# Patient Record
Sex: MALE | Race: OTHER | NOT HISPANIC OR LATINO | URBAN - METROPOLITAN AREA
[De-identification: names, ages, dates, MRNs, and addresses within clinical notes are randomized per-mention and may not be internally consistent; named-entity substitution may affect disease eponyms.]

---

## 2017-01-23 ENCOUNTER — INPATIENT (INPATIENT)
Facility: HOSPITAL | Age: 82
LOS: 14 days | Discharge: HOME | DRG: 234 | End: 2017-02-07
Attending: THORACIC SURGERY (CARDIOTHORACIC VASCULAR SURGERY) | Admitting: INTERNAL MEDICINE
Payer: MEDICARE

## 2017-01-23 VITALS
RESPIRATION RATE: 16 BRPM | HEART RATE: 55 BPM | OXYGEN SATURATION: 95 % | WEIGHT: 130.07 LBS | TEMPERATURE: 98 F | DIASTOLIC BLOOD PRESSURE: 79 MMHG | SYSTOLIC BLOOD PRESSURE: 179 MMHG | HEIGHT: 63 IN

## 2017-01-23 DIAGNOSIS — I20.9 ANGINA PECTORIS, UNSPECIFIED: ICD-10-CM

## 2017-01-23 DIAGNOSIS — I20.0 UNSTABLE ANGINA: ICD-10-CM

## 2017-01-23 DIAGNOSIS — Z96.652 PRESENCE OF LEFT ARTIFICIAL KNEE JOINT: Chronic | ICD-10-CM

## 2017-01-23 LAB
ALBUMIN SERPL ELPH-MCNC: 3.9 G/DL — SIGNIFICANT CHANGE UP (ref 3.3–5)
ALP SERPL-CCNC: 53 U/L — SIGNIFICANT CHANGE UP (ref 40–120)
ALT FLD-CCNC: 11 U/L — SIGNIFICANT CHANGE UP (ref 10–45)
ANION GAP SERPL CALC-SCNC: 12 MMOL/L — SIGNIFICANT CHANGE UP (ref 5–17)
APTT BLD: 28.7 SEC — SIGNIFICANT CHANGE UP (ref 27.5–37.4)
AST SERPL-CCNC: 14 U/L — SIGNIFICANT CHANGE UP (ref 10–40)
BILIRUB DIRECT SERPL-MCNC: 0.2 MG/DL — SIGNIFICANT CHANGE UP (ref 0–0.2)
BILIRUB INDIRECT FLD-MCNC: 0.3 MG/DL — SIGNIFICANT CHANGE UP (ref 0.2–1)
BILIRUB SERPL-MCNC: 0.5 MG/DL — SIGNIFICANT CHANGE UP (ref 0.2–1.2)
BLD GP AB SCN SERPL QL: NEGATIVE — SIGNIFICANT CHANGE UP
BUN SERPL-MCNC: 22 MG/DL — SIGNIFICANT CHANGE UP (ref 7–23)
CALCIUM SERPL-MCNC: 9.9 MG/DL — SIGNIFICANT CHANGE UP (ref 8.4–10.5)
CHLORIDE SERPL-SCNC: 104 MMOL/L — SIGNIFICANT CHANGE UP (ref 96–108)
CHOLEST SERPL-MCNC: 89 MG/DL — SIGNIFICANT CHANGE UP (ref 10–199)
CO2 SERPL-SCNC: 23 MMOL/L — SIGNIFICANT CHANGE UP (ref 22–31)
CREAT SERPL-MCNC: 1.02 MG/DL — SIGNIFICANT CHANGE UP (ref 0.5–1.3)
GLUCOSE SERPL-MCNC: 103 MG/DL — HIGH (ref 70–99)
HBA1C BLD-MCNC: 6.7 % — HIGH (ref 4–5.6)
HCT VFR BLD CALC: 36.6 % — LOW (ref 39–50)
HDLC SERPL-MCNC: 52 MG/DL — SIGNIFICANT CHANGE UP (ref 40–125)
HGB BLD-MCNC: 12.2 G/DL — LOW (ref 13–17)
INR BLD: 1.05 RATIO — SIGNIFICANT CHANGE UP (ref 0.88–1.16)
LIPID PNL WITH DIRECT LDL SERPL: 26 MG/DL — SIGNIFICANT CHANGE UP
MCHC RBC-ENTMCNC: 32 PG — SIGNIFICANT CHANGE UP (ref 27–34)
MCHC RBC-ENTMCNC: 33.5 GM/DL — SIGNIFICANT CHANGE UP (ref 32–36)
MCV RBC AUTO: 95.5 FL — SIGNIFICANT CHANGE UP (ref 80–100)
PLATELET # BLD AUTO: 241 K/UL — SIGNIFICANT CHANGE UP (ref 150–400)
POTASSIUM SERPL-MCNC: 4.9 MMOL/L — SIGNIFICANT CHANGE UP (ref 3.5–5.3)
POTASSIUM SERPL-SCNC: 4.9 MMOL/L — SIGNIFICANT CHANGE UP (ref 3.5–5.3)
PROT SERPL-MCNC: 6.5 G/DL — SIGNIFICANT CHANGE UP (ref 6–8.3)
PROTHROM AB SERPL-ACNC: 11.3 SEC — SIGNIFICANT CHANGE UP (ref 10–13.1)
RBC # BLD: 3.83 M/UL — LOW (ref 4.2–5.8)
RBC # FLD: 13.2 % — SIGNIFICANT CHANGE UP (ref 10.3–14.5)
RH IG SCN BLD-IMP: POSITIVE — SIGNIFICANT CHANGE UP
SODIUM SERPL-SCNC: 139 MMOL/L — SIGNIFICANT CHANGE UP (ref 135–145)
T3 SERPL-MCNC: 87 NG/DL — SIGNIFICANT CHANGE UP (ref 80–200)
T4 AB SER-ACNC: 6.1 UG/DL — SIGNIFICANT CHANGE UP (ref 4.6–12)
TOTAL CHOLESTEROL/HDL RATIO MEASUREMENT: 1.7 RATIO — LOW (ref 3.4–9.6)
TRIGL SERPL-MCNC: 56 MG/DL — SIGNIFICANT CHANGE UP (ref 10–149)
TSH SERPL-MCNC: 1.12 UIU/ML — SIGNIFICANT CHANGE UP (ref 0.27–4.2)
WBC # BLD: 9.1 K/UL — SIGNIFICANT CHANGE UP (ref 3.8–10.5)
WBC # FLD AUTO: 9.1 K/UL — SIGNIFICANT CHANGE UP (ref 3.8–10.5)

## 2017-01-23 PROCEDURE — 93010 ELECTROCARDIOGRAM REPORT: CPT

## 2017-01-23 PROCEDURE — 71010: CPT | Mod: 26

## 2017-01-23 PROCEDURE — 93880 EXTRACRANIAL BILAT STUDY: CPT | Mod: 26

## 2017-01-23 RX ORDER — SODIUM CHLORIDE 9 MG/ML
3 INJECTION INTRAMUSCULAR; INTRAVENOUS; SUBCUTANEOUS EVERY 8 HOURS
Qty: 0 | Refills: 0 | Status: DISCONTINUED | OUTPATIENT
Start: 2017-01-23 | End: 2017-01-24

## 2017-01-23 RX ORDER — ASPIRIN/CALCIUM CARB/MAGNESIUM 324 MG
81 TABLET ORAL DAILY
Qty: 0 | Refills: 0 | Status: DISCONTINUED | OUTPATIENT
Start: 2017-01-23 | End: 2017-01-24

## 2017-01-23 RX ORDER — CEFUROXIME AXETIL 250 MG
1500 TABLET ORAL ONCE
Qty: 0 | Refills: 0 | Status: DISCONTINUED | OUTPATIENT
Start: 2017-01-23 | End: 2017-01-24

## 2017-01-23 RX ORDER — NITROGLYCERIN 6.5 MG
0.3 CAPSULE, EXTENDED RELEASE ORAL
Qty: 0 | Refills: 0 | Status: DISCONTINUED | OUTPATIENT
Start: 2017-01-23 | End: 2017-01-23

## 2017-01-23 RX ORDER — POLYETHYLENE GLYCOL 3350 17 G/17G
17 POWDER, FOR SOLUTION ORAL DAILY
Qty: 0 | Refills: 0 | Status: DISCONTINUED | OUTPATIENT
Start: 2017-01-23 | End: 2017-01-24

## 2017-01-23 RX ORDER — CHLORHEXIDINE GLUCONATE 213 G/1000ML
1 SOLUTION TOPICAL ONCE
Qty: 0 | Refills: 0 | Status: COMPLETED | OUTPATIENT
Start: 2017-01-23 | End: 2017-01-23

## 2017-01-23 RX ORDER — SODIUM CHLORIDE 9 MG/ML
1000 INJECTION, SOLUTION INTRAVENOUS
Qty: 0 | Refills: 0 | Status: DISCONTINUED | OUTPATIENT
Start: 2017-01-23 | End: 2017-01-24

## 2017-01-23 RX ORDER — FINASTERIDE 5 MG/1
5 TABLET, FILM COATED ORAL DAILY
Qty: 0 | Refills: 0 | Status: DISCONTINUED | OUTPATIENT
Start: 2017-01-23 | End: 2017-01-24

## 2017-01-23 RX ORDER — NITROGLYCERIN 6.5 MG
0.4 CAPSULE, EXTENDED RELEASE ORAL
Qty: 0 | Refills: 0 | Status: DISCONTINUED | OUTPATIENT
Start: 2017-01-23 | End: 2017-01-24

## 2017-01-23 RX ORDER — SERTRALINE 25 MG/1
25 TABLET, FILM COATED ORAL DAILY
Qty: 0 | Refills: 0 | Status: DISCONTINUED | OUTPATIENT
Start: 2017-01-23 | End: 2017-01-24

## 2017-01-23 RX ORDER — GLUCAGON INJECTION, SOLUTION 0.5 MG/.1ML
1 INJECTION, SOLUTION SUBCUTANEOUS ONCE
Qty: 0 | Refills: 0 | Status: DISCONTINUED | OUTPATIENT
Start: 2017-01-23 | End: 2017-01-24

## 2017-01-23 RX ORDER — CHLORHEXIDINE GLUCONATE 213 G/1000ML
30 SOLUTION TOPICAL ONCE
Qty: 0 | Refills: 0 | Status: DISCONTINUED | OUTPATIENT
Start: 2017-01-23 | End: 2017-01-24

## 2017-01-23 RX ORDER — PANTOPRAZOLE SODIUM 20 MG/1
40 TABLET, DELAYED RELEASE ORAL
Qty: 0 | Refills: 0 | Status: DISCONTINUED | OUTPATIENT
Start: 2017-01-23 | End: 2017-01-24

## 2017-01-23 RX ORDER — METOPROLOL TARTRATE 50 MG
25 TABLET ORAL
Qty: 0 | Refills: 0 | Status: DISCONTINUED | OUTPATIENT
Start: 2017-01-23 | End: 2017-01-24

## 2017-01-23 RX ORDER — MECLIZINE HCL 12.5 MG
1 TABLET ORAL
Qty: 0 | Refills: 0 | COMMUNITY

## 2017-01-23 RX ORDER — FLUTICASONE PROPIONATE AND SALMETEROL 50; 250 UG/1; UG/1
1 POWDER ORAL; RESPIRATORY (INHALATION)
Qty: 0 | Refills: 0 | Status: DISCONTINUED | OUTPATIENT
Start: 2017-01-23 | End: 2017-01-24

## 2017-01-23 RX ORDER — MECLIZINE HCL 12.5 MG
25 TABLET ORAL THREE TIMES A DAY
Qty: 0 | Refills: 0 | Status: DISCONTINUED | OUTPATIENT
Start: 2017-01-23 | End: 2017-01-24

## 2017-01-23 RX ORDER — DEXTROSE 50 % IN WATER 50 %
1 SYRINGE (ML) INTRAVENOUS ONCE
Qty: 0 | Refills: 0 | Status: DISCONTINUED | OUTPATIENT
Start: 2017-01-23 | End: 2017-01-24

## 2017-01-23 RX ORDER — DEXTROSE 50 % IN WATER 50 %
25 SYRINGE (ML) INTRAVENOUS ONCE
Qty: 0 | Refills: 0 | Status: DISCONTINUED | OUTPATIENT
Start: 2017-01-23 | End: 2017-01-24

## 2017-01-23 RX ORDER — CARBIDOPA AND LEVODOPA 25; 100 MG/1; MG/1
1 TABLET ORAL THREE TIMES A DAY
Qty: 0 | Refills: 0 | Status: DISCONTINUED | OUTPATIENT
Start: 2017-01-23 | End: 2017-01-24

## 2017-01-23 RX ORDER — ATORVASTATIN CALCIUM 80 MG/1
80 TABLET, FILM COATED ORAL AT BEDTIME
Qty: 0 | Refills: 0 | Status: DISCONTINUED | OUTPATIENT
Start: 2017-01-23 | End: 2017-01-24

## 2017-01-23 RX ORDER — INSULIN LISPRO 100/ML
VIAL (ML) SUBCUTANEOUS AT BEDTIME
Qty: 0 | Refills: 0 | Status: DISCONTINUED | OUTPATIENT
Start: 2017-01-23 | End: 2017-01-24

## 2017-01-23 RX ORDER — DEXTROSE 50 % IN WATER 50 %
12.5 SYRINGE (ML) INTRAVENOUS ONCE
Qty: 0 | Refills: 0 | Status: DISCONTINUED | OUTPATIENT
Start: 2017-01-23 | End: 2017-01-24

## 2017-01-23 RX ORDER — INSULIN LISPRO 100/ML
VIAL (ML) SUBCUTANEOUS
Qty: 0 | Refills: 0 | Status: DISCONTINUED | OUTPATIENT
Start: 2017-01-23 | End: 2017-01-24

## 2017-01-23 RX ORDER — TAMSULOSIN HYDROCHLORIDE 0.4 MG/1
1 CAPSULE ORAL
Qty: 0 | Refills: 0 | COMMUNITY

## 2017-01-23 RX ORDER — ISOSORBIDE MONONITRATE 60 MG/1
30 TABLET, EXTENDED RELEASE ORAL DAILY
Qty: 0 | Refills: 0 | Status: DISCONTINUED | OUTPATIENT
Start: 2017-01-23 | End: 2017-01-24

## 2017-01-23 RX ADMIN — FINASTERIDE 5 MILLIGRAM(S): 5 TABLET, FILM COATED ORAL at 18:49

## 2017-01-23 RX ADMIN — ATORVASTATIN CALCIUM 80 MILLIGRAM(S): 80 TABLET, FILM COATED ORAL at 21:49

## 2017-01-23 RX ADMIN — CARBIDOPA AND LEVODOPA 1 TABLET(S): 25; 100 TABLET ORAL at 21:49

## 2017-01-23 RX ADMIN — ISOSORBIDE MONONITRATE 30 MILLIGRAM(S): 60 TABLET, EXTENDED RELEASE ORAL at 18:46

## 2017-01-23 RX ADMIN — SODIUM CHLORIDE 3 MILLILITER(S): 9 INJECTION INTRAMUSCULAR; INTRAVENOUS; SUBCUTANEOUS at 21:47

## 2017-01-23 RX ADMIN — CHLORHEXIDINE GLUCONATE 1 APPLICATION(S): 213 SOLUTION TOPICAL at 21:46

## 2017-01-23 RX ADMIN — FLUTICASONE PROPIONATE AND SALMETEROL 1 DOSE(S): 50; 250 POWDER ORAL; RESPIRATORY (INHALATION) at 21:48

## 2017-01-23 RX ADMIN — Medication 25 MILLIGRAM(S): at 19:48

## 2017-01-23 RX ADMIN — Medication 81 MILLIGRAM(S): at 18:49

## 2017-01-23 RX ADMIN — SERTRALINE 25 MILLIGRAM(S): 25 TABLET, FILM COATED ORAL at 18:47

## 2017-01-23 RX ADMIN — Medication 25 MILLIGRAM(S): at 21:50

## 2017-01-23 NOTE — H&P CARDIOLOGY - PMH
Benign prostatic hyperplasia    CAD (coronary artery disease)    Diabetes mellitus    Dyslipidemia    Hypertension    Osteoporosis    Syncope    Urinary frequency

## 2017-01-23 NOTE — H&P CARDIOLOGY - FAMILY HISTORY
Father  Still living? Unknown  Family history of coronary artery disease, Age at diagnosis: Age Unknown     Mother  Still living? Unknown  Family history of coronary artery disease, Age at diagnosis: Age Unknown

## 2017-01-23 NOTE — H&P CARDIOLOGY - HISTORY OF PRESENT ILLNESS
76 y/o male poor historian with PMHx of HTN, DMT2 (A1c unknown), CAD with PCI to LAD (2003) presents for cardiac cath. Patient reports exertional and nonexertional, dull left ACW non-radiating chest pain, relieved with nitro SL. Patient seen and evaluated by Dr. Campos (cards). Referred fro cardiac cath for further cardiac evaluation. Patient currently denies sob, seen & eval By MD & now presents for cardiac cath.

## 2017-01-24 ENCOUNTER — APPOINTMENT (OUTPATIENT)
Dept: CARDIOTHORACIC SURGERY | Facility: HOSPITAL | Age: 82
End: 2017-01-24

## 2017-01-24 LAB
ALBUMIN SERPL ELPH-MCNC: 2.8 G/DL — LOW (ref 3.3–5)
ALP SERPL-CCNC: 33 U/L — LOW (ref 40–120)
ALT FLD-CCNC: 5 U/L RC — LOW (ref 10–45)
ANION GAP SERPL CALC-SCNC: 14 MMOL/L — SIGNIFICANT CHANGE UP (ref 5–17)
APPEARANCE UR: CLEAR — SIGNIFICANT CHANGE UP
APTT BLD: 32.4 SEC — SIGNIFICANT CHANGE UP (ref 27.5–37.4)
AST SERPL-CCNC: 19 U/L — SIGNIFICANT CHANGE UP (ref 10–40)
BASOPHILS # BLD AUTO: 0 K/UL — SIGNIFICANT CHANGE UP (ref 0–0.2)
BASOPHILS NFR BLD AUTO: 0.2 % — SIGNIFICANT CHANGE UP (ref 0–2)
BILIRUB SERPL-MCNC: 1 MG/DL — SIGNIFICANT CHANGE UP (ref 0.2–1.2)
BILIRUB UR-MCNC: NEGATIVE — SIGNIFICANT CHANGE UP
BLD GP AB SCN SERPL QL: NEGATIVE — SIGNIFICANT CHANGE UP
BUN SERPL-MCNC: 18 MG/DL — SIGNIFICANT CHANGE UP (ref 7–23)
CALCIUM SERPL-MCNC: 7.9 MG/DL — LOW (ref 8.4–10.5)
CHLORIDE SERPL-SCNC: 108 MMOL/L — SIGNIFICANT CHANGE UP (ref 96–108)
CK MB BLD-MCNC: 11.2 % — HIGH (ref 0–3.5)
CK MB CFR SERPL CALC: 18.2 NG/ML — HIGH (ref 0–6.7)
CK SERPL-CCNC: 162 U/L — SIGNIFICANT CHANGE UP (ref 30–200)
CO2 SERPL-SCNC: 24 MMOL/L — SIGNIFICANT CHANGE UP (ref 22–31)
COLOR SPEC: SIGNIFICANT CHANGE UP
CREAT SERPL-MCNC: 0.7 MG/DL — SIGNIFICANT CHANGE UP (ref 0.5–1.3)
DIFF PNL FLD: ABNORMAL
EOSINOPHIL # BLD AUTO: 0.1 K/UL — SIGNIFICANT CHANGE UP (ref 0–0.5)
EOSINOPHIL NFR BLD AUTO: 0.9 % — SIGNIFICANT CHANGE UP (ref 0–6)
FIBRINOGEN PPP-MCNC: 276 MG/DL — SIGNIFICANT CHANGE UP (ref 255–510)
GAS PNL BLDA: SIGNIFICANT CHANGE UP
GLUCOSE SERPL-MCNC: 118 MG/DL — HIGH (ref 70–99)
GLUCOSE UR QL: 100
HCT VFR BLD CALC: 26.9 % — LOW (ref 39–50)
HGB BLD-MCNC: 9.1 G/DL — LOW (ref 13–17)
INR BLD: 1.44 RATIO — HIGH (ref 0.88–1.16)
KETONES UR-MCNC: NEGATIVE — SIGNIFICANT CHANGE UP
LEUKOCYTE ESTERASE UR-ACNC: NEGATIVE — SIGNIFICANT CHANGE UP
LYMPHOCYTES # BLD AUTO: 1 K/UL — SIGNIFICANT CHANGE UP (ref 1–3.3)
LYMPHOCYTES # BLD AUTO: 6.5 % — LOW (ref 13–44)
MCHC RBC-ENTMCNC: 32.2 PG — SIGNIFICANT CHANGE UP (ref 27–34)
MCHC RBC-ENTMCNC: 33.8 GM/DL — SIGNIFICANT CHANGE UP (ref 32–36)
MCV RBC AUTO: 95.2 FL — SIGNIFICANT CHANGE UP (ref 80–100)
MONOCYTES # BLD AUTO: 1 K/UL — HIGH (ref 0–0.9)
MONOCYTES NFR BLD AUTO: 6.1 % — SIGNIFICANT CHANGE UP (ref 2–14)
MRSA PCR RESULT.: SIGNIFICANT CHANGE UP
NEUTROPHILS # BLD AUTO: 13.9 K/UL — HIGH (ref 1.8–7.4)
NEUTROPHILS NFR BLD AUTO: 86.3 % — HIGH (ref 43–77)
NITRITE UR-MCNC: NEGATIVE — SIGNIFICANT CHANGE UP
PH UR: 5 — SIGNIFICANT CHANGE UP (ref 4.8–8)
PLATELET # BLD AUTO: 138 K/UL — LOW (ref 150–400)
POTASSIUM SERPL-MCNC: 4.8 MMOL/L — SIGNIFICANT CHANGE UP (ref 3.5–5.3)
POTASSIUM SERPL-SCNC: 4.8 MMOL/L — SIGNIFICANT CHANGE UP (ref 3.5–5.3)
PROT SERPL-MCNC: 4.2 G/DL — LOW (ref 6–8.3)
PROT UR-MCNC: NEGATIVE — SIGNIFICANT CHANGE UP
PROTHROM AB SERPL-ACNC: 15.7 SEC — HIGH (ref 10–13.1)
RBC # BLD: 2.82 M/UL — LOW (ref 4.2–5.8)
RBC # FLD: 13.2 % — SIGNIFICANT CHANGE UP (ref 10.3–14.5)
RH IG SCN BLD-IMP: POSITIVE — SIGNIFICANT CHANGE UP
S AUREUS DNA NOSE QL NAA+PROBE: SIGNIFICANT CHANGE UP
SODIUM SERPL-SCNC: 146 MMOL/L — HIGH (ref 135–145)
SP GR SPEC: >1.03 — HIGH (ref 1.01–1.02)
TROPONIN T SERPL-MCNC: 0.44 NG/ML — HIGH (ref 0–0.06)
UROBILINOGEN FLD QL: NEGATIVE — SIGNIFICANT CHANGE UP
WBC # BLD: 16.1 K/UL — HIGH (ref 3.8–10.5)
WBC # FLD AUTO: 16.1 K/UL — HIGH (ref 3.8–10.5)

## 2017-01-24 PROCEDURE — 94010 BREATHING CAPACITY TEST: CPT | Mod: 26

## 2017-01-24 PROCEDURE — 71010: CPT | Mod: 26

## 2017-01-24 PROCEDURE — 93010 ELECTROCARDIOGRAM REPORT: CPT

## 2017-01-24 PROCEDURE — 33518 CABG ARTERY-VEIN TWO: CPT

## 2017-01-24 PROCEDURE — 71010: CPT | Mod: 26,77

## 2017-01-24 PROCEDURE — 33533 CABG ARTERIAL SINGLE: CPT

## 2017-01-24 PROCEDURE — 33508 ENDOSCOPIC VEIN HARVEST: CPT

## 2017-01-24 RX ORDER — POTASSIUM CHLORIDE 20 MEQ
10 PACKET (EA) ORAL ONCE
Qty: 0 | Refills: 0 | Status: DISCONTINUED | OUTPATIENT
Start: 2017-01-24 | End: 2017-01-27

## 2017-01-24 RX ORDER — NICARDIPINE HYDROCHLORIDE 30 MG/1
5 CAPSULE, EXTENDED RELEASE ORAL
Qty: 50 | Refills: 0 | Status: DISCONTINUED | OUTPATIENT
Start: 2017-01-24 | End: 2017-01-25

## 2017-01-24 RX ORDER — SODIUM CHLORIDE 9 MG/ML
1000 INJECTION INTRAMUSCULAR; INTRAVENOUS; SUBCUTANEOUS
Qty: 0 | Refills: 0 | Status: DISCONTINUED | OUTPATIENT
Start: 2017-01-24 | End: 2017-01-26

## 2017-01-24 RX ORDER — FAMOTIDINE 10 MG/ML
20 INJECTION INTRAVENOUS EVERY 12 HOURS
Qty: 0 | Refills: 0 | Status: DISCONTINUED | OUTPATIENT
Start: 2017-01-24 | End: 2017-01-25

## 2017-01-24 RX ORDER — POTASSIUM CHLORIDE 20 MEQ
10 PACKET (EA) ORAL
Qty: 0 | Refills: 0 | Status: COMPLETED | OUTPATIENT
Start: 2017-01-24 | End: 2017-01-24

## 2017-01-24 RX ORDER — PROPOFOL 10 MG/ML
14.12 INJECTION, EMULSION INTRAVENOUS
Qty: 500 | Refills: 0 | Status: DISCONTINUED | OUTPATIENT
Start: 2017-01-24 | End: 2017-01-25

## 2017-01-24 RX ORDER — NOREPINEPHRINE BITARTRATE/D5W 8 MG/250ML
0.09 PLASTIC BAG, INJECTION (ML) INTRAVENOUS
Qty: 8 | Refills: 0 | Status: DISCONTINUED | OUTPATIENT
Start: 2017-01-24 | End: 2017-01-25

## 2017-01-24 RX ORDER — ASPIRIN/CALCIUM CARB/MAGNESIUM 324 MG
325 TABLET ORAL DAILY
Qty: 0 | Refills: 0 | Status: DISCONTINUED | OUTPATIENT
Start: 2017-01-24 | End: 2017-02-06

## 2017-01-24 RX ORDER — DOCUSATE SODIUM 100 MG
100 CAPSULE ORAL THREE TIMES A DAY
Qty: 0 | Refills: 0 | Status: DISCONTINUED | OUTPATIENT
Start: 2017-01-24 | End: 2017-02-07

## 2017-01-24 RX ORDER — POTASSIUM CHLORIDE 20 MEQ
10 PACKET (EA) ORAL
Qty: 0 | Refills: 0 | Status: DISCONTINUED | OUTPATIENT
Start: 2017-01-24 | End: 2017-01-27

## 2017-01-24 RX ORDER — OXYCODONE HYDROCHLORIDE 5 MG/1
5 TABLET ORAL EVERY 4 HOURS
Qty: 0 | Refills: 0 | Status: DISCONTINUED | OUTPATIENT
Start: 2017-01-24 | End: 2017-01-27

## 2017-01-24 RX ORDER — ASPIRIN/CALCIUM CARB/MAGNESIUM 324 MG
300 TABLET ORAL ONCE
Qty: 0 | Refills: 0 | Status: COMPLETED | OUTPATIENT
Start: 2017-01-24 | End: 2017-01-24

## 2017-01-24 RX ORDER — ALBUMIN HUMAN 25 %
250 VIAL (ML) INTRAVENOUS
Qty: 0 | Refills: 0 | Status: COMPLETED | OUTPATIENT
Start: 2017-01-24 | End: 2017-01-24

## 2017-01-24 RX ORDER — SODIUM CHLORIDE 9 MG/ML
1000 INJECTION, SOLUTION INTRAVENOUS
Qty: 0 | Refills: 0 | Status: DISCONTINUED | OUTPATIENT
Start: 2017-01-24 | End: 2017-01-26

## 2017-01-24 RX ORDER — ALBUMIN HUMAN 25 %
250 VIAL (ML) INTRAVENOUS ONCE
Qty: 0 | Refills: 0 | Status: COMPLETED | OUTPATIENT
Start: 2017-01-24 | End: 2017-01-24

## 2017-01-24 RX ORDER — CEFUROXIME AXETIL 250 MG
1500 TABLET ORAL EVERY 8 HOURS
Qty: 0 | Refills: 0 | Status: COMPLETED | OUTPATIENT
Start: 2017-01-24 | End: 2017-01-25

## 2017-01-24 RX ORDER — ACETAMINOPHEN 500 MG
650 TABLET ORAL EVERY 6 HOURS
Qty: 0 | Refills: 0 | Status: DISCONTINUED | OUTPATIENT
Start: 2017-01-24 | End: 2017-02-07

## 2017-01-24 RX ORDER — MEPERIDINE HYDROCHLORIDE 50 MG/ML
25 INJECTION INTRAMUSCULAR; INTRAVENOUS; SUBCUTANEOUS ONCE
Qty: 0 | Refills: 0 | Status: DISCONTINUED | OUTPATIENT
Start: 2017-01-24 | End: 2017-01-24

## 2017-01-24 RX ORDER — SODIUM CHLORIDE 9 MG/ML
700 INJECTION, SOLUTION INTRAVENOUS ONCE
Qty: 0 | Refills: 0 | Status: COMPLETED | OUTPATIENT
Start: 2017-01-24 | End: 2017-01-24

## 2017-01-24 RX ORDER — HYDROMORPHONE HYDROCHLORIDE 2 MG/ML
0.5 INJECTION INTRAMUSCULAR; INTRAVENOUS; SUBCUTANEOUS ONCE
Qty: 0 | Refills: 0 | Status: DISCONTINUED | OUTPATIENT
Start: 2017-01-24 | End: 2017-01-24

## 2017-01-24 RX ORDER — INSULIN HUMAN 100 [IU]/ML
1 INJECTION, SOLUTION SUBCUTANEOUS
Qty: 100 | Refills: 0 | Status: DISCONTINUED | OUTPATIENT
Start: 2017-01-24 | End: 2017-01-25

## 2017-01-24 RX ORDER — DOBUTAMINE HCL 250MG/20ML
1.25 VIAL (ML) INTRAVENOUS
Qty: 500 | Refills: 0 | Status: DISCONTINUED | OUTPATIENT
Start: 2017-01-24 | End: 2017-01-25

## 2017-01-24 RX ORDER — CHLORHEXIDINE GLUCONATE 213 G/1000ML
5 SOLUTION TOPICAL EVERY 4 HOURS
Qty: 0 | Refills: 0 | Status: DISCONTINUED | OUTPATIENT
Start: 2017-01-24 | End: 2017-01-25

## 2017-01-24 RX ORDER — DOBUTAMINE HCL 250MG/20ML
2 VIAL (ML) INTRAVENOUS
Qty: 500 | Refills: 0 | Status: DISCONTINUED | OUTPATIENT
Start: 2017-01-24 | End: 2017-01-24

## 2017-01-24 RX ORDER — AMINOCAPROIC ACID 500 MG/1
5 TABLET ORAL
Qty: 5 | Refills: 0 | Status: DISCONTINUED | OUTPATIENT
Start: 2017-01-24 | End: 2017-01-24

## 2017-01-24 RX ADMIN — Medication 300 MILLIGRAM(S): at 23:02

## 2017-01-24 RX ADMIN — Medication 100 MILLIGRAM(S): at 23:03

## 2017-01-24 RX ADMIN — CHLORHEXIDINE GLUCONATE 5 MILLILITER(S): 213 SOLUTION TOPICAL at 18:11

## 2017-01-24 RX ADMIN — Medication 100 MILLIEQUIVALENT(S): at 17:33

## 2017-01-24 RX ADMIN — Medication 125 MILLILITER(S): at 17:59

## 2017-01-24 RX ADMIN — HYDROMORPHONE HYDROCHLORIDE 0.5 MILLIGRAM(S): 2 INJECTION INTRAMUSCULAR; INTRAVENOUS; SUBCUTANEOUS at 18:45

## 2017-01-24 RX ADMIN — Medication 25 MILLIGRAM(S): at 06:55

## 2017-01-24 RX ADMIN — Medication 100 MILLIEQUIVALENT(S): at 18:01

## 2017-01-24 RX ADMIN — Medication 4.42 MICROGRAM(S)/KG/MIN: at 21:43

## 2017-01-24 RX ADMIN — Medication 500 MILLILITER(S): at 21:39

## 2017-01-24 RX ADMIN — SODIUM CHLORIDE 4200 MILLILITER(S): 9 INJECTION, SOLUTION INTRAVENOUS at 20:45

## 2017-01-24 RX ADMIN — PROPOFOL 5 MICROGRAM(S)/KG/MIN: 10 INJECTION, EMULSION INTRAVENOUS at 21:43

## 2017-01-24 RX ADMIN — Medication 100 MILLIEQUIVALENT(S): at 23:45

## 2017-01-24 RX ADMIN — CHLORHEXIDINE GLUCONATE 5 MILLILITER(S): 213 SOLUTION TOPICAL at 15:16

## 2017-01-24 RX ADMIN — Medication 10 MICROGRAM(S)/KG/MIN: at 21:42

## 2017-01-24 RX ADMIN — CARBIDOPA AND LEVODOPA 1 TABLET(S): 25; 100 TABLET ORAL at 06:55

## 2017-01-24 RX ADMIN — Medication 100 MILLIGRAM(S): at 16:32

## 2017-01-24 RX ADMIN — HYDROMORPHONE HYDROCHLORIDE 0.5 MILLIGRAM(S): 2 INJECTION INTRAMUSCULAR; INTRAVENOUS; SUBCUTANEOUS at 19:00

## 2017-01-24 RX ADMIN — CHLORHEXIDINE GLUCONATE 5 MILLILITER(S): 213 SOLUTION TOPICAL at 21:10

## 2017-01-24 RX ADMIN — FLUTICASONE PROPIONATE AND SALMETEROL 1 DOSE(S): 50; 250 POWDER ORAL; RESPIRATORY (INHALATION) at 06:55

## 2017-01-24 RX ADMIN — PANTOPRAZOLE SODIUM 40 MILLIGRAM(S): 20 TABLET, DELAYED RELEASE ORAL at 06:55

## 2017-01-24 RX ADMIN — FAMOTIDINE 20 MILLIGRAM(S): 10 INJECTION INTRAVENOUS at 18:11

## 2017-01-24 RX ADMIN — SODIUM CHLORIDE 3 MILLILITER(S): 9 INJECTION INTRAMUSCULAR; INTRAVENOUS; SUBCUTANEOUS at 06:49

## 2017-01-24 NOTE — PROVIDER CONTACT NOTE (OTHER) - SITUATION
pt is a c3 from today on levo, insulin, pt not hemodynamically atable, blood pressure dropping then bp increasing, pt bucking vent

## 2017-01-24 NOTE — PROVIDER CONTACT NOTE (OTHER) - ACTION/TREATMENT ORDERED:
give 1 unit blood, give LR, albumin, start cardene when pressure high, start  start propofol drip, will continue to monitor

## 2017-01-25 LAB
ALBUMIN SERPL ELPH-MCNC: 3.6 G/DL — SIGNIFICANT CHANGE UP (ref 3.3–5)
ALP SERPL-CCNC: 29 U/L — LOW (ref 40–120)
ALT FLD-CCNC: 8 U/L RC — LOW (ref 10–45)
ANION GAP SERPL CALC-SCNC: 13 MMOL/L — SIGNIFICANT CHANGE UP (ref 5–17)
APTT BLD: 52.7 SEC — HIGH (ref 27.5–37.4)
AST SERPL-CCNC: 19 U/L — SIGNIFICANT CHANGE UP (ref 10–40)
BASOPHILS # BLD AUTO: 0 K/UL — SIGNIFICANT CHANGE UP (ref 0–0.2)
BASOPHILS NFR BLD AUTO: 0 % — SIGNIFICANT CHANGE UP (ref 0–2)
BILIRUB SERPL-MCNC: 1.5 MG/DL — HIGH (ref 0.2–1.2)
BUN SERPL-MCNC: 18 MG/DL — SIGNIFICANT CHANGE UP (ref 7–23)
CALCIUM SERPL-MCNC: 8 MG/DL — LOW (ref 8.4–10.5)
CHLORIDE SERPL-SCNC: 108 MMOL/L — SIGNIFICANT CHANGE UP (ref 96–108)
CO2 SERPL-SCNC: 23 MMOL/L — SIGNIFICANT CHANGE UP (ref 22–31)
CREAT SERPL-MCNC: 0.9 MG/DL — SIGNIFICANT CHANGE UP (ref 0.5–1.3)
EOSINOPHIL # BLD AUTO: 0 K/UL — SIGNIFICANT CHANGE UP (ref 0–0.5)
EOSINOPHIL NFR BLD AUTO: 0.1 % — SIGNIFICANT CHANGE UP (ref 0–6)
GAS PNL BLDA: SIGNIFICANT CHANGE UP
GLUCOSE SERPL-MCNC: 136 MG/DL — HIGH (ref 70–99)
HCT VFR BLD CALC: 27.8 % — LOW (ref 39–50)
HGB BLD-MCNC: 9.4 G/DL — LOW (ref 13–17)
INR BLD: 1.33 RATIO — HIGH (ref 0.88–1.16)
LYMPHOCYTES # BLD AUTO: 0.4 K/UL — LOW (ref 1–3.3)
LYMPHOCYTES # BLD AUTO: 2.7 % — LOW (ref 13–44)
MCHC RBC-ENTMCNC: 32.1 PG — SIGNIFICANT CHANGE UP (ref 27–34)
MCHC RBC-ENTMCNC: 33.9 GM/DL — SIGNIFICANT CHANGE UP (ref 32–36)
MCV RBC AUTO: 94.8 FL — SIGNIFICANT CHANGE UP (ref 80–100)
MONOCYTES # BLD AUTO: 0.3 K/UL — SIGNIFICANT CHANGE UP (ref 0–0.9)
MONOCYTES NFR BLD AUTO: 2.3 % — SIGNIFICANT CHANGE UP (ref 2–14)
NEUTROPHILS # BLD AUTO: 13.7 K/UL — HIGH (ref 1.8–7.4)
NEUTROPHILS NFR BLD AUTO: 94.9 % — HIGH (ref 43–77)
PLATELET # BLD AUTO: 112 K/UL — LOW (ref 150–400)
POTASSIUM SERPL-MCNC: 4.5 MMOL/L — SIGNIFICANT CHANGE UP (ref 3.5–5.3)
POTASSIUM SERPL-SCNC: 4.5 MMOL/L — SIGNIFICANT CHANGE UP (ref 3.5–5.3)
PROT SERPL-MCNC: 4.9 G/DL — LOW (ref 6–8.3)
PROTHROM AB SERPL-ACNC: 14.5 SEC — HIGH (ref 10–13.1)
RBC # BLD: 2.93 M/UL — LOW (ref 4.2–5.8)
RBC # FLD: 13.4 % — SIGNIFICANT CHANGE UP (ref 10.3–14.5)
SODIUM SERPL-SCNC: 144 MMOL/L — SIGNIFICANT CHANGE UP (ref 135–145)
WBC # BLD: 14.4 K/UL — HIGH (ref 3.8–10.5)
WBC # FLD AUTO: 14.4 K/UL — HIGH (ref 3.8–10.5)

## 2017-01-25 PROCEDURE — 71010: CPT | Mod: 26

## 2017-01-25 PROCEDURE — 93010 ELECTROCARDIOGRAM REPORT: CPT

## 2017-01-25 RX ORDER — FINASTERIDE 5 MG/1
5 TABLET, FILM COATED ORAL DAILY
Qty: 0 | Refills: 0 | Status: DISCONTINUED | OUTPATIENT
Start: 2017-01-25 | End: 2017-02-07

## 2017-01-25 RX ORDER — HEPARIN SODIUM 5000 [USP'U]/ML
5000 INJECTION INTRAVENOUS; SUBCUTANEOUS EVERY 8 HOURS
Qty: 0 | Refills: 0 | Status: DISCONTINUED | OUTPATIENT
Start: 2017-01-25 | End: 2017-01-25

## 2017-01-25 RX ORDER — DOBUTAMINE HCL 250MG/20ML
1 VIAL (ML) INTRAVENOUS
Qty: 500 | Refills: 0 | Status: DISCONTINUED | OUTPATIENT
Start: 2017-01-25 | End: 2017-01-25

## 2017-01-25 RX ORDER — POLYETHYLENE GLYCOL 3350 17 G/17G
17 POWDER, FOR SOLUTION ORAL DAILY
Qty: 0 | Refills: 0 | Status: DISCONTINUED | OUTPATIENT
Start: 2017-01-25 | End: 2017-02-07

## 2017-01-25 RX ORDER — ENOXAPARIN SODIUM 100 MG/ML
40 INJECTION SUBCUTANEOUS DAILY
Qty: 0 | Refills: 0 | Status: DISCONTINUED | OUTPATIENT
Start: 2017-01-25 | End: 2017-01-28

## 2017-01-25 RX ORDER — FENTANYL CITRATE 50 UG/ML
25 INJECTION INTRAVENOUS ONCE
Qty: 0 | Refills: 0 | Status: DISCONTINUED | OUTPATIENT
Start: 2017-01-25 | End: 2017-01-25

## 2017-01-25 RX ORDER — METOPROLOL TARTRATE 50 MG
12.5 TABLET ORAL EVERY 12 HOURS
Qty: 0 | Refills: 0 | Status: DISCONTINUED | OUTPATIENT
Start: 2017-01-25 | End: 2017-01-26

## 2017-01-25 RX ORDER — SERTRALINE 25 MG/1
25 TABLET, FILM COATED ORAL DAILY
Qty: 0 | Refills: 0 | Status: DISCONTINUED | OUTPATIENT
Start: 2017-01-25 | End: 2017-02-07

## 2017-01-25 RX ORDER — METFORMIN HYDROCHLORIDE 850 MG/1
500 TABLET ORAL
Qty: 0 | Refills: 0 | Status: DISCONTINUED | OUTPATIENT
Start: 2017-01-25 | End: 2017-01-26

## 2017-01-25 RX ORDER — FAMOTIDINE 10 MG/ML
20 INJECTION INTRAVENOUS
Qty: 0 | Refills: 0 | Status: DISCONTINUED | OUTPATIENT
Start: 2017-01-25 | End: 2017-01-25

## 2017-01-25 RX ORDER — ALBUMIN HUMAN 25 %
250 VIAL (ML) INTRAVENOUS ONCE
Qty: 0 | Refills: 0 | Status: COMPLETED | OUTPATIENT
Start: 2017-01-25 | End: 2017-01-25

## 2017-01-25 RX ORDER — PANTOPRAZOLE SODIUM 20 MG/1
40 TABLET, DELAYED RELEASE ORAL
Qty: 0 | Refills: 0 | Status: DISCONTINUED | OUTPATIENT
Start: 2017-01-25 | End: 2017-02-07

## 2017-01-25 RX ORDER — INSULIN LISPRO 100/ML
VIAL (ML) SUBCUTANEOUS AT BEDTIME
Qty: 0 | Refills: 0 | Status: DISCONTINUED | OUTPATIENT
Start: 2017-01-25 | End: 2017-02-07

## 2017-01-25 RX ORDER — ATORVASTATIN CALCIUM 80 MG/1
40 TABLET, FILM COATED ORAL AT BEDTIME
Qty: 0 | Refills: 0 | Status: DISCONTINUED | OUTPATIENT
Start: 2017-01-25 | End: 2017-02-07

## 2017-01-25 RX ORDER — DEXMEDETOMIDINE HYDROCHLORIDE IN 0.9% SODIUM CHLORIDE 4 UG/ML
0.1 INJECTION INTRAVENOUS
Qty: 200 | Refills: 0 | Status: DISCONTINUED | OUTPATIENT
Start: 2017-01-25 | End: 2017-01-25

## 2017-01-25 RX ORDER — INSULIN LISPRO 100/ML
VIAL (ML) SUBCUTANEOUS
Qty: 0 | Refills: 0 | Status: DISCONTINUED | OUTPATIENT
Start: 2017-01-25 | End: 2017-02-07

## 2017-01-25 RX ORDER — CARBIDOPA AND LEVODOPA 25; 100 MG/1; MG/1
1 TABLET ORAL THREE TIMES A DAY
Qty: 0 | Refills: 0 | Status: DISCONTINUED | OUTPATIENT
Start: 2017-01-25 | End: 2017-02-07

## 2017-01-25 RX ORDER — OXYBUTYNIN CHLORIDE 5 MG
5 TABLET ORAL
Qty: 0 | Refills: 0 | Status: DISCONTINUED | OUTPATIENT
Start: 2017-01-25 | End: 2017-02-07

## 2017-01-25 RX ADMIN — FINASTERIDE 5 MILLIGRAM(S): 5 TABLET, FILM COATED ORAL at 12:48

## 2017-01-25 RX ADMIN — Medication 100 MILLIGRAM(S): at 07:37

## 2017-01-25 RX ADMIN — Medication 12.5 MILLIGRAM(S): at 18:36

## 2017-01-25 RX ADMIN — CARBIDOPA AND LEVODOPA 1 TABLET(S): 25; 100 TABLET ORAL at 12:49

## 2017-01-25 RX ADMIN — Medication 100 MILLIGRAM(S): at 22:33

## 2017-01-25 RX ADMIN — Medication 325 MILLIGRAM(S): at 12:48

## 2017-01-25 RX ADMIN — CARBIDOPA AND LEVODOPA 1 TABLET(S): 25; 100 TABLET ORAL at 22:34

## 2017-01-25 RX ADMIN — OXYCODONE HYDROCHLORIDE 5 MILLIGRAM(S): 5 TABLET ORAL at 20:35

## 2017-01-25 RX ADMIN — Medication 5 MILLIGRAM(S): at 18:36

## 2017-01-25 RX ADMIN — FAMOTIDINE 20 MILLIGRAM(S): 10 INJECTION INTRAVENOUS at 05:08

## 2017-01-25 RX ADMIN — CHLORHEXIDINE GLUCONATE 5 MILLILITER(S): 213 SOLUTION TOPICAL at 05:08

## 2017-01-25 RX ADMIN — Medication 100 MILLIGRAM(S): at 23:37

## 2017-01-25 RX ADMIN — CHLORHEXIDINE GLUCONATE 5 MILLILITER(S): 213 SOLUTION TOPICAL at 01:32

## 2017-01-25 RX ADMIN — OXYCODONE HYDROCHLORIDE 5 MILLIGRAM(S): 5 TABLET ORAL at 20:08

## 2017-01-25 RX ADMIN — Medication 100 MILLIGRAM(S): at 17:18

## 2017-01-25 RX ADMIN — OXYCODONE HYDROCHLORIDE 5 MILLIGRAM(S): 5 TABLET ORAL at 14:20

## 2017-01-25 RX ADMIN — Medication 100 MILLIGRAM(S): at 12:49

## 2017-01-25 RX ADMIN — METFORMIN HYDROCHLORIDE 500 MILLIGRAM(S): 850 TABLET ORAL at 17:20

## 2017-01-25 RX ADMIN — Medication 650 MILLIGRAM(S): at 12:50

## 2017-01-25 RX ADMIN — OXYCODONE HYDROCHLORIDE 5 MILLIGRAM(S): 5 TABLET ORAL at 13:50

## 2017-01-25 RX ADMIN — POLYETHYLENE GLYCOL 3350 17 GRAM(S): 17 POWDER, FOR SOLUTION ORAL at 12:48

## 2017-01-25 RX ADMIN — SERTRALINE 25 MILLIGRAM(S): 25 TABLET, FILM COATED ORAL at 12:48

## 2017-01-25 RX ADMIN — ATORVASTATIN CALCIUM 40 MILLIGRAM(S): 80 TABLET, FILM COATED ORAL at 22:33

## 2017-01-25 RX ADMIN — Medication 125 MILLILITER(S): at 10:15

## 2017-01-25 RX ADMIN — FENTANYL CITRATE 25 MICROGRAM(S): 50 INJECTION INTRAVENOUS at 05:45

## 2017-01-25 RX ADMIN — CARBIDOPA AND LEVODOPA 1 TABLET(S): 25; 100 TABLET ORAL at 02:33

## 2017-01-25 RX ADMIN — ENOXAPARIN SODIUM 40 MILLIGRAM(S): 100 INJECTION SUBCUTANEOUS at 12:48

## 2017-01-25 RX ADMIN — Medication 6: at 17:17

## 2017-01-25 RX ADMIN — Medication 650 MILLIGRAM(S): at 13:20

## 2017-01-25 RX ADMIN — METFORMIN HYDROCHLORIDE 500 MILLIGRAM(S): 850 TABLET ORAL at 12:53

## 2017-01-25 RX ADMIN — FENTANYL CITRATE 25 MICROGRAM(S): 50 INJECTION INTRAVENOUS at 05:30

## 2017-01-25 NOTE — AIRWAY REMOVAL NOTE  ADULT & PEDS - ARTIFICAL AIRWAY REMOVAL COMMENTS
Written order for extubation verified. The patient was identified by full name and birth date compared to the identification band. Present during the procedure was Shi HIGHTOWER

## 2017-01-26 LAB
ANION GAP SERPL CALC-SCNC: 14 MMOL/L — SIGNIFICANT CHANGE UP (ref 5–17)
APPEARANCE UR: CLEAR — SIGNIFICANT CHANGE UP
BACTERIA # UR AUTO: ABNORMAL /HPF
BASOPHILS # BLD AUTO: 0 K/UL — SIGNIFICANT CHANGE UP (ref 0–0.2)
BASOPHILS NFR BLD AUTO: 0 % — SIGNIFICANT CHANGE UP (ref 0–2)
BILIRUB UR-MCNC: NEGATIVE — SIGNIFICANT CHANGE UP
BUN SERPL-MCNC: 31 MG/DL — HIGH (ref 7–23)
CALCIUM SERPL-MCNC: 8.3 MG/DL — LOW (ref 8.4–10.5)
CHLORIDE SERPL-SCNC: 105 MMOL/L — SIGNIFICANT CHANGE UP (ref 96–108)
CO2 SERPL-SCNC: 22 MMOL/L — SIGNIFICANT CHANGE UP (ref 22–31)
COLOR SPEC: YELLOW — SIGNIFICANT CHANGE UP
CREAT SERPL-MCNC: 0.97 MG/DL — SIGNIFICANT CHANGE UP (ref 0.5–1.3)
DIFF PNL FLD: ABNORMAL
EOSINOPHIL # BLD AUTO: 0 K/UL — SIGNIFICANT CHANGE UP (ref 0–0.5)
EOSINOPHIL NFR BLD AUTO: 0.1 % — SIGNIFICANT CHANGE UP (ref 0–6)
GLUCOSE SERPL-MCNC: 206 MG/DL — HIGH (ref 70–99)
GLUCOSE UR QL: NEGATIVE — SIGNIFICANT CHANGE UP
HCT VFR BLD CALC: 26.4 % — LOW (ref 39–50)
HGB BLD-MCNC: 8.7 G/DL — LOW (ref 13–17)
KETONES UR-MCNC: NEGATIVE — SIGNIFICANT CHANGE UP
LEUKOCYTE ESTERASE UR-ACNC: NEGATIVE — SIGNIFICANT CHANGE UP
LYMPHOCYTES # BLD AUTO: 0.5 K/UL — LOW (ref 1–3.3)
LYMPHOCYTES # BLD AUTO: 3.1 % — LOW (ref 13–44)
MCHC RBC-ENTMCNC: 31.7 PG — SIGNIFICANT CHANGE UP (ref 27–34)
MCHC RBC-ENTMCNC: 32.8 GM/DL — SIGNIFICANT CHANGE UP (ref 32–36)
MCV RBC AUTO: 96.5 FL — SIGNIFICANT CHANGE UP (ref 80–100)
MONOCYTES # BLD AUTO: 1.3 K/UL — HIGH (ref 0–0.9)
MONOCYTES NFR BLD AUTO: 7.6 % — SIGNIFICANT CHANGE UP (ref 2–14)
NEUTROPHILS # BLD AUTO: 15 K/UL — HIGH (ref 1.8–7.4)
NEUTROPHILS NFR BLD AUTO: 89.2 % — HIGH (ref 43–77)
NITRITE UR-MCNC: NEGATIVE — SIGNIFICANT CHANGE UP
PH UR: 6 — SIGNIFICANT CHANGE UP (ref 4.8–8)
PLATELET # BLD AUTO: 122 K/UL — LOW (ref 150–400)
POTASSIUM SERPL-MCNC: 5 MMOL/L — SIGNIFICANT CHANGE UP (ref 3.5–5.3)
POTASSIUM SERPL-SCNC: 5 MMOL/L — SIGNIFICANT CHANGE UP (ref 3.5–5.3)
PROT UR-MCNC: SIGNIFICANT CHANGE UP
RBC # BLD: 2.74 M/UL — LOW (ref 4.2–5.8)
RBC # FLD: 13.7 % — SIGNIFICANT CHANGE UP (ref 10.3–14.5)
RBC CASTS # UR COMP ASSIST: ABNORMAL /HPF (ref 0–2)
SODIUM SERPL-SCNC: 141 MMOL/L — SIGNIFICANT CHANGE UP (ref 135–145)
SP GR SPEC: 1.03 — HIGH (ref 1.01–1.02)
UROBILINOGEN FLD QL: NEGATIVE — SIGNIFICANT CHANGE UP
WBC # BLD: 16.8 K/UL — HIGH (ref 3.8–10.5)
WBC # FLD AUTO: 16.8 K/UL — HIGH (ref 3.8–10.5)
WBC UR QL: SIGNIFICANT CHANGE UP /HPF (ref 0–5)

## 2017-01-26 PROCEDURE — 71010: CPT | Mod: 26

## 2017-01-26 RX ORDER — MAGNESIUM SULFATE 500 MG/ML
2 VIAL (ML) INJECTION ONCE
Qty: 0 | Refills: 0 | Status: COMPLETED | OUTPATIENT
Start: 2017-01-26 | End: 2017-01-26

## 2017-01-26 RX ORDER — METOPROLOL TARTRATE 50 MG
5 TABLET ORAL ONCE
Qty: 0 | Refills: 0 | Status: DISCONTINUED | OUTPATIENT
Start: 2017-01-26 | End: 2017-01-27

## 2017-01-26 RX ORDER — SODIUM CHLORIDE 9 MG/ML
1000 INJECTION, SOLUTION INTRAVENOUS
Qty: 0 | Refills: 0 | Status: DISCONTINUED | OUTPATIENT
Start: 2017-01-26 | End: 2017-02-06

## 2017-01-26 RX ORDER — INSULIN LISPRO 100/ML
4 VIAL (ML) SUBCUTANEOUS
Qty: 0 | Refills: 0 | Status: DISCONTINUED | OUTPATIENT
Start: 2017-01-26 | End: 2017-01-28

## 2017-01-26 RX ORDER — METOPROLOL TARTRATE 50 MG
25 TABLET ORAL
Qty: 0 | Refills: 0 | Status: DISCONTINUED | OUTPATIENT
Start: 2017-01-26 | End: 2017-01-26

## 2017-01-26 RX ORDER — INSULIN GLARGINE 100 [IU]/ML
8 INJECTION, SOLUTION SUBCUTANEOUS AT BEDTIME
Qty: 0 | Refills: 0 | Status: DISCONTINUED | OUTPATIENT
Start: 2017-01-26 | End: 2017-01-30

## 2017-01-26 RX ORDER — DEXTROSE 50 % IN WATER 50 %
12.5 SYRINGE (ML) INTRAVENOUS ONCE
Qty: 0 | Refills: 0 | Status: DISCONTINUED | OUTPATIENT
Start: 2017-01-26 | End: 2017-02-07

## 2017-01-26 RX ORDER — DEXTROSE 50 % IN WATER 50 %
1 SYRINGE (ML) INTRAVENOUS ONCE
Qty: 0 | Refills: 0 | Status: DISCONTINUED | OUTPATIENT
Start: 2017-01-26 | End: 2017-02-07

## 2017-01-26 RX ORDER — DEXTROSE 50 % IN WATER 50 %
25 SYRINGE (ML) INTRAVENOUS ONCE
Qty: 0 | Refills: 0 | Status: DISCONTINUED | OUTPATIENT
Start: 2017-01-26 | End: 2017-02-07

## 2017-01-26 RX ORDER — GLUCAGON INJECTION, SOLUTION 0.5 MG/.1ML
1 INJECTION, SOLUTION SUBCUTANEOUS ONCE
Qty: 0 | Refills: 0 | Status: DISCONTINUED | OUTPATIENT
Start: 2017-01-26 | End: 2017-02-07

## 2017-01-26 RX ORDER — METOPROLOL TARTRATE 50 MG
25 TABLET ORAL THREE TIMES A DAY
Qty: 0 | Refills: 0 | Status: DISCONTINUED | OUTPATIENT
Start: 2017-01-26 | End: 2017-01-28

## 2017-01-26 RX ADMIN — Medication 3: at 12:06

## 2017-01-26 RX ADMIN — OXYCODONE HYDROCHLORIDE 5 MILLIGRAM(S): 5 TABLET ORAL at 09:50

## 2017-01-26 RX ADMIN — Medication 5 MILLIGRAM(S): at 17:04

## 2017-01-26 RX ADMIN — CARBIDOPA AND LEVODOPA 1 TABLET(S): 25; 100 TABLET ORAL at 06:55

## 2017-01-26 RX ADMIN — OXYCODONE HYDROCHLORIDE 5 MILLIGRAM(S): 5 TABLET ORAL at 00:32

## 2017-01-26 RX ADMIN — Medication 25 MILLIGRAM(S): at 05:45

## 2017-01-26 RX ADMIN — OXYCODONE HYDROCHLORIDE 5 MILLIGRAM(S): 5 TABLET ORAL at 10:20

## 2017-01-26 RX ADMIN — METFORMIN HYDROCHLORIDE 500 MILLIGRAM(S): 850 TABLET ORAL at 17:04

## 2017-01-26 RX ADMIN — Medication 650 MILLIGRAM(S): at 17:04

## 2017-01-26 RX ADMIN — Medication 650 MILLIGRAM(S): at 18:04

## 2017-01-26 RX ADMIN — Medication 5 MILLIGRAM(S): at 05:45

## 2017-01-26 RX ADMIN — OXYCODONE HYDROCHLORIDE 5 MILLIGRAM(S): 5 TABLET ORAL at 23:02

## 2017-01-26 RX ADMIN — Medication 325 MILLIGRAM(S): at 12:06

## 2017-01-26 RX ADMIN — Medication 100 MILLIGRAM(S): at 21:25

## 2017-01-26 RX ADMIN — METFORMIN HYDROCHLORIDE 500 MILLIGRAM(S): 850 TABLET ORAL at 07:53

## 2017-01-26 RX ADMIN — Medication 100 MILLIGRAM(S): at 14:39

## 2017-01-26 RX ADMIN — SERTRALINE 25 MILLIGRAM(S): 25 TABLET, FILM COATED ORAL at 12:05

## 2017-01-26 RX ADMIN — Medication 25 MILLIGRAM(S): at 21:25

## 2017-01-26 RX ADMIN — POLYETHYLENE GLYCOL 3350 17 GRAM(S): 17 POWDER, FOR SOLUTION ORAL at 12:05

## 2017-01-26 RX ADMIN — CARBIDOPA AND LEVODOPA 1 TABLET(S): 25; 100 TABLET ORAL at 21:25

## 2017-01-26 RX ADMIN — CARBIDOPA AND LEVODOPA 1 TABLET(S): 25; 100 TABLET ORAL at 14:39

## 2017-01-26 RX ADMIN — PANTOPRAZOLE SODIUM 40 MILLIGRAM(S): 20 TABLET, DELAYED RELEASE ORAL at 05:46

## 2017-01-26 RX ADMIN — ENOXAPARIN SODIUM 40 MILLIGRAM(S): 100 INJECTION SUBCUTANEOUS at 12:06

## 2017-01-26 RX ADMIN — Medication 1: at 07:50

## 2017-01-26 RX ADMIN — Medication 50 GRAM(S): at 09:50

## 2017-01-26 RX ADMIN — ATORVASTATIN CALCIUM 40 MILLIGRAM(S): 80 TABLET, FILM COATED ORAL at 21:25

## 2017-01-26 RX ADMIN — Medication 100 MILLIGRAM(S): at 05:46

## 2017-01-26 RX ADMIN — Medication 25 MILLIGRAM(S): at 14:39

## 2017-01-26 RX ADMIN — OXYCODONE HYDROCHLORIDE 5 MILLIGRAM(S): 5 TABLET ORAL at 01:10

## 2017-01-26 RX ADMIN — FINASTERIDE 5 MILLIGRAM(S): 5 TABLET, FILM COATED ORAL at 12:05

## 2017-01-26 NOTE — PHYSICAL THERAPY INITIAL EVALUATION ADULT - PERTINENT HX OF CURRENT PROBLEM, REHAB EVAL
76 y/o Una speaking M presents for cardiac cath. Diagnostic cath via RFA 1/23 reveals LM 60%, pCx 50%, mRCA 50%. S/p CABG x 3 01/24/2017. 78 y/o M w/ Parkinson's Disease presents for cardiac cath. Diagnostic cath via RFA 1/23 reveals LM 60%, pCx 50%, mRCA 50%. S/p CABG x 3 01/24/2017.

## 2017-01-26 NOTE — PHYSICAL THERAPY INITIAL EVALUATION ADULT - GAIT DEVIATIONS NOTED, PT EVAL
decreased step length/increased time in double stance/forward flexed posture, difficulty initiating gait/decreased velocity of limb motion/decreased gisella

## 2017-01-27 LAB
ANION GAP SERPL CALC-SCNC: 12 MMOL/L — SIGNIFICANT CHANGE UP (ref 5–17)
BASOPHILS # BLD AUTO: 0 K/UL — SIGNIFICANT CHANGE UP (ref 0–0.2)
BASOPHILS NFR BLD AUTO: 0 % — SIGNIFICANT CHANGE UP (ref 0–2)
BLD GP AB SCN SERPL QL: NEGATIVE — SIGNIFICANT CHANGE UP
BUN SERPL-MCNC: 35 MG/DL — HIGH (ref 7–23)
CALCIUM SERPL-MCNC: 8.4 MG/DL — SIGNIFICANT CHANGE UP (ref 8.4–10.5)
CHLORIDE SERPL-SCNC: 103 MMOL/L — SIGNIFICANT CHANGE UP (ref 96–108)
CO2 SERPL-SCNC: 24 MMOL/L — SIGNIFICANT CHANGE UP (ref 22–31)
CREAT SERPL-MCNC: 0.87 MG/DL — SIGNIFICANT CHANGE UP (ref 0.5–1.3)
CULTURE RESULTS: NO GROWTH — SIGNIFICANT CHANGE UP
EOSINOPHIL # BLD AUTO: 0 K/UL — SIGNIFICANT CHANGE UP (ref 0–0.5)
EOSINOPHIL NFR BLD AUTO: 0.1 % — SIGNIFICANT CHANGE UP (ref 0–6)
GLUCOSE SERPL-MCNC: 171 MG/DL — HIGH (ref 70–99)
HCT VFR BLD CALC: 23.3 % — LOW (ref 39–50)
HCT VFR BLD CALC: 29.3 % — LOW (ref 39–50)
HGB BLD-MCNC: 10.1 G/DL — LOW (ref 13–17)
HGB BLD-MCNC: 7.4 G/DL — LOW (ref 13–17)
LYMPHOCYTES # BLD AUTO: 0.7 K/UL — LOW (ref 1–3.3)
LYMPHOCYTES # BLD AUTO: 4.6 % — LOW (ref 13–44)
MCHC RBC-ENTMCNC: 30.9 PG — SIGNIFICANT CHANGE UP (ref 27–34)
MCHC RBC-ENTMCNC: 31.7 PG — SIGNIFICANT CHANGE UP (ref 27–34)
MCHC RBC-ENTMCNC: 31.9 GM/DL — LOW (ref 32–36)
MCHC RBC-ENTMCNC: 34.3 GM/DL — SIGNIFICANT CHANGE UP (ref 32–36)
MCV RBC AUTO: 92.6 FL — SIGNIFICANT CHANGE UP (ref 80–100)
MCV RBC AUTO: 96.8 FL — SIGNIFICANT CHANGE UP (ref 80–100)
MONOCYTES # BLD AUTO: 1.5 K/UL — HIGH (ref 0–0.9)
MONOCYTES NFR BLD AUTO: 10.4 % — SIGNIFICANT CHANGE UP (ref 2–14)
NEUTROPHILS # BLD AUTO: 12.4 K/UL — HIGH (ref 1.8–7.4)
NEUTROPHILS NFR BLD AUTO: 84.9 % — HIGH (ref 43–77)
PLATELET # BLD AUTO: 112 K/UL — LOW (ref 150–400)
PLATELET # BLD AUTO: 121 K/UL — LOW (ref 150–400)
POTASSIUM SERPL-MCNC: 5.2 MMOL/L — SIGNIFICANT CHANGE UP (ref 3.5–5.3)
POTASSIUM SERPL-SCNC: 5.2 MMOL/L — SIGNIFICANT CHANGE UP (ref 3.5–5.3)
RBC # BLD: 2.41 M/UL — LOW (ref 4.2–5.8)
RBC # BLD: 3.17 M/UL — LOW (ref 4.2–5.8)
RBC # FLD: 13.4 % — SIGNIFICANT CHANGE UP (ref 10.3–14.5)
RBC # FLD: 14.3 % — SIGNIFICANT CHANGE UP (ref 10.3–14.5)
RH IG SCN BLD-IMP: POSITIVE — SIGNIFICANT CHANGE UP
SODIUM SERPL-SCNC: 139 MMOL/L — SIGNIFICANT CHANGE UP (ref 135–145)
SPECIMEN SOURCE: SIGNIFICANT CHANGE UP
WBC # BLD: 14.6 K/UL — HIGH (ref 3.8–10.5)
WBC # BLD: 16.2 K/UL — HIGH (ref 3.8–10.5)
WBC # FLD AUTO: 14.6 K/UL — HIGH (ref 3.8–10.5)
WBC # FLD AUTO: 16.2 K/UL — HIGH (ref 3.8–10.5)

## 2017-01-27 PROCEDURE — 93010 ELECTROCARDIOGRAM REPORT: CPT

## 2017-01-27 PROCEDURE — 71010: CPT | Mod: 26

## 2017-01-27 PROCEDURE — 71250 CT THORAX DX C-: CPT | Mod: 26

## 2017-01-27 PROCEDURE — 93306 TTE W/DOPPLER COMPLETE: CPT | Mod: 26

## 2017-01-27 RX ORDER — ALBUTEROL 90 UG/1
1 AEROSOL, METERED ORAL EVERY 4 HOURS
Qty: 0 | Refills: 0 | Status: DISCONTINUED | OUTPATIENT
Start: 2017-01-27 | End: 2017-02-07

## 2017-01-27 RX ORDER — AMIODARONE HYDROCHLORIDE 400 MG/1
400 TABLET ORAL THREE TIMES A DAY
Qty: 0 | Refills: 0 | Status: COMPLETED | OUTPATIENT
Start: 2017-01-27 | End: 2017-01-31

## 2017-01-27 RX ORDER — METOPROLOL TARTRATE 50 MG
5 TABLET ORAL ONCE
Qty: 0 | Refills: 0 | Status: COMPLETED | OUTPATIENT
Start: 2017-01-27 | End: 2017-01-27

## 2017-01-27 RX ORDER — TIOTROPIUM BROMIDE 18 UG/1
1 CAPSULE ORAL; RESPIRATORY (INHALATION) DAILY
Qty: 0 | Refills: 0 | Status: DISCONTINUED | OUTPATIENT
Start: 2017-01-27 | End: 2017-02-07

## 2017-01-27 RX ORDER — AMIODARONE HYDROCHLORIDE 400 MG/1
200 TABLET ORAL DAILY
Qty: 0 | Refills: 0 | Status: DISCONTINUED | OUTPATIENT
Start: 2017-02-01 | End: 2017-02-04

## 2017-01-27 RX ORDER — FUROSEMIDE 40 MG
40 TABLET ORAL DAILY
Qty: 0 | Refills: 0 | Status: DISCONTINUED | OUTPATIENT
Start: 2017-01-28 | End: 2017-01-28

## 2017-01-27 RX ORDER — FUROSEMIDE 40 MG
40 TABLET ORAL ONCE
Qty: 0 | Refills: 0 | Status: COMPLETED | OUTPATIENT
Start: 2017-01-27 | End: 2017-01-27

## 2017-01-27 RX ORDER — IPRATROPIUM/ALBUTEROL SULFATE 18-103MCG
3 AEROSOL WITH ADAPTER (GRAM) INHALATION EVERY 6 HOURS
Qty: 0 | Refills: 0 | Status: DISCONTINUED | OUTPATIENT
Start: 2017-01-27 | End: 2017-02-07

## 2017-01-27 RX ADMIN — FINASTERIDE 5 MILLIGRAM(S): 5 TABLET, FILM COATED ORAL at 13:08

## 2017-01-27 RX ADMIN — Medication 325 MILLIGRAM(S): at 13:09

## 2017-01-27 RX ADMIN — Medication 5 MILLIGRAM(S): at 17:06

## 2017-01-27 RX ADMIN — OXYCODONE HYDROCHLORIDE 5 MILLIGRAM(S): 5 TABLET ORAL at 00:05

## 2017-01-27 RX ADMIN — Medication 100 MILLIGRAM(S): at 22:31

## 2017-01-27 RX ADMIN — Medication 5 MILLIGRAM(S): at 05:57

## 2017-01-27 RX ADMIN — Medication 25 MILLIGRAM(S): at 22:34

## 2017-01-27 RX ADMIN — CARBIDOPA AND LEVODOPA 1 TABLET(S): 25; 100 TABLET ORAL at 22:30

## 2017-01-27 RX ADMIN — Medication 40 MILLIGRAM(S): at 09:13

## 2017-01-27 RX ADMIN — Medication 5 MILLIGRAM(S): at 15:10

## 2017-01-27 RX ADMIN — Medication 5 MILLIGRAM(S): at 13:15

## 2017-01-27 RX ADMIN — Medication 100 MILLIGRAM(S): at 05:57

## 2017-01-27 RX ADMIN — ATORVASTATIN CALCIUM 40 MILLIGRAM(S): 80 TABLET, FILM COATED ORAL at 22:30

## 2017-01-27 RX ADMIN — Medication 4 UNIT(S): at 13:06

## 2017-01-27 RX ADMIN — Medication 3 MILLILITER(S): at 17:10

## 2017-01-27 RX ADMIN — INSULIN GLARGINE 8 UNIT(S): 100 INJECTION, SOLUTION SUBCUTANEOUS at 22:34

## 2017-01-27 RX ADMIN — OXYCODONE HYDROCHLORIDE 5 MILLIGRAM(S): 5 TABLET ORAL at 07:53

## 2017-01-27 RX ADMIN — Medication 2: at 17:07

## 2017-01-27 RX ADMIN — OXYCODONE HYDROCHLORIDE 5 MILLIGRAM(S): 5 TABLET ORAL at 22:34

## 2017-01-27 RX ADMIN — CARBIDOPA AND LEVODOPA 1 TABLET(S): 25; 100 TABLET ORAL at 05:57

## 2017-01-27 RX ADMIN — Medication 3 MILLILITER(S): at 09:13

## 2017-01-27 RX ADMIN — ENOXAPARIN SODIUM 40 MILLIGRAM(S): 100 INJECTION SUBCUTANEOUS at 13:07

## 2017-01-27 RX ADMIN — Medication 25 MILLIGRAM(S): at 05:57

## 2017-01-27 RX ADMIN — CARBIDOPA AND LEVODOPA 1 TABLET(S): 25; 100 TABLET ORAL at 13:08

## 2017-01-27 RX ADMIN — Medication 25 MILLIGRAM(S): at 13:07

## 2017-01-27 RX ADMIN — Medication 1: at 07:53

## 2017-01-27 RX ADMIN — OXYCODONE HYDROCHLORIDE 5 MILLIGRAM(S): 5 TABLET ORAL at 08:22

## 2017-01-27 RX ADMIN — AMIODARONE HYDROCHLORIDE 400 MILLIGRAM(S): 400 TABLET ORAL at 13:33

## 2017-01-27 RX ADMIN — AMIODARONE HYDROCHLORIDE 400 MILLIGRAM(S): 400 TABLET ORAL at 22:31

## 2017-01-27 RX ADMIN — OXYCODONE HYDROCHLORIDE 5 MILLIGRAM(S): 5 TABLET ORAL at 23:00

## 2017-01-27 RX ADMIN — Medication 4 UNIT(S): at 07:53

## 2017-01-27 RX ADMIN — POLYETHYLENE GLYCOL 3350 17 GRAM(S): 17 POWDER, FOR SOLUTION ORAL at 13:09

## 2017-01-27 RX ADMIN — PANTOPRAZOLE SODIUM 40 MILLIGRAM(S): 20 TABLET, DELAYED RELEASE ORAL at 05:57

## 2017-01-27 RX ADMIN — SERTRALINE 25 MILLIGRAM(S): 25 TABLET, FILM COATED ORAL at 13:09

## 2017-01-27 RX ADMIN — Medication 5 MILLIGRAM(S): at 13:45

## 2017-01-27 RX ADMIN — Medication 4 UNIT(S): at 17:07

## 2017-01-27 RX ADMIN — Medication 2: at 13:06

## 2017-01-27 RX ADMIN — Medication 100 MILLIGRAM(S): at 13:07

## 2017-01-28 LAB
ANION GAP SERPL CALC-SCNC: 11 MMOL/L — SIGNIFICANT CHANGE UP (ref 5–17)
BUN SERPL-MCNC: 34 MG/DL — HIGH (ref 7–23)
CALCIUM SERPL-MCNC: 9 MG/DL — SIGNIFICANT CHANGE UP (ref 8.4–10.5)
CHLORIDE SERPL-SCNC: 103 MMOL/L — SIGNIFICANT CHANGE UP (ref 96–108)
CO2 SERPL-SCNC: 28 MMOL/L — SIGNIFICANT CHANGE UP (ref 22–31)
CREAT SERPL-MCNC: 0.83 MG/DL — SIGNIFICANT CHANGE UP (ref 0.5–1.3)
GLUCOSE SERPL-MCNC: 86 MG/DL — SIGNIFICANT CHANGE UP (ref 70–99)
HCT VFR BLD CALC: 26.3 % — LOW (ref 39–50)
HGB BLD-MCNC: 8.9 G/DL — LOW (ref 13–17)
INR BLD: 1.16 RATIO — SIGNIFICANT CHANGE UP (ref 0.88–1.16)
MCHC RBC-ENTMCNC: 31.4 PG — SIGNIFICANT CHANGE UP (ref 27–34)
MCHC RBC-ENTMCNC: 33.8 GM/DL — SIGNIFICANT CHANGE UP (ref 32–36)
MCV RBC AUTO: 92.9 FL — SIGNIFICANT CHANGE UP (ref 80–100)
PLATELET # BLD AUTO: 112 K/UL — LOW (ref 150–400)
POTASSIUM SERPL-MCNC: 4.2 MMOL/L — SIGNIFICANT CHANGE UP (ref 3.5–5.3)
POTASSIUM SERPL-SCNC: 4.2 MMOL/L — SIGNIFICANT CHANGE UP (ref 3.5–5.3)
PROTHROM AB SERPL-ACNC: 12.7 SEC — SIGNIFICANT CHANGE UP (ref 10–13.1)
RBC # BLD: 2.83 M/UL — LOW (ref 4.2–5.8)
RBC # FLD: 14.1 % — SIGNIFICANT CHANGE UP (ref 10.3–14.5)
SODIUM SERPL-SCNC: 142 MMOL/L — SIGNIFICANT CHANGE UP (ref 135–145)
WBC # BLD: 11.8 K/UL — HIGH (ref 3.8–10.5)
WBC # FLD AUTO: 11.8 K/UL — HIGH (ref 3.8–10.5)

## 2017-01-28 RX ORDER — TIOTROPIUM BROMIDE 18 UG/1
1 CAPSULE ORAL; RESPIRATORY (INHALATION) DAILY
Qty: 0 | Refills: 0 | Status: DISCONTINUED | OUTPATIENT
Start: 2017-01-28 | End: 2017-02-07

## 2017-01-28 RX ORDER — METOPROLOL TARTRATE 50 MG
5 TABLET ORAL ONCE
Qty: 0 | Refills: 0 | Status: COMPLETED | OUTPATIENT
Start: 2017-01-28 | End: 2017-01-28

## 2017-01-28 RX ORDER — FUROSEMIDE 40 MG
40 TABLET ORAL ONCE
Qty: 0 | Refills: 0 | Status: COMPLETED | OUTPATIENT
Start: 2017-01-28 | End: 2017-01-28

## 2017-01-28 RX ORDER — METOPROLOL TARTRATE 50 MG
50 TABLET ORAL
Qty: 0 | Refills: 0 | Status: DISCONTINUED | OUTPATIENT
Start: 2017-01-28 | End: 2017-02-04

## 2017-01-28 RX ORDER — ENOXAPARIN SODIUM 100 MG/ML
60 INJECTION SUBCUTANEOUS EVERY 12 HOURS
Qty: 0 | Refills: 0 | Status: DISCONTINUED | OUTPATIENT
Start: 2017-01-28 | End: 2017-01-29

## 2017-01-28 RX ORDER — INSULIN LISPRO 100/ML
7 VIAL (ML) SUBCUTANEOUS
Qty: 0 | Refills: 0 | Status: DISCONTINUED | OUTPATIENT
Start: 2017-01-28 | End: 2017-01-30

## 2017-01-28 RX ORDER — METOPROLOL TARTRATE 50 MG
50 TABLET ORAL
Qty: 0 | Refills: 0 | Status: DISCONTINUED | OUTPATIENT
Start: 2017-01-28 | End: 2017-01-28

## 2017-01-28 RX ORDER — WARFARIN SODIUM 2.5 MG/1
5 TABLET ORAL ONCE
Qty: 0 | Refills: 0 | Status: COMPLETED | OUTPATIENT
Start: 2017-01-28 | End: 2017-01-28

## 2017-01-28 RX ORDER — FUROSEMIDE 40 MG
40 TABLET ORAL DAILY
Qty: 0 | Refills: 0 | Status: DISCONTINUED | OUTPATIENT
Start: 2017-01-29 | End: 2017-02-07

## 2017-01-28 RX ADMIN — ATORVASTATIN CALCIUM 40 MILLIGRAM(S): 80 TABLET, FILM COATED ORAL at 22:36

## 2017-01-28 RX ADMIN — Medication 325 MILLIGRAM(S): at 13:09

## 2017-01-28 RX ADMIN — POLYETHYLENE GLYCOL 3350 17 GRAM(S): 17 POWDER, FOR SOLUTION ORAL at 13:10

## 2017-01-28 RX ADMIN — AMIODARONE HYDROCHLORIDE 400 MILLIGRAM(S): 400 TABLET ORAL at 22:35

## 2017-01-28 RX ADMIN — FINASTERIDE 5 MILLIGRAM(S): 5 TABLET, FILM COATED ORAL at 13:10

## 2017-01-28 RX ADMIN — SERTRALINE 25 MILLIGRAM(S): 25 TABLET, FILM COATED ORAL at 13:10

## 2017-01-28 RX ADMIN — Medication 25 MILLIGRAM(S): at 14:57

## 2017-01-28 RX ADMIN — CARBIDOPA AND LEVODOPA 1 TABLET(S): 25; 100 TABLET ORAL at 22:35

## 2017-01-28 RX ADMIN — Medication 50 MILLIGRAM(S): at 18:01

## 2017-01-28 RX ADMIN — INSULIN GLARGINE 8 UNIT(S): 100 INJECTION, SOLUTION SUBCUTANEOUS at 23:44

## 2017-01-28 RX ADMIN — AMIODARONE HYDROCHLORIDE 400 MILLIGRAM(S): 400 TABLET ORAL at 14:56

## 2017-01-28 RX ADMIN — Medication 5 MILLIGRAM(S): at 09:41

## 2017-01-28 RX ADMIN — ENOXAPARIN SODIUM 60 MILLIGRAM(S): 100 INJECTION SUBCUTANEOUS at 18:00

## 2017-01-28 RX ADMIN — WARFARIN SODIUM 5 MILLIGRAM(S): 2.5 TABLET ORAL at 22:38

## 2017-01-28 RX ADMIN — Medication 3 MILLILITER(S): at 06:02

## 2017-01-28 RX ADMIN — Medication 25 MILLIGRAM(S): at 06:04

## 2017-01-28 RX ADMIN — TIOTROPIUM BROMIDE 1 CAPSULE(S): 18 CAPSULE ORAL; RESPIRATORY (INHALATION) at 07:27

## 2017-01-28 RX ADMIN — Medication 5 MILLIGRAM(S): at 18:01

## 2017-01-28 RX ADMIN — Medication 3 MILLILITER(S): at 00:04

## 2017-01-28 RX ADMIN — Medication 5 MILLIGRAM(S): at 06:04

## 2017-01-28 RX ADMIN — Medication 100 MILLIGRAM(S): at 14:57

## 2017-01-28 RX ADMIN — CARBIDOPA AND LEVODOPA 1 TABLET(S): 25; 100 TABLET ORAL at 06:04

## 2017-01-28 RX ADMIN — Medication 7 UNIT(S): at 12:10

## 2017-01-28 RX ADMIN — PANTOPRAZOLE SODIUM 40 MILLIGRAM(S): 20 TABLET, DELAYED RELEASE ORAL at 06:04

## 2017-01-28 RX ADMIN — Medication 4 UNIT(S): at 07:47

## 2017-01-28 RX ADMIN — Medication 5 MILLIGRAM(S): at 07:43

## 2017-01-28 RX ADMIN — Medication 3 MILLILITER(S): at 12:10

## 2017-01-28 RX ADMIN — AMIODARONE HYDROCHLORIDE 400 MILLIGRAM(S): 400 TABLET ORAL at 06:04

## 2017-01-28 RX ADMIN — Medication 100 MILLIGRAM(S): at 22:35

## 2017-01-28 RX ADMIN — Medication 7 UNIT(S): at 18:01

## 2017-01-28 RX ADMIN — Medication 3 MILLILITER(S): at 18:01

## 2017-01-28 RX ADMIN — Medication 40 MILLIGRAM(S): at 06:20

## 2017-01-28 RX ADMIN — CARBIDOPA AND LEVODOPA 1 TABLET(S): 25; 100 TABLET ORAL at 14:57

## 2017-01-28 RX ADMIN — Medication 100 MILLIGRAM(S): at 06:04

## 2017-01-28 NOTE — PROVIDER CONTACT NOTE (CHANGE IN STATUS NOTIFICATION) - ACTION/TREATMENT ORDERED:
Pt given Lopressor 5mg IVP as per orders, received 6AM Lopressor 25mg and Amio 400mg, will continue to monitor

## 2017-01-28 NOTE — PROVIDER CONTACT NOTE (OTHER) - REASON
pt pressure very liable, pt woke up only squeezed hands did not move feet
Pt. Using accessory muscle to breath, Audible expiratory wheezing
Patient converted to Rapid Afib 130-140

## 2017-01-28 NOTE — PROVIDER CONTACT NOTE (OTHER) - BACKGROUND
Pt. S/P cath: TVD on 1/23; CABG X3 on 1/24. Pt. sent to Rapid Afib on 1/26; and 1/27. Pt. now on NSR

## 2017-01-29 LAB
ANION GAP SERPL CALC-SCNC: 12 MMOL/L — SIGNIFICANT CHANGE UP (ref 5–17)
BUN SERPL-MCNC: 38 MG/DL — HIGH (ref 7–23)
CALCIUM SERPL-MCNC: 8.9 MG/DL — SIGNIFICANT CHANGE UP (ref 8.4–10.5)
CHLORIDE SERPL-SCNC: 101 MMOL/L — SIGNIFICANT CHANGE UP (ref 96–108)
CO2 SERPL-SCNC: 28 MMOL/L — SIGNIFICANT CHANGE UP (ref 22–31)
CREAT SERPL-MCNC: 0.99 MG/DL — SIGNIFICANT CHANGE UP (ref 0.5–1.3)
GLUCOSE SERPL-MCNC: 108 MG/DL — HIGH (ref 70–99)
HCT VFR BLD CALC: 26.6 % — LOW (ref 39–50)
HGB BLD-MCNC: 9 G/DL — LOW (ref 13–17)
INR BLD: 1.14 RATIO — SIGNIFICANT CHANGE UP (ref 0.88–1.16)
MCHC RBC-ENTMCNC: 31.8 PG — SIGNIFICANT CHANGE UP (ref 27–34)
MCHC RBC-ENTMCNC: 34 GM/DL — SIGNIFICANT CHANGE UP (ref 32–36)
MCV RBC AUTO: 93.5 FL — SIGNIFICANT CHANGE UP (ref 80–100)
PLATELET # BLD AUTO: 149 K/UL — LOW (ref 150–400)
POTASSIUM SERPL-MCNC: 3.9 MMOL/L — SIGNIFICANT CHANGE UP (ref 3.5–5.3)
POTASSIUM SERPL-SCNC: 3.9 MMOL/L — SIGNIFICANT CHANGE UP (ref 3.5–5.3)
PROTHROM AB SERPL-ACNC: 12.4 SEC — SIGNIFICANT CHANGE UP (ref 10–13.1)
RBC # BLD: 2.84 M/UL — LOW (ref 4.2–5.8)
RBC # FLD: 14.1 % — SIGNIFICANT CHANGE UP (ref 10.3–14.5)
SODIUM SERPL-SCNC: 141 MMOL/L — SIGNIFICANT CHANGE UP (ref 135–145)
WBC # BLD: 10.7 K/UL — HIGH (ref 3.8–10.5)
WBC # FLD AUTO: 10.7 K/UL — HIGH (ref 3.8–10.5)

## 2017-01-29 RX ORDER — WARFARIN SODIUM 2.5 MG/1
5 TABLET ORAL ONCE
Qty: 0 | Refills: 0 | Status: COMPLETED | OUTPATIENT
Start: 2017-01-29 | End: 2017-01-29

## 2017-01-29 RX ORDER — NYSTATIN 500MM UNIT
500000 POWDER (EA) MISCELLANEOUS EVERY 6 HOURS
Qty: 0 | Refills: 0 | Status: DISCONTINUED | OUTPATIENT
Start: 2017-01-29 | End: 2017-02-07

## 2017-01-29 RX ADMIN — INSULIN GLARGINE 8 UNIT(S): 100 INJECTION, SOLUTION SUBCUTANEOUS at 22:23

## 2017-01-29 RX ADMIN — Medication 5 MILLIGRAM(S): at 07:02

## 2017-01-29 RX ADMIN — Medication 7 UNIT(S): at 17:19

## 2017-01-29 RX ADMIN — Medication 325 MILLIGRAM(S): at 12:03

## 2017-01-29 RX ADMIN — FINASTERIDE 5 MILLIGRAM(S): 5 TABLET, FILM COATED ORAL at 12:03

## 2017-01-29 RX ADMIN — Medication 500000 UNIT(S): at 14:54

## 2017-01-29 RX ADMIN — Medication 100 MILLIGRAM(S): at 07:02

## 2017-01-29 RX ADMIN — PANTOPRAZOLE SODIUM 40 MILLIGRAM(S): 20 TABLET, DELAYED RELEASE ORAL at 07:02

## 2017-01-29 RX ADMIN — Medication 1: at 22:01

## 2017-01-29 RX ADMIN — Medication 1: at 17:19

## 2017-01-29 RX ADMIN — SERTRALINE 25 MILLIGRAM(S): 25 TABLET, FILM COATED ORAL at 12:03

## 2017-01-29 RX ADMIN — CARBIDOPA AND LEVODOPA 1 TABLET(S): 25; 100 TABLET ORAL at 07:02

## 2017-01-29 RX ADMIN — ENOXAPARIN SODIUM 60 MILLIGRAM(S): 100 INJECTION SUBCUTANEOUS at 07:07

## 2017-01-29 RX ADMIN — Medication 40 MILLIGRAM(S): at 07:02

## 2017-01-29 RX ADMIN — Medication 100 MILLIGRAM(S): at 21:25

## 2017-01-29 RX ADMIN — Medication 3 MILLILITER(S): at 21:36

## 2017-01-29 RX ADMIN — AMIODARONE HYDROCHLORIDE 400 MILLIGRAM(S): 400 TABLET ORAL at 07:02

## 2017-01-29 RX ADMIN — CARBIDOPA AND LEVODOPA 1 TABLET(S): 25; 100 TABLET ORAL at 14:54

## 2017-01-29 RX ADMIN — Medication 5 MILLIGRAM(S): at 17:20

## 2017-01-29 RX ADMIN — Medication 50 MILLIGRAM(S): at 07:07

## 2017-01-29 RX ADMIN — POLYETHYLENE GLYCOL 3350 17 GRAM(S): 17 POWDER, FOR SOLUTION ORAL at 12:02

## 2017-01-29 RX ADMIN — Medication 50 MILLIGRAM(S): at 17:20

## 2017-01-29 RX ADMIN — Medication 7 UNIT(S): at 08:20

## 2017-01-29 RX ADMIN — Medication 3 MILLILITER(S): at 12:02

## 2017-01-29 RX ADMIN — WARFARIN SODIUM 5 MILLIGRAM(S): 2.5 TABLET ORAL at 21:25

## 2017-01-29 RX ADMIN — Medication 3 MILLILITER(S): at 07:03

## 2017-01-29 RX ADMIN — Medication 3 MILLILITER(S): at 17:19

## 2017-01-29 RX ADMIN — Medication 7 UNIT(S): at 12:02

## 2017-01-29 RX ADMIN — Medication 100 MILLIGRAM(S): at 14:56

## 2017-01-29 RX ADMIN — ATORVASTATIN CALCIUM 40 MILLIGRAM(S): 80 TABLET, FILM COATED ORAL at 21:25

## 2017-01-29 RX ADMIN — CARBIDOPA AND LEVODOPA 1 TABLET(S): 25; 100 TABLET ORAL at 21:25

## 2017-01-29 RX ADMIN — Medication 500000 UNIT(S): at 21:25

## 2017-01-29 RX ADMIN — TIOTROPIUM BROMIDE 1 CAPSULE(S): 18 CAPSULE ORAL; RESPIRATORY (INHALATION) at 12:03

## 2017-01-29 RX ADMIN — Medication 3 MILLILITER(S): at 00:00

## 2017-01-29 RX ADMIN — AMIODARONE HYDROCHLORIDE 400 MILLIGRAM(S): 400 TABLET ORAL at 21:25

## 2017-01-29 RX ADMIN — AMIODARONE HYDROCHLORIDE 400 MILLIGRAM(S): 400 TABLET ORAL at 14:56

## 2017-01-30 LAB
ANION GAP SERPL CALC-SCNC: 14 MMOL/L — SIGNIFICANT CHANGE UP (ref 5–17)
APTT BLD: 29.6 SEC — SIGNIFICANT CHANGE UP (ref 27.5–37.4)
BUN SERPL-MCNC: 37 MG/DL — HIGH (ref 7–23)
CALCIUM SERPL-MCNC: 8.6 MG/DL — SIGNIFICANT CHANGE UP (ref 8.4–10.5)
CHLORIDE SERPL-SCNC: 100 MMOL/L — SIGNIFICANT CHANGE UP (ref 96–108)
CO2 SERPL-SCNC: 26 MMOL/L — SIGNIFICANT CHANGE UP (ref 22–31)
CREAT SERPL-MCNC: 1.03 MG/DL — SIGNIFICANT CHANGE UP (ref 0.5–1.3)
GLUCOSE SERPL-MCNC: 138 MG/DL — HIGH (ref 70–99)
HCT VFR BLD CALC: 25.7 % — LOW (ref 39–50)
HGB BLD-MCNC: 8.9 G/DL — LOW (ref 13–17)
INR BLD: 1.27 RATIO — HIGH (ref 0.88–1.16)
MCHC RBC-ENTMCNC: 32.3 PG — SIGNIFICANT CHANGE UP (ref 27–34)
MCHC RBC-ENTMCNC: 34.5 GM/DL — SIGNIFICANT CHANGE UP (ref 32–36)
MCV RBC AUTO: 93.6 FL — SIGNIFICANT CHANGE UP (ref 80–100)
PLATELET # BLD AUTO: 186 K/UL — SIGNIFICANT CHANGE UP (ref 150–400)
POTASSIUM SERPL-MCNC: 3.9 MMOL/L — SIGNIFICANT CHANGE UP (ref 3.5–5.3)
POTASSIUM SERPL-SCNC: 3.9 MMOL/L — SIGNIFICANT CHANGE UP (ref 3.5–5.3)
PROTHROM AB SERPL-ACNC: 13.7 SEC — HIGH (ref 10–13.1)
RBC # BLD: 2.74 M/UL — LOW (ref 4.2–5.8)
RBC # FLD: 14.1 % — SIGNIFICANT CHANGE UP (ref 10.3–14.5)
SODIUM SERPL-SCNC: 140 MMOL/L — SIGNIFICANT CHANGE UP (ref 135–145)
WBC # BLD: 9.1 K/UL — SIGNIFICANT CHANGE UP (ref 3.8–10.5)
WBC # FLD AUTO: 9.1 K/UL — SIGNIFICANT CHANGE UP (ref 3.8–10.5)

## 2017-01-30 RX ORDER — WARFARIN SODIUM 2.5 MG/1
7.5 TABLET ORAL ONCE
Qty: 0 | Refills: 0 | Status: COMPLETED | OUTPATIENT
Start: 2017-01-30 | End: 2017-01-30

## 2017-01-30 RX ORDER — INSULIN LISPRO 100/ML
9 VIAL (ML) SUBCUTANEOUS
Qty: 0 | Refills: 0 | Status: DISCONTINUED | OUTPATIENT
Start: 2017-01-30 | End: 2017-02-01

## 2017-01-30 RX ORDER — INSULIN GLARGINE 100 [IU]/ML
10 INJECTION, SOLUTION SUBCUTANEOUS AT BEDTIME
Qty: 0 | Refills: 0 | Status: DISCONTINUED | OUTPATIENT
Start: 2017-01-30 | End: 2017-02-07

## 2017-01-30 RX ADMIN — Medication 7 UNIT(S): at 08:15

## 2017-01-30 RX ADMIN — Medication 2: at 12:09

## 2017-01-30 RX ADMIN — CARBIDOPA AND LEVODOPA 1 TABLET(S): 25; 100 TABLET ORAL at 21:44

## 2017-01-30 RX ADMIN — Medication 325 MILLIGRAM(S): at 12:01

## 2017-01-30 RX ADMIN — AMIODARONE HYDROCHLORIDE 400 MILLIGRAM(S): 400 TABLET ORAL at 06:32

## 2017-01-30 RX ADMIN — Medication 500000 UNIT(S): at 06:32

## 2017-01-30 RX ADMIN — TIOTROPIUM BROMIDE 1 CAPSULE(S): 18 CAPSULE ORAL; RESPIRATORY (INHALATION) at 12:10

## 2017-01-30 RX ADMIN — Medication 5 MILLIGRAM(S): at 06:32

## 2017-01-30 RX ADMIN — CARBIDOPA AND LEVODOPA 1 TABLET(S): 25; 100 TABLET ORAL at 14:38

## 2017-01-30 RX ADMIN — SERTRALINE 25 MILLIGRAM(S): 25 TABLET, FILM COATED ORAL at 12:01

## 2017-01-30 RX ADMIN — Medication 500000 UNIT(S): at 17:46

## 2017-01-30 RX ADMIN — Medication 3 MILLILITER(S): at 21:44

## 2017-01-30 RX ADMIN — ATORVASTATIN CALCIUM 40 MILLIGRAM(S): 80 TABLET, FILM COATED ORAL at 21:44

## 2017-01-30 RX ADMIN — Medication 100 MILLIGRAM(S): at 14:38

## 2017-01-30 RX ADMIN — Medication 1: at 17:43

## 2017-01-30 RX ADMIN — Medication 5 MILLIGRAM(S): at 17:46

## 2017-01-30 RX ADMIN — INSULIN GLARGINE 10 UNIT(S): 100 INJECTION, SOLUTION SUBCUTANEOUS at 21:56

## 2017-01-30 RX ADMIN — Medication 3 MILLILITER(S): at 06:33

## 2017-01-30 RX ADMIN — Medication 50 MILLIGRAM(S): at 17:46

## 2017-01-30 RX ADMIN — Medication 50 MILLIGRAM(S): at 06:32

## 2017-01-30 RX ADMIN — Medication 500000 UNIT(S): at 21:44

## 2017-01-30 RX ADMIN — POLYETHYLENE GLYCOL 3350 17 GRAM(S): 17 POWDER, FOR SOLUTION ORAL at 12:00

## 2017-01-30 RX ADMIN — Medication 1: at 08:15

## 2017-01-30 RX ADMIN — Medication 9 UNIT(S): at 17:43

## 2017-01-30 RX ADMIN — Medication 40 MILLIGRAM(S): at 06:32

## 2017-01-30 RX ADMIN — Medication 100 MILLIGRAM(S): at 08:16

## 2017-01-30 RX ADMIN — AMIODARONE HYDROCHLORIDE 400 MILLIGRAM(S): 400 TABLET ORAL at 14:38

## 2017-01-30 RX ADMIN — Medication 3 MILLILITER(S): at 12:10

## 2017-01-30 RX ADMIN — CARBIDOPA AND LEVODOPA 1 TABLET(S): 25; 100 TABLET ORAL at 06:32

## 2017-01-30 RX ADMIN — WARFARIN SODIUM 7.5 MILLIGRAM(S): 2.5 TABLET ORAL at 21:44

## 2017-01-30 RX ADMIN — Medication 500000 UNIT(S): at 12:05

## 2017-01-30 RX ADMIN — AMIODARONE HYDROCHLORIDE 400 MILLIGRAM(S): 400 TABLET ORAL at 21:44

## 2017-01-30 RX ADMIN — FINASTERIDE 5 MILLIGRAM(S): 5 TABLET, FILM COATED ORAL at 12:01

## 2017-01-30 RX ADMIN — Medication 7 UNIT(S): at 12:10

## 2017-01-30 RX ADMIN — PANTOPRAZOLE SODIUM 40 MILLIGRAM(S): 20 TABLET, DELAYED RELEASE ORAL at 06:32

## 2017-01-31 LAB
ANION GAP SERPL CALC-SCNC: 14 MMOL/L — SIGNIFICANT CHANGE UP (ref 5–17)
APPEARANCE UR: CLEAR — SIGNIFICANT CHANGE UP
BACTERIA # UR AUTO: ABNORMAL /HPF
BILIRUB UR-MCNC: NEGATIVE — SIGNIFICANT CHANGE UP
BUN SERPL-MCNC: 35 MG/DL — HIGH (ref 7–23)
CALCIUM SERPL-MCNC: 8.8 MG/DL — SIGNIFICANT CHANGE UP (ref 8.4–10.5)
CHLORIDE SERPL-SCNC: 101 MMOL/L — SIGNIFICANT CHANGE UP (ref 96–108)
CO2 SERPL-SCNC: 27 MMOL/L — SIGNIFICANT CHANGE UP (ref 22–31)
COLOR SPEC: YELLOW — SIGNIFICANT CHANGE UP
CREAT SERPL-MCNC: 1.05 MG/DL — SIGNIFICANT CHANGE UP (ref 0.5–1.3)
DIFF PNL FLD: NEGATIVE — SIGNIFICANT CHANGE UP
GLUCOSE SERPL-MCNC: 95 MG/DL — SIGNIFICANT CHANGE UP (ref 70–99)
GLUCOSE UR QL: NEGATIVE — SIGNIFICANT CHANGE UP
HCT VFR BLD CALC: 26.6 % — LOW (ref 39–50)
HGB BLD-MCNC: 8.8 G/DL — LOW (ref 13–17)
INR BLD: 1.66 RATIO — HIGH (ref 0.88–1.16)
KETONES UR-MCNC: NEGATIVE — SIGNIFICANT CHANGE UP
LEUKOCYTE ESTERASE UR-ACNC: NEGATIVE — SIGNIFICANT CHANGE UP
MCHC RBC-ENTMCNC: 31.3 PG — SIGNIFICANT CHANGE UP (ref 27–34)
MCHC RBC-ENTMCNC: 33 GM/DL — SIGNIFICANT CHANGE UP (ref 32–36)
MCV RBC AUTO: 94.8 FL — SIGNIFICANT CHANGE UP (ref 80–100)
NITRITE UR-MCNC: NEGATIVE — SIGNIFICANT CHANGE UP
PH UR: 5.5 — SIGNIFICANT CHANGE UP (ref 4.8–8)
PLATELET # BLD AUTO: 243 K/UL — SIGNIFICANT CHANGE UP (ref 150–400)
POTASSIUM SERPL-MCNC: 3.8 MMOL/L — SIGNIFICANT CHANGE UP (ref 3.5–5.3)
POTASSIUM SERPL-SCNC: 3.8 MMOL/L — SIGNIFICANT CHANGE UP (ref 3.5–5.3)
PROT UR-MCNC: SIGNIFICANT CHANGE UP
PROTHROM AB SERPL-ACNC: 18.2 SEC — HIGH (ref 10–13.1)
RBC # BLD: 2.81 M/UL — LOW (ref 4.2–5.8)
RBC # FLD: 13.6 % — SIGNIFICANT CHANGE UP (ref 10.3–14.5)
RBC CASTS # UR COMP ASSIST: SIGNIFICANT CHANGE UP /HPF (ref 0–2)
SODIUM SERPL-SCNC: 142 MMOL/L — SIGNIFICANT CHANGE UP (ref 135–145)
SP GR SPEC: 1.02 — SIGNIFICANT CHANGE UP (ref 1.01–1.02)
UROBILINOGEN FLD QL: NEGATIVE — SIGNIFICANT CHANGE UP
WBC # BLD: 10.9 K/UL — HIGH (ref 3.8–10.5)
WBC # FLD AUTO: 10.9 K/UL — HIGH (ref 3.8–10.5)
WBC UR QL: SIGNIFICANT CHANGE UP /HPF (ref 0–5)

## 2017-01-31 RX ORDER — WARFARIN SODIUM 2.5 MG/1
5 TABLET ORAL ONCE
Qty: 0 | Refills: 0 | Status: COMPLETED | OUTPATIENT
Start: 2017-01-31 | End: 2017-01-31

## 2017-01-31 RX ADMIN — ATORVASTATIN CALCIUM 40 MILLIGRAM(S): 80 TABLET, FILM COATED ORAL at 21:47

## 2017-01-31 RX ADMIN — Medication 50 MILLIGRAM(S): at 06:34

## 2017-01-31 RX ADMIN — TIOTROPIUM BROMIDE 1 CAPSULE(S): 18 CAPSULE ORAL; RESPIRATORY (INHALATION) at 11:54

## 2017-01-31 RX ADMIN — Medication 9 UNIT(S): at 07:49

## 2017-01-31 RX ADMIN — WARFARIN SODIUM 5 MILLIGRAM(S): 2.5 TABLET ORAL at 21:47

## 2017-01-31 RX ADMIN — Medication 3 MILLILITER(S): at 17:16

## 2017-01-31 RX ADMIN — AMIODARONE HYDROCHLORIDE 400 MILLIGRAM(S): 400 TABLET ORAL at 06:34

## 2017-01-31 RX ADMIN — Medication 9 UNIT(S): at 17:13

## 2017-01-31 RX ADMIN — CARBIDOPA AND LEVODOPA 1 TABLET(S): 25; 100 TABLET ORAL at 14:23

## 2017-01-31 RX ADMIN — Medication 500000 UNIT(S): at 17:16

## 2017-01-31 RX ADMIN — INSULIN GLARGINE 10 UNIT(S): 100 INJECTION, SOLUTION SUBCUTANEOUS at 21:56

## 2017-01-31 RX ADMIN — PANTOPRAZOLE SODIUM 40 MILLIGRAM(S): 20 TABLET, DELAYED RELEASE ORAL at 06:33

## 2017-01-31 RX ADMIN — Medication 3 MILLILITER(S): at 06:35

## 2017-01-31 RX ADMIN — Medication 3 MILLILITER(S): at 21:47

## 2017-01-31 RX ADMIN — Medication 3 MILLILITER(S): at 12:03

## 2017-01-31 RX ADMIN — Medication 100 MILLIGRAM(S): at 14:23

## 2017-01-31 RX ADMIN — Medication 325 MILLIGRAM(S): at 11:54

## 2017-01-31 RX ADMIN — CARBIDOPA AND LEVODOPA 1 TABLET(S): 25; 100 TABLET ORAL at 06:34

## 2017-01-31 RX ADMIN — Medication 500000 UNIT(S): at 06:35

## 2017-01-31 RX ADMIN — Medication 5 MILLIGRAM(S): at 17:16

## 2017-01-31 RX ADMIN — CARBIDOPA AND LEVODOPA 1 TABLET(S): 25; 100 TABLET ORAL at 21:47

## 2017-01-31 RX ADMIN — Medication 40 MILLIGRAM(S): at 06:34

## 2017-01-31 RX ADMIN — Medication 500000 UNIT(S): at 12:03

## 2017-01-31 RX ADMIN — Medication 5 MILLIGRAM(S): at 06:34

## 2017-01-31 RX ADMIN — Medication 500000 UNIT(S): at 21:47

## 2017-01-31 RX ADMIN — Medication 50 MILLIGRAM(S): at 17:16

## 2017-01-31 RX ADMIN — Medication 100 MILLIGRAM(S): at 21:47

## 2017-01-31 RX ADMIN — SERTRALINE 25 MILLIGRAM(S): 25 TABLET, FILM COATED ORAL at 11:54

## 2017-01-31 RX ADMIN — FINASTERIDE 5 MILLIGRAM(S): 5 TABLET, FILM COATED ORAL at 11:54

## 2017-01-31 RX ADMIN — Medication 9 UNIT(S): at 12:10

## 2017-02-01 LAB
ANION GAP SERPL CALC-SCNC: 15 MMOL/L — SIGNIFICANT CHANGE UP (ref 5–17)
BUN SERPL-MCNC: 46 MG/DL — HIGH (ref 7–23)
CALCIUM SERPL-MCNC: 9.4 MG/DL — SIGNIFICANT CHANGE UP (ref 8.4–10.5)
CHLORIDE SERPL-SCNC: 96 MMOL/L — SIGNIFICANT CHANGE UP (ref 96–108)
CO2 SERPL-SCNC: 28 MMOL/L — SIGNIFICANT CHANGE UP (ref 22–31)
CREAT SERPL-MCNC: 1.11 MG/DL — SIGNIFICANT CHANGE UP (ref 0.5–1.3)
GLUCOSE SERPL-MCNC: 180 MG/DL — HIGH (ref 70–99)
HCT VFR BLD CALC: 29.6 % — LOW (ref 39–50)
HGB BLD-MCNC: 9.7 G/DL — LOW (ref 13–17)
INR BLD: 2.63 RATIO — HIGH (ref 0.88–1.16)
MCHC RBC-ENTMCNC: 30.8 PG — SIGNIFICANT CHANGE UP (ref 27–34)
MCHC RBC-ENTMCNC: 32.8 GM/DL — SIGNIFICANT CHANGE UP (ref 32–36)
MCV RBC AUTO: 93.8 FL — SIGNIFICANT CHANGE UP (ref 80–100)
PLATELET # BLD AUTO: 333 K/UL — SIGNIFICANT CHANGE UP (ref 150–400)
POTASSIUM SERPL-MCNC: 3.9 MMOL/L — SIGNIFICANT CHANGE UP (ref 3.5–5.3)
POTASSIUM SERPL-SCNC: 3.9 MMOL/L — SIGNIFICANT CHANGE UP (ref 3.5–5.3)
PROTHROM AB SERPL-ACNC: 28.7 SEC — HIGH (ref 10–13.1)
RBC # BLD: 3.15 M/UL — LOW (ref 4.2–5.8)
RBC # FLD: 14.3 % — SIGNIFICANT CHANGE UP (ref 10.3–14.5)
SODIUM SERPL-SCNC: 139 MMOL/L — SIGNIFICANT CHANGE UP (ref 135–145)
WBC # BLD: 11.9 K/UL — HIGH (ref 3.8–10.5)
WBC # FLD AUTO: 11.9 K/UL — HIGH (ref 3.8–10.5)

## 2017-02-01 RX ORDER — DEXTROSE 50 % IN WATER 50 %
1 SYRINGE (ML) INTRAVENOUS ONCE
Qty: 0 | Refills: 0 | Status: COMPLETED | OUTPATIENT
Start: 2017-02-01 | End: 2017-02-01

## 2017-02-01 RX ADMIN — Medication 5 MILLIGRAM(S): at 05:11

## 2017-02-01 RX ADMIN — Medication 3 MILLILITER(S): at 20:00

## 2017-02-01 RX ADMIN — INSULIN GLARGINE 10 UNIT(S): 100 INJECTION, SOLUTION SUBCUTANEOUS at 22:54

## 2017-02-01 RX ADMIN — Medication 325 MILLIGRAM(S): at 13:11

## 2017-02-01 RX ADMIN — AMIODARONE HYDROCHLORIDE 200 MILLIGRAM(S): 400 TABLET ORAL at 05:12

## 2017-02-01 RX ADMIN — Medication 500000 UNIT(S): at 13:10

## 2017-02-01 RX ADMIN — Medication 50 MILLIGRAM(S): at 22:00

## 2017-02-01 RX ADMIN — Medication 40 MILLIGRAM(S): at 05:11

## 2017-02-01 RX ADMIN — Medication 650 MILLIGRAM(S): at 22:45

## 2017-02-01 RX ADMIN — Medication 650 MILLIGRAM(S): at 22:00

## 2017-02-01 RX ADMIN — PANTOPRAZOLE SODIUM 40 MILLIGRAM(S): 20 TABLET, DELAYED RELEASE ORAL at 05:13

## 2017-02-01 RX ADMIN — Medication 9 UNIT(S): at 08:19

## 2017-02-01 RX ADMIN — CARBIDOPA AND LEVODOPA 1 TABLET(S): 25; 100 TABLET ORAL at 22:00

## 2017-02-01 RX ADMIN — Medication 3 MILLILITER(S): at 05:12

## 2017-02-01 RX ADMIN — FINASTERIDE 5 MILLIGRAM(S): 5 TABLET, FILM COATED ORAL at 13:11

## 2017-02-01 RX ADMIN — CARBIDOPA AND LEVODOPA 1 TABLET(S): 25; 100 TABLET ORAL at 13:11

## 2017-02-01 RX ADMIN — TIOTROPIUM BROMIDE 1 CAPSULE(S): 18 CAPSULE ORAL; RESPIRATORY (INHALATION) at 13:10

## 2017-02-01 RX ADMIN — Medication 50 MILLIGRAM(S): at 05:11

## 2017-02-01 RX ADMIN — CARBIDOPA AND LEVODOPA 1 TABLET(S): 25; 100 TABLET ORAL at 05:12

## 2017-02-01 RX ADMIN — Medication 100 MILLIGRAM(S): at 22:00

## 2017-02-01 RX ADMIN — Medication 1 DOSE(S): at 11:58

## 2017-02-01 RX ADMIN — ATORVASTATIN CALCIUM 40 MILLIGRAM(S): 80 TABLET, FILM COATED ORAL at 22:01

## 2017-02-01 RX ADMIN — Medication 100 MILLIGRAM(S): at 13:11

## 2017-02-01 RX ADMIN — Medication 5 MILLIGRAM(S): at 22:00

## 2017-02-01 RX ADMIN — Medication 1: at 08:19

## 2017-02-01 RX ADMIN — Medication 0: at 22:55

## 2017-02-01 RX ADMIN — Medication 3 MILLILITER(S): at 13:10

## 2017-02-01 RX ADMIN — SERTRALINE 25 MILLIGRAM(S): 25 TABLET, FILM COATED ORAL at 13:10

## 2017-02-01 NOTE — DIETITIAN INITIAL EVALUATION ADULT. - ADHERENCE
Pt states his daughter or wife make food for him. States that he checks his fingersticks in the morning and his numbers are typically 100-135 mg/dL. Pt states he has whole wheat bread for breakfas,t chicken and bean sauce with rice for lunch and dinner. Snacks on fruit. Attempted to provide diet education, however pt refused. Pt took written materials in English, states his family can read English. Made pt aware RD available for diet education upon pt/family request.

## 2017-02-01 NOTE — DIETITIAN INITIAL EVALUATION ADULT. - NS AS NUTRI INTERV ED CONTENT
Attempted to provide verbal diet education however pt refused. Noted pt also confused. Pt took written materials in English stating that his family read English. Made pt aware RD available for diet education upon pt/family request. Left pt Taking Warfarin Safely Packet, Type 2 Diabetes Medical Nutrition Therapy handout and Heart Healthy Eating Medical Nutrition Therapy handout.

## 2017-02-01 NOTE — DIETITIAN INITIAL EVALUATION ADULT. - NS AS NUTRI INTERV FEED ASSISTANCE
1) Reviewed alternate menu options and menu ordering procedure. 2) provide food preferences within diet restrictions when requested. 3) Continue to provide assistance and encouragement at meal times.

## 2017-02-01 NOTE — DIETITIAN INITIAL EVALUATION ADULT. - NS AS NUTRI INTERV MEDICAL AND FOOD SUPPLEMENTS
Recommend add glucerna shakes twice daily for additional protein calories. Encouraged pt to consume in between meals for small/frequent meals.

## 2017-02-01 NOTE — DIETITIAN INITIAL EVALUATION ADULT. - PT NOT SOURCE
Noted pt confused, poor historian no family at bedside. Primary language Farsi however pt refused  phone, able to answer questions in English.

## 2017-02-01 NOTE — DIETITIAN INITIAL EVALUATION ADULT. - DIET TYPE
dysphagia 2 with thin liquids, halal/consistent carbohydrate (no snacks)/DASH/TLC (sodium and cholesterol restricted diet)

## 2017-02-01 NOTE — DIETITIAN INITIAL EVALUATION ADULT. - ENERGY NEEDS
weight: 5'3", weight: 130 pounds ,BMI: 23, ideal body weight: 124 pounds (+/-10%), %IBW: 105%  Pertinent information: Per chart pt s/p CABG x 3, post-op rapid Afib, chronic diastolic CHF. Noted +1 gretel leg, foot edema. No pressure ulcers noted. Defer fluids to team.

## 2017-02-01 NOTE — DIETITIAN INITIAL EVALUATION ADULT. - PERTINENT MEDS FT
nystatin suspension, amiodarone, lantus, lasix, humalog, humalog insulin sliding scale, miralax, colace, protonix, coumadin

## 2017-02-02 LAB
ANION GAP SERPL CALC-SCNC: 11 MMOL/L — SIGNIFICANT CHANGE UP (ref 5–17)
BUN SERPL-MCNC: 39 MG/DL — HIGH (ref 7–23)
CALCIUM SERPL-MCNC: 8.8 MG/DL — SIGNIFICANT CHANGE UP (ref 8.4–10.5)
CHLORIDE SERPL-SCNC: 100 MMOL/L — SIGNIFICANT CHANGE UP (ref 96–108)
CO2 SERPL-SCNC: 29 MMOL/L — SIGNIFICANT CHANGE UP (ref 22–31)
CREAT SERPL-MCNC: 1.13 MG/DL — SIGNIFICANT CHANGE UP (ref 0.5–1.3)
GLUCOSE SERPL-MCNC: 110 MG/DL — HIGH (ref 70–99)
HCT VFR BLD CALC: 25.3 % — LOW (ref 39–50)
HGB BLD-MCNC: 8.4 G/DL — LOW (ref 13–17)
INR BLD: 3.94 RATIO — HIGH (ref 0.88–1.16)
MCHC RBC-ENTMCNC: 31.6 PG — SIGNIFICANT CHANGE UP (ref 27–34)
MCHC RBC-ENTMCNC: 33.3 GM/DL — SIGNIFICANT CHANGE UP (ref 32–36)
MCV RBC AUTO: 94.9 FL — SIGNIFICANT CHANGE UP (ref 80–100)
PLATELET # BLD AUTO: 303 K/UL — SIGNIFICANT CHANGE UP (ref 150–400)
POTASSIUM SERPL-MCNC: 3.5 MMOL/L — SIGNIFICANT CHANGE UP (ref 3.5–5.3)
POTASSIUM SERPL-SCNC: 3.5 MMOL/L — SIGNIFICANT CHANGE UP (ref 3.5–5.3)
PROTHROM AB SERPL-ACNC: 43.1 SEC — HIGH (ref 10–13.1)
RBC # BLD: 2.67 M/UL — LOW (ref 4.2–5.8)
RBC # FLD: 14 % — SIGNIFICANT CHANGE UP (ref 10.3–14.5)
SODIUM SERPL-SCNC: 140 MMOL/L — SIGNIFICANT CHANGE UP (ref 135–145)
WBC # BLD: 8.9 K/UL — SIGNIFICANT CHANGE UP (ref 3.8–10.5)
WBC # FLD AUTO: 8.9 K/UL — SIGNIFICANT CHANGE UP (ref 3.8–10.5)

## 2017-02-02 RX ORDER — SORBITOL SOLUTION 70 %
20 SOLUTION, ORAL MISCELLANEOUS ONCE
Qty: 0 | Refills: 0 | Status: COMPLETED | OUTPATIENT
Start: 2017-02-02 | End: 2017-02-02

## 2017-02-02 RX ORDER — POTASSIUM CHLORIDE 20 MEQ
20 PACKET (EA) ORAL DAILY
Qty: 0 | Refills: 0 | Status: DISCONTINUED | OUTPATIENT
Start: 2017-02-02 | End: 2017-02-03

## 2017-02-02 RX ADMIN — Medication 3 MILLILITER(S): at 14:26

## 2017-02-02 RX ADMIN — Medication 650 MILLIGRAM(S): at 09:44

## 2017-02-02 RX ADMIN — Medication 3 MILLILITER(S): at 09:29

## 2017-02-02 RX ADMIN — Medication 40 MILLIGRAM(S): at 06:24

## 2017-02-02 RX ADMIN — Medication 20 MILLILITER(S): at 16:42

## 2017-02-02 RX ADMIN — CARBIDOPA AND LEVODOPA 1 TABLET(S): 25; 100 TABLET ORAL at 22:27

## 2017-02-02 RX ADMIN — Medication 5 MILLIGRAM(S): at 18:50

## 2017-02-02 RX ADMIN — FINASTERIDE 5 MILLIGRAM(S): 5 TABLET, FILM COATED ORAL at 14:27

## 2017-02-02 RX ADMIN — AMIODARONE HYDROCHLORIDE 200 MILLIGRAM(S): 400 TABLET ORAL at 09:29

## 2017-02-02 RX ADMIN — PANTOPRAZOLE SODIUM 40 MILLIGRAM(S): 20 TABLET, DELAYED RELEASE ORAL at 06:25

## 2017-02-02 RX ADMIN — Medication 1: at 17:53

## 2017-02-02 RX ADMIN — Medication 100 MILLIGRAM(S): at 22:27

## 2017-02-02 RX ADMIN — Medication 5 MILLIGRAM(S): at 06:24

## 2017-02-02 RX ADMIN — Medication 650 MILLIGRAM(S): at 10:15

## 2017-02-02 RX ADMIN — Medication 50 MILLIGRAM(S): at 09:43

## 2017-02-02 RX ADMIN — INSULIN GLARGINE 10 UNIT(S): 100 INJECTION, SOLUTION SUBCUTANEOUS at 22:26

## 2017-02-02 RX ADMIN — Medication 500000 UNIT(S): at 06:25

## 2017-02-02 RX ADMIN — Medication 325 MILLIGRAM(S): at 14:26

## 2017-02-02 RX ADMIN — CARBIDOPA AND LEVODOPA 1 TABLET(S): 25; 100 TABLET ORAL at 14:27

## 2017-02-02 RX ADMIN — Medication 500000 UNIT(S): at 18:51

## 2017-02-02 RX ADMIN — ATORVASTATIN CALCIUM 40 MILLIGRAM(S): 80 TABLET, FILM COATED ORAL at 22:27

## 2017-02-02 RX ADMIN — CARBIDOPA AND LEVODOPA 1 TABLET(S): 25; 100 TABLET ORAL at 06:25

## 2017-02-02 RX ADMIN — Medication 50 MILLIGRAM(S): at 18:50

## 2017-02-02 RX ADMIN — POLYETHYLENE GLYCOL 3350 17 GRAM(S): 17 POWDER, FOR SOLUTION ORAL at 14:26

## 2017-02-02 RX ADMIN — TIOTROPIUM BROMIDE 1 CAPSULE(S): 18 CAPSULE ORAL; RESPIRATORY (INHALATION) at 14:27

## 2017-02-02 RX ADMIN — SERTRALINE 25 MILLIGRAM(S): 25 TABLET, FILM COATED ORAL at 14:27

## 2017-02-02 RX ADMIN — Medication 500000 UNIT(S): at 14:27

## 2017-02-02 RX ADMIN — Medication 3 MILLILITER(S): at 02:00

## 2017-02-02 RX ADMIN — Medication 100 MILLIGRAM(S): at 06:24

## 2017-02-02 RX ADMIN — Medication 1: at 12:26

## 2017-02-02 RX ADMIN — Medication 100 MILLIGRAM(S): at 14:27

## 2017-02-02 RX ADMIN — Medication 20 MILLIEQUIVALENT(S): at 18:52

## 2017-02-03 ENCOUNTER — TRANSCRIPTION ENCOUNTER (OUTPATIENT)
Age: 82
End: 2017-02-03

## 2017-02-03 LAB
ANION GAP SERPL CALC-SCNC: 7 MMOL/L — SIGNIFICANT CHANGE UP (ref 5–17)
BUN SERPL-MCNC: 40 MG/DL — HIGH (ref 7–23)
CALCIUM SERPL-MCNC: 8.8 MG/DL — SIGNIFICANT CHANGE UP (ref 8.4–10.5)
CHLORIDE SERPL-SCNC: 103 MMOL/L — SIGNIFICANT CHANGE UP (ref 96–108)
CO2 SERPL-SCNC: 32 MMOL/L — HIGH (ref 22–31)
CREAT SERPL-MCNC: 1.08 MG/DL — SIGNIFICANT CHANGE UP (ref 0.5–1.3)
GLUCOSE SERPL-MCNC: 111 MG/DL — HIGH (ref 70–99)
HCT VFR BLD CALC: 26.3 % — LOW (ref 39–50)
HGB BLD-MCNC: 8.6 G/DL — LOW (ref 13–17)
INR BLD: 3.34 RATIO — HIGH (ref 0.88–1.16)
MCHC RBC-ENTMCNC: 31.2 PG — SIGNIFICANT CHANGE UP (ref 27–34)
MCHC RBC-ENTMCNC: 32.6 GM/DL — SIGNIFICANT CHANGE UP (ref 32–36)
MCV RBC AUTO: 95.5 FL — SIGNIFICANT CHANGE UP (ref 80–100)
PLATELET # BLD AUTO: 354 K/UL — SIGNIFICANT CHANGE UP (ref 150–400)
POTASSIUM SERPL-MCNC: 3.6 MMOL/L — SIGNIFICANT CHANGE UP (ref 3.5–5.3)
POTASSIUM SERPL-SCNC: 3.6 MMOL/L — SIGNIFICANT CHANGE UP (ref 3.5–5.3)
PROTHROM AB SERPL-ACNC: 36.9 SEC — HIGH (ref 10–13.1)
RBC # BLD: 2.75 M/UL — LOW (ref 4.2–5.8)
RBC # FLD: 14.4 % — SIGNIFICANT CHANGE UP (ref 10.3–14.5)
SODIUM SERPL-SCNC: 142 MMOL/L — SIGNIFICANT CHANGE UP (ref 135–145)
WBC # BLD: 9 K/UL — SIGNIFICANT CHANGE UP (ref 3.8–10.5)
WBC # FLD AUTO: 9 K/UL — SIGNIFICANT CHANGE UP (ref 3.8–10.5)

## 2017-02-03 PROCEDURE — 93010 ELECTROCARDIOGRAM REPORT: CPT

## 2017-02-03 PROCEDURE — 93308 TTE F-UP OR LMTD: CPT | Mod: 26

## 2017-02-03 PROCEDURE — 93321 DOPPLER ECHO F-UP/LMTD STD: CPT | Mod: 26

## 2017-02-03 PROCEDURE — 71010: CPT | Mod: 26

## 2017-02-03 RX ORDER — LOSARTAN POTASSIUM 100 MG/1
1 TABLET, FILM COATED ORAL
Qty: 0 | Refills: 0 | COMMUNITY

## 2017-02-03 RX ORDER — POLYETHYLENE GLYCOL 3350 17 G/17G
17 POWDER, FOR SOLUTION ORAL
Qty: 0 | Refills: 0 | DISCHARGE
Start: 2017-02-03

## 2017-02-03 RX ORDER — METOPROLOL TARTRATE 50 MG
1 TABLET ORAL
Qty: 0 | Refills: 0 | COMMUNITY
Start: 2017-02-03

## 2017-02-03 RX ORDER — INSULIN GLARGINE 100 [IU]/ML
10 INJECTION, SOLUTION SUBCUTANEOUS
Qty: 0 | Refills: 0 | DISCHARGE
Start: 2017-02-03

## 2017-02-03 RX ORDER — ASPIRIN/CALCIUM CARB/MAGNESIUM 324 MG
1 TABLET ORAL
Qty: 0 | Refills: 0 | COMMUNITY
Start: 2017-02-03

## 2017-02-03 RX ORDER — INSULIN LISPRO 100/ML
0 VIAL (ML) SUBCUTANEOUS
Qty: 0 | Refills: 0 | DISCHARGE
Start: 2017-02-03

## 2017-02-03 RX ORDER — SERTRALINE 25 MG/1
1 TABLET, FILM COATED ORAL
Qty: 0 | Refills: 0 | DISCHARGE
Start: 2017-02-03

## 2017-02-03 RX ORDER — WARFARIN SODIUM 2.5 MG/1
1 TABLET ORAL
Qty: 0 | Refills: 0 | COMMUNITY
Start: 2017-02-03

## 2017-02-03 RX ORDER — SITAGLIPTIN 50 MG/1
1 TABLET, FILM COATED ORAL
Qty: 0 | Refills: 0 | COMMUNITY

## 2017-02-03 RX ORDER — COLCHICINE 0.6 MG
0.6 TABLET ORAL
Qty: 0 | Refills: 0 | Status: DISCONTINUED | OUTPATIENT
Start: 2017-02-03 | End: 2017-02-07

## 2017-02-03 RX ORDER — POLYETHYLENE GLYCOL 3350 17 G/17G
0 POWDER, FOR SOLUTION ORAL
Qty: 0 | Refills: 0 | COMMUNITY

## 2017-02-03 RX ORDER — POTASSIUM CHLORIDE 20 MEQ
40 PACKET (EA) ORAL DAILY
Qty: 0 | Refills: 0 | Status: DISCONTINUED | OUTPATIENT
Start: 2017-02-03 | End: 2017-02-04

## 2017-02-03 RX ORDER — ROSUVASTATIN CALCIUM 5 MG/1
1 TABLET ORAL
Qty: 0 | Refills: 0 | COMMUNITY

## 2017-02-03 RX ORDER — CARBIDOPA AND LEVODOPA 25; 100 MG/1; MG/1
1 TABLET ORAL
Qty: 0 | Refills: 0 | DISCHARGE
Start: 2017-02-03

## 2017-02-03 RX ORDER — NYSTATIN 500MM UNIT
5 POWDER (EA) MISCELLANEOUS
Qty: 0 | Refills: 0 | DISCHARGE
Start: 2017-02-03

## 2017-02-03 RX ORDER — METFORMIN HYDROCHLORIDE 850 MG/1
1 TABLET ORAL
Qty: 0 | Refills: 0 | COMMUNITY

## 2017-02-03 RX ORDER — ACETAMINOPHEN 500 MG
2 TABLET ORAL
Qty: 0 | Refills: 0 | DISCHARGE
Start: 2017-02-03

## 2017-02-03 RX ORDER — MELOXICAM 15 MG/1
1 TABLET ORAL
Qty: 0 | Refills: 0 | COMMUNITY

## 2017-02-03 RX ORDER — MIRABEGRON 50 MG/1
1 TABLET, EXTENDED RELEASE ORAL
Qty: 0 | Refills: 0 | COMMUNITY

## 2017-02-03 RX ORDER — BUDESONIDE, MICRONIZED 100 %
0.25 POWDER (GRAM) MISCELLANEOUS
Qty: 0 | Refills: 0 | Status: DISCONTINUED | OUTPATIENT
Start: 2017-02-03 | End: 2017-02-07

## 2017-02-03 RX ORDER — OXYBUTYNIN CHLORIDE 5 MG
1 TABLET ORAL
Qty: 0 | Refills: 0 | DISCHARGE
Start: 2017-02-03

## 2017-02-03 RX ORDER — ASPIRIN/CALCIUM CARB/MAGNESIUM 324 MG
1 TABLET ORAL
Qty: 0 | Refills: 0 | COMMUNITY

## 2017-02-03 RX ORDER — AMIODARONE HYDROCHLORIDE 400 MG/1
1 TABLET ORAL
Qty: 0 | Refills: 0 | DISCHARGE
Start: 2017-02-03

## 2017-02-03 RX ORDER — WARFARIN SODIUM 2.5 MG/1
2.5 TABLET ORAL ONCE
Qty: 0 | Refills: 0 | Status: DISCONTINUED | OUTPATIENT
Start: 2017-02-03 | End: 2017-02-03

## 2017-02-03 RX ORDER — SERTRALINE 25 MG/1
1 TABLET, FILM COATED ORAL
Qty: 0 | Refills: 0 | COMMUNITY

## 2017-02-03 RX ORDER — FUROSEMIDE 40 MG
1 TABLET ORAL
Qty: 0 | Refills: 0 | DISCHARGE
Start: 2017-02-03

## 2017-02-03 RX ORDER — POTASSIUM CHLORIDE 20 MEQ
40 PACKET (EA) ORAL DAILY
Qty: 0 | Refills: 0 | Status: DISCONTINUED | OUTPATIENT
Start: 2017-02-03 | End: 2017-02-03

## 2017-02-03 RX ORDER — MECLIZINE HCL 12.5 MG
1 TABLET ORAL
Qty: 0 | Refills: 0 | COMMUNITY

## 2017-02-03 RX ORDER — CARBIDOPA AND LEVODOPA 25; 100 MG/1; MG/1
1 TABLET ORAL
Qty: 0 | Refills: 0 | COMMUNITY

## 2017-02-03 RX ORDER — NITROGLYCERIN 6.5 MG
1 CAPSULE, EXTENDED RELEASE ORAL
Qty: 0 | Refills: 0 | COMMUNITY

## 2017-02-03 RX ORDER — POTASSIUM CHLORIDE 20 MEQ
2 PACKET (EA) ORAL
Qty: 0 | Refills: 0 | COMMUNITY
Start: 2017-02-03

## 2017-02-03 RX ORDER — DOCUSATE SODIUM 100 MG
1 CAPSULE ORAL
Qty: 0 | Refills: 0 | DISCHARGE
Start: 2017-02-03

## 2017-02-03 RX ORDER — ISOSORBIDE MONONITRATE 60 MG/1
1 TABLET, EXTENDED RELEASE ORAL
Qty: 0 | Refills: 0 | COMMUNITY

## 2017-02-03 RX ORDER — ATORVASTATIN CALCIUM 80 MG/1
1 TABLET, FILM COATED ORAL
Qty: 0 | Refills: 0 | DISCHARGE
Start: 2017-02-03

## 2017-02-03 RX ADMIN — INSULIN GLARGINE 10 UNIT(S): 100 INJECTION, SOLUTION SUBCUTANEOUS at 21:59

## 2017-02-03 RX ADMIN — Medication 100 MILLIGRAM(S): at 21:17

## 2017-02-03 RX ADMIN — CARBIDOPA AND LEVODOPA 1 TABLET(S): 25; 100 TABLET ORAL at 06:02

## 2017-02-03 RX ADMIN — CARBIDOPA AND LEVODOPA 1 TABLET(S): 25; 100 TABLET ORAL at 21:17

## 2017-02-03 RX ADMIN — AMIODARONE HYDROCHLORIDE 200 MILLIGRAM(S): 400 TABLET ORAL at 06:02

## 2017-02-03 RX ADMIN — Medication 2: at 14:26

## 2017-02-03 RX ADMIN — Medication 0.6 MILLIGRAM(S): at 21:17

## 2017-02-03 RX ADMIN — Medication 1: at 17:38

## 2017-02-03 RX ADMIN — Medication 50 MILLIGRAM(S): at 17:41

## 2017-02-03 RX ADMIN — TIOTROPIUM BROMIDE 1 CAPSULE(S): 18 CAPSULE ORAL; RESPIRATORY (INHALATION) at 14:30

## 2017-02-03 RX ADMIN — Medication 0.25 MILLIGRAM(S): at 18:01

## 2017-02-03 RX ADMIN — Medication 40 MILLIEQUIVALENT(S): at 14:27

## 2017-02-03 RX ADMIN — FINASTERIDE 5 MILLIGRAM(S): 5 TABLET, FILM COATED ORAL at 14:28

## 2017-02-03 RX ADMIN — Medication 50 MILLIGRAM(S): at 06:11

## 2017-02-03 RX ADMIN — ATORVASTATIN CALCIUM 40 MILLIGRAM(S): 80 TABLET, FILM COATED ORAL at 21:17

## 2017-02-03 RX ADMIN — Medication 3 MILLILITER(S): at 17:41

## 2017-02-03 RX ADMIN — CARBIDOPA AND LEVODOPA 1 TABLET(S): 25; 100 TABLET ORAL at 14:30

## 2017-02-03 RX ADMIN — Medication 40 MILLIGRAM(S): at 06:02

## 2017-02-03 RX ADMIN — Medication 325 MILLIGRAM(S): at 14:28

## 2017-02-03 RX ADMIN — Medication 5 MILLIGRAM(S): at 06:02

## 2017-02-03 RX ADMIN — PANTOPRAZOLE SODIUM 40 MILLIGRAM(S): 20 TABLET, DELAYED RELEASE ORAL at 06:02

## 2017-02-03 RX ADMIN — Medication 100 MILLIGRAM(S): at 06:02

## 2017-02-03 RX ADMIN — Medication 5 MILLIGRAM(S): at 17:41

## 2017-02-03 RX ADMIN — SERTRALINE 25 MILLIGRAM(S): 25 TABLET, FILM COATED ORAL at 14:28

## 2017-02-03 NOTE — DISCHARGE NOTE ADULT - NS AS ACTIVITY OBS
Walking-Indoors allowed/Showering allowed/Do not make important decisions/Do not drive or operate machinery/Return to Work/School allowed/Stairs allowed/Walking-Outdoors allowed/Sex allowed/No Heavy lifting/straining

## 2017-02-03 NOTE — DISCHARGE NOTE ADULT - CARE PROVIDER_API CALL
Albaro Booth), Surgery; Thoracic and Cardiac Surgery  [address] [place] [address]  Phone: [phone]  Fax: [phone] Albaro Booth (MD), Surgery; Thoracic and Cardiac Surgery  [address] [place] [address]  Phone: [phone]  Fax: [phone]    Dagoberto Wilburn; MBBS), Cardiovascular Disease; Internal Medicine  [address] [place] [id number]  Phone: [phone]  Fax: [phone]

## 2017-02-03 NOTE — DISCHARGE NOTE ADULT - CARE PLAN
Principal Discharge DX:	S/P CABG x 3  Goal:	Complete Recovery  Instructions for follow-up, activity and diet:	1. Daily Shower  2. Weight yourself daily and notify any weight gain greater than 2-3 pounds in 24 hours.  3. Diabetic diet - no concentrated sweets, low fat, low cholesterol, no added salt.  4. Cleanse Midsternal incision and leg incision daily while showering with warm water and mild soap, pat dry and maintain open to air.   5. Follow Cardiac Surgery Do's and Don'ts discharge instructions.  6. Check blood sugar before meals and at bedtime. Record levels.  7. Check PT/INR Saturday 2/4 and then every Monday and Thursday weekly. Dose Coumadin per INR level. Goal INR 2-3. Principal Discharge DX:	S/P CABG x 3  Goal:	Complete Recovery  Instructions for follow-up, activity and diet:	1. Daily Shower  2. Weight yourself daily and notify any weight gain greater than 2-3 pounds in 24 hours.  3. Diabetic diet - no concentrated sweets, low fat, low cholesterol, no added salt.  4. Cleanse Midsternal incision and leg incision daily while showering with warm water and mild soap, pat dry and maintain open to air.   5. Follow Cardiac Surgery Do's and Don'ts discharge instructions.  6. Check blood sugar before meals and at bedtime. Record levels.  7. Check PT/INR Saturday 2/4 and then every Monday and Thursday weekly. Dose Coumadin per INR level. Goal INR 2-3.  8. Check BMP and CBC Monday and Thursday and prn. Supplement potassium prn. Principal Discharge DX:	S/P CABG x 3  Goal:	Complete Recovery  Instructions for follow-up, activity and diet:	1. Daily Shower  2. Weight yourself daily and notify any weight gain greater than 2-3 pounds in 24 hours.  3. Diabetic diet - no concentrated sweets, low fat, low cholesterol, no added salt.  4. Cleanse Midsternal incision and leg incision daily while showering with warm water and mild soap, pat dry and maintain open to air.   5. Follow Cardiac Surgery Do's and Don'ts discharge instructions.  6. Check blood sugar before meals and at bedtime. Record levels.  7. Check PT/INR Saturday 2/4 and then every Monday and Thursday weekly. Dose Coumadin per INR level. Goal INR 2-3.  8. Measure Intake and Output daily.  8. Check BMP and CBC Monday and Thursday and prn. Supplement potassium prn.

## 2017-02-03 NOTE — DISCHARGE NOTE ADULT - PLAN OF CARE
Complete Recovery 1. Daily Shower  2. Weight yourself daily and notify any weight gain greater than 2-3 pounds in 24 hours.  3. Diabetic diet - no concentrated sweets, low fat, low cholesterol, no added salt.  4. Cleanse Midsternal incision and leg incision daily while showering with warm water and mild soap, pat dry and maintain open to air.   5. Follow Cardiac Surgery Do's and Don'ts discharge instructions.  6. Check blood sugar before meals and at bedtime. Record levels.  7. Check PT/INR Saturday 2/4 and then every Monday and Thursday weekly. Dose Coumadin per INR level. Goal INR 2-3. 1. Daily Shower  2. Weight yourself daily and notify any weight gain greater than 2-3 pounds in 24 hours.  3. Diabetic diet - no concentrated sweets, low fat, low cholesterol, no added salt.  4. Cleanse Midsternal incision and leg incision daily while showering with warm water and mild soap, pat dry and maintain open to air.   5. Follow Cardiac Surgery Do's and Don'ts discharge instructions.  6. Check blood sugar before meals and at bedtime. Record levels.  7. Check PT/INR Saturday 2/4 and then every Monday and Thursday weekly. Dose Coumadin per INR level. Goal INR 2-3.  8. Check BMP and CBC Monday and Thursday and prn. Supplement potassium prn. 1. Daily Shower  2. Weight yourself daily and notify any weight gain greater than 2-3 pounds in 24 hours.  3. Diabetic diet - no concentrated sweets, low fat, low cholesterol, no added salt.  4. Cleanse Midsternal incision and leg incision daily while showering with warm water and mild soap, pat dry and maintain open to air.   5. Follow Cardiac Surgery Do's and Don'ts discharge instructions.  6. Check blood sugar before meals and at bedtime. Record levels.  7. Check PT/INR Saturday 2/4 and then every Monday and Thursday weekly. Dose Coumadin per INR level. Goal INR 2-3.  8. Measure Intake and Output daily.  8. Check BMP and CBC Monday and Thursday and prn. Supplement potassium prn.

## 2017-02-03 NOTE — DISCHARGE NOTE ADULT - MEDICATION SUMMARY - MEDICATIONS TO STOP TAKING
I will STOP taking the medications listed below when I get home from the hospital:    Crestor 20 mg oral tablet  -- 1 tab(s) by mouth once a day (at bedtime)    losartan 100 mg oral tablet  -- 1 tab(s) by mouth once a day    metFORMIN 500 mg oral tablet, extended release  -- 1 tab(s) by mouth 2 times a day    isosorbide mononitrate 30 mg oral tablet, extended release  -- 1 tab(s) by mouth once a day (in the morning)    meclizine 25 mg oral tablet  -- 1 tab(s) by mouth 3 times a day    meloxicam 15 mg oral tablet  -- 1 tab(s) by mouth once a day    Myrbetriq 25 mg oral tablet, extended release  -- 1 tab(s) by mouth once a day    Nitrostat 0.3 mg sublingual tablet  -- 1 tab(s) under tongue every 5 minutes, As Needed    Januvia 50 mg oral tablet  -- 1 tab(s) by mouth once a day

## 2017-02-03 NOTE — DISCHARGE NOTE ADULT - NS MD DC FALL RISK RISK
For information on Fall & Injury Prevention, visit [email or web address]

## 2017-02-03 NOTE — DISCHARGE NOTE ADULT - INSTRUCTIONS
Diabetic diet - no concentrated sweets, low fat, low cholesterol, no added salt. Diabetic diet - no concentrated sweets, low fat, low cholesterol, no added salt. Soft diet 2/2 sore throat-advance diet as tolerated.

## 2017-02-03 NOTE — DISCHARGE NOTE ADULT - MEDICATION SUMMARY - MEDICATIONS TO TAKE
I will START or STAY ON the medications listed below when I get home from the hospital:    finasteride 5 mg oral tablet  -- 1 tab(s) by mouth once a day  -- Indication: For prostate    acetaminophen 325 mg oral tablet  -- 2 tab(s) by mouth every 6 hours, As needed, Mild Pain (1 - 3)  -- Indication: For pain    aspirin 325 mg oral delayed release tablet  -- 1 tab(s) by mouth once a day  -- Indication: For anti-platelet    amiodarone 200 mg oral tablet  -- 1 tab(s) by mouth once a day  -- Indication: For antiarrythmic    warfarin 2.5 mg oral tablet  -- 1 tab(s) by mouth once a day  -- Indication: For Blood thinner    sertraline 25 mg oral tablet  -- 1 tab(s) by mouth once a day  -- Indication: For depression    HumaLOG 100 units/mL subcutaneous solution  --  subcutaneous 3 times a day (before meals); 1 Unit(s) if Glucose 151 - 200  2 Unit(s) if Glucose 201 - 250  3 Unit(s) if Glucose 251 - 300  4 Unit(s) if Glucose 301 - 350  5 Unit(s) if Glucose 351 - 400  6 Unit(s) if Glucose Greater Than 400  -- Indication: For diabetes    HumaLOG 100 units/mL subcutaneous solution  --  subcutaneous once a day (at bedtime); 2 Unit(s) if Glucose 251 - 300  4 Unit(s) if Glucose 301 - 350  6 Unit(s) if Glucose 351 - 400  8 Unit(s) if Glucose Greater Than 400  -- Indication: For diabetes    insulin glargine  -- 10 unit(s) subcutaneous once a day (at bedtime)  -- Indication: For diabetes    nystatin 100,000 units/mL oral suspension  -- 5 milliliter(s) by mouth every 6 hours  -- Indication: For thrush    atorvastatin 40 mg oral tablet  -- 1 tab(s) by mouth once a day (at bedtime)  -- Indication: For cholesterol    carbidopa-levodopa 25 mg-100 mg oral tablet  -- 1 tab(s) by mouth 3 times a day  -- Indication: For parkinsons    metoprolol tartrate 50 mg oral tablet  -- 1 tab(s) by mouth 2 times a day  -- Indication: For Blood pressure/heart rate    Symbicort 160 mcg-4.5 mcg/inh inhalation aerosol  -- 2 puff(s) inhaled 2 times a day  -- Indication: For asthma    furosemide 40 mg oral tablet  -- 1 tab(s) by mouth once a day  -- Indication: For diuretic    docusate sodium 100 mg oral capsule  -- 1 cap(s) by mouth 3 times a day  -- Indication: For Stool softener    polyethylene glycol 3350 oral powder for reconstitution  -- 17 gram(s) by mouth once a day  -- Indication: For laxative    potassium chloride 20 mEq oral tablet, extended release  -- 2 tab(s) by mouth once a day  -- Indication: For potassium supplement    omeprazole 40 mg oral delayed release capsule  -- 1 cap(s) by mouth once a day  -- Indication: For gerd    oxybutynin 5 mg oral tablet  -- 1 tab(s) by mouth 2 times a day  -- Indication: For Urinary frequency I will START or STAY ON the medications listed below when I get home from the hospital:    finasteride 5 mg oral tablet  -- 1 tab(s) by mouth once a day  -- Indication: For prostate    budesonide 0.25 mg/2 mL inhalation suspension  --  inhaled   -- Indication: For inhaler    aspirin 81 mg oral delayed release tablet  -- 1 tab(s) by mouth once a day  -- Indication: For antiplatelet    acetaminophen 325 mg oral tablet  -- 2 tab(s) by mouth every 6 hours, As needed, Mild Pain (1 - 3)  -- Indication: For pain    amiodarone 200 mg oral tablet  -- 1 tab(s) by mouth once a day  -- Indication: For antiarrythmic    sertraline 25 mg oral tablet  -- 1 tab(s) by mouth once a day  -- Indication: For depression    HumaLOG 100 units/mL subcutaneous solution  --  subcutaneous 3 times a day (before meals); 1 Unit(s) if Glucose 151 - 200  2 Unit(s) if Glucose 201 - 250  3 Unit(s) if Glucose 251 - 300  4 Unit(s) if Glucose 301 - 350  5 Unit(s) if Glucose 351 - 400  6 Unit(s) if Glucose Greater Than 400  -- Indication: For diabetes    HumaLOG 100 units/mL subcutaneous solution  --  subcutaneous once a day (at bedtime); 2 Unit(s) if Glucose 251 - 300  4 Unit(s) if Glucose 301 - 350  6 Unit(s) if Glucose 351 - 400  8 Unit(s) if Glucose Greater Than 400  -- Indication: For diabetes    insulin glargine  -- 10 unit(s) subcutaneous once a day (at bedtime)  -- Indication: For diabetes    nystatin 100,000 units/mL oral suspension  -- 5 milliliter(s) by mouth every 6 hours  -- Indication: For thrush    colchicine 0.6 mg oral tablet  -- 1 tab(s) by mouth 2 times a day  -- Indication: For antigout    atorvastatin 40 mg oral tablet  -- 1 tab(s) by mouth once a day (at bedtime)  -- Indication: For cholesterol    carbidopa-levodopa 25 mg-100 mg oral tablet  -- 1 tab(s) by mouth 3 times a day  -- Indication: For parkinsons    metoprolol tartrate 25 mg oral tablet  -- 1 tab(s) by mouth 2 times a day  -- Indication: For cardiovascular agent    Symbicort 160 mcg-4.5 mcg/inh inhalation aerosol  -- 2 puff(s) inhaled 2 times a day  -- Indication: For asthma    furosemide 40 mg oral tablet  -- 1 tab(s) by mouth once a day  -- Indication: For diuretic    docusate sodium 100 mg oral capsule  -- 1 cap(s) by mouth 3 times a day  -- Indication: For Stool softener    polyethylene glycol 3350 oral powder for reconstitution  -- 17 gram(s) by mouth once a day  -- Indication: For laxative    omeprazole 40 mg oral delayed release capsule  -- 1 cap(s) by mouth once a day  -- Indication: For gerd    oxybutynin 5 mg oral tablet  -- 1 tab(s) by mouth 2 times a day  -- Indication: For Urinary frequency

## 2017-02-03 NOTE — DISCHARGE NOTE ADULT - ADDITIONAL INSTRUCTIONS
Please call [phone] after discharge from Rehab to make follow up appointment with Dr. Booth at [facility].  Check PT/INR Saturday 2/4 and then every Monday and Thursday weekly. Dose Coumadin per INR level. Goal INR 2-3. Please call [phone] after discharge from Rehab to make follow up appointment with Dr. Booth at [facility].  Please call [phone] after discharge from Rehab to make follow up appointment with cardiologist Dr. Campos to manage Coumadin dosing and to check PT/INR levels. Goal INR 2-3.  Please make follow up appointment with PCP after discharge from Rehab.

## 2017-02-03 NOTE — DISCHARGE NOTE ADULT - MEDICATION SUMMARY - MEDICATIONS TO CHANGE
I will SWITCH the dose or number of times a day I take the medications listed below when I get home from the hospital:    aspirin 81 mg oral tablet  -- 1 tab(s) by mouth once a day    aspirin 81 mg oral tablet  -- 1 tab(s) by mouth once a day

## 2017-02-03 NOTE — DISCHARGE NOTE ADULT - HOSPITAL COURSE
78 y/o male with PMHx of HTN, DMT2, Parkinson's disease, BPH, CAD with PCI to LAD (2003) s/p 1/24/17 CABGx3 LIMA.   Post-op Course:  Afib with RVR  TTE  Chest CT  Acute delirium/confusion requiring 1:1 observation for safety, now off 1:1 A&Ox2-3 but forgetful, needs frequent orientation.  Repeat TTE 2/3  Discharged to Rehab on 2/3/17 per Dr. Booth. 76 y/o male with PMHx of HTN, DMT2, Parkinson's disease, BPH, CAD with PCI to LAD (2003) s/p 1/24/17 CABGx3 LIMA.   Post-op Course:  Afib with RVR, started on amiodarone, pt continues to have go back and forth between SR and Afib.  Anticoagulation on Coumadin, goal INR 2-3.  1/27 TTE- normal LV function, mild-mod AR, MR, TR. Negative pericardial effusion.  1/27 Chest CT - Small B/L pleural effusions and adjacent atelectasis. Trace pericardial effusion. Started on PO lasix 40mg daily.  Acute delirium/confusion requiring 1:1 observation for safety, now off 1:1 A&Ox2-3 but forgetful, needs frequent orientation.  Repeat TTE 2/3  Discharged to Rehab on 2/3/17 per Dr. Booth. 76 y/o male with PMHx of HTN, DMT2, Parkinson's disease, BPH, CAD with PCI to LAD (2003) s/p 1/24/17 CABGx3 LIMA.   Post-op Course:  Afib with RVR, started on amiodarone, pt continues to have go back and forth between SR and Afib.  Anticoagulation on Coumadin, goal INR 2-3.  1/27 TTE- normal LV function, mild-mod AR, MR, TR. Negative pericardial effusion.  1/27 Chest CT - Small B/L pleural effusions and adjacent atelectasis. Trace pericardial effusion. Started on PO lasix 40mg daily.  Acute delirium/confusion requiring 1:1 observation for safety, now off 1:1 A&Ox2-3 but forgetful, needs frequent orientation.  Repeat TTE 2/3: EF 63% negative pericardial effusion.  Discharged to Rehab on 2/3/17 per Dr. Booth. 78 y/o male with PMHx of HTN, DMT2, Parkinson's disease, BPH, CAD with PCI to LAD (2003) s/p 1/24/17 CABGx3 LIMA.   Post-op Course:  Afib with RVR, started on amiodarone, pt continues to have go back and forth between SR and Afib.  Anticoagulation on Coumadin, goal INR 2-3.  1/27 TTE- normal LV function, mild-mod AR, MR, TR. Negative pericardial effusion.  1/27 Chest CT - Small B/L pleural effusions and adjacent atelectasis. Trace pericardial effusion. Started on PO lasix 40mg daily.  Acute delirium/confusion requiring 1:1 observation for safety, now off 1:1 A&Ox2-3 but forgetful, needs frequent orientation.  Repeat TTE 2/3: EF 63% negative pericardial effusion.  Discharged to Rehab on 2/3/17 per Dr. Booth. 76 y/o male with PMHx of HTN, DMT2, Parkinson's disease, BPH, CAD with PCI to LAD (2003) s/p 1/24/17 CABGx3 LIMA.   Post-op Course:  Afib with RVR, started on amiodarone, pt continues to have go back and forth between SR and Afib.  Anticoagulation on Coumadin, goal INR 2-3.  1/27 TTE- normal LV function, mild-mod AR, MR, TR. Negative pericardial effusion.  1/27 Chest CT - Small B/L pleural effusions and adjacent atelectasis. Trace pericardial effusion. Started on PO lasix 40mg daily.  Acute delirium/confusion requiring 1:1 observation for safety, now off 1:1 A&Ox2-3 but forgetful, needs frequent orientation.  Repeat TTE 2/3: EF 63% negative pericardial effusion.  Discharged to Rehab on 2/7/17 per Dr. Booth.

## 2017-02-03 NOTE — DISCHARGE NOTE ADULT - CARE PROVIDERS DIRECT ADDRESSES
,[email or web address],DirectAddress_Unknown ,[email or web address],DirectAddress_Unknown

## 2017-02-03 NOTE — DISCHARGE NOTE ADULT - PATIENT PORTAL LINK FT
“You can access the Follow[employer] Patient Portal, offered by [facility], by registering with the following website: [email or web address]”

## 2017-02-04 LAB
ANION GAP SERPL CALC-SCNC: 14 MMOL/L — SIGNIFICANT CHANGE UP (ref 5–17)
BUN SERPL-MCNC: 39 MG/DL — HIGH (ref 7–23)
CALCIUM SERPL-MCNC: 9.1 MG/DL — SIGNIFICANT CHANGE UP (ref 8.4–10.5)
CHLORIDE SERPL-SCNC: 102 MMOL/L — SIGNIFICANT CHANGE UP (ref 96–108)
CO2 SERPL-SCNC: 26 MMOL/L — SIGNIFICANT CHANGE UP (ref 22–31)
CREAT SERPL-MCNC: 1.06 MG/DL — SIGNIFICANT CHANGE UP (ref 0.5–1.3)
GLUCOSE SERPL-MCNC: 138 MG/DL — HIGH (ref 70–99)
HCT VFR BLD CALC: 26.6 % — LOW (ref 39–50)
HGB BLD-MCNC: 8.8 G/DL — LOW (ref 13–17)
INR BLD: 2.06 RATIO — HIGH (ref 0.88–1.16)
MCHC RBC-ENTMCNC: 31.6 PG — SIGNIFICANT CHANGE UP (ref 27–34)
MCHC RBC-ENTMCNC: 33.1 GM/DL — SIGNIFICANT CHANGE UP (ref 32–36)
MCV RBC AUTO: 95.5 FL — SIGNIFICANT CHANGE UP (ref 80–100)
PLATELET # BLD AUTO: 357 K/UL — SIGNIFICANT CHANGE UP (ref 150–400)
POTASSIUM SERPL-MCNC: 4.5 MMOL/L — SIGNIFICANT CHANGE UP (ref 3.5–5.3)
POTASSIUM SERPL-SCNC: 4.5 MMOL/L — SIGNIFICANT CHANGE UP (ref 3.5–5.3)
PROTHROM AB SERPL-ACNC: 22.4 SEC — HIGH (ref 10–13.1)
RBC # BLD: 2.78 M/UL — LOW (ref 4.2–5.8)
RBC # FLD: 14.4 % — SIGNIFICANT CHANGE UP (ref 10.3–14.5)
SODIUM SERPL-SCNC: 142 MMOL/L — SIGNIFICANT CHANGE UP (ref 135–145)
WBC # BLD: 12.2 K/UL — HIGH (ref 3.8–10.5)
WBC # FLD AUTO: 12.2 K/UL — HIGH (ref 3.8–10.5)

## 2017-02-04 RX ORDER — METOPROLOL TARTRATE 50 MG
25 TABLET ORAL
Qty: 0 | Refills: 0 | Status: DISCONTINUED | OUTPATIENT
Start: 2017-02-04 | End: 2017-02-07

## 2017-02-04 RX ORDER — WARFARIN SODIUM 2.5 MG/1
2 TABLET ORAL ONCE
Qty: 0 | Refills: 0 | Status: COMPLETED | OUTPATIENT
Start: 2017-02-04 | End: 2017-02-04

## 2017-02-04 RX ORDER — POTASSIUM CHLORIDE 20 MEQ
20 PACKET (EA) ORAL DAILY
Qty: 0 | Refills: 0 | Status: DISCONTINUED | OUTPATIENT
Start: 2017-02-05 | End: 2017-02-07

## 2017-02-04 RX ADMIN — Medication 0.6 MILLIGRAM(S): at 05:56

## 2017-02-04 RX ADMIN — POLYETHYLENE GLYCOL 3350 17 GRAM(S): 17 POWDER, FOR SOLUTION ORAL at 12:05

## 2017-02-04 RX ADMIN — TIOTROPIUM BROMIDE 1 CAPSULE(S): 18 CAPSULE ORAL; RESPIRATORY (INHALATION) at 12:04

## 2017-02-04 RX ADMIN — Medication 500000 UNIT(S): at 15:06

## 2017-02-04 RX ADMIN — Medication 5 MILLIGRAM(S): at 17:13

## 2017-02-04 RX ADMIN — CARBIDOPA AND LEVODOPA 1 TABLET(S): 25; 100 TABLET ORAL at 05:57

## 2017-02-04 RX ADMIN — AMIODARONE HYDROCHLORIDE 200 MILLIGRAM(S): 400 TABLET ORAL at 05:57

## 2017-02-04 RX ADMIN — Medication 100 MILLIGRAM(S): at 22:21

## 2017-02-04 RX ADMIN — Medication 1: at 17:14

## 2017-02-04 RX ADMIN — Medication 2: at 12:02

## 2017-02-04 RX ADMIN — PANTOPRAZOLE SODIUM 40 MILLIGRAM(S): 20 TABLET, DELAYED RELEASE ORAL at 05:58

## 2017-02-04 RX ADMIN — Medication 0.25 MILLIGRAM(S): at 08:40

## 2017-02-04 RX ADMIN — SERTRALINE 25 MILLIGRAM(S): 25 TABLET, FILM COATED ORAL at 12:04

## 2017-02-04 RX ADMIN — Medication 40 MILLIGRAM(S): at 05:58

## 2017-02-04 RX ADMIN — Medication 5 MILLIGRAM(S): at 05:57

## 2017-02-04 RX ADMIN — Medication 50 MILLIGRAM(S): at 05:56

## 2017-02-04 RX ADMIN — Medication 325 MILLIGRAM(S): at 12:04

## 2017-02-04 RX ADMIN — INSULIN GLARGINE 10 UNIT(S): 100 INJECTION, SOLUTION SUBCUTANEOUS at 22:22

## 2017-02-04 RX ADMIN — ATORVASTATIN CALCIUM 40 MILLIGRAM(S): 80 TABLET, FILM COATED ORAL at 22:21

## 2017-02-04 RX ADMIN — CARBIDOPA AND LEVODOPA 1 TABLET(S): 25; 100 TABLET ORAL at 22:21

## 2017-02-04 RX ADMIN — Medication 0.6 MILLIGRAM(S): at 17:13

## 2017-02-04 RX ADMIN — WARFARIN SODIUM 2 MILLIGRAM(S): 2.5 TABLET ORAL at 22:21

## 2017-02-04 RX ADMIN — Medication 3 MILLILITER(S): at 08:40

## 2017-02-04 RX ADMIN — Medication 25 MILLIGRAM(S): at 17:13

## 2017-02-04 RX ADMIN — Medication 0.25 MILLIGRAM(S): at 18:13

## 2017-02-04 RX ADMIN — Medication 3 MILLILITER(S): at 18:00

## 2017-02-04 RX ADMIN — Medication 3 MILLILITER(S): at 12:02

## 2017-02-04 RX ADMIN — Medication 100 MILLIGRAM(S): at 05:57

## 2017-02-04 RX ADMIN — Medication 100 MILLIGRAM(S): at 15:06

## 2017-02-04 RX ADMIN — FINASTERIDE 5 MILLIGRAM(S): 5 TABLET, FILM COATED ORAL at 12:04

## 2017-02-04 RX ADMIN — CARBIDOPA AND LEVODOPA 1 TABLET(S): 25; 100 TABLET ORAL at 15:06

## 2017-02-05 LAB
ANION GAP SERPL CALC-SCNC: 11 MMOL/L — SIGNIFICANT CHANGE UP (ref 5–17)
BUN SERPL-MCNC: 29 MG/DL — HIGH (ref 7–23)
CALCIUM SERPL-MCNC: 9.4 MG/DL — SIGNIFICANT CHANGE UP (ref 8.4–10.5)
CHLORIDE SERPL-SCNC: 102 MMOL/L — SIGNIFICANT CHANGE UP (ref 96–108)
CO2 SERPL-SCNC: 28 MMOL/L — SIGNIFICANT CHANGE UP (ref 22–31)
CREAT SERPL-MCNC: 1.02 MG/DL — SIGNIFICANT CHANGE UP (ref 0.5–1.3)
GLUCOSE SERPL-MCNC: 109 MG/DL — HIGH (ref 70–99)
HCT VFR BLD CALC: 28 % — LOW (ref 39–50)
HGB BLD-MCNC: 9.1 G/DL — LOW (ref 13–17)
INR BLD: 1.91 RATIO — HIGH (ref 0.88–1.16)
MCHC RBC-ENTMCNC: 31.4 PG — SIGNIFICANT CHANGE UP (ref 27–34)
MCHC RBC-ENTMCNC: 32.4 GM/DL — SIGNIFICANT CHANGE UP (ref 32–36)
MCV RBC AUTO: 96.9 FL — SIGNIFICANT CHANGE UP (ref 80–100)
PLATELET # BLD AUTO: 423 K/UL — HIGH (ref 150–400)
POTASSIUM SERPL-MCNC: 4.3 MMOL/L — SIGNIFICANT CHANGE UP (ref 3.5–5.3)
POTASSIUM SERPL-SCNC: 4.3 MMOL/L — SIGNIFICANT CHANGE UP (ref 3.5–5.3)
PROTHROM AB SERPL-ACNC: 21 SEC — HIGH (ref 10–13.1)
RBC # BLD: 2.89 M/UL — LOW (ref 4.2–5.8)
RBC # FLD: 14.9 % — HIGH (ref 10.3–14.5)
SODIUM SERPL-SCNC: 141 MMOL/L — SIGNIFICANT CHANGE UP (ref 135–145)
WBC # BLD: 14 K/UL — HIGH (ref 3.8–10.5)
WBC # FLD AUTO: 14 K/UL — HIGH (ref 3.8–10.5)

## 2017-02-05 PROCEDURE — 99232 SBSQ HOSP IP/OBS MODERATE 35: CPT | Mod: GC

## 2017-02-05 PROCEDURE — 71020: CPT | Mod: 26

## 2017-02-05 RX ORDER — WARFARIN SODIUM 2.5 MG/1
3 TABLET ORAL ONCE
Qty: 0 | Refills: 0 | Status: COMPLETED | OUTPATIENT
Start: 2017-02-05 | End: 2017-02-05

## 2017-02-05 RX ADMIN — Medication 3 MILLILITER(S): at 17:44

## 2017-02-05 RX ADMIN — Medication 3 MILLILITER(S): at 08:05

## 2017-02-05 RX ADMIN — Medication 40 MILLIGRAM(S): at 06:22

## 2017-02-05 RX ADMIN — INSULIN GLARGINE 10 UNIT(S): 100 INJECTION, SOLUTION SUBCUTANEOUS at 22:24

## 2017-02-05 RX ADMIN — Medication 5 MILLIGRAM(S): at 17:44

## 2017-02-05 RX ADMIN — ATORVASTATIN CALCIUM 40 MILLIGRAM(S): 80 TABLET, FILM COATED ORAL at 22:23

## 2017-02-05 RX ADMIN — CARBIDOPA AND LEVODOPA 1 TABLET(S): 25; 100 TABLET ORAL at 22:23

## 2017-02-05 RX ADMIN — Medication 5 MILLIGRAM(S): at 06:22

## 2017-02-05 RX ADMIN — Medication 0.25 MILLIGRAM(S): at 17:44

## 2017-02-05 RX ADMIN — Medication 325 MILLIGRAM(S): at 12:53

## 2017-02-05 RX ADMIN — Medication 0.6 MILLIGRAM(S): at 17:50

## 2017-02-05 RX ADMIN — FINASTERIDE 5 MILLIGRAM(S): 5 TABLET, FILM COATED ORAL at 12:54

## 2017-02-05 RX ADMIN — Medication 25 MILLIGRAM(S): at 06:25

## 2017-02-05 RX ADMIN — Medication 500000 UNIT(S): at 17:44

## 2017-02-05 RX ADMIN — SERTRALINE 25 MILLIGRAM(S): 25 TABLET, FILM COATED ORAL at 12:53

## 2017-02-05 RX ADMIN — WARFARIN SODIUM 3 MILLIGRAM(S): 2.5 TABLET ORAL at 22:23

## 2017-02-05 RX ADMIN — PANTOPRAZOLE SODIUM 40 MILLIGRAM(S): 20 TABLET, DELAYED RELEASE ORAL at 06:22

## 2017-02-05 RX ADMIN — Medication 500000 UNIT(S): at 12:53

## 2017-02-05 RX ADMIN — TIOTROPIUM BROMIDE 1 CAPSULE(S): 18 CAPSULE ORAL; RESPIRATORY (INHALATION) at 12:52

## 2017-02-05 RX ADMIN — Medication 100 MILLIGRAM(S): at 13:01

## 2017-02-05 RX ADMIN — Medication 0.6 MILLIGRAM(S): at 06:22

## 2017-02-05 RX ADMIN — CARBIDOPA AND LEVODOPA 1 TABLET(S): 25; 100 TABLET ORAL at 06:22

## 2017-02-05 RX ADMIN — POLYETHYLENE GLYCOL 3350 17 GRAM(S): 17 POWDER, FOR SOLUTION ORAL at 12:54

## 2017-02-05 RX ADMIN — Medication 3 MILLILITER(S): at 12:52

## 2017-02-05 RX ADMIN — CARBIDOPA AND LEVODOPA 1 TABLET(S): 25; 100 TABLET ORAL at 13:02

## 2017-02-05 RX ADMIN — Medication 0.25 MILLIGRAM(S): at 08:05

## 2017-02-05 RX ADMIN — Medication 25 MILLIGRAM(S): at 17:44

## 2017-02-05 RX ADMIN — Medication 100 MILLIGRAM(S): at 06:22

## 2017-02-05 RX ADMIN — Medication 20 MILLIEQUIVALENT(S): at 12:53

## 2017-02-06 LAB
ANION GAP SERPL CALC-SCNC: 10 MMOL/L — SIGNIFICANT CHANGE UP (ref 5–17)
BUN SERPL-MCNC: 29 MG/DL — HIGH (ref 7–23)
CALCIUM SERPL-MCNC: 8.6 MG/DL — SIGNIFICANT CHANGE UP (ref 8.4–10.5)
CHLORIDE SERPL-SCNC: 103 MMOL/L — SIGNIFICANT CHANGE UP (ref 96–108)
CO2 SERPL-SCNC: 27 MMOL/L — SIGNIFICANT CHANGE UP (ref 22–31)
CREAT SERPL-MCNC: 0.99 MG/DL — SIGNIFICANT CHANGE UP (ref 0.5–1.3)
GLUCOSE SERPL-MCNC: 86 MG/DL — SIGNIFICANT CHANGE UP (ref 70–99)
HCT VFR BLD CALC: 28.1 % — LOW (ref 39–50)
HGB BLD-MCNC: 9 G/DL — LOW (ref 13–17)
INR BLD: 1.94 RATIO — HIGH (ref 0.88–1.16)
MCHC RBC-ENTMCNC: 31.1 PG — SIGNIFICANT CHANGE UP (ref 27–34)
MCHC RBC-ENTMCNC: 31.9 GM/DL — LOW (ref 32–36)
MCV RBC AUTO: 97.5 FL — SIGNIFICANT CHANGE UP (ref 80–100)
PLATELET # BLD AUTO: 414 K/UL — HIGH (ref 150–400)
POTASSIUM SERPL-MCNC: 4.5 MMOL/L — SIGNIFICANT CHANGE UP (ref 3.5–5.3)
POTASSIUM SERPL-SCNC: 4.5 MMOL/L — SIGNIFICANT CHANGE UP (ref 3.5–5.3)
PROTHROM AB SERPL-ACNC: 21.1 SEC — HIGH (ref 10–13.1)
RBC # BLD: 2.89 M/UL — LOW (ref 4.2–5.8)
RBC # FLD: 15.1 % — HIGH (ref 10.3–14.5)
SODIUM SERPL-SCNC: 140 MMOL/L — SIGNIFICANT CHANGE UP (ref 135–145)
WBC # BLD: 11.1 K/UL — HIGH (ref 3.8–10.5)
WBC # FLD AUTO: 11.1 K/UL — HIGH (ref 3.8–10.5)

## 2017-02-06 PROCEDURE — 99232 SBSQ HOSP IP/OBS MODERATE 35: CPT | Mod: GC

## 2017-02-06 RX ORDER — ASPIRIN/CALCIUM CARB/MAGNESIUM 324 MG
81 TABLET ORAL DAILY
Qty: 0 | Refills: 0 | Status: DISCONTINUED | OUTPATIENT
Start: 2017-02-06 | End: 2017-02-07

## 2017-02-06 RX ORDER — WARFARIN SODIUM 2.5 MG/1
3 TABLET ORAL ONCE
Qty: 0 | Refills: 0 | Status: COMPLETED | OUTPATIENT
Start: 2017-02-06 | End: 2017-02-06

## 2017-02-06 RX ADMIN — Medication 0.25 MILLIGRAM(S): at 23:15

## 2017-02-06 RX ADMIN — Medication 100 MILLIGRAM(S): at 13:50

## 2017-02-06 RX ADMIN — SERTRALINE 25 MILLIGRAM(S): 25 TABLET, FILM COATED ORAL at 11:14

## 2017-02-06 RX ADMIN — CARBIDOPA AND LEVODOPA 1 TABLET(S): 25; 100 TABLET ORAL at 23:14

## 2017-02-06 RX ADMIN — CARBIDOPA AND LEVODOPA 1 TABLET(S): 25; 100 TABLET ORAL at 13:50

## 2017-02-06 RX ADMIN — Medication 500000 UNIT(S): at 17:19

## 2017-02-06 RX ADMIN — CARBIDOPA AND LEVODOPA 1 TABLET(S): 25; 100 TABLET ORAL at 06:35

## 2017-02-06 RX ADMIN — FINASTERIDE 5 MILLIGRAM(S): 5 TABLET, FILM COATED ORAL at 11:14

## 2017-02-06 RX ADMIN — Medication 25 MILLIGRAM(S): at 06:35

## 2017-02-06 RX ADMIN — Medication 325 MILLIGRAM(S): at 11:15

## 2017-02-06 RX ADMIN — ATORVASTATIN CALCIUM 40 MILLIGRAM(S): 80 TABLET, FILM COATED ORAL at 23:14

## 2017-02-06 RX ADMIN — Medication 5 MILLIGRAM(S): at 06:34

## 2017-02-06 RX ADMIN — Medication 0.6 MILLIGRAM(S): at 17:19

## 2017-02-06 RX ADMIN — Medication 20 MILLIEQUIVALENT(S): at 11:14

## 2017-02-06 RX ADMIN — WARFARIN SODIUM 3 MILLIGRAM(S): 2.5 TABLET ORAL at 23:14

## 2017-02-06 RX ADMIN — Medication 0.6 MILLIGRAM(S): at 06:35

## 2017-02-06 RX ADMIN — Medication 0.25 MILLIGRAM(S): at 08:50

## 2017-02-06 RX ADMIN — Medication 3 MILLILITER(S): at 23:15

## 2017-02-06 RX ADMIN — TIOTROPIUM BROMIDE 1 CAPSULE(S): 18 CAPSULE ORAL; RESPIRATORY (INHALATION) at 11:15

## 2017-02-06 RX ADMIN — Medication 3 MILLILITER(S): at 13:50

## 2017-02-06 RX ADMIN — Medication 5 MILLIGRAM(S): at 17:19

## 2017-02-06 RX ADMIN — Medication 40 MILLIGRAM(S): at 06:35

## 2017-02-06 RX ADMIN — Medication 500000 UNIT(S): at 08:50

## 2017-02-06 RX ADMIN — Medication 3 MILLILITER(S): at 08:50

## 2017-02-06 RX ADMIN — PANTOPRAZOLE SODIUM 40 MILLIGRAM(S): 20 TABLET, DELAYED RELEASE ORAL at 06:34

## 2017-02-06 RX ADMIN — Medication 25 MILLIGRAM(S): at 17:19

## 2017-02-06 RX ADMIN — Medication 100 MILLIGRAM(S): at 23:12

## 2017-02-06 RX ADMIN — POLYETHYLENE GLYCOL 3350 17 GRAM(S): 17 POWDER, FOR SOLUTION ORAL at 11:15

## 2017-02-06 RX ADMIN — INSULIN GLARGINE 10 UNIT(S): 100 INJECTION, SOLUTION SUBCUTANEOUS at 23:18

## 2017-02-07 VITALS — WEIGHT: 129.19 LBS

## 2017-02-07 LAB
ANION GAP SERPL CALC-SCNC: 10 MMOL/L — SIGNIFICANT CHANGE UP (ref 5–17)
BUN SERPL-MCNC: 24 MG/DL — HIGH (ref 7–23)
CALCIUM SERPL-MCNC: 8.7 MG/DL — SIGNIFICANT CHANGE UP (ref 8.4–10.5)
CHLORIDE SERPL-SCNC: 101 MMOL/L — SIGNIFICANT CHANGE UP (ref 96–108)
CO2 SERPL-SCNC: 26 MMOL/L — SIGNIFICANT CHANGE UP (ref 22–31)
CREAT SERPL-MCNC: 0.93 MG/DL — SIGNIFICANT CHANGE UP (ref 0.5–1.3)
GLUCOSE SERPL-MCNC: 70 MG/DL — SIGNIFICANT CHANGE UP (ref 70–99)
HCT VFR BLD CALC: 28.6 % — LOW (ref 39–50)
HGB BLD-MCNC: 9.5 G/DL — LOW (ref 13–17)
INR BLD: 1.82 RATIO — HIGH (ref 0.88–1.16)
MCHC RBC-ENTMCNC: 32.1 PG — SIGNIFICANT CHANGE UP (ref 27–34)
MCHC RBC-ENTMCNC: 33 GM/DL — SIGNIFICANT CHANGE UP (ref 32–36)
MCV RBC AUTO: 97.3 FL — SIGNIFICANT CHANGE UP (ref 80–100)
PLATELET # BLD AUTO: 428 K/UL — HIGH (ref 150–400)
POTASSIUM SERPL-MCNC: 4.4 MMOL/L — SIGNIFICANT CHANGE UP (ref 3.5–5.3)
POTASSIUM SERPL-SCNC: 4.4 MMOL/L — SIGNIFICANT CHANGE UP (ref 3.5–5.3)
PROTHROM AB SERPL-ACNC: 20 SEC — HIGH (ref 10–13.1)
RBC # BLD: 2.94 M/UL — LOW (ref 4.2–5.8)
RBC # FLD: 14.9 % — HIGH (ref 10.3–14.5)
SODIUM SERPL-SCNC: 137 MMOL/L — SIGNIFICANT CHANGE UP (ref 135–145)
WBC # BLD: 9.8 K/UL — SIGNIFICANT CHANGE UP (ref 3.8–10.5)
WBC # FLD AUTO: 9.8 K/UL — SIGNIFICANT CHANGE UP (ref 3.8–10.5)

## 2017-02-07 PROCEDURE — 36430 TRANSFUSION BLD/BLD COMPNT: CPT

## 2017-02-07 PROCEDURE — 94002 VENT MGMT INPAT INIT DAY: CPT

## 2017-02-07 PROCEDURE — 94640 AIRWAY INHALATION TREATMENT: CPT

## 2017-02-07 PROCEDURE — 84132 ASSAY OF SERUM POTASSIUM: CPT

## 2017-02-07 PROCEDURE — 82803 BLOOD GASES ANY COMBINATION: CPT

## 2017-02-07 PROCEDURE — 83036 HEMOGLOBIN GLYCOSYLATED A1C: CPT

## 2017-02-07 PROCEDURE — 86891 AUTOLOGOUS BLOOD OP SALVAGE: CPT

## 2017-02-07 PROCEDURE — 80076 HEPATIC FUNCTION PANEL: CPT

## 2017-02-07 PROCEDURE — 71250 CT THORAX DX C-: CPT

## 2017-02-07 PROCEDURE — 85610 PROTHROMBIN TIME: CPT

## 2017-02-07 PROCEDURE — 82947 ASSAY GLUCOSE BLOOD QUANT: CPT

## 2017-02-07 PROCEDURE — C1769: CPT

## 2017-02-07 PROCEDURE — 84436 ASSAY OF TOTAL THYROXINE: CPT

## 2017-02-07 PROCEDURE — 86900 BLOOD TYPING SEROLOGIC ABO: CPT

## 2017-02-07 PROCEDURE — 93880 EXTRACRANIAL BILAT STUDY: CPT

## 2017-02-07 PROCEDURE — C1894: CPT

## 2017-02-07 PROCEDURE — 93308 TTE F-UP OR LMTD: CPT

## 2017-02-07 PROCEDURE — 83605 ASSAY OF LACTIC ACID: CPT

## 2017-02-07 PROCEDURE — 80048 BASIC METABOLIC PNL TOTAL CA: CPT

## 2017-02-07 PROCEDURE — 97162 PT EVAL MOD COMPLEX 30 MIN: CPT

## 2017-02-07 PROCEDURE — C1887: CPT

## 2017-02-07 PROCEDURE — C1751: CPT

## 2017-02-07 PROCEDURE — C1889: CPT

## 2017-02-07 PROCEDURE — 86901 BLOOD TYPING SEROLOGIC RH(D): CPT

## 2017-02-07 PROCEDURE — 84480 ASSAY TRIIODOTHYRONINE (T3): CPT

## 2017-02-07 PROCEDURE — 93306 TTE W/DOPPLER COMPLETE: CPT

## 2017-02-07 PROCEDURE — 93321 DOPPLER ECHO F-UP/LMTD STD: CPT

## 2017-02-07 PROCEDURE — 82435 ASSAY OF BLOOD CHLORIDE: CPT

## 2017-02-07 PROCEDURE — 71046 X-RAY EXAM CHEST 2 VIEWS: CPT

## 2017-02-07 PROCEDURE — 84443 ASSAY THYROID STIM HORMONE: CPT

## 2017-02-07 PROCEDURE — 93005 ELECTROCARDIOGRAM TRACING: CPT

## 2017-02-07 PROCEDURE — 84484 ASSAY OF TROPONIN QUANT: CPT

## 2017-02-07 PROCEDURE — 82553 CREATINE MB FRACTION: CPT

## 2017-02-07 PROCEDURE — 82550 ASSAY OF CK (CPK): CPT

## 2017-02-07 PROCEDURE — 85014 HEMATOCRIT: CPT

## 2017-02-07 PROCEDURE — 82330 ASSAY OF CALCIUM: CPT

## 2017-02-07 PROCEDURE — P9016: CPT

## 2017-02-07 PROCEDURE — 81001 URINALYSIS AUTO W/SCOPE: CPT

## 2017-02-07 PROCEDURE — C1729: CPT

## 2017-02-07 PROCEDURE — 94010 BREATHING CAPACITY TEST: CPT

## 2017-02-07 PROCEDURE — 80053 COMPREHEN METABOLIC PANEL: CPT

## 2017-02-07 PROCEDURE — 87640 STAPH A DNA AMP PROBE: CPT

## 2017-02-07 PROCEDURE — P9045: CPT

## 2017-02-07 PROCEDURE — 85730 THROMBOPLASTIN TIME PARTIAL: CPT

## 2017-02-07 PROCEDURE — 82565 ASSAY OF CREATININE: CPT

## 2017-02-07 PROCEDURE — 84295 ASSAY OF SERUM SODIUM: CPT

## 2017-02-07 PROCEDURE — 85384 FIBRINOGEN ACTIVITY: CPT

## 2017-02-07 PROCEDURE — 87086 URINE CULTURE/COLONY COUNT: CPT

## 2017-02-07 PROCEDURE — 71045 X-RAY EXAM CHEST 1 VIEW: CPT

## 2017-02-07 PROCEDURE — 80061 LIPID PANEL: CPT

## 2017-02-07 PROCEDURE — 93454 CORONARY ARTERY ANGIO S&I: CPT

## 2017-02-07 PROCEDURE — 86923 COMPATIBILITY TEST ELECTRIC: CPT

## 2017-02-07 PROCEDURE — 97116 GAIT TRAINING THERAPY: CPT

## 2017-02-07 PROCEDURE — 87641 MR-STAPH DNA AMP PROBE: CPT

## 2017-02-07 PROCEDURE — 85027 COMPLETE CBC AUTOMATED: CPT

## 2017-02-07 PROCEDURE — P9047: CPT

## 2017-02-07 PROCEDURE — 86850 RBC ANTIBODY SCREEN: CPT

## 2017-02-07 RX ORDER — POTASSIUM CHLORIDE 20 MEQ
1 PACKET (EA) ORAL
Qty: 30 | Refills: 0
Start: 2017-02-07 | End: 2017-03-09

## 2017-02-07 RX ORDER — METOPROLOL TARTRATE 50 MG
1 TABLET ORAL
Qty: 0 | Refills: 0 | DISCHARGE
Start: 2017-02-07

## 2017-02-07 RX ORDER — WARFARIN SODIUM 2.5 MG/1
3 TABLET ORAL
Qty: 90 | Refills: 0
Start: 2017-02-07 | End: 2017-03-09

## 2017-02-07 RX ORDER — BUDESONIDE, MICRONIZED 100 %
0 POWDER (GRAM) MISCELLANEOUS
Qty: 0 | Refills: 0 | DISCHARGE
Start: 2017-02-07

## 2017-02-07 RX ORDER — WARFARIN SODIUM 2.5 MG/1
4 TABLET ORAL ONCE
Qty: 0 | Refills: 0 | Status: DISCONTINUED | OUTPATIENT
Start: 2017-02-07 | End: 2017-02-07

## 2017-02-07 RX ORDER — ASPIRIN/CALCIUM CARB/MAGNESIUM 324 MG
1 TABLET ORAL
Qty: 0 | Refills: 0 | DISCHARGE
Start: 2017-02-07

## 2017-02-07 RX ORDER — COLCHICINE 0.6 MG
1 TABLET ORAL
Qty: 0 | Refills: 0 | DISCHARGE
Start: 2017-02-07

## 2017-02-07 RX ADMIN — Medication 5 MILLIGRAM(S): at 06:16

## 2017-02-07 RX ADMIN — Medication 40 MILLIGRAM(S): at 06:15

## 2017-02-07 RX ADMIN — Medication 25 MILLIGRAM(S): at 06:26

## 2017-02-07 RX ADMIN — TIOTROPIUM BROMIDE 1 CAPSULE(S): 18 CAPSULE ORAL; RESPIRATORY (INHALATION) at 11:43

## 2017-02-07 RX ADMIN — Medication 20 MILLIEQUIVALENT(S): at 11:43

## 2017-02-07 RX ADMIN — CARBIDOPA AND LEVODOPA 1 TABLET(S): 25; 100 TABLET ORAL at 06:15

## 2017-02-07 RX ADMIN — Medication 3 MILLILITER(S): at 09:06

## 2017-02-07 RX ADMIN — PANTOPRAZOLE SODIUM 40 MILLIGRAM(S): 20 TABLET, DELAYED RELEASE ORAL at 06:15

## 2017-02-07 RX ADMIN — SERTRALINE 25 MILLIGRAM(S): 25 TABLET, FILM COATED ORAL at 11:43

## 2017-02-07 RX ADMIN — Medication 81 MILLIGRAM(S): at 11:43

## 2017-02-07 RX ADMIN — FINASTERIDE 5 MILLIGRAM(S): 5 TABLET, FILM COATED ORAL at 11:43

## 2017-02-07 RX ADMIN — Medication 500000 UNIT(S): at 06:16

## 2017-02-07 RX ADMIN — Medication 0.25 MILLIGRAM(S): at 06:15

## 2017-02-07 RX ADMIN — Medication 0.6 MILLIGRAM(S): at 06:15

## 2017-02-07 RX ADMIN — Medication 100 MILLIGRAM(S): at 06:15

## 2017-02-22 ENCOUNTER — APPOINTMENT (OUTPATIENT)
Dept: CARDIOLOGY | Facility: CLINIC | Age: 82
End: 2017-02-22

## 2017-02-22 VITALS
TEMPERATURE: 97.5 F | RESPIRATION RATE: 17 BRPM | SYSTOLIC BLOOD PRESSURE: 105 MMHG | DIASTOLIC BLOOD PRESSURE: 69 MMHG | OXYGEN SATURATION: 96 % | HEART RATE: 84 BPM

## 2017-02-22 PROBLEM — M81.0 AGE-RELATED OSTEOPOROSIS WITHOUT CURRENT PATHOLOGICAL FRACTURE: Chronic | Status: ACTIVE | Noted: 2017-01-23

## 2017-02-22 PROBLEM — R35.0 FREQUENCY OF MICTURITION: Chronic | Status: ACTIVE | Noted: 2017-01-23

## 2017-02-22 PROBLEM — R55 SYNCOPE AND COLLAPSE: Chronic | Status: ACTIVE | Noted: 2017-01-23

## 2017-02-22 PROBLEM — N40.0 BENIGN PROSTATIC HYPERPLASIA WITHOUT LOWER URINARY TRACT SYMPTOMS: Chronic | Status: ACTIVE | Noted: 2017-01-23

## 2017-03-23 ENCOUNTER — APPOINTMENT (OUTPATIENT)
Dept: CARDIOLOGY | Facility: CLINIC | Age: 82
End: 2017-03-23

## 2017-03-23 ENCOUNTER — NON-APPOINTMENT (OUTPATIENT)
Age: 82
End: 2017-03-23

## 2017-03-23 VITALS
OXYGEN SATURATION: 97 % | TEMPERATURE: 97.9 F | DIASTOLIC BLOOD PRESSURE: 74 MMHG | SYSTOLIC BLOOD PRESSURE: 145 MMHG | BODY MASS INDEX: 20.37 KG/M2 | RESPIRATION RATE: 17 BRPM | HEART RATE: 64 BPM | WEIGHT: 115 LBS

## 2017-03-23 LAB
INR PPP: 1.1 RATIO
POCT-PROTHROMBIN TIME: 12.8 SECS

## 2017-03-23 RX ORDER — AMIODARONE HYDROCHLORIDE 200 MG/1
200 TABLET ORAL DAILY
Qty: 60 | Refills: 1 | Status: DISCONTINUED | COMMUNITY
End: 2017-03-23

## 2017-05-01 ENCOUNTER — NON-APPOINTMENT (OUTPATIENT)
Age: 82
End: 2017-05-01

## 2017-05-01 ENCOUNTER — APPOINTMENT (OUTPATIENT)
Dept: CARDIOLOGY | Facility: CLINIC | Age: 82
End: 2017-05-01

## 2017-05-01 VITALS — SYSTOLIC BLOOD PRESSURE: 158 MMHG | DIASTOLIC BLOOD PRESSURE: 63 MMHG

## 2017-05-01 VITALS
RESPIRATION RATE: 16 BRPM | HEART RATE: 69 BPM | DIASTOLIC BLOOD PRESSURE: 72 MMHG | SYSTOLIC BLOOD PRESSURE: 179 MMHG | WEIGHT: 124 LBS | TEMPERATURE: 98.2 F | OXYGEN SATURATION: 97 % | BODY MASS INDEX: 21.97 KG/M2

## 2017-05-01 DIAGNOSIS — G20 PARKINSON'S DISEASE: ICD-10-CM

## 2017-05-01 DIAGNOSIS — I25.10 ATHEROSCLEROTIC HEART DISEASE OF NATIVE CORONARY ARTERY W/OUT ANGINA PECTORIS: ICD-10-CM

## 2017-05-01 RX ORDER — AMOXICILLIN AND CLAVULANATE POTASSIUM 875; 125 MG/1; MG/1
875-125 TABLET, COATED ORAL
Qty: 14 | Refills: 0 | Status: DISCONTINUED | COMMUNITY
Start: 2017-01-06 | End: 2017-05-01

## 2017-05-01 RX ORDER — WARFARIN 3 MG/1
3 TABLET ORAL DAILY
Qty: 30 | Refills: 0 | Status: DISCONTINUED | COMMUNITY
Start: 2017-02-22 | End: 2017-05-01

## 2017-07-17 ENCOUNTER — APPOINTMENT (OUTPATIENT)
Dept: CARDIOLOGY | Facility: CLINIC | Age: 82
End: 2017-07-17

## 2017-07-17 ENCOUNTER — NON-APPOINTMENT (OUTPATIENT)
Age: 82
End: 2017-07-17

## 2017-07-17 VITALS
DIASTOLIC BLOOD PRESSURE: 55 MMHG | OXYGEN SATURATION: 97 % | RESPIRATION RATE: 17 BRPM | SYSTOLIC BLOOD PRESSURE: 102 MMHG | WEIGHT: 124 LBS | BODY MASS INDEX: 21.97 KG/M2 | HEART RATE: 74 BPM | TEMPERATURE: 98.6 F

## 2017-07-17 VITALS — SYSTOLIC BLOOD PRESSURE: 91 MMHG | DIASTOLIC BLOOD PRESSURE: 50 MMHG

## 2017-07-17 RX ORDER — ISOSORBIDE MONONITRATE 30 MG
30 TABLET, EXTENDED RELEASE 24 HR ORAL
Refills: 0 | Status: DISCONTINUED | COMMUNITY
End: 2017-07-17

## 2017-08-08 ENCOUNTER — MEDICATION RENEWAL (OUTPATIENT)
Age: 82
End: 2017-08-08

## 2018-02-24 ENCOUNTER — APPOINTMENT (OUTPATIENT)
Dept: CARDIOLOGY | Facility: CLINIC | Age: 83
End: 2018-02-24
Payer: MEDICARE

## 2018-02-24 ENCOUNTER — NON-APPOINTMENT (OUTPATIENT)
Age: 83
End: 2018-02-24

## 2018-02-24 VITALS
SYSTOLIC BLOOD PRESSURE: 137 MMHG | WEIGHT: 125 LBS | OXYGEN SATURATION: 96 % | DIASTOLIC BLOOD PRESSURE: 92 MMHG | BODY MASS INDEX: 22.14 KG/M2 | HEART RATE: 68 BPM | TEMPERATURE: 97.4 F | RESPIRATION RATE: 18 BRPM

## 2018-02-24 DIAGNOSIS — R07.9 CHEST PAIN, UNSPECIFIED: ICD-10-CM

## 2018-02-24 PROCEDURE — 99215 OFFICE O/P EST HI 40 MIN: CPT

## 2018-02-24 PROCEDURE — 93306 TTE W/DOPPLER COMPLETE: CPT

## 2018-02-24 PROCEDURE — 93000 ELECTROCARDIOGRAM COMPLETE: CPT | Mod: 59

## 2018-02-24 RX ORDER — LOSARTAN POTASSIUM 50 MG/1
50 TABLET, FILM COATED ORAL
Qty: 90 | Refills: 1 | Status: DISCONTINUED | COMMUNITY
End: 2018-02-24

## 2018-02-24 RX ORDER — AZITHROMYCIN 250 MG/1
250 TABLET, FILM COATED ORAL
Qty: 6 | Refills: 0 | Status: DISCONTINUED | COMMUNITY
Start: 2017-09-23 | End: 2018-02-24

## 2018-02-24 RX ORDER — OMEPRAZOLE 40 MG/1
40 CAPSULE, DELAYED RELEASE ORAL
Qty: 30 | Refills: 0 | Status: DISCONTINUED | COMMUNITY
Start: 2017-01-16 | End: 2018-02-24

## 2018-02-24 RX ORDER — SERTRALINE 25 MG/1
25 TABLET, FILM COATED ORAL
Qty: 30 | Refills: 0 | Status: DISCONTINUED | COMMUNITY
Start: 2016-12-27 | End: 2018-02-24

## 2018-02-24 RX ORDER — CEFDINIR 300 MG/1
300 CAPSULE ORAL
Qty: 14 | Refills: 0 | Status: DISCONTINUED | COMMUNITY
Start: 2017-10-02 | End: 2018-02-24

## 2018-02-24 RX ORDER — ACETAMINOPHEN AND CODEINE 300; 30 MG/1; MG/1
300-30 TABLET ORAL
Qty: 15 | Refills: 0 | Status: DISCONTINUED | COMMUNITY
Start: 2017-09-23 | End: 2018-02-24

## 2018-02-24 RX ORDER — NEPAFENAC 1 MG/ML
0.1 SUSPENSION/ DROPS OPHTHALMIC
Qty: 6 | Refills: 0 | Status: ACTIVE | COMMUNITY
Start: 2017-12-04

## 2018-02-24 RX ORDER — FUROSEMIDE 40 MG/1
40 TABLET ORAL
Qty: 90 | Refills: 0 | Status: DISCONTINUED | COMMUNITY
Start: 2017-05-01 | End: 2018-02-24

## 2018-02-24 RX ORDER — CEFTRIAXONE 1 G/1
1 INJECTION, POWDER, FOR SOLUTION INTRAMUSCULAR; INTRAVENOUS
Qty: 3 | Refills: 0 | Status: DISCONTINUED | COMMUNITY
Start: 2017-09-30 | End: 2018-02-24

## 2018-02-24 RX ORDER — LOPERAMIDE HYDROCHLORIDE 2 MG/1
2 CAPSULE ORAL
Qty: 15 | Refills: 0 | Status: ACTIVE | COMMUNITY
Start: 2018-01-20

## 2018-02-24 RX ORDER — DIFLUPREDNATE 0.5 MG/ML
0.05 EMULSION OPHTHALMIC
Qty: 5 | Refills: 0 | Status: ACTIVE | COMMUNITY
Start: 2017-10-26

## 2018-02-24 RX ORDER — PREDNISOLONE ACETATE 10 MG/ML
1 SUSPENSION/ DROPS OPHTHALMIC
Qty: 10 | Refills: 0 | Status: ACTIVE | COMMUNITY
Start: 2017-12-21

## 2018-02-24 RX ORDER — RANITIDINE 150 MG/1
150 TABLET ORAL
Qty: 60 | Refills: 0 | Status: DISCONTINUED | COMMUNITY
Start: 2017-09-07 | End: 2018-02-24

## 2018-02-24 RX ORDER — ISOSORBIDE DINITRATE 30 MG/1
30 TABLET ORAL
Qty: 30 | Refills: 0 | Status: ACTIVE | COMMUNITY
Start: 2017-09-11

## 2018-02-24 RX ORDER — MOXIFLOXACIN OPHTHALMIC 5 MG/ML
0.5 SOLUTION/ DROPS OPHTHALMIC
Qty: 6 | Refills: 0 | Status: ACTIVE | COMMUNITY
Start: 2017-10-26

## 2018-02-24 RX ORDER — OMEPRAZOLE 20 MG/1
20 CAPSULE, DELAYED RELEASE ORAL
Qty: 30 | Refills: 0 | Status: DISCONTINUED | COMMUNITY
Start: 2017-09-23 | End: 2018-02-24

## 2018-02-26 LAB
ALBUMIN SERPL ELPH-MCNC: 4.1 G/DL
ALP BLD-CCNC: 62 U/L
ALT SERPL-CCNC: <4 U/L
ANION GAP SERPL CALC-SCNC: 12 MMOL/L
AST SERPL-CCNC: 11 U/L
BASOPHILS # BLD AUTO: 0.02 K/UL
BASOPHILS NFR BLD AUTO: 0.3 %
BILIRUB SERPL-MCNC: 0.5 MG/DL
BUN SERPL-MCNC: 24 MG/DL
CALCIUM SERPL-MCNC: 10.2 MG/DL
CHLORIDE SERPL-SCNC: 101 MMOL/L
CHOLEST SERPL-MCNC: 143 MG/DL
CHOLEST/HDLC SERPL: 2.9 RATIO
CO2 SERPL-SCNC: 24 MMOL/L
CREAT SERPL-MCNC: 1.19 MG/DL
EOSINOPHIL # BLD AUTO: 0.16 K/UL
EOSINOPHIL NFR BLD AUTO: 2.6 %
GLUCOSE SERPL-MCNC: 106 MG/DL
HBA1C MFR BLD HPLC: 6.4 %
HCT VFR BLD CALC: 38.9 %
HDLC SERPL-MCNC: 49 MG/DL
HGB BLD-MCNC: 12.2 G/DL
IMM GRANULOCYTES NFR BLD AUTO: 0.2 %
LDLC SERPL CALC-MCNC: 63 MG/DL
LYMPHOCYTES # BLD AUTO: 0.88 K/UL
LYMPHOCYTES NFR BLD AUTO: 14.4 %
MAN DIFF?: NORMAL
MCHC RBC-ENTMCNC: 28.7 PG
MCHC RBC-ENTMCNC: 31.4 GM/DL
MCV RBC AUTO: 91.5 FL
MONOCYTES # BLD AUTO: 0.75 K/UL
MONOCYTES NFR BLD AUTO: 12.3 %
NEUTROPHILS # BLD AUTO: 4.27 K/UL
NEUTROPHILS NFR BLD AUTO: 70.2 %
NT-PROBNP SERPL-MCNC: 236 PG/ML
PLATELET # BLD AUTO: 271 K/UL
POTASSIUM SERPL-SCNC: 4.7 MMOL/L
PROT SERPL-MCNC: 7.1 G/DL
RBC # BLD: 4.25 M/UL
RBC # FLD: 14.4 %
SODIUM SERPL-SCNC: 137 MMOL/L
TRIGL SERPL-MCNC: 153 MG/DL
WBC # FLD AUTO: 6.09 K/UL

## 2018-07-17 ENCOUNTER — NON-APPOINTMENT (OUTPATIENT)
Age: 83
End: 2018-07-17

## 2018-07-17 ENCOUNTER — APPOINTMENT (OUTPATIENT)
Dept: CARDIOLOGY | Facility: CLINIC | Age: 83
End: 2018-07-17
Payer: MEDICARE

## 2018-07-17 VITALS — DIASTOLIC BLOOD PRESSURE: 65 MMHG | SYSTOLIC BLOOD PRESSURE: 158 MMHG

## 2018-07-17 VITALS
WEIGHT: 130 LBS | SYSTOLIC BLOOD PRESSURE: 158 MMHG | TEMPERATURE: 97.7 F | BODY MASS INDEX: 23.03 KG/M2 | HEART RATE: 74 BPM | OXYGEN SATURATION: 97 % | DIASTOLIC BLOOD PRESSURE: 55 MMHG | RESPIRATION RATE: 17 BRPM

## 2018-07-17 PROCEDURE — 99215 OFFICE O/P EST HI 40 MIN: CPT

## 2018-07-17 PROCEDURE — 93000 ELECTROCARDIOGRAM COMPLETE: CPT | Mod: 59

## 2018-07-17 RX ORDER — CHLORTHALIDONE 25 MG/1
25 TABLET ORAL
Qty: 90 | Refills: 1 | Status: ACTIVE | COMMUNITY
Start: 2018-07-17 | End: 1900-01-01

## 2018-12-27 ENCOUNTER — NON-APPOINTMENT (OUTPATIENT)
Age: 83
End: 2018-12-27

## 2018-12-27 ENCOUNTER — APPOINTMENT (OUTPATIENT)
Dept: CARDIOLOGY | Facility: CLINIC | Age: 83
End: 2018-12-27
Payer: MEDICARE

## 2018-12-27 VITALS
WEIGHT: 133 LBS | TEMPERATURE: 97.5 F | BODY MASS INDEX: 23.56 KG/M2 | SYSTOLIC BLOOD PRESSURE: 154 MMHG | DIASTOLIC BLOOD PRESSURE: 75 MMHG | RESPIRATION RATE: 17 BRPM | OXYGEN SATURATION: 98 % | HEART RATE: 72 BPM

## 2018-12-27 VITALS — DIASTOLIC BLOOD PRESSURE: 62 MMHG | SYSTOLIC BLOOD PRESSURE: 127 MMHG

## 2018-12-27 DIAGNOSIS — J42 UNSPECIFIED CHRONIC BRONCHITIS: ICD-10-CM

## 2018-12-27 PROCEDURE — 93000 ELECTROCARDIOGRAM COMPLETE: CPT | Mod: 59

## 2018-12-27 PROCEDURE — 99215 OFFICE O/P EST HI 40 MIN: CPT

## 2018-12-27 RX ORDER — BUDESONIDE AND FORMOTEROL FUMARATE DIHYDRATE 160; 4.5 UG/1; UG/1
160-4.5 AEROSOL RESPIRATORY (INHALATION)
Qty: 1 | Refills: 3 | Status: ACTIVE | COMMUNITY
Start: 2017-01-16 | End: 1900-01-01

## 2018-12-27 NOTE — DISCUSSION/SUMMARY
[Atrial Fibrillation] : atrial fibrillation [Coronary Artery Disease] : coronary artery disease [Dietary Modification] : dietary modification [Exercise Regimen] : an exercise regimen [Hyperlipidemia] : hyperlipidemia [Diet Modification] : diet modification [Exercise] : exercise [Hypertension] : hypertension [Improving] : improving [Weight Loss] : weight loss [Sodium Restriction] : sodium restriction [Family] : the patient's family [BNP] : a BNP [Echocardiogram] : an echocardiogram [Medication Changes Per Orders] : as documented in orders [Low Sodium Diet] : low sodium diet [Minutes spent___] : for [unfilled] ~Uminutes [___ Month(s)] : [unfilled] month(s) [With Me] : with me [Moderate Aortic Stenosis] : moderate aortic stenosis [Stable] : stable [None] : none [Patient] : the patient [Responding to Treatment] : responding to treatment [de-identified] : ? related to the SOB, functional II [de-identified] : continue observation, eventually may require TAVR [de-identified] : at perioperative state [de-identified] : Patient did not take amiodarone and  Coumadin for some time. He defers the medications. [de-identified] :  s/p PCI and recent CABG (1/2017) [de-identified] : no active chest pain, considered angina resolved. [de-identified] : reactive hypertension [de-identified] : use diuretic for the treatment, and also taking care the " leg edema". [de-identified] : subjective, not objective, likely from weight gain ( 6Ibs) [de-identified] : because of hypertension and leg edema, use  chlorthalidone as the choice of treatment. [FreeTextEntry1] : No evidence of AF,  no  indication for continue anticoagulation and amiodarone\par

## 2018-12-27 NOTE — REVIEW OF SYSTEMS
[Feeling Fatigued] : feeling fatigued [Joint Pain] : joint pain [Joint Stiffness] : joint stiffness [Negative] : Heme/Lymph [Fever] : no fever [Headache] : no headache [Chills] : no chills [Recent Weight Loss (___ Lbs)] : no recent weight loss [Shortness Of Breath] : no shortness of breath [Dyspnea on exertion] : dyspnea during exertion [Chest  Pressure] : no chest pressure [Chest Pain] : no chest pain [Lower Ext Edema] : no extremity edema [Leg Claudication] : no intermittent leg claudication [Palpitations] : no palpitations [Cough] : no cough [Wheezing] : no wheezing [Coughing Up Blood] : no hemoptysis [Abdominal Pain] : no abdominal pain [Nausea] : no nausea [Vomiting] : no vomiting [Heartburn] : no heartburn [Change in Appetite] : no change in appetite [Change In The Stool] : no change in stool [Dysphagia] : no dysphagia [Joint Swelling] : no joint swelling [Muscle Cramps] : no muscle cramps [Limb Weakness (Paresis)] : no limb weakness [Skin: A Rash] : no rash: [Itching] : no itching [Skin Lesions] : no skin lesions [Dizziness] : no dizziness [Tremor] : no tremor was seen [Convulsions] : no convulsions [Tingling (Paresthesia)] : no tingling [Confusion] : no confusion was observed [Memory Lapses Or Loss] : no memory lapses or loss [Anxiety] : no anxiety [Under Stress] : not under stress [Excessive Thirst] : no polydipsia [Easy Bleeding] : no tendency for easy bleeding [Swollen Glands] : no swollen glands [Easy Bruising] : no tendency for easy bruising [Swollen Glands In The Neck] : no swollen glands in the neck [FreeTextEntry1] : s/p CABG, s/p left knee replacement.

## 2018-12-27 NOTE — HISTORY OF PRESENT ILLNESS
[FreeTextEntry1] : He has history of CAD with LAD PCI. With subsequent recurrence of chest pain, then he had CABG on 1- and  discharged on 2-7-2017 at [facility].   Again he was admitted to [facility] for shortness of breath in  the beginning of March 2017 for shortness of breath and findings of large pleural effusion of the left. He was tapped and returned home. He has been fine since then.\par He has no  chest pain,  no dizziness except standing or palpitations. He is feeling " better than before" . But, he has experiences exertional shortness of breath for a 3-4 blocks of walking. However, to assess that, I walked with him on the floor, I did n see his shortness of breath for very short period of time.\par He walks with limping and cane.\par

## 2018-12-27 NOTE — REASON FOR VISIT
[Follow-Up - Clinic] : a clinic follow-up of [Aortic Stenosis] : aortic stenosis [Chest Pain] : chest pain [Coronary Artery Disease] : coronary artery disease [CABG Follow-up] : bypass graft [Dyspnea] : dyspnea [Hypertension] : hypertension

## 2018-12-27 NOTE — PHYSICAL EXAM
[General Appearance - Well Developed] : well developed [Normal Appearance] : normal appearance [Well Groomed] : well groomed [General Appearance - Well Nourished] : well nourished [No Deformities] : no deformities [General Appearance - In No Acute Distress] : no acute distress [Normal Conjunctiva] : the conjunctiva exhibited no abnormalities [Eyelids - No Xanthelasma] : the eyelids demonstrated no xanthelasmas [Normal Oral Mucosa] : normal oral mucosa [No Oral Pallor] : no oral pallor [No Oral Cyanosis] : no oral cyanosis [Normal Jugular Venous A Waves Present] : normal jugular venous A waves present [Normal Jugular Venous V Waves Present] : normal jugular venous V waves present [No Jugular Venous Rod A Waves] : no jugular venous rod A waves [Respiration, Rhythm And Depth] : normal respiratory rhythm and effort [Exaggerated Use Of Accessory Muscles For Inspiration] : no accessory muscle use [Chest Palpation] : palpation of the chest revealed no abnormalities [Lungs Percussion] : the lungs were normal to percussion [Heart Rate And Rhythm] : heart rate and rhythm were normal [Heart Sounds] : normal S1 and S2 [Arterial Pulses Normal] : the arterial pulses were normal [Edema] : no peripheral edema present [Veins - Varicosity Changes] : no varicosital changes were noted in the lower extremities [Systolic grade ___/6] : A grade [unfilled]/6 systolic murmur was heard. [Bowel Sounds] : normal bowel sounds [Abdomen Soft] : soft [Abdomen Tenderness] : non-tender [Abdomen Mass (___ Cm)] : no abdominal mass palpated [Abdomen Hernia] : no hernia was discovered [Nail Clubbing] : no clubbing of the fingernails [Cyanosis, Localized] : no localized cyanosis [Petechial Hemorrhages (___cm)] : no petechial hemorrhages [Skin Color & Pigmentation] : normal skin color and pigmentation [Skin Turgor] : normal skin turgor [] : no rash [No Venous Stasis] : no venous stasis [Skin Lesions] : no skin lesions [No Skin Ulcers] : no skin ulcer [No Xanthoma] : no  xanthoma was observed [Oriented To Time, Place, And Person] : oriented to person, place, and time [Impaired Insight] : insight and judgment were intact [Affect] : the affect was normal [Mood] : the mood was normal [Memory Recent] : recent memory was not impaired [Memory Remote] : remote memory was not impaired [No Anxiety] : not feeling anxious [FreeTextEntry1] : s/p left knee surgery. s/p venous  harvest for CABG on the left side of low extremity.

## 2019-07-02 ENCOUNTER — APPOINTMENT (OUTPATIENT)
Dept: CARDIOLOGY | Facility: CLINIC | Age: 84
End: 2019-07-02
Payer: MEDICARE

## 2019-07-02 ENCOUNTER — NON-APPOINTMENT (OUTPATIENT)
Age: 84
End: 2019-07-02

## 2019-07-02 VITALS — SYSTOLIC BLOOD PRESSURE: 151 MMHG | DIASTOLIC BLOOD PRESSURE: 70 MMHG

## 2019-07-02 VITALS
DIASTOLIC BLOOD PRESSURE: 76 MMHG | SYSTOLIC BLOOD PRESSURE: 159 MMHG | OXYGEN SATURATION: 97 % | HEART RATE: 73 BPM | RESPIRATION RATE: 17 BRPM | TEMPERATURE: 98.4 F

## 2019-07-02 DIAGNOSIS — R60.0 LOCALIZED EDEMA: ICD-10-CM

## 2019-07-02 PROCEDURE — 99214 OFFICE O/P EST MOD 30 MIN: CPT

## 2019-07-02 PROCEDURE — 93000 ELECTROCARDIOGRAM COMPLETE: CPT | Mod: 59

## 2019-07-02 NOTE — REVIEW OF SYSTEMS
[Feeling Fatigued] : feeling fatigued [Dyspnea on exertion] : dyspnea during exertion [Joint Pain] : joint pain [Joint Stiffness] : joint stiffness [Negative] : Endocrine [Headache] : no headache [Fever] : no fever [Shortness Of Breath] : no shortness of breath [Recent Weight Loss (___ Lbs)] : no recent weight loss [Chills] : no chills [Chest  Pressure] : no chest pressure [Chest Pain] : no chest pain [Leg Claudication] : no intermittent leg claudication [Palpitations] : no palpitations [Lower Ext Edema] : no extremity edema [Wheezing] : no wheezing [Coughing Up Blood] : no hemoptysis [Cough] : no cough [Abdominal Pain] : no abdominal pain [Nausea] : no nausea [Vomiting] : no vomiting [Change In The Stool] : no change in stool [Change in Appetite] : no change in appetite [Heartburn] : no heartburn [Joint Swelling] : no joint swelling [Dysphagia] : no dysphagia [Limb Weakness (Paresis)] : no limb weakness [Muscle Cramps] : no muscle cramps [Skin: A Rash] : no rash: [Skin Lesions] : no skin lesions [Itching] : no itching [Tremor] : no tremor was seen [Dizziness] : no dizziness [Convulsions] : no convulsions [Memory Lapses Or Loss] : no memory lapses or loss [Confusion] : no confusion was observed [Tingling (Paresthesia)] : no tingling [Under Stress] : not under stress [Excessive Thirst] : no polydipsia [Anxiety] : no anxiety [Easy Bruising] : no tendency for easy bruising [Swollen Glands] : no swollen glands [Easy Bleeding] : no tendency for easy bleeding [FreeTextEntry1] : s/p CABG, s/p left knee replacement. [Swollen Glands In The Neck] : no swollen glands in the neck

## 2019-07-02 NOTE — REASON FOR VISIT
[Follow-Up - Clinic] : a clinic follow-up of [Aortic Stenosis] : aortic stenosis [Chest Pain] : chest pain [Coronary Artery Disease] : coronary artery disease [CABG Follow-up] : bypass graft [Hypertension] : hypertension [Dyspnea] : dyspnea [Atrial Fibrillation] : atrial fibrillation

## 2019-07-02 NOTE — HISTORY OF PRESENT ILLNESS
[FreeTextEntry1] : He has history of CAD with LAD PCI. With subsequent recurrence of chest pain, then he had CABG on 1- and  discharged on 2-7-2017 at [facility].   Again he was admitted to [facility] for shortness of breath in  the beginning of March 2017 for shortness of breath and findings of large pleural effusion of the left. He was tapped and returned home. He has been fine since then.\par He has no  chest pain,  no dizziness except standing or palpitations. He is feeling " better than before" . But, he has experiences exertional shortness of breath for a 3-4 blocks of walking. However, to assess that, I walked with him on the floor, I did  see his shortness of breath lasting for very short period of time.\par He walks with limping and cane. 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

## 2019-07-02 NOTE — DISCUSSION/SUMMARY
[Moderate Aortic Stenosis] : moderate aortic stenosis [Atrial Fibrillation] : atrial fibrillation [Family] : the patient's family [Coronary Artery Disease] : coronary artery disease [Dietary Modification] : dietary modification [Exercise Regimen] : an exercise regimen [Hyperlipidemia] : hyperlipidemia [Diet Modification] : diet modification [Exercise] : exercise [None] : none [Hypertension] : hypertension [Improving] : improving [Responding to Treatment] : responding to treatment [Weight Loss] : weight loss [Sodium Restriction] : sodium restriction [Stable] : stable [Low Sodium Diet] : low sodium diet [Medication Changes Per Orders] : as documented in orders [Minutes spent___] : for [unfilled] ~Uminutes [Patient] : the patient [With Me] : with me [___ Month(s)] : [unfilled] month(s) [de-identified] : ? related to the SOB, functional II [de-identified] : Patient did not take amiodarone and  Coumadin for some time. He defers the medications. [de-identified] : continue observation, eventually may require TAVR [de-identified] :  s/p PCI and recent CABG (1/2017) [de-identified] : at perioperative state [de-identified] : no active chest pain, considered angina resolved. [de-identified] : subjective, not objective. [de-identified] : reactive hypertension [de-identified] : use diuretic for the treatment, and also taking care the " leg edema". [FreeTextEntry1] : No evidence of AF,  no  indication for continue anticoagulation and amiodarone\par  [de-identified] : because of hypertension and leg edema, use  chlorthalidone as the choice of treatment.

## 2019-07-02 NOTE — PHYSICAL EXAM
[General Appearance - Well Developed] : well developed [Normal Appearance] : normal appearance [Well Groomed] : well groomed [General Appearance - Well Nourished] : well nourished [Normal Conjunctiva] : the conjunctiva exhibited no abnormalities [No Deformities] : no deformities [General Appearance - In No Acute Distress] : no acute distress [Normal Oral Mucosa] : normal oral mucosa [Eyelids - No Xanthelasma] : the eyelids demonstrated no xanthelasmas [Normal Jugular Venous A Waves Present] : normal jugular venous A waves present [No Oral Cyanosis] : no oral cyanosis [No Oral Pallor] : no oral pallor [No Jugular Venous Rod A Waves] : no jugular venous rod A waves [Normal Jugular Venous V Waves Present] : normal jugular venous V waves present [Respiration, Rhythm And Depth] : normal respiratory rhythm and effort [Exaggerated Use Of Accessory Muscles For Inspiration] : no accessory muscle use [Chest Palpation] : palpation of the chest revealed no abnormalities [Lungs Percussion] : the lungs were normal to percussion [Heart Rate And Rhythm] : heart rate and rhythm were normal [Veins - Varicosity Changes] : no varicosital changes were noted in the lower extremities [Heart Sounds] : normal S1 and S2 [Arterial Pulses Normal] : the arterial pulses were normal [Edema] : no peripheral edema present [Bowel Sounds] : normal bowel sounds [Systolic grade ___/6] : A grade [unfilled]/6 systolic murmur was heard. [Abdomen Soft] : soft [Abdomen Tenderness] : non-tender [Abdomen Hernia] : no hernia was discovered [Abdomen Mass (___ Cm)] : no abdominal mass palpated [Nail Clubbing] : no clubbing of the fingernails [Cyanosis, Localized] : no localized cyanosis [Petechial Hemorrhages (___cm)] : no petechial hemorrhages [Skin Turgor] : normal skin turgor [Skin Color & Pigmentation] : normal skin color and pigmentation [No Venous Stasis] : no venous stasis [Skin Lesions] : no skin lesions [] : no rash [No Xanthoma] : no  xanthoma was observed [No Skin Ulcers] : no skin ulcer [Impaired Insight] : insight and judgment were intact [Oriented To Time, Place, And Person] : oriented to person, place, and time [Affect] : the affect was normal [Memory Recent] : recent memory was not impaired [Mood] : the mood was normal [Memory Remote] : remote memory was not impaired [No Anxiety] : not feeling anxious [Auscultation Breath Sounds / Voice Sounds] : lungs were clear to auscultation bilaterally [FreeTextEntry1] : parkinsonian gait

## 2019-07-10 ENCOUNTER — EMERGENCY (EMERGENCY)
Facility: HOSPITAL | Age: 84
LOS: 1 days | Discharge: HOME | End: 2019-07-10
Attending: EMERGENCY MEDICINE | Admitting: EMERGENCY MEDICINE
Payer: MEDICARE

## 2019-07-10 VITALS
RESPIRATION RATE: 18 BRPM | SYSTOLIC BLOOD PRESSURE: 131 MMHG | DIASTOLIC BLOOD PRESSURE: 53 MMHG | OXYGEN SATURATION: 100 % | HEART RATE: 67 BPM | TEMPERATURE: 98 F

## 2019-07-10 DIAGNOSIS — Z96.652 PRESENCE OF LEFT ARTIFICIAL KNEE JOINT: Chronic | ICD-10-CM

## 2019-07-10 LAB
ALBUMIN SERPL ELPH-MCNC: 4.3 G/DL — SIGNIFICANT CHANGE UP (ref 3.3–5)
ALP SERPL-CCNC: 73 U/L — SIGNIFICANT CHANGE UP (ref 40–120)
ALT FLD-CCNC: 5 U/L — SIGNIFICANT CHANGE UP (ref 4–41)
ANION GAP SERPL CALC-SCNC: 12 MMO/L — SIGNIFICANT CHANGE UP (ref 7–14)
APPEARANCE UR: CLEAR — SIGNIFICANT CHANGE UP
APTT BLD: 30.3 SEC — SIGNIFICANT CHANGE UP (ref 27.5–36.3)
AST SERPL-CCNC: 11 U/L — SIGNIFICANT CHANGE UP (ref 4–40)
BASOPHILS # BLD AUTO: 0.04 K/UL — SIGNIFICANT CHANGE UP (ref 0–0.2)
BASOPHILS NFR BLD AUTO: 0.4 % — SIGNIFICANT CHANGE UP (ref 0–2)
BILIRUB SERPL-MCNC: 0.3 MG/DL — SIGNIFICANT CHANGE UP (ref 0.2–1.2)
BILIRUB UR-MCNC: NEGATIVE — SIGNIFICANT CHANGE UP
BLOOD UR QL VISUAL: SIGNIFICANT CHANGE UP
BUN SERPL-MCNC: 21 MG/DL — SIGNIFICANT CHANGE UP (ref 7–23)
CALCIUM SERPL-MCNC: 9.7 MG/DL — SIGNIFICANT CHANGE UP (ref 8.4–10.5)
CHLORIDE SERPL-SCNC: 102 MMOL/L — SIGNIFICANT CHANGE UP (ref 98–107)
CO2 SERPL-SCNC: 24 MMOL/L — SIGNIFICANT CHANGE UP (ref 22–31)
COLOR SPEC: SIGNIFICANT CHANGE UP
CREAT SERPL-MCNC: 0.96 MG/DL — SIGNIFICANT CHANGE UP (ref 0.5–1.3)
EOSINOPHIL # BLD AUTO: 0.18 K/UL — SIGNIFICANT CHANGE UP (ref 0–0.5)
EOSINOPHIL NFR BLD AUTO: 1.6 % — SIGNIFICANT CHANGE UP (ref 0–6)
GLUCOSE SERPL-MCNC: 179 MG/DL — HIGH (ref 70–99)
GLUCOSE UR-MCNC: NEGATIVE — SIGNIFICANT CHANGE UP
HCT VFR BLD CALC: 36.8 % — LOW (ref 39–50)
HGB BLD-MCNC: 11.7 G/DL — LOW (ref 13–17)
IMM GRANULOCYTES NFR BLD AUTO: 0.5 % — SIGNIFICANT CHANGE UP (ref 0–1.5)
INR BLD: 0.93 — SIGNIFICANT CHANGE UP (ref 0.88–1.17)
KETONES UR-MCNC: NEGATIVE — SIGNIFICANT CHANGE UP
LEUKOCYTE ESTERASE UR-ACNC: NEGATIVE — SIGNIFICANT CHANGE UP
LIDOCAIN IGE QN: 50.3 U/L — SIGNIFICANT CHANGE UP (ref 7–60)
LYMPHOCYTES # BLD AUTO: 0.77 K/UL — LOW (ref 1–3.3)
LYMPHOCYTES # BLD AUTO: 7 % — LOW (ref 13–44)
MCHC RBC-ENTMCNC: 30.2 PG — SIGNIFICANT CHANGE UP (ref 27–34)
MCHC RBC-ENTMCNC: 31.8 % — LOW (ref 32–36)
MCV RBC AUTO: 95.1 FL — SIGNIFICANT CHANGE UP (ref 80–100)
MONOCYTES # BLD AUTO: 0.96 K/UL — HIGH (ref 0–0.9)
MONOCYTES NFR BLD AUTO: 8.7 % — SIGNIFICANT CHANGE UP (ref 2–14)
NEUTROPHILS # BLD AUTO: 9.06 K/UL — HIGH (ref 1.8–7.4)
NEUTROPHILS NFR BLD AUTO: 81.8 % — HIGH (ref 43–77)
NITRITE UR-MCNC: NEGATIVE — SIGNIFICANT CHANGE UP
NRBC # FLD: 0 K/UL — SIGNIFICANT CHANGE UP (ref 0–0)
NT-PROBNP SERPL-SCNC: 269 PG/ML — SIGNIFICANT CHANGE UP
PH UR: 6 — SIGNIFICANT CHANGE UP (ref 5–8)
PLATELET # BLD AUTO: 247 K/UL — SIGNIFICANT CHANGE UP (ref 150–400)
PMV BLD: 9.9 FL — SIGNIFICANT CHANGE UP (ref 7–13)
POTASSIUM SERPL-MCNC: 4.9 MMOL/L — SIGNIFICANT CHANGE UP (ref 3.5–5.3)
POTASSIUM SERPL-SCNC: 4.9 MMOL/L — SIGNIFICANT CHANGE UP (ref 3.5–5.3)
PROT SERPL-MCNC: 7.2 G/DL — SIGNIFICANT CHANGE UP (ref 6–8.3)
PROT UR-MCNC: NEGATIVE — SIGNIFICANT CHANGE UP
PROTHROM AB SERPL-ACNC: 10.3 SEC — SIGNIFICANT CHANGE UP (ref 9.8–13.1)
RBC # BLD: 3.87 M/UL — LOW (ref 4.2–5.8)
RBC # FLD: 13.6 % — SIGNIFICANT CHANGE UP (ref 10.3–14.5)
SODIUM SERPL-SCNC: 138 MMOL/L — SIGNIFICANT CHANGE UP (ref 135–145)
SP GR SPEC: 1.01 — SIGNIFICANT CHANGE UP (ref 1–1.04)
TROPONIN T, HIGH SENSITIVITY: 8 NG/L — SIGNIFICANT CHANGE UP (ref ?–14)
UROBILINOGEN FLD QL: NORMAL — SIGNIFICANT CHANGE UP
WBC # BLD: 11.06 K/UL — HIGH (ref 3.8–10.5)
WBC # FLD AUTO: 11.06 K/UL — HIGH (ref 3.8–10.5)

## 2019-07-10 PROCEDURE — 99284 EMERGENCY DEPT VISIT MOD MDM: CPT | Mod: 25,GC

## 2019-07-10 PROCEDURE — 93010 ELECTROCARDIOGRAM REPORT: CPT

## 2019-07-10 PROCEDURE — 71045 X-RAY EXAM CHEST 1 VIEW: CPT | Mod: 26

## 2019-07-10 NOTE — ED PROVIDER NOTE - NS ED ROS FT
CONST: no fevers, no chills, no trauma  EYES: no pain, no visual disturbances  ENT: no sore throat, no epistaxis, no rhinorrhea, no hearing changes  CV: no chest pain, no palpitations, no orthopnea, no extremity pain or swelling  RESP: no shortness of breath, no cough, no sputum, no pleurisy, no wheezing  ABD: + abdominal pain, no nausea, no vomiting, no diarrhea, no black or bloody stool  : + dysuria, no hematuria, + frequency, + urgency  MSK: no back pain, no neck pain, no extremity pain  NEURO: no headache, no sensory disturbances, no focal weakness, no dizziness  HEME: no easy bleeding or bruising  SKIN: no diaphoresis, no rash

## 2019-07-10 NOTE — ED ADULT TRIAGE NOTE - CHIEF COMPLAINT QUOTE
abdominal pain x 4 days and difficulty having BM x 1 week. Also endorsing polyuria and dysuria x few months. Denies fevers/chills. Endorsing weakness/dizziness/orthopnea/WORKMAN. hx DM, HTN, BPH

## 2019-07-10 NOTE — ED PROVIDER NOTE - CLINICAL SUMMARY MEDICAL DECISION MAKING FREE TEXT BOX
84 yo M presenting with multiple medical complaints. Urinary frequency, dysuria, urgency increased from baseline r/o UTI. Abdominal pain/constipation r/o SBO. WORKMAN and orthopnea, no rales on exam but with 1+ pitting edema, r/o acute CHF exacerbation.

## 2019-07-10 NOTE — ED PROVIDER NOTE - ATTENDING CONTRIBUTION TO CARE
I, Jennifer Cabot, MD, have performed a history and physical exam of the patient and discussed their management with the resident. I reviewed the resident's note and agree with the documented findings and plan of care. My medical decision making and observations are found above.    Cabot: 85M with PMH of NIDDM, HTN, BPH, fluid overload no longer taking his diuretics, here with multiple complaints.  He reports WORKMAN and orthopnea x days, as well as 1 week of vague abd pain and no BMs, (is passing flatus), and polyuria.  Denies F/C/N/V/D/cough/CP.  On exam, HDS, NAD, MMM, eyes clear, lungs CTAB, heart sounds with systolic murmur, abd soft, NT, ND, no CVAT, RUSSEL without masses, protate nontender, LEs without edema, wwp, skin normal temperature and color, neuro: alert and oriented, no focal deficits.  Concern for pulmonary edema vs ACS, abd pathology including mass, less likely infection or obstruction, UTI, hyperglycemia, BPH.  Will check basic labs, UA, trop, BNP, EKG, CXR, CT A/P, reassess.

## 2019-07-10 NOTE — ED ADULT NURSE NOTE - NSIMPLEMENTINTERV_GEN_ALL_ED
Implemented All Fall Risk Interventions:  [place] to call system. Call bell, personal items and telephone within reach. Instruct patient to call for assistance. Room bathroom lighting operational. Non-slip footwear when patient is off stretcher. Physically safe environment: no spills, clutter or unnecessary equipment. Stretcher in lowest position, wheels locked, appropriate side rails in place. Provide visual cue, wrist band, yellow gown, etc. Monitor gait and stability. Monitor for mental status changes and reorient to person, place, and time. Review medications for side effects contributing to fall risk. Reinforce activity limits and safety measures with patient and family.

## 2019-07-10 NOTE — ED ADULT NURSE NOTE - OBJECTIVE STATEMENT
Patient presents for abdominal pain and constipation x 1 week. Endorses "has small bowel movements." Endorses no relief with stool softeners. Also endorses WORKMAN, orthopnea, weakness, dizziness. Endorsing polyuria and dysuria x few months. Denies fevers/chills/CP. Denies n/v/d. Patient needs assistance to ambulate and ambulates with can at baseline. Skin intact. Report endorsed to primary RN.

## 2019-07-10 NOTE — ED PROVIDER NOTE - PHYSICAL EXAMINATION
VITALS: reviewed  GEN: NAD, A & O x 4  HEAD/EYES: NCAT, EOMI, anicteric sclerae  ENT: mucus membranes moist, oropharynx WNL, trachea midline  RESP: lungs CTA with equal breath sounds bilaterally, chest wall nontender and atraumatic  CV: heart with reg rhythm S1, S2, distal pulses intact and symmetric bilaterally  ABDOMEN: normoactive bowel sounds, soft, distended, diffusely ttp, no guarding/rebound, no palpable masses  : no CVAT  MSK: extremities atraumatic and nontender, b/l 1+ pitting edema to calf, no asymmetry.  SKIN: warm, dry, no rash, no bruising, no cyanosis. color appropriate for ethnicity  NEURO: alert, mentating appropriately, no facial asymmetry. gross sensation, motor are intact  PSYCH: Affect appropriate

## 2019-07-10 NOTE — ED PROVIDER NOTE - PROGRESS NOTE DETAILS
Melony PGY2: CT shows proctitis, rectal exam without tenderness on initial exam. Will dc with bowel regimen and pcp fu with return precautions. Melony PGY2: CT shows proctitis, rectal exam without tenderness on initial exam.   Tolerated PO. Will dc with bowel regimen and pcp fu with return precautions.

## 2019-07-10 NOTE — ED PROVIDER NOTE - NSFOLLOWUPINSTRUCTIONS_ED_ALL_ED_FT
Rest, drink plenty of fluids.  Advance activity as tolerated.  Continue all previously prescribed medications as directed.  Follow up with your primary care physician in 48-72 hours- bring copies of your results.  Return to the ER for worsening or persistent symptoms, and/or ANY NEW OR CONCERNING SYMPTOMS. If you have issues obtaining follow up, please call: [phone] to obtain a doctor or specialist who takes your insurance in your area.    You may purchase Miralax, senna, and colace at your local pharmacy. Take as directed for constipation.

## 2019-07-11 VITALS
DIASTOLIC BLOOD PRESSURE: 54 MMHG | OXYGEN SATURATION: 98 % | HEART RATE: 73 BPM | TEMPERATURE: 98 F | SYSTOLIC BLOOD PRESSURE: 146 MMHG | RESPIRATION RATE: 18 BRPM

## 2019-07-11 LAB — TROPONIN T, HIGH SENSITIVITY: 8 NG/L — SIGNIFICANT CHANGE UP (ref ?–14)

## 2019-07-11 PROCEDURE — 74177 CT ABD & PELVIS W/CONTRAST: CPT | Mod: 26

## 2019-07-11 RX ORDER — ACETAMINOPHEN 500 MG
650 TABLET ORAL ONCE
Refills: 0 | Status: COMPLETED | OUTPATIENT
Start: 2019-07-11 | End: 2019-07-11

## 2019-07-11 RX ADMIN — Medication 650 MILLIGRAM(S): at 05:27

## 2019-07-11 NOTE — ED ADULT NURSE REASSESSMENT NOTE - NS ED NURSE REASSESS COMMENT FT1
Patient's daughter here to  patient, patient is awake, A&O x 3, NAD, ambulates with a cane.  Had a large BM after receiving a fleet enema.  Patient states, he feels better after BM.  Assisted him with getting dressed, placed in wheel chair and taken to car.

## 2019-07-12 LAB
BACTERIA UR CULT: SIGNIFICANT CHANGE UP
SPECIMEN SOURCE: SIGNIFICANT CHANGE UP

## 2019-09-27 ENCOUNTER — APPOINTMENT (OUTPATIENT)
Dept: CARDIOLOGY | Facility: CLINIC | Age: 84
End: 2019-09-27
Payer: MEDICARE

## 2019-09-27 ENCOUNTER — NON-APPOINTMENT (OUTPATIENT)
Age: 84
End: 2019-09-27

## 2019-09-27 VITALS
OXYGEN SATURATION: 98 % | HEART RATE: 63 BPM | SYSTOLIC BLOOD PRESSURE: 135 MMHG | RESPIRATION RATE: 17 BRPM | WEIGHT: 126 LBS | BODY MASS INDEX: 22.32 KG/M2 | DIASTOLIC BLOOD PRESSURE: 70 MMHG | TEMPERATURE: 98 F

## 2019-09-27 PROCEDURE — 99214 OFFICE O/P EST MOD 30 MIN: CPT

## 2019-09-27 PROCEDURE — 93000 ELECTROCARDIOGRAM COMPLETE: CPT

## 2019-09-27 NOTE — HISTORY OF PRESENT ILLNESS
[FreeTextEntry1] : He has history of CAD with LAD PCI. With subsequent recurrence of chest pain, then he had CABG on 1- and  discharged on 2-7-2017 at [facility].   Again he was admitted to [facility] for shortness of breath in  the beginning of March 2017 for shortness of breath and findings of large pleural effusion of the left. He was tapped and returned home. He has been fine since then.\par He has no  chest pain,  no dizziness  or palpitations. He is feeling " better than before" . But, he has experiences exertional shortness of breath for a 3-4 blocks of walking. However, to assess that, I walked with him on the floor, I did  see his shortness of breath lasting for very short period of time.\par He walks with limping and cane. 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 He always feel the " head is heavy- dizziness".\par

## 2019-09-27 NOTE — REVIEW OF SYSTEMS
[Dyspnea on exertion] : dyspnea during exertion [Joint Pain] : joint pain [Joint Stiffness] : joint stiffness [Negative] : Heme/Lymph [Headache] : no headache [Fever] : no fever [Feeling Fatigued] : not feeling fatigued [Chills] : no chills [Recent Weight Loss (___ Lbs)] : no recent weight loss [Chest  Pressure] : no chest pressure [Shortness Of Breath] : no shortness of breath [Chest Pain] : no chest pain [Lower Ext Edema] : no extremity edema [Leg Claudication] : no intermittent leg claudication [Palpitations] : no palpitations [Wheezing] : no wheezing [Cough] : no cough [Coughing Up Blood] : no hemoptysis [Nausea] : no nausea [Abdominal Pain] : no abdominal pain [Heartburn] : no heartburn [Vomiting] : no vomiting [Change in Appetite] : no change in appetite [Change In The Stool] : no change in stool [Dysphagia] : no dysphagia [Muscle Cramps] : no muscle cramps [Joint Swelling] : no joint swelling [Limb Weakness (Paresis)] : no limb weakness [Skin: A Rash] : no rash: [Itching] : no itching [Skin Lesions] : no skin lesions [Dizziness] : no dizziness [Tremor] : no tremor was seen [Tingling (Paresthesia)] : no tingling [Convulsions] : no convulsions [Confusion] : no confusion was observed [Memory Lapses Or Loss] : no memory lapses or loss [Under Stress] : not under stress [Anxiety] : no anxiety [Excessive Thirst] : no polydipsia [Easy Bleeding] : no tendency for easy bleeding [Swollen Glands] : no swollen glands [Swollen Glands In The Neck] : no swollen glands in the neck [Easy Bruising] : no tendency for easy bruising [FreeTextEntry1] : s/p CABG, s/p left knee replacement.

## 2019-09-27 NOTE — PHYSICAL EXAM
[General Appearance - Well Developed] : well developed [Normal Appearance] : normal appearance [General Appearance - Well Nourished] : well nourished [No Deformities] : no deformities [Well Groomed] : well groomed [General Appearance - In No Acute Distress] : no acute distress [Normal Conjunctiva] : the conjunctiva exhibited no abnormalities [Eyelids - No Xanthelasma] : the eyelids demonstrated no xanthelasmas [Normal Oral Mucosa] : normal oral mucosa [No Oral Pallor] : no oral pallor [No Oral Cyanosis] : no oral cyanosis [Normal Jugular Venous A Waves Present] : normal jugular venous A waves present [No Jugular Venous Rod A Waves] : no jugular venous rod A waves [Normal Jugular Venous V Waves Present] : normal jugular venous V waves present [Respiration, Rhythm And Depth] : normal respiratory rhythm and effort [Auscultation Breath Sounds / Voice Sounds] : lungs were clear to auscultation bilaterally [Exaggerated Use Of Accessory Muscles For Inspiration] : no accessory muscle use [Chest Palpation] : palpation of the chest revealed no abnormalities [Lungs Percussion] : the lungs were normal to percussion [Heart Rate And Rhythm] : heart rate and rhythm were normal [Heart Sounds] : normal S1 and S2 [Arterial Pulses Normal] : the arterial pulses were normal [Edema] : no peripheral edema present [Veins - Varicosity Changes] : no varicosital changes were noted in the lower extremities [Systolic grade ___/6] : A grade [unfilled]/6 systolic murmur was heard. [Abdomen Soft] : soft [Bowel Sounds] : normal bowel sounds [Abdomen Tenderness] : non-tender [Abdomen Hernia] : no hernia was discovered [Abdomen Mass (___ Cm)] : no abdominal mass palpated [Nail Clubbing] : no clubbing of the fingernails [Petechial Hemorrhages (___cm)] : no petechial hemorrhages [Cyanosis, Localized] : no localized cyanosis [Skin Color & Pigmentation] : normal skin color and pigmentation [] : no rash [Skin Turgor] : normal skin turgor [No Skin Ulcers] : no skin ulcer [No Venous Stasis] : no venous stasis [Skin Lesions] : no skin lesions [Oriented To Time, Place, And Person] : oriented to person, place, and time [No Xanthoma] : no  xanthoma was observed [Impaired Insight] : insight and judgment were intact [Affect] : the affect was normal [Memory Remote] : remote memory was not impaired [Memory Recent] : recent memory was not impaired [Mood] : the mood was normal [No Anxiety] : not feeling anxious [FreeTextEntry1] : s/p left knee surgery. s/p venous  harvest for CABG on the left side of low extremity.

## 2019-09-27 NOTE — DISCUSSION/SUMMARY
[Moderate Aortic Stenosis] : moderate aortic stenosis [Atrial Fibrillation] : atrial fibrillation [Family] : the patient's family [Coronary Artery Disease] : coronary artery disease [Dietary Modification] : dietary modification [Exercise Regimen] : an exercise regimen [Hyperlipidemia] : hyperlipidemia [Diet Modification] : diet modification [None] : none [Exercise] : exercise [Hypertension] : hypertension [Responding to Treatment] : responding to treatment [Improving] : improving [Sodium Restriction] : sodium restriction [Weight Loss] : weight loss [Stable] : stable [Low Sodium Diet] : low sodium diet [Medication Changes Per Orders] : as documented in orders [Patient] : the patient [Minutes spent___] : for [unfilled] ~Uminutes [___ Month(s)] : [unfilled] month(s) [With Me] : with me [de-identified] : at perioperative state [de-identified] : ? related to the SOB, functional II [de-identified] : continue observation, eventually may require TAVR [de-identified] : Patient did not take amiodarone and  Coumadin for some time. He defers the medications. [de-identified] : no active chest pain, considered angina resolved. [de-identified] :  s/p PCI and recent CABG (1/2017) [de-identified] : use diuretic for the treatment, and also taking care the " leg edema". [de-identified] : reactive hypertension [de-identified] : because of hypertension and leg edema, use  chlorthalidone as the choice of treatment. [de-identified] : subjective, not objective. [FreeTextEntry1] : No evidence of AF,  no  indication for continue anticoagulation and amiodarone. The AF likely the post operation effect of CABG, or the tremor effect of parkinsons.\par

## 2019-09-27 NOTE — REASON FOR VISIT
[Follow-Up - Clinic] : a clinic follow-up of [Aortic Stenosis] : aortic stenosis [Coronary Artery Disease] : coronary artery disease [Atrial Fibrillation] : atrial fibrillation [Hypertension] : hypertension [CABG Follow-up] : bypass graft

## 2020-08-07 ENCOUNTER — INPATIENT (INPATIENT)
Facility: HOSPITAL | Age: 85
LOS: 2 days | Discharge: HOME | DRG: 689 | End: 2020-08-10
Attending: HOSPITALIST | Admitting: STUDENT IN AN ORGANIZED HEALTH CARE EDUCATION/TRAINING PROGRAM
Payer: MEDICARE

## 2020-08-07 VITALS
OXYGEN SATURATION: 99 % | DIASTOLIC BLOOD PRESSURE: 70 MMHG | SYSTOLIC BLOOD PRESSURE: 149 MMHG | HEART RATE: 77 BPM | TEMPERATURE: 99 F | RESPIRATION RATE: 17 BRPM

## 2020-08-07 DIAGNOSIS — Z95.1 PRESENCE OF AORTOCORONARY BYPASS GRAFT: Chronic | ICD-10-CM

## 2020-08-07 DIAGNOSIS — R53.1 WEAKNESS: ICD-10-CM

## 2020-08-07 DIAGNOSIS — Z96.652 PRESENCE OF LEFT ARTIFICIAL KNEE JOINT: Chronic | ICD-10-CM

## 2020-08-07 LAB
ALBUMIN SERPL ELPH-MCNC: 4.2 G/DL — SIGNIFICANT CHANGE UP (ref 3.3–5)
ALP SERPL-CCNC: 62 U/L — SIGNIFICANT CHANGE UP (ref 40–120)
ALT FLD-CCNC: 10 U/L — SIGNIFICANT CHANGE UP (ref 10–45)
ANION GAP SERPL CALC-SCNC: 14 MMOL/L — SIGNIFICANT CHANGE UP (ref 5–17)
APPEARANCE UR: CLEAR — SIGNIFICANT CHANGE UP
APTT BLD: 27.8 SEC — SIGNIFICANT CHANGE UP (ref 27.5–35.5)
AST SERPL-CCNC: 14 U/L — SIGNIFICANT CHANGE UP (ref 10–40)
BACTERIA # UR AUTO: 0 — SIGNIFICANT CHANGE UP
BASE EXCESS BLDV CALC-SCNC: 1.3 MMOL/L — SIGNIFICANT CHANGE UP (ref -2–2)
BASOPHILS # BLD AUTO: 0.03 K/UL — SIGNIFICANT CHANGE UP (ref 0–0.2)
BASOPHILS NFR BLD AUTO: 0.5 % — SIGNIFICANT CHANGE UP (ref 0–2)
BILIRUB SERPL-MCNC: 0.2 MG/DL — SIGNIFICANT CHANGE UP (ref 0.2–1.2)
BILIRUB UR-MCNC: NEGATIVE — SIGNIFICANT CHANGE UP
BUN SERPL-MCNC: 27 MG/DL — HIGH (ref 7–23)
CA-I SERPL-SCNC: 1.24 MMOL/L — SIGNIFICANT CHANGE UP (ref 1.12–1.3)
CALCIUM SERPL-MCNC: 9.7 MG/DL — SIGNIFICANT CHANGE UP (ref 8.4–10.5)
CHLORIDE BLDV-SCNC: 104 MMOL/L — SIGNIFICANT CHANGE UP (ref 96–108)
CHLORIDE SERPL-SCNC: 99 MMOL/L — SIGNIFICANT CHANGE UP (ref 96–108)
CO2 BLDV-SCNC: 28 MMOL/L — SIGNIFICANT CHANGE UP (ref 22–30)
CO2 SERPL-SCNC: 23 MMOL/L — SIGNIFICANT CHANGE UP (ref 22–31)
COLOR SPEC: ABNORMAL
CREAT SERPL-MCNC: 1.4 MG/DL — HIGH (ref 0.5–1.3)
DIFF PNL FLD: NEGATIVE — SIGNIFICANT CHANGE UP
EOSINOPHIL # BLD AUTO: 0.11 K/UL — SIGNIFICANT CHANGE UP (ref 0–0.5)
EOSINOPHIL NFR BLD AUTO: 1.7 % — SIGNIFICANT CHANGE UP (ref 0–6)
EPI CELLS # UR: 0 /HPF — SIGNIFICANT CHANGE UP
GAS PNL BLDV: 133 MMOL/L — LOW (ref 135–145)
GAS PNL BLDV: SIGNIFICANT CHANGE UP
GAS PNL BLDV: SIGNIFICANT CHANGE UP
GLUCOSE BLDV-MCNC: 154 MG/DL — HIGH (ref 70–99)
GLUCOSE SERPL-MCNC: 157 MG/DL — HIGH (ref 70–99)
GLUCOSE UR QL: ABNORMAL
HCO3 BLDV-SCNC: 26 MMOL/L — SIGNIFICANT CHANGE UP (ref 21–29)
HCT VFR BLD CALC: 37 % — LOW (ref 39–50)
HCT VFR BLDA CALC: 38 % — LOW (ref 39–50)
HGB BLD CALC-MCNC: 12.4 G/DL — LOW (ref 13–17)
HGB BLD-MCNC: 11.8 G/DL — LOW (ref 13–17)
HYALINE CASTS # UR AUTO: 1 /LPF — SIGNIFICANT CHANGE UP (ref 0–2)
IMM GRANULOCYTES NFR BLD AUTO: 0.2 % — SIGNIFICANT CHANGE UP (ref 0–1.5)
INR BLD: 0.92 RATIO — SIGNIFICANT CHANGE UP (ref 0.88–1.16)
KETONES UR-MCNC: NEGATIVE — SIGNIFICANT CHANGE UP
LACTATE BLDV-MCNC: 1.9 MMOL/L — SIGNIFICANT CHANGE UP (ref 0.7–2)
LEUKOCYTE ESTERASE UR-ACNC: NEGATIVE — SIGNIFICANT CHANGE UP
LYMPHOCYTES # BLD AUTO: 0.44 K/UL — LOW (ref 1–3.3)
LYMPHOCYTES # BLD AUTO: 7 % — LOW (ref 13–44)
MANUAL SMEAR VERIFICATION: SIGNIFICANT CHANGE UP
MCHC RBC-ENTMCNC: 30.1 PG — SIGNIFICANT CHANGE UP (ref 27–34)
MCHC RBC-ENTMCNC: 31.9 GM/DL — LOW (ref 32–36)
MCV RBC AUTO: 94.4 FL — SIGNIFICANT CHANGE UP (ref 80–100)
MONOCYTES # BLD AUTO: 0.57 K/UL — SIGNIFICANT CHANGE UP (ref 0–0.9)
MONOCYTES NFR BLD AUTO: 9 % — SIGNIFICANT CHANGE UP (ref 2–14)
NEUTROPHILS # BLD AUTO: 5.17 K/UL — SIGNIFICANT CHANGE UP (ref 1.8–7.4)
NEUTROPHILS NFR BLD AUTO: 81.6 % — HIGH (ref 43–77)
NITRITE UR-MCNC: POSITIVE
NRBC # BLD: 0 /100 WBCS — SIGNIFICANT CHANGE UP (ref 0–0)
NT-PROBNP SERPL-SCNC: 453 PG/ML — HIGH (ref 0–300)
OTHER CELLS CSF MANUAL: 12 ML/DL — LOW (ref 18–22)
PCO2 BLDV: 45 MMHG — SIGNIFICANT CHANGE UP (ref 35–50)
PH BLDV: 7.38 — SIGNIFICANT CHANGE UP (ref 7.35–7.45)
PH UR: 6 — SIGNIFICANT CHANGE UP (ref 5–8)
PLAT MORPH BLD: NORMAL — SIGNIFICANT CHANGE UP
PLATELET # BLD AUTO: 240 K/UL — SIGNIFICANT CHANGE UP (ref 150–400)
PO2 BLDV: 42 MMHG — SIGNIFICANT CHANGE UP (ref 25–45)
POTASSIUM BLDV-SCNC: 4.3 MMOL/L — SIGNIFICANT CHANGE UP (ref 3.5–5.3)
POTASSIUM SERPL-MCNC: 4.5 MMOL/L — SIGNIFICANT CHANGE UP (ref 3.5–5.3)
POTASSIUM SERPL-SCNC: 4.5 MMOL/L — SIGNIFICANT CHANGE UP (ref 3.5–5.3)
PROT SERPL-MCNC: 7.1 G/DL — SIGNIFICANT CHANGE UP (ref 6–8.3)
PROT UR-MCNC: ABNORMAL
PROTHROM AB SERPL-ACNC: 11 SEC — SIGNIFICANT CHANGE UP (ref 10.6–13.6)
RBC # BLD: 3.92 M/UL — LOW (ref 4.2–5.8)
RBC # FLD: 12.8 % — SIGNIFICANT CHANGE UP (ref 10.3–14.5)
RBC BLD AUTO: SIGNIFICANT CHANGE UP
RBC CASTS # UR COMP ASSIST: 2 /HPF — SIGNIFICANT CHANGE UP (ref 0–4)
SAO2 % BLDV: 72 % — SIGNIFICANT CHANGE UP (ref 67–88)
SARS-COV-2 RNA SPEC QL NAA+PROBE: SIGNIFICANT CHANGE UP
SODIUM SERPL-SCNC: 136 MMOL/L — SIGNIFICANT CHANGE UP (ref 135–145)
SP GR SPEC: 1.02 — SIGNIFICANT CHANGE UP (ref 1.01–1.02)
TROPONIN T, HIGH SENSITIVITY RESULT: 8 NG/L — SIGNIFICANT CHANGE UP (ref 0–51)
TROPONIN T, HIGH SENSITIVITY RESULT: 9 NG/L — SIGNIFICANT CHANGE UP (ref 0–51)
UROBILINOGEN FLD QL: NEGATIVE — SIGNIFICANT CHANGE UP
WBC # BLD: 6.33 K/UL — SIGNIFICANT CHANGE UP (ref 3.8–10.5)
WBC # FLD AUTO: 6.33 K/UL — SIGNIFICANT CHANGE UP (ref 3.8–10.5)
WBC UR QL: 1 /HPF — SIGNIFICANT CHANGE UP (ref 0–5)

## 2020-08-07 PROCEDURE — 71045 X-RAY EXAM CHEST 1 VIEW: CPT | Mod: 26

## 2020-08-07 PROCEDURE — 70450 CT HEAD/BRAIN W/O DYE: CPT | Mod: 26

## 2020-08-07 PROCEDURE — 99223 1ST HOSP IP/OBS HIGH 75: CPT | Mod: GC

## 2020-08-07 RX ORDER — SODIUM CHLORIDE 9 MG/ML
1000 INJECTION INTRAMUSCULAR; INTRAVENOUS; SUBCUTANEOUS
Refills: 0 | Status: COMPLETED | OUTPATIENT
Start: 2020-08-07 | End: 2020-08-07

## 2020-08-07 RX ORDER — ACETAMINOPHEN 500 MG
650 TABLET ORAL ONCE
Refills: 0 | Status: COMPLETED | OUTPATIENT
Start: 2020-08-07 | End: 2020-08-07

## 2020-08-07 RX ADMIN — Medication 650 MILLIGRAM(S): at 23:09

## 2020-08-07 RX ADMIN — SODIUM CHLORIDE 75 MILLILITER(S): 9 INJECTION INTRAMUSCULAR; INTRAVENOUS; SUBCUTANEOUS at 21:11

## 2020-08-07 NOTE — H&P ADULT - NSHPPHYSICALEXAM_GEN_ALL_CORE
Vital Signs Last 24 Hrs  T(C): 36.6 (07 Aug 2020 22:27), Max: 37.2 (07 Aug 2020 18:03)  T(F): 97.8 (07 Aug 2020 22:27), Max: 99 (07 Aug 2020 18:03)  HR: 76 (07 Aug 2020 22:27) (76 - 77)  BP: 149/76 (07 Aug 2020 22:27) (135/57 - 149/76)  BP(mean): 78 (07 Aug 2020 19:15) (78 - 78)  RR: 18 (07 Aug 2020 22:27) (17 - 20)  SpO2: 94% (07 Aug 2020 22:27) (94% - 99%)    PHYSICAL EXAM: PENDING  GENERAL: NAD  EYES: EOMI, PERRLA, conjunctiva and sclera clear  ENMT: No tonsillar erythema, exudates, or enlargement; Moist mucous membranes  NECK: Supple, No JVD, Normal thyroid  CHEST/LUNG: Clear to auscultation bilaterally; No crackles, rhonchi, wheezing, or rubs  HEART: Regular rate and rhythm; No murmurs, rubs, or gallops  ABDOMEN: Soft, Nontender, Nondistended; Bowel sounds present  EXTREMITIES:  2+ Peripheral Pulses, No clubbing, cyanosis, or edema  LYMPH: No lymphadenopathy noted  SKIN: No rashes or lesions  NERVOUS SYSTEM:  Alert & Oriented X3, Good concentration; Motor Strength 5/5 B/L upper and lower extremities Vital Signs Last 24 Hrs  T(C): 36.6 (07 Aug 2020 22:27), Max: 37.2 (07 Aug 2020 18:03)  T(F): 97.8 (07 Aug 2020 22:27), Max: 99 (07 Aug 2020 18:03)  HR: 76 (07 Aug 2020 22:27) (76 - 77)  BP: 149/76 (07 Aug 2020 22:27) (135/57 - 149/76)  BP(mean): 78 (07 Aug 2020 19:15) (78 - 78)  RR: 18 (07 Aug 2020 22:27) (17 - 20)  SpO2: 94% (07 Aug 2020 22:27) (94% - 99%)    PHYSICAL EXAM:   GENERAL: NAD, answering questions appropriately and coherent, hard of hearing, but when interviewer speaks loudly he understands  EYES: EOMI, PERRLA, conjunctiva and sclera clear  ENMT: No tonsillar erythema, exudates, or enlargement; Moist mucous membranes  CHEST/LUNG: Clear to auscultation bilaterally; No crackles, rhonchi, wheezing, or rubs  HEART: Regular rate and rhythm; No murmurs, rubs, or gallops  ABDOMEN: Soft, mild epigastric, mild suprapubic, Nondistended; Bowel sounds present  EXTREMITIES:  2+ Peripheral Pulses, No clubbing, cyanosis, or edema  LYMPH: No lymphadenopathy noted cervical  SKIN: No rashes or lesions  NERVOUS SYSTEM:  Alert & Oriented X3, Good concentration; b/l hand tremor resting Vital Signs Last 24 Hrs  T(C): 36.6 (07 Aug 2020 22:27), Max: 37.2 (07 Aug 2020 18:03)  T(F): 97.8 (07 Aug 2020 22:27), Max: 99 (07 Aug 2020 18:03)  HR: 76 (07 Aug 2020 22:27) (76 - 77)  BP: 149/76 (07 Aug 2020 22:27) (135/57 - 149/76)  BP(mean): 78 (07 Aug 2020 19:15) (78 - 78)  RR: 18 (07 Aug 2020 22:27) (17 - 20)  SpO2: 94% (07 Aug 2020 22:27) (94% - 99%)    PHYSICAL EXAM:   GENERAL: NAD, answering questions appropriately and coherent, hard of hearing, but when interviewer speaks loudly he understands  EYES: EOMI, PERRLA, conjunctiva and sclera clear  ENMT: No tonsillar erythema, exudates, or enlargement; Moist mucous membranes  CHEST/LUNG: Clear to auscultation bilaterally; No crackles, rhonchi, wheezing, or rubs  HEART: Regular rate and rhythm; No rubs or gallops. Grade 2-3 holosystolic murmur at 5th intercostal space midclavicular.  ABDOMEN: Soft, mild epigastric, mild suprapubic, mildly distended; Bowel sounds present  EXTREMITIES:  2+ Peripheral Pulses, No clubbing, cyanosis, or edema  LYMPH: No lymphadenopathy noted at cervical  SKIN: No rashes or lesions  NERVOUS SYSTEM:  Alert & Oriented X2-3, Good concentration; b/l hand tremor resting Vital Signs Last 24 Hrs  T(C): 36.6 (07 Aug 2020 22:27), Max: 37.2 (07 Aug 2020 18:03)  T(F): 97.8 (07 Aug 2020 22:27), Max: 99 (07 Aug 2020 18:03)  HR: 76 (07 Aug 2020 22:27) (76 - 77)  BP: 149/76 (07 Aug 2020 22:27) (135/57 - 149/76)  BP(mean): 78 (07 Aug 2020 19:15) (78 - 78)  RR: 18 (07 Aug 2020 22:27) (17 - 20)  SpO2: 94% (07 Aug 2020 22:27) (94% - 99%)    PHYSICAL EXAM:   GENERAL: NAD, answering questions appropriately and coherent, hard of hearing, but when interviewer speaks loudly he understands  EYES: EOMI, PERRLA, conjunctiva and sclera clear  ENMT: No tonsillar erythema, exudates, or enlargement; Moist mucous membranes  CHEST/LUNG: Clear to auscultation bilaterally; No crackles, rhonchi, wheezing, or rubs  HEART: Regular rate and rhythm; No rubs or gallops. Grade 2-3 holosystolic murmur at 5th intercostal space midclavicular.  ABDOMEN: Soft, mild epigastric, mild suprapubic, mildly distended; Bowel sounds present  EXTREMITIES:  2+ Peripheral Pulses, No clubbing, cyanosis, or edema  LYMPH: No lymphadenopathy noted at cervical  SKIN: No rashes or lesions  NERVOUS SYSTEM:  Alert & Oriented X2-3, Good concentration; b/l hand tremor resting  PSYCH: normal affect, no si no hi

## 2020-08-07 NOTE — ED PROVIDER NOTE - PMH
CAD (coronary artery disease)    DM (diabetes mellitus)    HLD (hyperlipidemia)    HTN (hypertension)    Parkinson disease

## 2020-08-07 NOTE — H&P ADULT - PROBLEM SELECTOR PLAN 10
Transitions of Care Status:  1.  Name of PCP: Fernando Tapia MD  2.  PCP Contacted on Admission: [ ] Y    [ x] N   Night admission  3.  PCP contacted at Discharge: [ ] Y    [ ] N    [ ] N/A  4.  Post-Discharge Appointment Date and Location:  5.  Summary of Handoff given to PCP:

## 2020-08-07 NOTE — H&P ADULT - NSHPLABSRESULTS_GEN_ALL_CORE
136  |  99  |  27<H>  ----------------------------<  157<H>  4.5   |  23  |  1.40<H>    Ca    9.7      07 Aug 2020 18:38    TPro  7.1  /  Alb  4.2  /  TBili  0.2  /  DBili  x   /  AST  14  /  ALT  10  /  AlkPhos  62  08-07      PT/INR - ( 07 Aug 2020 18:38 )   PT: 11.0 sec;   INR: 0.92 ratio         PTT - ( 07 Aug 2020 18:38 )  PTT:27.8 sec              Urinalysis Basic - ( 07 Aug 2020 19:47 )    Color: Dark Yellow / Appearance: Clear / S.023 / pH: x  Gluc: x / Ketone: Negative  / Bili: Negative / Urobili: Negative   Blood: x / Protein: Trace / Nitrite: Positive   Leuk Esterase: Negative / RBC: 2 /hpf / WBC 1 /HPF   Sq Epi: x / Non Sq Epi: 0 /hpf / Bacteria: 0.0                              11.8   6.33  )-----------( 240      ( 07 Aug 2020 18:38 )             37.0     CAPILLARY BLOOD GLUCOSE    Blood Gas Venous - Lactate (20 @ 18:38)    Blood Gas Venous - Lactate: 1.9 mmoL/L  Blood Gas Profile - Venous (20 @ 18:38)    pH, Venous: 7.38    pCO2, Venous: 45 mmHg    pO2, Venous: 42 mmHg    HCO3, Venous: 26 mmol/L    Base Excess, Venous: 1.3 mmol/L    Oxygen Saturation, Venous: 72 %    Total CO2, Venous: 28 mmol/L    Blood Gas Source Venous: Venous    Troponin T, High Sensitivity (20 @ 20:49)    Troponin T, High Sensitivity Result: 9  Troponin T, High Sensitivity (20 @ 18:38)    Troponin T, High Sensitivity Result: 8    Serum Pro-Brain Natriuretic Peptide (20 @ 18:38)    Serum Pro-Brain Natriuretic Peptide: 453 pg/mL    < from: CT Head No Cont (20 @ 18:59) >    IMPRESSION:  Limited examination secondary to motion.  No acute intracranial hemorrhage, mass effect, or other acute intracranial pathology.  < end of copied text >    < from: Xray Chest 1 View- PORTABLE-Urgent (20 @ 20:03) >    INTERPRETATION:  no emergent findings  Clear lungs     136  |  99  |  27<H>  ----------------------------<  157<H>  4.5   |  23  |  1.40<H>    Ca    9.7      07 Aug 2020 18:38    TPro  7.1  /  Alb  4.2  /  TBili  0.2  /  DBili  x   /  AST  14  /  ALT  10  /  AlkPhos  62  08-07      PT/INR - ( 07 Aug 2020 18:38 )   PT: 11.0 sec;   INR: 0.92 ratio         PTT - ( 07 Aug 2020 18:38 )  PTT:27.8 sec              Urinalysis Basic - ( 07 Aug 2020 19:47 )    Color: Dark Yellow / Appearance: Clear / S.023 / pH: x  Gluc: x / Ketone: Negative  / Bili: Negative / Urobili: Negative   Blood: x / Protein: Trace / Nitrite: Positive   Leuk Esterase: Negative / RBC: 2 /hpf / WBC 1 /HPF   Sq Epi: x / Non Sq Epi: 0 /hpf / Bacteria: 0.0                              11.8   6.33  )-----------( 240      ( 07 Aug 2020 18:38 )             37.0     CAPILLARY BLOOD GLUCOSE    Blood Gas Venous - Lactate (20 @ 18:38)    Blood Gas Venous - Lactate: 1.9 mmoL/L  Blood Gas Profile - Venous (20 @ 18:38)    pH, Venous: 7.38    pCO2, Venous: 45 mmHg    pO2, Venous: 42 mmHg    HCO3, Venous: 26 mmol/L    Base Excess, Venous: 1.3 mmol/L    Oxygen Saturation, Venous: 72 %    Total CO2, Venous: 28 mmol/L    Blood Gas Source Venous: Venous    Troponin T, High Sensitivity (20 @ 20:49)    Troponin T, High Sensitivity Result: 9  Troponin T, High Sensitivity (20 @ 18:38)    Troponin T, High Sensitivity Result: 8    Serum Pro-Brain Natriuretic Peptide (20 @ 18:38)    Serum Pro-Brain Natriuretic Peptide: 453 pg/mL    < from: CT Head No Cont (20 @ 18:59) >    IMPRESSION:  Limited examination secondary to motion.  No acute intracranial hemorrhage, mass effect, or other acute intracranial pathology.  < end of copied text >    < from: Xray Chest 1 View- PORTABLE-Urgent (20 @ 20:03) >    INTERPRETATION:  no emergent findings  Clear lungs    EKG  NSR HR 77     136  |  99  |  27<H>  ----------------------------<  157<H>  4.5   |  23  |  1.40<H>    Ca    9.7      07 Aug 2020 18:38    TPro  7.1  /  Alb  4.2  /  TBili  0.2  /  DBili  x   /  AST  14  /  ALT  10  /  AlkPhos  62  08-07      PT/INR - ( 07 Aug 2020 18:38 )   PT: 11.0 sec;   INR: 0.92 ratio         PTT - ( 07 Aug 2020 18:38 )  PTT:27.8 sec              Urinalysis Basic - ( 07 Aug 2020 19:47 )    Color: Dark Yellow / Appearance: Clear / S.023 / pH: x  Gluc: x / Ketone: Negative  / Bili: Negative / Urobili: Negative   Blood: x / Protein: Trace / Nitrite: Positive   Leuk Esterase: Negative / RBC: 2 /hpf / WBC 1 /HPF   Sq Epi: x / Non Sq Epi: 0 /hpf / Bacteria: 0.0               11.8   6.33  )-----------( 240      ( 07 Aug 2020 18:38 )             37.0     CAPILLARY BLOOD GLUCOSE    Blood Gas Venous - Lactate (20 @ 18:38)    Blood Gas Venous - Lactate: 1.9 mmoL/L  Blood Gas Profile - Venous (20 @ 18:38)    pH, Venous: 7.38    pCO2, Venous: 45 mmHg    pO2, Venous: 42 mmHg    HCO3, Venous: 26 mmol/L    Base Excess, Venous: 1.3 mmol/L    Oxygen Saturation, Venous: 72 %    Total CO2, Venous: 28 mmol/L    Blood Gas Source Venous: Venous    Troponin T, High Sensitivity (20 @ 20:49)    Troponin T, High Sensitivity Result: 9  Troponin T, High Sensitivity (20 @ 18:38)    Troponin T, High Sensitivity Result: 8    Serum Pro-Brain Natriuretic Peptide (20 @ 18:38)    Serum Pro-Brain Natriuretic Peptide: 453 pg/mL    < from: CT Head No Cont (20 @ 18:59) >    IMPRESSION:  Limited examination secondary to motion.  No acute intracranial hemorrhage, mass effect, or other acute intracranial pathology.  < end of copied text >    < from: Xray Chest 1 View- PORTABLE-Urgent (20 @ 20:03) >    INTERPRETATION:  no emergent findings  Clear lungs    EKG  NSR HR 77     136  |  99  |  27<H>  ----------------------------<  157<H>  4.5   |  23  |  1.40<H>    Ca    9.7      07 Aug 2020 18:38    TPro  7.1  /  Alb  4.2  /  TBili  0.2  /  DBili  x   /  AST  14  /  ALT  10  /  AlkPhos  62  08-07      PT/INR - ( 07 Aug 2020 18:38 )   PT: 11.0 sec;   INR: 0.92 ratio         PTT - ( 07 Aug 2020 18:38 )  PTT:27.8 sec              Urinalysis Basic - ( 07 Aug 2020 19:47 )    Color: Dark Yellow / Appearance: Clear / S.023 / pH: x  Gluc: x / Ketone: Negative  / Bili: Negative / Urobili: Negative   Blood: x / Protein: Trace / Nitrite: Positive   Leuk Esterase: Negative / RBC: 2 /hpf / WBC 1 /HPF   Sq Epi: x / Non Sq Epi: 0 /hpf / Bacteria: 0.0               11.8   6.33  )-----------( 240      ( 07 Aug 2020 18:38 )             37.0     CAPILLARY BLOOD GLUCOSE    Blood Gas Venous - Lactate (20 @ 18:38)    Blood Gas Venous - Lactate: 1.9 mmoL/L  Blood Gas Profile - Venous (20 @ 18:38)    pH, Venous: 7.38    pCO2, Venous: 45 mmHg    pO2, Venous: 42 mmHg    HCO3, Venous: 26 mmol/L    Base Excess, Venous: 1.3 mmol/L    Oxygen Saturation, Venous: 72 %    Total CO2, Venous: 28 mmol/L    Blood Gas Source Venous: Venous    Troponin T, High Sensitivity (20 @ 20:49)    Troponin T, High Sensitivity Result: 9  Troponin T, High Sensitivity (20 @ 18:38)    Troponin T, High Sensitivity Result: 8    Serum Pro-Brain Natriuretic Peptide (20 @ 18:38)    Serum Pro-Brain Natriuretic Peptide: 453 pg/mL    < from: CT Head No Cont (20 @ 18:59) >    IMPRESSION:  Limited examination secondary to motion.  No acute intracranial hemorrhage, mass effect, or other acute intracranial pathology.  < end of copied text >    < from: Xray Chest 1 View- PORTABLE-Urgent (20 @ 20:03) >    INTERPRETATION:  no emergent findings  Clear lungs    EKG  NSR HR 77    Labs, imaging, ekg personally reviewed

## 2020-08-07 NOTE — ED PROVIDER NOTE - ATTENDING CONTRIBUTION TO CARE
Curt Hutton MD, FACEP: patient with weakness, and urinary symptoms and parkingson's disease. Patient has increased fatigue and weakness.   ncat  mildly dry mm  non-tachycardic   non-tachypneic   bilateral breath sounds   soft, ntnd, no rebound/guarding  no rash/vesicles/petechiae     Curt Hutton MD, FACEP: In this physician's medical judgement based on clinical history and physical exam, patient with fatigue and weakness with urinary symptoms, will get iv, cbc, cmp, ua, urine culture, and reassess  Will follow up on labs, analgesia, imaging, reassess and disposition to the inpatient team as clinically indicated.     Based on patient's history and physical exam, as well as the results of today's workup, I feel that patient warrants admission to the hospital for further workup/evaluation and continued management. I discussed the findings of today's workup with the patient and addressed the patient's questions and concerns. The patient was agreeable with admission. Our team spoke with the inpatient receiving team who accepted the patient for admission and subsequently took over the patient's care at the time of admission.

## 2020-08-07 NOTE — H&P ADULT - NSHPREVIEWOFSYSTEMS_GEN_ALL_CORE
CONSTITUTIONAL: No weakness, fevers or chills  EYES/ENT: No visual changes;  No vertigo or throat pain   NECK: No pain or stiffness  RESPIRATORY: No cough, wheezing, hemoptysis; No shortness of breath  CARDIOVASCULAR: No chest pain or palpitations  GASTROINTESTINAL: No abdominal or epigastric pain. No nausea, vomiting, or hematemesis; No diarrhea or constipation. No melena or hematochezia.  GENITOURINARY: No dysuria, frequency or hematuria  NEUROLOGICAL: No numbness or weakness  SKIN: No itching, burning, rashes, or lesions CONSTITUTIONAL: + weakness, No fevers or chills  EYES/ENT: No visual changes;  No vertigo or throat pain   NECK: + chronic neck pain posterior along with HA  ENDO: does not check fingersticks  RESPIRATORY: No cough, wheezing, hemoptysis; + shortness of breath  CARDIOVASCULAR: No chest pain or palpitations  GASTROINTESTINAL: No abdominal or epigastric pain. No nausea, vomiting, or hematemesis; No diarrhea or constipation. No melena or hematochezia.  GENITOURINARY: +dysuria, frequency, unclear if hematuria. sometimes dribbling when pee. Had increased urinary frequency prior as well.  NEUROLOGICAL: No numbness.  + weakness and HA per HPI  SKIN: No itching, burning, rashes, or lesions  PSYCH: no hx depression, no hx anxiety CONSTITUTIONAL: + weakness, No fevers or chills  EYES/ENT: No visual changes;  No vertigo or throat pain   NECK: + chronic neck pain posterior along with HA  ENDO: does not check fingersticks  RESPIRATORY: No cough, wheezing, hemoptysis; + shortness of breath  CARDIOVASCULAR: No chest pain or palpitations  GASTROINTESTINAL: No abdominal or epigastric pain. No nausea, vomiting, or hematemesis; No diarrhea or constipation. No melena or hematochezia.  GENITOURINARY: +dysuria, frequency, unclear if hematuria. sometimes dribbling when pee. Had increased urinary frequency prior as well.  NEUROLOGICAL: No numbness.  + weakness and HA per HPI  MSK: hx L knee replacement  SKIN: No itching, burning, rashes, or lesions  PSYCH: no hx depression, no hx anxiety

## 2020-08-07 NOTE — H&P ADULT - PROBLEM SELECTOR PROBLEM 4
Parkinson disease Shortness of breath Coronary artery disease involving coronary bypass graft, angina presence unspecified, unspecified whether native or transplanted heart

## 2020-08-07 NOTE — H&P ADULT - ATTENDING COMMENTS
Pt seen and examined at bedside.  I have precepted this case with house staff and agree with resident note above and have edited it where appropriate.  86 M with parkinson dz, admitted for progressive encephalopathy from partially treated outpatient UTI. c/w ctx, await cultures. mentation improved this am. CTH nonactionable. holding lasix for mild juan (suspect partial bactrim effect + prerenal azotemia) not grossly overloaded on exam or imaging.   Care to be assumed by day hospitalist at 8 am.  This patient was assigned to me by the hospitalist in charge; my involvement in this case has consisted of the initial history, physical, chart review, and management plan.  This patient was previously unknown to me.

## 2020-08-07 NOTE — H&P ADULT - PROBLEM SELECTOR PROBLEM 3
Coronary artery disease involving coronary bypass graft, angina presence unspecified, unspecified whether native or transplanted heart Acute kidney injury

## 2020-08-07 NOTE — H&P ADULT - PROBLEM SELECTOR PROBLEM 5
DM (diabetes mellitus) Coronary artery disease involving coronary bypass graft, angina presence unspecified, unspecified whether native or transplanted heart Parkinson disease

## 2020-08-07 NOTE — ED PROVIDER NOTE - PHYSICAL EXAMINATION
VITAL SIGNS: I have reviewed nursing notes and confirm.  CONSTITUTIONAL: non-toxic, well appearing  SKIN: no rash, no petechiae.  EYES: pink conjunctiva, anicteric  NECK: Supple; no meningismus, no JVD  CARD: RRR, no murmurs, equal radial pulses bilaterally 2+  RESP: CTA B/L, no respiratory distress  ABD: Soft, non-tender, non-distended, no peritoneal signs  EXT: Normal ROM x4. No edema. No calf tenderness  NEURO: Neuro exam nonfocal, equal strength bilaterally

## 2020-08-07 NOTE — H&P ADULT - PROBLEM SELECTOR PLAN 4
# Hx   - c/w Pt not c/o SOB now, had intermittently at home per daughter, hx pulm edema in past, needing Lasix. CXR clear now, no LE edema. BNP < 500, low c/f ADHF. Satting well on RA.  - Continue to monitor. # Hx CABG in 2015  - No troponin elevation now. BNP < 500, no ADHF.  - c/w ASA 81 mg  - c/f mitral regurgitation from exam, grade 2-3 holosystolic murmur, no fluid on CXR however, nonacute, f/u with PCP/outpt cardiologist

## 2020-08-07 NOTE — ED PROVIDER NOTE - NS ED ROS FT
Review of Systems    Constitutional: (-) fever, (-) chills, (+) fatigue  HEENT: (-) sore throat, (-) hearing loss, (-) nasal congestion  Cardiovascular: (-) chest pain, (-) syncope  Respiratory: (-) cough, (-) shortness of breath  Gastrointestinal: (-) vomiting, (-) diarrhea, (-) abdominal pain  Musculoskeletal: (-) neck pain, (-) back pain, (-) joint pain  Integumentary: (-) rash, (-) edema, (-) wound  Neurological: (+) headache, (+) altered mental status    Except as documented in the HPI, all other systems are negative.

## 2020-08-07 NOTE — H&P ADULT - NSICDXPASTSURGICALHX_GEN_ALL_CORE_FT
PAST SURGICAL HISTORY:  S/P CABG (coronary artery bypass graft) PAST SURGICAL HISTORY:  History of knee replacement procedure of left knee 2017    S/P CABG (coronary artery bypass graft) 2015

## 2020-08-07 NOTE — H&P ADULT - PROBLEM SELECTOR PLAN 1
# # A/w weakness, hx Parkinson's. Per daughter appears more slow in movements and cognition, especially in last 2 weeks  - Due to UTI, treatment as below: f.u UCx and BCx in lab  - HA: distribution and character appears to be tension HA, give Tylenol PRN. No acute CT head findings, unclear about arterial stenosis on posterior head as related by daughter, f/u outpatient with neurology, no acute needs  - No acute neurological findings on CT head or on exam. Pt mentating well on exam.  - Lab w/u check: TSH, B12, folate. Pt hx anemia now Hb 11.8 prior b/l 9-10, chronic, check iron studies. # A/w weakness, hx Parkinson's. Per daughter appears more slow in movements and cognition, especially in last 2 weeks  - Due to UTI, treatment as below: f/u UCx and BCx in lab  - HA: distribution and character appears to be tension HA, give Tylenol PRN. No acute CT head findings, unclear about arterial stenosis on posterior head as related by daughter, f/u outpatient with neurology, no acute needs  - No acute neurological findings on CT head or on exam. Pt mentating well on exam.  - Lab w/u check: TSH, B12, folate. Pt hx anemia now Hb 11.8 prior b/l 9-10, chronic, check iron studies. # started treatment for UTI outpatient yesterday with Bactrim. No leukocytosis but PMN predominance. UA + nitrite and neg LE.  - F/u UCx in lab  - Start CTX 1 g q24h, tailor per culture

## 2020-08-07 NOTE — H&P ADULT - HISTORY OF PRESENT ILLNESS
87 yo M with hx Parkinson's disease, DM, HTN, HLD, CAD s/p CABG and CHF, p/w confusion and weakness since yesterday.    Hx obtained from patient's daughter given that patient poor historian.    Pt had increased urinary frequency and urgency, and dysuria for 1 week, and was treated for a UTI with Bactrim (starting yesterday). Pt observed to be more confused by family, also complaining of increased weakness and dizziness. Noticed to have increased shortness of breath. ROS neg for f/c/v/d/rash/recent falls. No recent changes to medications or missed doses. 85 yo M with hx Parkinson's disease, DM, HTN, HLD, CAD s/p CABG and CHF, p/w confusion and weakness since yesterday.    Hx obtained from patient's daughter in addition to patient, given that pt seemed unsure of medical history.    Pt had increased urinary frequency and urgency, and dysuria for 1 week, and was treated for a UTI with Bactrim (starting 2 d ago). Also in setting of taking Lasix for the last 10 d (prescribed by urgent care for leg swelling - daughter unsure how long, says long time). Pt observed to be more confused by family, also complaining of increased weakness (feeling faint but no LOC) and dizziness. Noticed to have increased shortness of breath. Also c/o increased head pain/HA, has had for years, per daughter had a CT scan (unsure when) of head that showed a narrowed vessel at the back of the head. Pt described HA as pushing, at temple and also pain at back of head and neck. Denied blurry vision. Tylenol does not help HA. HA worsening per pt. Daughter reported intermittent SOB at home, appears to be worsening, sometimes with cough (coughing at night), occurring for months. ROS neg for f/c/v/d/rash/recent falls. No recent changes to medications or missed doses.    In the ED, pt received 1 L NS and a dose of Tylenol for pain. 85 yo M with hx Parkinson's disease, DM, HTN, HLD, CAD s/p CABG and CHF, p/w confusion and weakness since yesterday.    Hx obtained from patient's daughter in addition to patient, given that pt seemed unsure of medical history.    Pt had increased urinary frequency and urgency, and dysuria for 1 week, and was treated for a UTI with Bactrim (starting 2 d ago, seen in urgent care 8 d ago). Also in setting of taking Lasix 2-3 days in last 3 days (prescribed by urgent care for leg swelling - daughter unsure how long, says long time, was taking Lasix months ago but stopped bc leg swelling disappeared). Pt observed to be more confused by family, also complaining of increased weakness (feeling faint but no LOC) and dizziness. Noticed to have increased shortness of breath. Also c/o increased head pain/HA, has had for years, per daughter had a CT scan (unsure when) of head that showed a narrowed vessel at the back of the head. Pt described HA as pushing, at temple and also pain at back of head and neck. Denied blurry vision. Tylenol does not help HA. HA worsening per pt. Daughter reported intermittent SOB at home, appears to be worsening, sometimes with cough (coughing at night), occurring for months. ROS neg for f/c/v/d/rash/recent falls. No recent changes to medications or missed doses.    In the ED, pt received 1 L NS and a dose of Tylenol for pain. 85 yo M with hx Parkinson's disease, DM, HTN, HLD, CAD s/p CABG, BPHand CHF, p/w confusion and weakness.    Hx obtained from patient's daughter in addition to patient, given that pt seemed unsure of medical history.    Pt had increased urinary frequency and urgency, and dysuria for 1 week, and was treated for a UTI with Bactrim (starting 2 d ago, seen in urgent care 8 d ago). Also in setting of taking Lasix 2-3 days in last 3 days (prescribed by urgent care for leg swelling - daughter unsure how long, says long time, was taking Lasix months ago but stopped bc leg swelling disappeared). Pt observed to be more confused by family, also complaining of increased weakness (feeling faint but no LOC) and dizziness. Noticed to have increased shortness of breath. Also c/o increased head pain/HA, has had for years, per daughter had a CT scan (unsure when) of head that showed a narrowed vessel at the back of the head. Pt described HA as pushing, at temple and also pain at back of head and neck. Denied blurry vision. Tylenol does not help HA. HA worsening per pt. Daughter reported intermittent SOB at home, appears to be worsening, sometimes with cough (coughing at night), occurring for months. ROS neg for f/c/v/d/rash/recent falls. No recent changes to medications or missed doses.    In the ED, pt received 1 L NS and a dose of Tylenol for pain.

## 2020-08-07 NOTE — H&P ADULT - PROBLEM SELECTOR PLAN 5
# on   - c/w # Hx CABG in 2015  - No troponin elevation now. BNP < 500, no ADHF.  - c/w ASA 81 mg # Hx tremor, weakness. Follows with neurologist. Taking meds as prescribed.  - C/o worsening dizziness, no acute neurological cause, dizziness found in parkinson's, likely progression of disease, as well as lowing of movements  - c/w ropinirole 0.5 mg tid  - c/w carbidopa-levodopa  mg 3 times per day per daughter  - F/u with neurology outpt regarding slowing of movements

## 2020-08-07 NOTE — ED ADULT NURSE REASSESSMENT NOTE - NS ED NURSE REASSESS COMMENT FT1
Received report @ 1900. Pt lying in bed comfortably, VS stable. COVID swab, UA/UC, rpt troponin collected and sent as per MD order. Pt repositioned in bed and cleaned. Pt in no acute distress.

## 2020-08-07 NOTE — H&P ADULT - NSHPSOCIALHISTORY_GEN_ALL_CORE
Marital Status:  (   )    (   ) Single    (   )    (  )   Lives with: (  ) alone  (  ) children   (  ) spouse   (  ) parents  (  ) other  Substance Use (street drugs): (  ) never used  (  ) other:  Tobacco Usage:  ( x  ) never smoked   (   ) former smoker   (   ) current smoker  (     ) pack year  (        ) last cigarette date  Alcohol Usage: Lives with: (  ) alone  ( x ) children   (  ) spouse   (  ) parents  (  ) other  Substance Use (street drugs): ( x) never used  (  ) other:  Tobacco Usage:  ( x  ) never smoked   (   ) former smoker   (   ) current smoker  (     ) pack year  (        ) last cigarette date  Alcohol Usage: Lives with: (  ) alone  ( x ) children   (  ) spouse   (  ) parents  (  ) other  Substance Use (street drugs): ( x) never used  (  ) other:  Tobacco Usage:  ( x  ) never smoked   (   ) former smoker   (   ) current smoker  (     ) pack year  (        ) last cigarette date  Alcohol Usage: none

## 2020-08-07 NOTE — H&P ADULT - PROBLEM SELECTOR PLAN 6
BPs not at goal  - c/w # Hx tremor, weakness. Follows with neurologist. Taking meds as prescribed.  - c/w ropinirole 0.5 mg tid  - c/w carbidopa-levodopa  mg 4 times per day (per pharmacy) **  - F/u with neurology outpt regarding slowing of movements # Hx tremor, weakness. Follows with neurologist. Taking meds as prescribed.  - c/w ropinirole 0.5 mg tid  - c/w carbidopa-levodopa  mg 3 times per day per daughter  - F/u with neurology outpt regarding slowing of movements # on metformin and Januvia  - ISS qac and qhs

## 2020-08-07 NOTE — ED ADULT NURSE NOTE - NSIMPLEMENTINTERV_GEN_ALL_ED
Implemented All Fall Risk Interventions:  [place] to call system. Call bell, personal items and telephone within reach. Instruct patient to call for assistance. Room bathroom lighting operational. Non-slip footwear when patient is off stretcher. Physically safe environment: no spills, clutter or unnecessary equipment. Stretcher in lowest position, wheels locked, appropriate side rails in place. Provide visual cue, wrist band, yellow gown, etc. Monitor gait and stability. Monitor for mental status changes and reorient to person, place, and time. Review medications for side effects contributing to fall risk. Reinforce activity limits and safety measures with patient and family.

## 2020-08-07 NOTE — H&P ADULT - PROBLEM SELECTOR PLAN 9
Transitions of Care Status:  1.  Name of PCP: Fernando Tapia MD  2.  PCP Contacted on Admission: [ ] Y    [ x] N   Night admission  3.  PCP contacted at Discharge: [ ] Y    [ ] N    [ ] N/A  4.  Post-Discharge Appointment Date and Location:  5.  Summary of Handoff given to PCP: HLD: Hx CAD s/p CABG; c/w Crestor as Lipitor 80 mg qhs  GERD?: epigastric mild tender, coughing at night, takes Tums at home sometimes: consider PPI if worsens.  Constipation: senna and miralax  DVT ppx: Lovenox 40 mh qhs CrCl 35 no dose adjustment needed  Diet: diabetic diet  Disposition: pending w/u, PT consult

## 2020-08-07 NOTE — ED ADULT NURSE NOTE - OBJECTIVE STATEMENT
87 yo presents to the ED from home by EMS. history of parkinson's, ambulatory with cane. daughter called EMS due to dizziness, SOB, weakness for 1 week. pt also complaining of frequency and painful urination. went to urgent care was diagnosed with UTI-on antibiotics for 3 days. VSS. pt denies any pain. abd soft nontender nondistended. 20G R forearm.

## 2020-08-07 NOTE — H&P ADULT - ASSESSMENT
85 yo M with hx Parkinson's disease, DM, HTN, HLD, CAD s/p CABG and CHF, p/w confusion and weakness since yesterday, admitted for AMS/fatigue w/u. 87 yo M with hx Parkinson's disease, DMT2, HTN, HLD, BPH, CAD s/p CABG and ?CHF, p/w confusion and weakness worsening in last week, recent UTI, admitted for AMS/fatigue w/u.

## 2020-08-07 NOTE — H&P ADULT - NSICDXPASTMEDICALHX_GEN_ALL_CORE_FT
PAST MEDICAL HISTORY:  CAD (coronary artery disease)     DM (diabetes mellitus)     HLD (hyperlipidemia)     HTN (hypertension)     Parkinson disease

## 2020-08-07 NOTE — H&P ADULT - PROBLEM SELECTOR PLAN 2
# started treatment for UTI outpatient yesterday with Bactrim # started treatment for UTI outpatient yesterday with Bactrim. No leukocytosis but PMN predominance. UA + nitrite and neg LE.  - F/u UCx in effie  - Start CTX 1 g q24h, tailor per culture # started treatment for UTI outpatient yesterday with Bactrim. No leukocytosis but PMN predominance. UA + nitrite and neg LE.  - F/u UCx in lab  - Start CTX 1 g q24h, tailor per culture # A/w weakness, hx Parkinson's. Per daughter appears more slow in movements and cognition, especially in last 2 weeks  - Due to UTI, treatment as above: f/u UCx and BCx in lab  - HA: distribution and character appears to be tension HA, give Tylenol PRN. No acute CT head findings, unclear about arterial stenosis on posterior head as related by daughter, f/u outpatient with neurology, no acute needs.  - No acute neurological findings on CT head or on exam. Pt mentating well on exam.  - Lab w/u check: TSH, B12, folate. Pt hx anemia now Hb 11.8 prior b/l 9-10, chronic, check iron studies.

## 2020-08-07 NOTE — H&P ADULT - PROBLEM SELECTOR PLAN 7
- c/w # on metformin and Januvia  - ISS qac and qhs - not on BP meds at home, BPs adequate now  - No tx needed

## 2020-08-07 NOTE — ED PROVIDER NOTE - CLINICAL SUMMARY MEDICAL DECISION MAKING FREE TEXT BOX
Johnathan-PGY2: 86 year old male with PMH parkinson's disease, DM, HTN, HLD, CHF, CAD s/p CABG presents with altered mental status since yesterday. Unclear etiology, ddx includes sepsis, ACS, CHF exacerbation. Patient with increased dyspnea, urinary symptoms, and altered mental status. Will obtain imaging, labs, symptomatic treatment and reassessment with disposition accordingly.

## 2020-08-07 NOTE — H&P ADULT - PROBLEM SELECTOR PLAN 8
DVT ppx:  Diet:  Disposition: pending w/u, PT consult BPs not at goal  - not on BP meds at home, except for Lasix, however not needed now  - start Norvasc 5 mg qd * Hx BPH: c/w Flomax, trend UOP  HLD: Hx CAD s/p CABG; c/w Crestor as Lipitor 80 mg qhs  GERD?: epigastric mild tender, coughing at night, takes Tums at home sometimes: consider PPI if worsens.  Constipation: senna and miralax  DVT ppx: Lovenox 40 mh qhs CrCl 35 no dose adjustment needed  Diet: diabetic diet  Disposition: pending w/u, PT consult for weakness

## 2020-08-07 NOTE — ED PROVIDER NOTE - OBJECTIVE STATEMENT
86 year old male with PMH parkinson's disease, DM, HTN, HLD, CHF, CAD s/p CABG presents with altered mental status since yesterday. Pt poor historian, per daughter patient reports urinary urgency, frequency, and dysuria x 1 week, prescribed bactrim yesterday. Per daughter, patient having increased weakness and dizziness and difficulty ambulating with increased confusion and "acting slower than usual." Daughter also reports increased dyspnea. Denies any change in medications or missed doses of medications. Denies any fevers, vomiting, diarrhea, numbness, weakness, or rash. Denies any recent injury or trauma. Denies any aspirin or AC use. Denies any additional complaints.

## 2020-08-07 NOTE — H&P ADULT - PROBLEM SELECTOR PLAN 3
# Hx  - c/w b/l Cr from JAZMINE ~ 1.0, now Cr 1.4  Likely prerenal STUART given taking Lasix for last 10 days and urinating a lot. No signs of hypervolemia.  Got 1 L NS in ED, check AM BMP for Cr change and reassess another IVF bolus. b/l Cr from HIE ~ 1.0, now Cr 1.4  Likely side effect from Bactrim use and Cr elevation, however also prerenal STUART given taking Lasix several days for last 10 days and urinating a lot. No signs of hypervolemia.  Got 1 L NS in ED, check AM BMP for Cr change and reassess another IVF bolus.  hold further lasix.

## 2020-08-08 DIAGNOSIS — G20 PARKINSON'S DISEASE: ICD-10-CM

## 2020-08-08 DIAGNOSIS — Z96.652 PRESENCE OF LEFT ARTIFICIAL KNEE JOINT: Chronic | ICD-10-CM

## 2020-08-08 DIAGNOSIS — E78.5 HYPERLIPIDEMIA, UNSPECIFIED: ICD-10-CM

## 2020-08-08 DIAGNOSIS — Z29.9 ENCOUNTER FOR PROPHYLACTIC MEASURES, UNSPECIFIED: ICD-10-CM

## 2020-08-08 DIAGNOSIS — N39.0 URINARY TRACT INFECTION, SITE NOT SPECIFIED: ICD-10-CM

## 2020-08-08 DIAGNOSIS — N17.9 ACUTE KIDNEY FAILURE, UNSPECIFIED: ICD-10-CM

## 2020-08-08 DIAGNOSIS — R06.02 SHORTNESS OF BREATH: ICD-10-CM

## 2020-08-08 DIAGNOSIS — G93.40 ENCEPHALOPATHY, UNSPECIFIED: ICD-10-CM

## 2020-08-08 DIAGNOSIS — E11.9 TYPE 2 DIABETES MELLITUS WITHOUT COMPLICATIONS: ICD-10-CM

## 2020-08-08 DIAGNOSIS — Z02.9 ENCOUNTER FOR ADMINISTRATIVE EXAMINATIONS, UNSPECIFIED: ICD-10-CM

## 2020-08-08 DIAGNOSIS — R41.82 ALTERED MENTAL STATUS, UNSPECIFIED: ICD-10-CM

## 2020-08-08 DIAGNOSIS — I25.810 ATHEROSCLEROSIS OF CORONARY ARTERY BYPASS GRAFT(S) WITHOUT ANGINA PECTORIS: ICD-10-CM

## 2020-08-08 DIAGNOSIS — I10 ESSENTIAL (PRIMARY) HYPERTENSION: ICD-10-CM

## 2020-08-08 LAB
ALBUMIN SERPL ELPH-MCNC: 4 G/DL — SIGNIFICANT CHANGE UP (ref 3.3–5)
ALP SERPL-CCNC: 60 U/L — SIGNIFICANT CHANGE UP (ref 40–120)
ALT FLD-CCNC: 12 U/L — SIGNIFICANT CHANGE UP (ref 10–45)
ANION GAP SERPL CALC-SCNC: 11 MMOL/L — SIGNIFICANT CHANGE UP (ref 5–17)
AST SERPL-CCNC: 16 U/L — SIGNIFICANT CHANGE UP (ref 10–40)
BILIRUB SERPL-MCNC: 0.4 MG/DL — SIGNIFICANT CHANGE UP (ref 0.2–1.2)
BUN SERPL-MCNC: 20 MG/DL — SIGNIFICANT CHANGE UP (ref 7–23)
CALCIUM SERPL-MCNC: 10 MG/DL — SIGNIFICANT CHANGE UP (ref 8.4–10.5)
CHLORIDE SERPL-SCNC: 102 MMOL/L — SIGNIFICANT CHANGE UP (ref 96–108)
CO2 SERPL-SCNC: 27 MMOL/L — SIGNIFICANT CHANGE UP (ref 22–31)
CREAT SERPL-MCNC: 1.2 MG/DL — SIGNIFICANT CHANGE UP (ref 0.5–1.3)
CULTURE RESULTS: SIGNIFICANT CHANGE UP
FERRITIN SERPL-MCNC: 89 NG/ML — SIGNIFICANT CHANGE UP (ref 30–400)
FOLATE SERPL-MCNC: >20 NG/ML — SIGNIFICANT CHANGE UP
GLUCOSE BLDC GLUCOMTR-MCNC: 107 MG/DL — HIGH (ref 70–99)
GLUCOSE BLDC GLUCOMTR-MCNC: 108 MG/DL — HIGH (ref 70–99)
GLUCOSE BLDC GLUCOMTR-MCNC: 144 MG/DL — HIGH (ref 70–99)
GLUCOSE SERPL-MCNC: 103 MG/DL — HIGH (ref 70–99)
HCT VFR BLD CALC: 37.7 % — LOW (ref 39–50)
HGB BLD-MCNC: 12.1 G/DL — LOW (ref 13–17)
IRON SATN MFR SERPL: 17 % — SIGNIFICANT CHANGE UP (ref 16–55)
IRON SATN MFR SERPL: 41 UG/DL — LOW (ref 45–165)
MAGNESIUM SERPL-MCNC: 2 MG/DL — SIGNIFICANT CHANGE UP (ref 1.6–2.6)
MCHC RBC-ENTMCNC: 30 PG — SIGNIFICANT CHANGE UP (ref 27–34)
MCHC RBC-ENTMCNC: 32.1 GM/DL — SIGNIFICANT CHANGE UP (ref 32–36)
MCV RBC AUTO: 93.3 FL — SIGNIFICANT CHANGE UP (ref 80–100)
NRBC # BLD: 0 /100 WBCS — SIGNIFICANT CHANGE UP (ref 0–0)
PHOSPHATE SERPL-MCNC: 2.7 MG/DL — SIGNIFICANT CHANGE UP (ref 2.5–4.5)
PLATELET # BLD AUTO: 231 K/UL — SIGNIFICANT CHANGE UP (ref 150–400)
POTASSIUM SERPL-MCNC: 4.2 MMOL/L — SIGNIFICANT CHANGE UP (ref 3.5–5.3)
POTASSIUM SERPL-SCNC: 4.2 MMOL/L — SIGNIFICANT CHANGE UP (ref 3.5–5.3)
PROT SERPL-MCNC: 6.6 G/DL — SIGNIFICANT CHANGE UP (ref 6–8.3)
RBC # BLD: 4.04 M/UL — LOW (ref 4.2–5.8)
RBC # FLD: 13 % — SIGNIFICANT CHANGE UP (ref 10.3–14.5)
SARS-COV-2 IGG SERPL QL IA: NEGATIVE — SIGNIFICANT CHANGE UP
SARS-COV-2 IGM SERPL IA-ACNC: 0.09 INDEX — SIGNIFICANT CHANGE UP
SODIUM SERPL-SCNC: 140 MMOL/L — SIGNIFICANT CHANGE UP (ref 135–145)
SPECIMEN SOURCE: SIGNIFICANT CHANGE UP
TIBC SERPL-MCNC: 242 UG/DL — SIGNIFICANT CHANGE UP (ref 220–430)
TSH SERPL-MCNC: 0.82 UIU/ML — SIGNIFICANT CHANGE UP (ref 0.27–4.2)
UIBC SERPL-MCNC: 201 UG/DL — SIGNIFICANT CHANGE UP (ref 110–370)
VIT B12 SERPL-MCNC: 318 PG/ML — SIGNIFICANT CHANGE UP (ref 232–1245)
WBC # BLD: 6.04 K/UL — SIGNIFICANT CHANGE UP (ref 3.8–10.5)
WBC # FLD AUTO: 6.04 K/UL — SIGNIFICANT CHANGE UP (ref 3.8–10.5)

## 2020-08-08 PROCEDURE — 99233 SBSQ HOSP IP/OBS HIGH 50: CPT | Mod: GC

## 2020-08-08 RX ORDER — DEXTROSE 50 % IN WATER 50 %
25 SYRINGE (ML) INTRAVENOUS ONCE
Refills: 0 | Status: DISCONTINUED | OUTPATIENT
Start: 2020-08-08 | End: 2020-08-10

## 2020-08-08 RX ORDER — INSULIN LISPRO 100/ML
VIAL (ML) SUBCUTANEOUS AT BEDTIME
Refills: 0 | Status: DISCONTINUED | OUTPATIENT
Start: 2020-08-08 | End: 2020-08-10

## 2020-08-08 RX ORDER — DEXTROSE 50 % IN WATER 50 %
15 SYRINGE (ML) INTRAVENOUS ONCE
Refills: 0 | Status: DISCONTINUED | OUTPATIENT
Start: 2020-08-08 | End: 2020-08-10

## 2020-08-08 RX ORDER — GLUCAGON INJECTION, SOLUTION 0.5 MG/.1ML
1 INJECTION, SOLUTION SUBCUTANEOUS ONCE
Refills: 0 | Status: DISCONTINUED | OUTPATIENT
Start: 2020-08-08 | End: 2020-08-10

## 2020-08-08 RX ORDER — INSULIN LISPRO 100/ML
VIAL (ML) SUBCUTANEOUS
Refills: 0 | Status: DISCONTINUED | OUTPATIENT
Start: 2020-08-08 | End: 2020-08-10

## 2020-08-08 RX ORDER — ASPIRIN/CALCIUM CARB/MAGNESIUM 324 MG
81 TABLET ORAL DAILY
Refills: 0 | Status: DISCONTINUED | OUTPATIENT
Start: 2020-08-08 | End: 2020-08-10

## 2020-08-08 RX ORDER — DEXTROSE 50 % IN WATER 50 %
12.5 SYRINGE (ML) INTRAVENOUS ONCE
Refills: 0 | Status: DISCONTINUED | OUTPATIENT
Start: 2020-08-08 | End: 2020-08-10

## 2020-08-08 RX ORDER — ENOXAPARIN SODIUM 100 MG/ML
40 INJECTION SUBCUTANEOUS DAILY
Refills: 0 | Status: DISCONTINUED | OUTPATIENT
Start: 2020-08-08 | End: 2020-08-10

## 2020-08-08 RX ORDER — ENOXAPARIN SODIUM 100 MG/ML
40 INJECTION SUBCUTANEOUS AT BEDTIME
Refills: 0 | Status: DISCONTINUED | OUTPATIENT
Start: 2020-08-08 | End: 2020-08-08

## 2020-08-08 RX ORDER — ACETAMINOPHEN 500 MG
650 TABLET ORAL EVERY 6 HOURS
Refills: 0 | Status: DISCONTINUED | OUTPATIENT
Start: 2020-08-08 | End: 2020-08-10

## 2020-08-08 RX ORDER — ROPINIROLE 8 MG/1
0.5 TABLET, FILM COATED, EXTENDED RELEASE ORAL THREE TIMES A DAY
Refills: 0 | Status: DISCONTINUED | OUTPATIENT
Start: 2020-08-08 | End: 2020-08-10

## 2020-08-08 RX ORDER — POLYETHYLENE GLYCOL 3350 17 G/17G
17 POWDER, FOR SOLUTION ORAL DAILY
Refills: 0 | Status: DISCONTINUED | OUTPATIENT
Start: 2020-08-08 | End: 2020-08-10

## 2020-08-08 RX ORDER — CEFTRIAXONE 500 MG/1
1000 INJECTION, POWDER, FOR SOLUTION INTRAMUSCULAR; INTRAVENOUS ONCE
Refills: 0 | Status: COMPLETED | OUTPATIENT
Start: 2020-08-08 | End: 2020-08-08

## 2020-08-08 RX ORDER — TAMSULOSIN HYDROCHLORIDE 0.4 MG/1
0.4 CAPSULE ORAL AT BEDTIME
Refills: 0 | Status: DISCONTINUED | OUTPATIENT
Start: 2020-08-08 | End: 2020-08-10

## 2020-08-08 RX ORDER — ATORVASTATIN CALCIUM 80 MG/1
80 TABLET, FILM COATED ORAL AT BEDTIME
Refills: 0 | Status: DISCONTINUED | OUTPATIENT
Start: 2020-08-08 | End: 2020-08-10

## 2020-08-08 RX ORDER — CARBIDOPA AND LEVODOPA 25; 100 MG/1; MG/1
1 TABLET ORAL THREE TIMES A DAY
Refills: 0 | Status: DISCONTINUED | OUTPATIENT
Start: 2020-08-08 | End: 2020-08-10

## 2020-08-08 RX ORDER — SENNA PLUS 8.6 MG/1
2 TABLET ORAL AT BEDTIME
Refills: 0 | Status: DISCONTINUED | OUTPATIENT
Start: 2020-08-08 | End: 2020-08-10

## 2020-08-08 RX ORDER — CEFTRIAXONE 500 MG/1
INJECTION, POWDER, FOR SOLUTION INTRAMUSCULAR; INTRAVENOUS
Refills: 0 | Status: DISCONTINUED | OUTPATIENT
Start: 2020-08-08 | End: 2020-08-10

## 2020-08-08 RX ORDER — SODIUM CHLORIDE 9 MG/ML
1000 INJECTION, SOLUTION INTRAVENOUS
Refills: 0 | Status: DISCONTINUED | OUTPATIENT
Start: 2020-08-08 | End: 2020-08-10

## 2020-08-08 RX ORDER — CEFTRIAXONE 500 MG/1
1000 INJECTION, POWDER, FOR SOLUTION INTRAMUSCULAR; INTRAVENOUS EVERY 24 HOURS
Refills: 0 | Status: DISCONTINUED | OUTPATIENT
Start: 2020-08-09 | End: 2020-08-10

## 2020-08-08 RX ADMIN — Medication 650 MILLIGRAM(S): at 00:50

## 2020-08-08 RX ADMIN — Medication 650 MILLIGRAM(S): at 12:15

## 2020-08-08 RX ADMIN — ENOXAPARIN SODIUM 40 MILLIGRAM(S): 100 INJECTION SUBCUTANEOUS at 03:30

## 2020-08-08 RX ADMIN — CARBIDOPA AND LEVODOPA 1 TABLET(S): 25; 100 TABLET ORAL at 21:31

## 2020-08-08 RX ADMIN — SENNA PLUS 2 TABLET(S): 8.6 TABLET ORAL at 21:31

## 2020-08-08 RX ADMIN — POLYETHYLENE GLYCOL 3350 17 GRAM(S): 17 POWDER, FOR SOLUTION ORAL at 12:13

## 2020-08-08 RX ADMIN — ROPINIROLE 0.5 MILLIGRAM(S): 8 TABLET, FILM COATED, EXTENDED RELEASE ORAL at 13:41

## 2020-08-08 RX ADMIN — ROPINIROLE 0.5 MILLIGRAM(S): 8 TABLET, FILM COATED, EXTENDED RELEASE ORAL at 21:31

## 2020-08-08 RX ADMIN — CARBIDOPA AND LEVODOPA 1 TABLET(S): 25; 100 TABLET ORAL at 05:50

## 2020-08-08 RX ADMIN — CARBIDOPA AND LEVODOPA 1 TABLET(S): 25; 100 TABLET ORAL at 13:41

## 2020-08-08 RX ADMIN — Medication 650 MILLIGRAM(S): at 13:00

## 2020-08-08 RX ADMIN — Medication 81 MILLIGRAM(S): at 12:12

## 2020-08-08 RX ADMIN — TAMSULOSIN HYDROCHLORIDE 0.4 MILLIGRAM(S): 0.4 CAPSULE ORAL at 21:31

## 2020-08-08 RX ADMIN — ATORVASTATIN CALCIUM 80 MILLIGRAM(S): 80 TABLET, FILM COATED ORAL at 21:31

## 2020-08-08 RX ADMIN — Medication 1 TABLET(S): at 12:12

## 2020-08-08 RX ADMIN — CEFTRIAXONE 100 MILLIGRAM(S): 500 INJECTION, POWDER, FOR SOLUTION INTRAMUSCULAR; INTRAVENOUS at 03:30

## 2020-08-08 RX ADMIN — ROPINIROLE 0.5 MILLIGRAM(S): 8 TABLET, FILM COATED, EXTENDED RELEASE ORAL at 05:50

## 2020-08-08 NOTE — PHYSICAL THERAPY INITIAL EVALUATION ADULT - PERTINENT HX OF CURRENT PROBLEM, REHAB EVAL
85 yo M with hx Parkinson's disease, DM, HTN, HLD, CAD s/p CABG, BPHand CHF, p/w confusion and weakness. Pt had increased urinary frequency and urgency, and dysuria for 1 week, and was treated for a UTI with Bactrim. Also in setting of taking Lasix 2-3 days in last 3 days. Pt observed to be more confused by family, also c/o increased weakness (feeling faint but no LOC) and dizziness. CONT BELOW

## 2020-08-08 NOTE — PROGRESS NOTE ADULT - PROBLEM SELECTOR PLAN 2
A/w weakness, hx Parkinson's. Per daughter appears more slow in movements and cognition, especially in last 2 weeks  - Recent change likely 2/2 to UTI, treatment as above: f/u UCx and BCx   - HA: distribution and character appears to be tension HA, give Tylenol PRN. No acute CT head findings, unclear about arterial stenosis on posterior head as related by daughter, f/u outpatient with neurology, no acute needs.  - No acute neurological findings on CT head or on exam. Pt mentating well on exam.  - Lab w/u check: f/u TSH, B12, folate. Pt hx anemia now Hb 11.8 prior b/l 9-10, chronic, check iron studies. A/w weakness, hx Parkinson's. Per daughter appears more slow in movements and cognition, especially in last 2 weeks  - Recent change likely 2/2 to UTI, treatment as above: f/u UCx and BCx   - HA: distribution and character appears to be tension HA, give Tylenol PRN. No acute CT head findings, unclear about arterial stenosis on posterior head as related by daughter, f/u outpatient with neurology, no acute needs.  - No acute neurological findings on CT head or on exam. Pt mentating well on exam.  - f/u TSH, B12, folate, iron studies Pt hx anemia now Hb 11.8 prior b/l 9-10, chronic

## 2020-08-08 NOTE — PROVIDER CONTACT NOTE (OTHER) - ASSESSMENT
Pt awake alert oriented x3 ,no shortness of breath noted lungs sounds are clear  vital signs  Temp97.8 Pulse 81 ,B/P137/68 ,Resp rate 18  ,O2sat 97% room air

## 2020-08-08 NOTE — PHYSICAL THERAPY INITIAL EVALUATION ADULT - ADDITIONAL COMMENTS
Pt lives with daughter in private home. Pt requires assist/supervision for all functional mobility. Pt uses straight cane/RW to ambulate and requires assistance to ambulate on stairs. Pt has had PT in the past for his Parkinsons.

## 2020-08-08 NOTE — PROGRESS NOTE ADULT - SUBJECTIVE AND OBJECTIVE BOX
PROGRESS NOTE:   ************************************************  Authored by: Phil Noyola MD PGY1  Pager: [phone]  *************************************************    Patient is a 86y old  Male who presents with a chief complaint of AMS (07 Aug 2020 23:45)      SUBJECTIVE / OVERNIGHT EVENTS: The patient was seen and examined at bedside. Patient complains of constipation, stating that he has not had a bowel movement since last week. Says that he had several small urinations overnight. Per RN, this morning bed pad was soaked with urine. Denies abdominal pain.     MEDICATIONS  (STANDING):  aspirin enteric coated 81 milliGRAM(s) Oral daily  atorvastatin 80 milliGRAM(s) Oral at bedtime  carbidopa/levodopa  25/250 1 Tablet(s) Oral three times a day  cefTRIAXone   IVPB      dextrose 5%. 1000 milliLiter(s) (50 mL/Hr) IV Continuous <Continuous>  dextrose 50% Injectable 12.5 Gram(s) IV Push once  dextrose 50% Injectable 25 Gram(s) IV Push once  dextrose 50% Injectable 25 Gram(s) IV Push once  enoxaparin Injectable 40 milliGRAM(s) SubCutaneous daily  insulin lispro (HumaLOG) corrective regimen sliding scale   SubCutaneous three times a day before meals  insulin lispro (HumaLOG) corrective regimen sliding scale   SubCutaneous at bedtime  multivitamin 1 Tablet(s) Oral daily  polyethylene glycol 3350 17 Gram(s) Oral daily  rOPINIRole 0.5 milliGRAM(s) Oral three times a day  senna 2 Tablet(s) Oral at bedtime  tamsulosin 0.4 milliGRAM(s) Oral at bedtime    MEDICATIONS  (PRN):  dextrose 40% Gel 15 Gram(s) Oral once PRN Blood Glucose LESS THAN 70 milliGRAM(s)/deciliter  glucagon  Injectable 1 milliGRAM(s) IntraMuscular once PRN Glucose LESS THAN 70 milligrams/deciliter      CAPILLARY BLOOD GLUCOSE      POCT Blood Glucose.: 108 mg/dL (08 Aug 2020 08:53)  POCT Blood Glucose.: 107 mg/dL (08 Aug 2020 05:54)    I&O's Summary      PHYSICAL EXAM:  Vital Signs Last 24 Hrs  T(C): 36.7 (08 Aug 2020 05:15), Max: 37.2 (07 Aug 2020 18:03)  T(F): 98.1 (08 Aug 2020 05:15), Max: 99 (07 Aug 2020 18:03)  HR: 76 (08 Aug 2020 05:15) (76 - 77)  BP: 138/69 (08 Aug 2020 05:15) (135/57 - 149/76)  BP(mean): 78 (07 Aug 2020 19:15) (78 - 78)  RR: 18 (08 Aug 2020 05:15) (17 - 20)  SpO2: 94% (08 Aug 2020 05:15) (94% - 99%)    TELEMETRY:    CONSTITUTIONAL: NAD, resting comfortably in bed  RESPIRATORY: Normal respiratory effort; lungs are clear to auscultation bilaterally  CARDIOVASCULAR: Regular rate and rhythm, normal S1 and S2, no murmur/rub/gallop;   ABDOMEN: mild tenderness to palpation of suprapubic area with mild distention of suprapubic area  EXT/SKIN: trace peripheral edema right leg    PSYCH: A+O to person, place, and time; affect appropriate    LABS:                        12.1   6.04  )-----------( 231      ( 08 Aug 2020 07:13 )             37.7     08-08    140  |  102  |  20  ----------------------------<  103<H>  4.2   |  27  |  1.20    Ca    10.0      08 Aug 2020 07:11  Phos  2.7     08-08  Mg     2.0     08-08    TPro  6.6  /  Alb  4.0  /  TBili  0.4  /  DBili  x   /  AST  16  /  ALT  12  /  AlkPhos  60  08-08    PT/INR - ( 07 Aug 2020 18:38 )   PT: 11.0 sec;   INR: 0.92 ratio         PTT - ( 07 Aug 2020 18:38 )  PTT:27.8 sec      Urinalysis Basic - ( 07 Aug 2020 19:47 )    Color: Dark Yellow / Appearance: Clear / S.023 / pH: x  Gluc: x / Ketone: Negative  / Bili: Negative / Urobili: Negative   Blood: x / Protein: Trace / Nitrite: Positive   Leuk Esterase: Negative / RBC: 2 /hpf / WBC 1 /HPF   Sq Epi: x / Non Sq Epi: 0 /hpf / Bacteria: 0.0          RADIOLOGY & ADDITIONAL TESTS:  Results Reviewed:   Imaging Personally Reviewed:  Electrocardiogram Personally Reviewed:    COORDINATION OF CARE:  Care Discussed with Consultants/Other Providers [Y/N]:  Prior or Outpatient Records Reviewed [Y/N]:

## 2020-08-08 NOTE — PHYSICAL THERAPY INITIAL EVALUATION ADULT - PRECAUTIONS/LIMITATIONS, REHAB EVAL
CONT FROM ABOVE: Noticed to have increased SOB. Also c/o increased head pain/HA, has had for years, per daughter had a CT scan of head that showed a narrowed vessel at the back of the head. Pt described HA as pushing, at temple and also pain at back of head and neck. CTHead (-) CXR (-) fall precautions/CONT FROM ABOVE: Noticed to have increased SOB. Also c/o increased head pain/HA, has had for years, per daughter had a CT scan of head that showed a narrowed vessel at the back of the head. Pt described HA as pushing, at temple and also pain at back of head and neck. CTHead (-) CXR (-)

## 2020-08-08 NOTE — PROGRESS NOTE ADULT - ATTENDING COMMENTS
Agree with above.     In brief, this is a 87 yo M with hx Parkinson's disease, DM, HTN, HLD, CAD s/p CABG, BPHand CHF, p/w confusion and weakness. Presented with a pre-renal STUART likely from lasix use, bactrim could have contributed. Also with positive nitrite in the urine.     Metabolic encephalopathy- Improved, daughter at bedside to help assess. Patient appears more alert and communicative  STUART - continue holding diuretic, cr improving   UTI- treat with 3 days of ceftriaxone, f/u ucx (may be negative - was already on bactrim)  Dizziness - could be related to progression of parkinsons, obtain orthostatics/PT chhaya Roque DO  Hospitalist  [phone]

## 2020-08-09 LAB
A1C WITH ESTIMATED AVERAGE GLUCOSE RESULT: 6.6 % — HIGH (ref 4–5.6)
ANION GAP SERPL CALC-SCNC: 14 MMOL/L — SIGNIFICANT CHANGE UP (ref 5–17)
BUN SERPL-MCNC: 21 MG/DL — SIGNIFICANT CHANGE UP (ref 7–23)
CALCIUM SERPL-MCNC: 9.3 MG/DL — SIGNIFICANT CHANGE UP (ref 8.4–10.5)
CHLORIDE SERPL-SCNC: 100 MMOL/L — SIGNIFICANT CHANGE UP (ref 96–108)
CO2 SERPL-SCNC: 25 MMOL/L — SIGNIFICANT CHANGE UP (ref 22–31)
CREAT SERPL-MCNC: 1.02 MG/DL — SIGNIFICANT CHANGE UP (ref 0.5–1.3)
ESTIMATED AVERAGE GLUCOSE: 143 MG/DL — HIGH (ref 68–114)
GLUCOSE BLDC GLUCOMTR-MCNC: 109 MG/DL — HIGH (ref 70–99)
GLUCOSE BLDC GLUCOMTR-MCNC: 118 MG/DL — HIGH (ref 70–99)
GLUCOSE BLDC GLUCOMTR-MCNC: 201 MG/DL — HIGH (ref 70–99)
GLUCOSE BLDC GLUCOMTR-MCNC: 301 MG/DL — HIGH (ref 70–99)
GLUCOSE SERPL-MCNC: 120 MG/DL — HIGH (ref 70–99)
HCT VFR BLD CALC: 38.1 % — LOW (ref 39–50)
HGB BLD-MCNC: 12 G/DL — LOW (ref 13–17)
MAGNESIUM SERPL-MCNC: 2.1 MG/DL — SIGNIFICANT CHANGE UP (ref 1.6–2.6)
MCHC RBC-ENTMCNC: 29.8 PG — SIGNIFICANT CHANGE UP (ref 27–34)
MCHC RBC-ENTMCNC: 31.5 GM/DL — LOW (ref 32–36)
MCV RBC AUTO: 94.5 FL — SIGNIFICANT CHANGE UP (ref 80–100)
NRBC # BLD: 0 /100 WBCS — SIGNIFICANT CHANGE UP (ref 0–0)
PHOSPHATE SERPL-MCNC: 2.2 MG/DL — LOW (ref 2.5–4.5)
PLATELET # BLD AUTO: 235 K/UL — SIGNIFICANT CHANGE UP (ref 150–400)
POTASSIUM SERPL-MCNC: 4.4 MMOL/L — SIGNIFICANT CHANGE UP (ref 3.5–5.3)
POTASSIUM SERPL-SCNC: 4.4 MMOL/L — SIGNIFICANT CHANGE UP (ref 3.5–5.3)
RBC # BLD: 4.03 M/UL — LOW (ref 4.2–5.8)
RBC # FLD: 13 % — SIGNIFICANT CHANGE UP (ref 10.3–14.5)
SODIUM SERPL-SCNC: 139 MMOL/L — SIGNIFICANT CHANGE UP (ref 135–145)
WBC # BLD: 5.69 K/UL — SIGNIFICANT CHANGE UP (ref 3.8–10.5)
WBC # FLD AUTO: 5.69 K/UL — SIGNIFICANT CHANGE UP (ref 3.8–10.5)

## 2020-08-09 PROCEDURE — 99232 SBSQ HOSP IP/OBS MODERATE 35: CPT | Mod: GC

## 2020-08-09 RX ADMIN — ROPINIROLE 0.5 MILLIGRAM(S): 8 TABLET, FILM COATED, EXTENDED RELEASE ORAL at 15:00

## 2020-08-09 RX ADMIN — TAMSULOSIN HYDROCHLORIDE 0.4 MILLIGRAM(S): 0.4 CAPSULE ORAL at 21:48

## 2020-08-09 RX ADMIN — Medication 1 TABLET(S): at 12:17

## 2020-08-09 RX ADMIN — SENNA PLUS 2 TABLET(S): 8.6 TABLET ORAL at 21:48

## 2020-08-09 RX ADMIN — CARBIDOPA AND LEVODOPA 1 TABLET(S): 25; 100 TABLET ORAL at 05:06

## 2020-08-09 RX ADMIN — Medication 5 MILLIGRAM(S): at 17:50

## 2020-08-09 RX ADMIN — Medication 650 MILLIGRAM(S): at 12:00

## 2020-08-09 RX ADMIN — ROPINIROLE 0.5 MILLIGRAM(S): 8 TABLET, FILM COATED, EXTENDED RELEASE ORAL at 05:06

## 2020-08-09 RX ADMIN — ATORVASTATIN CALCIUM 80 MILLIGRAM(S): 80 TABLET, FILM COATED ORAL at 21:48

## 2020-08-09 RX ADMIN — CEFTRIAXONE 100 MILLIGRAM(S): 500 INJECTION, POWDER, FOR SOLUTION INTRAMUSCULAR; INTRAVENOUS at 02:41

## 2020-08-09 RX ADMIN — Medication 650 MILLIGRAM(S): at 11:14

## 2020-08-09 RX ADMIN — Medication 4: at 17:52

## 2020-08-09 RX ADMIN — ROPINIROLE 0.5 MILLIGRAM(S): 8 TABLET, FILM COATED, EXTENDED RELEASE ORAL at 21:48

## 2020-08-09 RX ADMIN — Medication 81 MILLIGRAM(S): at 12:10

## 2020-08-09 RX ADMIN — CARBIDOPA AND LEVODOPA 1 TABLET(S): 25; 100 TABLET ORAL at 15:00

## 2020-08-09 RX ADMIN — POLYETHYLENE GLYCOL 3350 17 GRAM(S): 17 POWDER, FOR SOLUTION ORAL at 12:10

## 2020-08-09 RX ADMIN — ENOXAPARIN SODIUM 40 MILLIGRAM(S): 100 INJECTION SUBCUTANEOUS at 12:10

## 2020-08-09 RX ADMIN — Medication 62.5 MILLIMOLE(S): at 09:51

## 2020-08-09 RX ADMIN — CARBIDOPA AND LEVODOPA 1 TABLET(S): 25; 100 TABLET ORAL at 21:48

## 2020-08-09 RX ADMIN — Medication 2: at 13:35

## 2020-08-09 NOTE — PROGRESS NOTE ADULT - ATTENDING COMMENTS
In brief, this is a 85 yo M with hx Parkinson's disease, DM, HTN, HLD, CAD s/p CABG, BPHand CHF, p/w confusion and weakness. Presented with a pre-renal STUART likely from lasix use, bactrim could have contributed. Also with positive nitrite in the urine. Metabolic encephalopathy appears to have resolved, patient is alert and conversant (hard of hearing). He wishes to go home today - feels well, no HA/dizziness.     STUART - continue holding diuretic, cr has improved to what appears baseline. IVF was just d/c'ed today, would obtain repeat tomorrow to ensure adequate PO intake   UTI- treat with at least 3 days of ceftriaxone, f/u ucx (may be negative - was already on bactrim for 2 days prior to admission)  Dizziness - could be related to progression of parkinsons, orthostatic negative, improved today     PT- home PT  Dispo: likely d/c tomorrow after dose of abx. Discussed with daughter Nitesh Kulkarni DO Jude  Hospitalist  [phone]

## 2020-08-09 NOTE — PROGRESS NOTE ADULT - SUBJECTIVE AND OBJECTIVE BOX
Patient is a 86y old  Male who presents with a chief complaint of AMS (08 Aug 2020 09:51)      SUBJECTIVE / OVERNIGHT EVENTS:    MEDICATIONS  (STANDING):  aspirin enteric coated 81 milliGRAM(s) Oral daily  atorvastatin 80 milliGRAM(s) Oral at bedtime  carbidopa/levodopa  25/250 1 Tablet(s) Oral three times a day  cefTRIAXone   IVPB      cefTRIAXone   IVPB 1000 milliGRAM(s) IV Intermittent every 24 hours  dextrose 5%. 1000 milliLiter(s) (50 mL/Hr) IV Continuous <Continuous>  dextrose 50% Injectable 12.5 Gram(s) IV Push once  dextrose 50% Injectable 25 Gram(s) IV Push once  dextrose 50% Injectable 25 Gram(s) IV Push once  enoxaparin Injectable 40 milliGRAM(s) SubCutaneous daily  insulin lispro (HumaLOG) corrective regimen sliding scale   SubCutaneous three times a day before meals  insulin lispro (HumaLOG) corrective regimen sliding scale   SubCutaneous at bedtime  multivitamin 1 Tablet(s) Oral daily  polyethylene glycol 3350 17 Gram(s) Oral daily  rOPINIRole 0.5 milliGRAM(s) Oral three times a day  senna 2 Tablet(s) Oral at bedtime  tamsulosin 0.4 milliGRAM(s) Oral at bedtime    MEDICATIONS  (PRN):  acetaminophen   Tablet .. 650 milliGRAM(s) Oral every 6 hours PRN Moderate Pain (4 - 6)  dextrose 40% Gel 15 Gram(s) Oral once PRN Blood Glucose LESS THAN 70 milliGRAM(s)/deciliter  glucagon  Injectable 1 milliGRAM(s) IntraMuscular once PRN Glucose LESS THAN 70 milligrams/deciliter      Vital Signs Last 24 Hrs  T(C): 36.8 (09 Aug 2020 05:03), Max: 36.8 (09 Aug 2020 05:03)  T(F): 98.2 (09 Aug 2020 05:03), Max: 98.2 (09 Aug 2020 05:03)  HR: 71 (09 Aug 2020 05:03) (71 - 81)  BP: 100/61 (09 Aug 2020 05:03) (100/61 - 155/72)  BP(mean): --  RR: 18 (09 Aug 2020 05:03) (18 - 18)  SpO2: 95% (09 Aug 2020 05:03) (95% - 97%)  CAPILLARY BLOOD GLUCOSE      POCT Blood Glucose.: 118 mg/dL (09 Aug 2020 09:36)  POCT Blood Glucose.: 144 mg/dL (08 Aug 2020 12:43)    I&O's Summary    08 Aug 2020 07:01  -  09 Aug 2020 07:00  --------------------------------------------------------  IN: 970 mL / OUT: 0 mL / NET: 970 mL        PHYSICAL EXAM:  GENERAL: NAD, well-developed  HEAD:  Atraumatic, Normocephalic  EYES: EOMI, PERRLA, conjunctiva and sclera clear  NECK: Supple, No JVD  CHEST/LUNG: Clear to auscultation bilaterally; No wheeze  HEART: Regular rate and rhythm; No murmurs, rubs, or gallops  ABDOMEN: Soft, Nontender, Nondistended; Bowel sounds present  EXTREMITIES:  2+ Peripheral Pulses, No clubbing, cyanosis, or edema  PSYCH: AAOx3  NEUROLOGY: non-focal  SKIN: No rashes or lesions    LABS:                        12.0   5.69  )-----------( 235      ( 09 Aug 2020 07:20 )             38.1     08-09    139  |  100  |  21  ----------------------------<  120<H>  4.4   |  25  |  1.02    Ca    9.3      09 Aug 2020 07:20  Phos  2.2     08-09  Mg     2.1     08-09    TPro  6.6  /  Alb  4.0  /  TBili  0.4  /  DBili  x   /  AST  16  /  ALT  12  /  AlkPhos  60  08-08    PT/INR - ( 07 Aug 2020 18:38 )   PT: 11.0 sec;   INR: 0.92 ratio         PTT - ( 07 Aug 2020 18:38 )  PTT:27.8 sec      Urinalysis Basic - ( 07 Aug 2020 19:47 )    Color: Dark Yellow / Appearance: Clear / S.023 / pH: x  Gluc: x / Ketone: Negative  / Bili: Negative / Urobili: Negative   Blood: x / Protein: Trace / Nitrite: Positive   Leuk Esterase: Negative / RBC: 2 /hpf / WBC 1 /HPF   Sq Epi: x / Non Sq Epi: 0 /hpf / Bacteria: 0.0        RADIOLOGY & ADDITIONAL TESTS:    Imaging Personally Reviewed:    Consultant(s) Notes Reviewed:      Care Discussed with Consultants/Other Providers: Patient is a 86y old  Male who presents with a chief complaint of AMS (08 Aug 2020 09:51)      SUBJECTIVE / OVERNIGHT EVENTS:  Overnight, the patient complained of SOB.   He was assessed by Night Float.  His physical examination and vitals were WNL.  The patient went to sleep and did not need any intervention.  This morning the patient reported feeling well.  He denied fever, chest pain or SOB.    MEDICATIONS  (STANDING):  aspirin enteric coated 81 milliGRAM(s) Oral daily  atorvastatin 80 milliGRAM(s) Oral at bedtime  carbidopa/levodopa  25/250 1 Tablet(s) Oral three times a day  cefTRIAXone   IVPB      cefTRIAXone   IVPB 1000 milliGRAM(s) IV Intermittent every 24 hours  dextrose 5%. 1000 milliLiter(s) (50 mL/Hr) IV Continuous <Continuous>  dextrose 50% Injectable 12.5 Gram(s) IV Push once  dextrose 50% Injectable 25 Gram(s) IV Push once  dextrose 50% Injectable 25 Gram(s) IV Push once  enoxaparin Injectable 40 milliGRAM(s) SubCutaneous daily  insulin lispro (HumaLOG) corrective regimen sliding scale   SubCutaneous three times a day before meals  insulin lispro (HumaLOG) corrective regimen sliding scale   SubCutaneous at bedtime  multivitamin 1 Tablet(s) Oral daily  polyethylene glycol 3350 17 Gram(s) Oral daily  rOPINIRole 0.5 milliGRAM(s) Oral three times a day  senna 2 Tablet(s) Oral at bedtime  tamsulosin 0.4 milliGRAM(s) Oral at bedtime    MEDICATIONS  (PRN):  acetaminophen   Tablet .. 650 milliGRAM(s) Oral every 6 hours PRN Moderate Pain (4 - 6)  dextrose 40% Gel 15 Gram(s) Oral once PRN Blood Glucose LESS THAN 70 milliGRAM(s)/deciliter  glucagon  Injectable 1 milliGRAM(s) IntraMuscular once PRN Glucose LESS THAN 70 milligrams/deciliter      Vital Signs Last 24 Hrs  T(C): 36.8 (09 Aug 2020 05:03), Max: 36.8 (09 Aug 2020 05:03)  T(F): 98.2 (09 Aug 2020 05:03), Max: 98.2 (09 Aug 2020 05:03)  HR: 71 (09 Aug 2020 05:03) (71 - 81)  BP: 100/61 (09 Aug 2020 05:03) (100/61 - 155/72)  BP(mean): --  RR: 18 (09 Aug 2020 05:03) (18 - 18)  SpO2: 95% (09 Aug 2020 05:03) (95% - 97%)  CAPILLARY BLOOD GLUCOSE      POCT Blood Glucose.: 118 mg/dL (09 Aug 2020 09:36)  POCT Blood Glucose.: 144 mg/dL (08 Aug 2020 12:43)    I&O's Summary    08 Aug 2020 07:01  -  09 Aug 2020 07:00  --------------------------------------------------------  IN: 970 mL / OUT: 0 mL / NET: 970 mL        PHYSICAL EXAM:  GENERAL: NAD, well-developed  HEAD:  Atraumatic, Normocephalic  EYES: EOMI, PERRLA, conjunctiva and sclera clear  NECK: Supple, No JVD  CHEST/LUNG: Clear to auscultation bilaterally; No wheeze  HEART: Regular rate and rhythm; JACK murmur; no rubs or gallops  ABDOMEN: Soft, Nontender, Nondistended; Bowel sounds present  EXTREMITIES:  2+ Peripheral Pulses, No clubbing, cyanosis, or edema, resting hand tremor  PSYCH: AAOx3  NEUROLOGY: non-focal  SKIN: No rashes or lesions    LABS:                        12.0   5.69  )-----------( 235      ( 09 Aug 2020 07:20 )             38.1     08-09    139  |  100  |  21  ----------------------------<  120<H>  4.4   |  25  |  1.02    Ca    9.3      09 Aug 2020 07:20  Phos  2.2     08-09  Mg     2.1     08-09    TPro  6.6  /  Alb  4.0  /  TBili  0.4  /  DBili  x   /  AST  16  /  ALT  12  /  AlkPhos  60  08-08    PT/INR - ( 07 Aug 2020 18:38 )   PT: 11.0 sec;   INR: 0.92 ratio         PTT - ( 07 Aug 2020 18:38 )  PTT:27.8 sec      Urinalysis Basic - ( 07 Aug 2020 19:47 )    Color: Dark Yellow / Appearance: Clear / S.023 / pH: x  Gluc: x / Ketone: Negative  / Bili: Negative / Urobili: Negative   Blood: x / Protein: Trace / Nitrite: Positive   Leuk Esterase: Negative / RBC: 2 /hpf / WBC 1 /HPF   Sq Epi: x / Non Sq Epi: 0 /hpf / Bacteria: 0.0        RADIOLOGY & ADDITIONAL TESTS:    Imaging Personally Reviewed:    Consultant(s) Notes Reviewed:      Care Discussed with Consultants/Other Providers:

## 2020-08-09 NOTE — CHART NOTE - NSCHARTNOTEFT_GEN_A_CORE
MD paged at 2200 hrs by nursing, stating pt complaining of SOB. Assessed at bedside. VSS, NAD. CTAB in anterior fields, cardiac exam normal. Advised patient to turn off lights and try to sleep. Pt agreeable.

## 2020-08-09 NOTE — PROGRESS NOTE ADULT - PROBLEM SELECTOR PLAN 2
A/w weakness, hx Parkinson's. Per daughter appears more slow in movements and cognition, especially in last 2 weeks  - Recent change likely 2/2 to UTI, treatment as above: f/u UCx and BCx   - HA: distribution and character appears to be tension HA, give Tylenol PRN. No acute CT head findings, unclear about arterial stenosis on posterior head as related by daughter, f/u outpatient with neurology, no acute needs.  - No acute neurological findings on CT head or on exam. Pt mentating well on exam.  - TSH, B12 and folate WNL. Pt hx anemia now Hb 11.8 prior b/l 9-10, chronic

## 2020-08-10 ENCOUNTER — TRANSCRIPTION ENCOUNTER (OUTPATIENT)
Age: 85
End: 2020-08-10

## 2020-08-10 VITALS
RESPIRATION RATE: 18 BRPM | TEMPERATURE: 97 F | DIASTOLIC BLOOD PRESSURE: 60 MMHG | OXYGEN SATURATION: 98 % | HEART RATE: 76 BPM | SYSTOLIC BLOOD PRESSURE: 98 MMHG

## 2020-08-10 LAB
ANION GAP SERPL CALC-SCNC: 12 MMOL/L — SIGNIFICANT CHANGE UP (ref 5–17)
BUN SERPL-MCNC: 17 MG/DL — SIGNIFICANT CHANGE UP (ref 7–23)
CALCIUM SERPL-MCNC: 9.6 MG/DL — SIGNIFICANT CHANGE UP (ref 8.4–10.5)
CHLORIDE SERPL-SCNC: 100 MMOL/L — SIGNIFICANT CHANGE UP (ref 96–108)
CO2 SERPL-SCNC: 24 MMOL/L — SIGNIFICANT CHANGE UP (ref 22–31)
CREAT SERPL-MCNC: 0.94 MG/DL — SIGNIFICANT CHANGE UP (ref 0.5–1.3)
GLUCOSE BLDC GLUCOMTR-MCNC: 103 MG/DL — HIGH (ref 70–99)
GLUCOSE BLDC GLUCOMTR-MCNC: 106 MG/DL — HIGH (ref 70–99)
GLUCOSE BLDC GLUCOMTR-MCNC: 152 MG/DL — HIGH (ref 70–99)
GLUCOSE BLDC GLUCOMTR-MCNC: 266 MG/DL — HIGH (ref 70–99)
GLUCOSE SERPL-MCNC: 151 MG/DL — HIGH (ref 70–99)
HCT VFR BLD CALC: 37.1 % — LOW (ref 39–50)
HGB BLD-MCNC: 11.8 G/DL — LOW (ref 13–17)
MAGNESIUM SERPL-MCNC: 2.2 MG/DL — SIGNIFICANT CHANGE UP (ref 1.6–2.6)
MCHC RBC-ENTMCNC: 29.9 PG — SIGNIFICANT CHANGE UP (ref 27–34)
MCHC RBC-ENTMCNC: 31.8 GM/DL — LOW (ref 32–36)
MCV RBC AUTO: 93.9 FL — SIGNIFICANT CHANGE UP (ref 80–100)
NRBC # BLD: 0 /100 WBCS — SIGNIFICANT CHANGE UP (ref 0–0)
PHOSPHATE SERPL-MCNC: 2.2 MG/DL — LOW (ref 2.5–4.5)
PLATELET # BLD AUTO: 233 K/UL — SIGNIFICANT CHANGE UP (ref 150–400)
POTASSIUM SERPL-MCNC: 4.3 MMOL/L — SIGNIFICANT CHANGE UP (ref 3.5–5.3)
POTASSIUM SERPL-SCNC: 4.3 MMOL/L — SIGNIFICANT CHANGE UP (ref 3.5–5.3)
RBC # BLD: 3.95 M/UL — LOW (ref 4.2–5.8)
RBC # FLD: 12.8 % — SIGNIFICANT CHANGE UP (ref 10.3–14.5)
SODIUM SERPL-SCNC: 136 MMOL/L — SIGNIFICANT CHANGE UP (ref 135–145)
WBC # BLD: 6.32 K/UL — SIGNIFICANT CHANGE UP (ref 3.8–10.5)
WBC # FLD AUTO: 6.32 K/UL — SIGNIFICANT CHANGE UP (ref 3.8–10.5)

## 2020-08-10 PROCEDURE — 70450 CT HEAD/BRAIN W/O DYE: CPT

## 2020-08-10 PROCEDURE — 83550 IRON BINDING TEST: CPT

## 2020-08-10 PROCEDURE — 80048 BASIC METABOLIC PNL TOTAL CA: CPT

## 2020-08-10 PROCEDURE — 85014 HEMATOCRIT: CPT

## 2020-08-10 PROCEDURE — 82435 ASSAY OF BLOOD CHLORIDE: CPT

## 2020-08-10 PROCEDURE — 84132 ASSAY OF SERUM POTASSIUM: CPT

## 2020-08-10 PROCEDURE — 80053 COMPREHEN METABOLIC PANEL: CPT

## 2020-08-10 PROCEDURE — 84295 ASSAY OF SERUM SODIUM: CPT

## 2020-08-10 PROCEDURE — 36415 COLL VENOUS BLD VENIPUNCTURE: CPT

## 2020-08-10 PROCEDURE — 87086 URINE CULTURE/COLONY COUNT: CPT

## 2020-08-10 PROCEDURE — 87040 BLOOD CULTURE FOR BACTERIA: CPT

## 2020-08-10 PROCEDURE — 82728 ASSAY OF FERRITIN: CPT

## 2020-08-10 PROCEDURE — 84100 ASSAY OF PHOSPHORUS: CPT

## 2020-08-10 PROCEDURE — 85730 THROMBOPLASTIN TIME PARTIAL: CPT

## 2020-08-10 PROCEDURE — 82565 ASSAY OF CREATININE: CPT

## 2020-08-10 PROCEDURE — 86769 SARS-COV-2 COVID-19 ANTIBODY: CPT

## 2020-08-10 PROCEDURE — 83605 ASSAY OF LACTIC ACID: CPT

## 2020-08-10 PROCEDURE — 84443 ASSAY THYROID STIM HORMONE: CPT

## 2020-08-10 PROCEDURE — 83880 ASSAY OF NATRIURETIC PEPTIDE: CPT

## 2020-08-10 PROCEDURE — 71045 X-RAY EXAM CHEST 1 VIEW: CPT

## 2020-08-10 PROCEDURE — 97162 PT EVAL MOD COMPLEX 30 MIN: CPT

## 2020-08-10 PROCEDURE — 82330 ASSAY OF CALCIUM: CPT

## 2020-08-10 PROCEDURE — 83036 HEMOGLOBIN GLYCOSYLATED A1C: CPT

## 2020-08-10 PROCEDURE — 99285 EMERGENCY DEPT VISIT HI MDM: CPT | Mod: 25

## 2020-08-10 PROCEDURE — 82947 ASSAY GLUCOSE BLOOD QUANT: CPT

## 2020-08-10 PROCEDURE — 82746 ASSAY OF FOLIC ACID SERUM: CPT

## 2020-08-10 PROCEDURE — 99239 HOSP IP/OBS DSCHRG MGMT >30: CPT

## 2020-08-10 PROCEDURE — 84484 ASSAY OF TROPONIN QUANT: CPT

## 2020-08-10 PROCEDURE — 93005 ELECTROCARDIOGRAM TRACING: CPT

## 2020-08-10 PROCEDURE — 83540 ASSAY OF IRON: CPT

## 2020-08-10 PROCEDURE — 83735 ASSAY OF MAGNESIUM: CPT

## 2020-08-10 PROCEDURE — 85027 COMPLETE CBC AUTOMATED: CPT

## 2020-08-10 PROCEDURE — 81001 URINALYSIS AUTO W/SCOPE: CPT

## 2020-08-10 PROCEDURE — 82607 VITAMIN B-12: CPT

## 2020-08-10 PROCEDURE — 82962 GLUCOSE BLOOD TEST: CPT

## 2020-08-10 PROCEDURE — 85610 PROTHROMBIN TIME: CPT

## 2020-08-10 PROCEDURE — 82803 BLOOD GASES ANY COMBINATION: CPT

## 2020-08-10 RX ORDER — ACETAMINOPHEN 500 MG
2 TABLET ORAL
Qty: 0 | Refills: 0 | DISCHARGE
Start: 2020-08-10

## 2020-08-10 RX ADMIN — ENOXAPARIN SODIUM 40 MILLIGRAM(S): 100 INJECTION SUBCUTANEOUS at 09:31

## 2020-08-10 RX ADMIN — Medication 85 MILLIMOLE(S): at 08:34

## 2020-08-10 RX ADMIN — CARBIDOPA AND LEVODOPA 1 TABLET(S): 25; 100 TABLET ORAL at 05:35

## 2020-08-10 RX ADMIN — CEFTRIAXONE 100 MILLIGRAM(S): 500 INJECTION, POWDER, FOR SOLUTION INTRAMUSCULAR; INTRAVENOUS at 02:30

## 2020-08-10 RX ADMIN — CARBIDOPA AND LEVODOPA 1 TABLET(S): 25; 100 TABLET ORAL at 12:58

## 2020-08-10 RX ADMIN — Medication 650 MILLIGRAM(S): at 05:00

## 2020-08-10 RX ADMIN — Medication 3: at 08:33

## 2020-08-10 RX ADMIN — Medication 1: at 12:17

## 2020-08-10 RX ADMIN — ROPINIROLE 0.5 MILLIGRAM(S): 8 TABLET, FILM COATED, EXTENDED RELEASE ORAL at 12:57

## 2020-08-10 RX ADMIN — Medication 650 MILLIGRAM(S): at 04:04

## 2020-08-10 RX ADMIN — ROPINIROLE 0.5 MILLIGRAM(S): 8 TABLET, FILM COATED, EXTENDED RELEASE ORAL at 05:35

## 2020-08-10 RX ADMIN — Medication 1 TABLET(S): at 09:31

## 2020-08-10 RX ADMIN — POLYETHYLENE GLYCOL 3350 17 GRAM(S): 17 POWDER, FOR SOLUTION ORAL at 09:30

## 2020-08-10 RX ADMIN — Medication 81 MILLIGRAM(S): at 09:32

## 2020-08-10 NOTE — PROGRESS NOTE ADULT - ATTENDING COMMENTS
pt seen and examined.  above plan discussed on rounds today.  In addition, 85 yo M with hx Parkinson's disease, DM, HTN, HLD, CAD s/p CABG, BPHand CHF, p/w confusion and weakness. Presented with a pre-renal STUART likely from lasix use, bactrim could have contributed. Also with positive nitrite in the urine. Metabolic encephalopathy appears to have resolved, patient is alert and conversant (hard of hearing).     STUART - continue holding diuretic, cr has improved to what appears baseline. Creatinine Trend: 0.94<--, 1.02<--, 1.20<--, 1.40<--  UTI- treat with at least 3 days of ceftriaxone, f/u ucx (may be negative - was already on bactrim for 2 days prior to admission)  Dizziness - could be related to progression of parkinsons, orthostatic negative, improved today   Metabolic Encephalopathy resolved. presumed to be due to dehydration.  stable for discharge today.  Discharge time spent: 32min

## 2020-08-10 NOTE — PROGRESS NOTE ADULT - PROBLEM SELECTOR PLAN 8
Hx BPH: c/w Flomax, trend UOP  HLD: Hx CAD s/p CABG; c/w Crestor as Lipitor 80 mg qhs  GERD?: epigastric mild tender, coughing at night, takes Tums at home sometimes: consider PPI if worsens.  Constipation: senna and miralax  DVT ppx: Lovenox 40 mh qhs CrCl 35 no dose adjustment needed  Diet: diabetic diet  Disposition: pending w/u, PT consult for weakness
Hx BPH: c/w Flomax, trend UOP  HLD: Hx CAD s/p CABG; c/w Crestor as Lipitor 80 mg qhs  GERD?: epigastric mild tender, coughing at night, takes Tums at home sometimes: consider PPI if worsens.  Constipation: senna and miralax  DVT ppx: Lovenox 40 mh qhs CrCl 35 no dose adjustment needed  Diet: diabetic diet  Disposition: Home with PT
Hx BPH: c/w Flomax, trend UOP  HLD: Hx CAD s/p CABG; c/w Crestor as Lipitor 80 mg qhs  GERD?: epigastric mild tender, coughing at night, takes Tums at home sometimes: consider PPI if worsens.  Constipation: senna and miralax  DVT ppx: Lovenox 40 mh qhs CrCl 35 no dose adjustment needed  Diet: diabetic diet  Disposition: Home with PT

## 2020-08-10 NOTE — PROGRESS NOTE ADULT - PROBLEM SELECTOR PLAN 7
- not on BP meds at home, BPs adequate now  - No tx needed

## 2020-08-10 NOTE — PROGRESS NOTE ADULT - PROBLEM SELECTOR PLAN 5
# Hx tremor, weakness. Follows with neurologist. Taking meds as prescribed.  - C/o worsening dizziness, no acute neurological cause, dizziness found in parkinson's, likely progression of disease, as well as lowing of movements  - c/w ropinirole 0.5 mg tid  - c/w carbidopa-levodopa  mg 3 times per day per daughter  - F/u with neurology outpt regarding slowing of movements

## 2020-08-10 NOTE — PROGRESS NOTE ADULT - PROBLEM SELECTOR PLAN 3
b/l Cr from HIE ~ 1.0, now Cr 1.4  Likely side effect from Bactrim use and Cr elevation, however also prerenal STUART given taking Lasix several days for last 10 days and urinating a lot. No signs of hypervolemia.  8/8 AM BMP - Cr improving, 1.2 from 1.4. continue holding lasix
b/l Cr from HIE ~ 1.0, now Cr 1.4  Likely side effect from Bactrim use and Cr elevation, however also prerenal STUART given taking Lasix several days for last 10 days and urinating a lot. No signs of hypervolemia.  8/8 AM BMP - Cr improving, 1.2 from 1.4. continue holding lasix
resolved, Cr now at baseline from HIE ~ 1.0   - elevation likely prerenal STUART 2/2 Lasix several days for last 10 days and urinating a lot

## 2020-08-10 NOTE — PROGRESS NOTE ADULT - PROBLEM SELECTOR PLAN 6
# on metformin and Januvia at home  - ISS qac and qhs - sugars well controlled

## 2020-08-10 NOTE — DISCHARGE NOTE NURSING/CASE MANAGEMENT/SOCIAL WORK - PATIENT PORTAL LINK FT
You can access the FollowMyHealth Patient Portal offered by [facility] by registering at the following website: [email or web address]. By joining [facility]’s FollowMyHealth portal, you will also be able to view your health information using other applications (apps) compatible with our system.

## 2020-08-10 NOTE — DISCHARGE NOTE PROVIDER - CARE PROVIDER_API CALL
YAQUELIN LINK  [id number]  [place] [address]  Phone: [phone]  Fax: [phone]  Follow Up Time:

## 2020-08-10 NOTE — DISCHARGE NOTE PROVIDER - NSDCMRMEDTOKEN_GEN_ALL_CORE_FT
aspirin 81 mg oral tablet: orally once a day  Bactrim: 800-160 milligram(s) orally 2 times a day  carbidopa-levodopa 25 mg-250 mg oral tablet: 1 tab(s) orally 3 times a day  furosemide:   Januvia 100 mg oral tablet: 1 tab(s) orally once a day  metFORMIN 500 mg oral tablet: orally 3 times a day  multivitamin:   rOPINIRole 0.5 mg oral tablet: 1 tab(s) orally 3 times a day  rosuvastatin 20 mg oral tablet: 1 tab(s) orally once a day  Stool Softener with Laxative:   tamsulosin 0.4 mg oral capsule: 1 cap(s) orally once a day aspirin 81 mg oral tablet: orally once a day  carbidopa-levodopa 25 mg-250 mg oral tablet: 1 tab(s) orally 3 times a day  Januvia 100 mg oral tablet: 1 tab(s) orally once a day  metFORMIN 500 mg oral tablet: orally 3 times a day  multivitamin:   rOPINIRole 0.5 mg oral tablet: 1 tab(s) orally 3 times a day  rosuvastatin 20 mg oral tablet: 1 tab(s) orally once a day  Stool Softener with Laxative:   tamsulosin 0.4 mg oral capsule: 1 cap(s) orally once a day aspirin 81 mg oral tablet: orally once a day  carbidopa-levodopa 25 mg-250 mg oral tablet: 1 tab(s) orally 3 times a day  Januvia 100 mg oral tablet: 1 tab(s) orally once a day  metFORMIN 500 mg oral tablet: orally 3 times a day  multivitamin:   rOPINIRole 0.5 mg oral tablet: 1 tab(s) orally 3 times a day  rosuvastatin 20 mg oral tablet: 1 tab(s) orally once a day  Stool Softener with Laxative:   tamsulosin 0.4 mg oral capsule: 1 cap(s) orally once a day  Tylenol 325 mg oral tablet: 2 tab(s) orally every 6 hours, As needed, Moderate Pain (4 - 6)

## 2020-08-10 NOTE — DISCHARGE NOTE PROVIDER - NSDCCPCAREPLAN_GEN_ALL_CORE_FT
PRINCIPAL DISCHARGE DIAGNOSIS  Diagnosis: Urinary tract infection  Assessment and Plan of Treatment:       SECONDARY DISCHARGE DIAGNOSES  Diagnosis: Encephalopathy acute  Assessment and Plan of Treatment:     Diagnosis: Acute kidney injury  Assessment and Plan of Treatment: PRINCIPAL DISCHARGE DIAGNOSIS  Diagnosis: Encephalopathy acute  Assessment and Plan of Treatment: You came to the hospital after ~a week of altered mental state, including some confusion, weakness, and dizziness. there are many things that can cause this, especially in elderly people. You also had a evidence of an acute kidney injury, likely from dehyadration. Dehydration can also cause altered mental state. You were given IV fluids, and your kidney function improved along with your mental state. At home, please make sure to stay hydrated, and make sure to follow up with your neurologist, as well as your primary care doctor. For now, avoid taking Lasix, which can contribute to dehydration, until you are able to follow up with your primary care doctor.      SECONDARY DISCHARGE DIAGNOSES  Diagnosis: Urinary tract infection  Assessment and Plan of Treatment: Urinary tract infection (UTI) is common in the elderly, and can also lead to changes in mental state. Your labwork showed that a UTI was less likely, but still possible. As a result you were treated with a typical 3 day course of antibiotics. You had good urinary output throughout your stay in the hospital.    Diagnosis: Acute kidney injury  Assessment and Plan of Treatment: You had an acute kidney injury likely due to dehydration. The medication Lasix can also contribute to this problem. For now, avoid taking Lasix until you can follow up with your primary care doctor who can help further determine the role of Lasix in your treatment for leg swelling you have had previously.

## 2020-08-10 NOTE — DISCHARGE NOTE PROVIDER - HOSPITAL COURSE
87 yo M with hx Parkinson's disease, DM, HTN, HLD, CAD s/p CABG, BPH and CHF, p/w confusion and weakness.        Hx obtained from patient's daughter in addition to patient.         Pt had increased urinary frequency and urgency, and dysuria for 1 week, and was treated for a UTI with Bactrim (starting 2 d ago, seen in urgent care 8 d ago). Also in setting of taking Lasix 2-3 days in last 3 days (prescribed by urgent care for leg swelling - daughter unsure how long, says long time, was taking Lasix months ago but stopped bc leg swelling disappeared). Pt observed to be more confused by family, also complaining of increased weakness (feeling faint but no LOC) and dizziness. Noticed to have increased shortness of breath. Also c/o increased head pain/HA, has had for years, per daughter had a CT scan (unsure when) of head that showed a narrowed vessel at the back of the head. Pt described HA as pushing, at temple and also pain at back of head and neck. Denied blurry vision. Tylenol does not help HA. HA worsening per pt. Daughter reported intermittent SOB at home, appears to be worsening, sometimes with cough (coughing at night), occurring for months. ROS neg for f/c/v/d/rash/recent falls. No recent changes to medications or missed doses. Patient was also noted to have STUART (creat 1.4 from ~1 at BL). UA was nitrite positive.         Patient was started on ceftriaxone for presumed UTI and given IVF. Headaches were treated with tylenol. During hospitalization, his STUART improved with fluid + holding Lasix, and he had good urine output. His dizziness and headaches improved. Per conversation with daughter, these acute changes had resolved, and he appeared to be back at his baseline. He did develop some confusion in the hospital, and was reporting hearing music in his ears at times. Per daughter, this had also happened at prior hospitalizations, and resolved when he came home and was back in familiar settings. He was deemed medically stable for discharge. 85 yo M with hx Parkinson's disease, DM, HTN, HLD, CAD s/p CABG, BPH and CHF, p/w confusion and weakness.        Hx obtained from patient's daughter in addition to patient.         Pt had increased urinary frequency and urgency, and dysuria for 1 week, and was treated for a UTI with Bactrim (starting 2 d ago, seen in urgent care 8 d ago). Of note, this was the in setting of taking Lasix 2-3 days prescribed by urgent care for leg swelling. Pt observed to be more confused by family, also complaining of increased weakness (feeling faint but no LOC) and dizziness. Noticed to have increased shortness of breath. Also c/o increased head pain/HA, has had for years, per daughter had a CT scan (unsure when) of head that showed a narrowed vessel at the back of the head. Pt described HA as pushing, at temple and also pain at back of head and neck. Denied blurry vision. Daughter reported intermittent SOB at home, appears to be worsening, sometimes with cough (coughing at night), occurring for months. ROS neg for f/c/v/d/rash/recent falls. No recent changes to medications or missed doses. Patient was also noted to have STUART (creat 1.4 from ~1 at [facility]). UA was nitrite positive.         Patient was started on ceftriaxone for presumed UTI though UA was mostly unremarkable (neg LE, positive for nitrites), and was given IVF and Lasix was held. Headaches were treated with tylenol. During hospitalization, his STURAT improved with fluid + holding Lasix, and he had good urine output. His dizziness and headaches improved. Per conversation with daughter, these acute changes seemed to have resolved, and he appeared to be back at his baseline. He did develop some confusion in the hospital, and was reporting hearing music in his ears at times. Per daughter, this had also happened at prior hospitalizations, and resolved when he came home and was back in familiar settings. He was deemed medically stable for discharge.

## 2020-08-10 NOTE — PROGRESS NOTE ADULT - SUBJECTIVE AND OBJECTIVE BOX
PROGRESS NOTE:   ************************************************  Authored by: Phil Noyola MD PGY1  Pager: [phone]  *************************************************    Patient is a 86y old  Male who presents with a chief complaint of AMS (09 Aug 2020 10:59)      SUBJECTIVE / OVERNIGHT EVENTS: No acute events overnight. The patient was seen and examined at bedside. Patient reports that had some confusion yesterday thinking that he was in a hotel, but states that he then realized he was in the hospital. Has had good urine output. Still reports that he has not had a BM in over a week, though, per RN's had one 2 days ago.     MEDICATIONS  (STANDING):  aspirin enteric coated 81 milliGRAM(s) Oral daily  atorvastatin 80 milliGRAM(s) Oral at bedtime  carbidopa/levodopa  25/250 1 Tablet(s) Oral three times a day  cefTRIAXone   IVPB      cefTRIAXone   IVPB 1000 milliGRAM(s) IV Intermittent every 24 hours  dextrose 5%. 1000 milliLiter(s) (50 mL/Hr) IV Continuous <Continuous>  dextrose 50% Injectable 12.5 Gram(s) IV Push once  dextrose 50% Injectable 25 Gram(s) IV Push once  dextrose 50% Injectable 25 Gram(s) IV Push once  enoxaparin Injectable 40 milliGRAM(s) SubCutaneous daily  insulin lispro (HumaLOG) corrective regimen sliding scale   SubCutaneous three times a day before meals  insulin lispro (HumaLOG) corrective regimen sliding scale   SubCutaneous at bedtime  multivitamin 1 Tablet(s) Oral daily  polyethylene glycol 3350 17 Gram(s) Oral daily  rOPINIRole 0.5 milliGRAM(s) Oral three times a day  senna 2 Tablet(s) Oral at bedtime  tamsulosin 0.4 milliGRAM(s) Oral at bedtime    MEDICATIONS  (PRN):  acetaminophen   Tablet .. 650 milliGRAM(s) Oral every 6 hours PRN Moderate Pain (4 - 6)  dextrose 40% Gel 15 Gram(s) Oral once PRN Blood Glucose LESS THAN 70 milliGRAM(s)/deciliter  glucagon  Injectable 1 milliGRAM(s) IntraMuscular once PRN Glucose LESS THAN 70 milligrams/deciliter      CAPILLARY BLOOD GLUCOSE      POCT Blood Glucose.: 266 mg/dL (10 Aug 2020 08:24)  POCT Blood Glucose.: 109 mg/dL (09 Aug 2020 21:45)  POCT Blood Glucose.: 301 mg/dL (09 Aug 2020 17:51)  POCT Blood Glucose.: 201 mg/dL (09 Aug 2020 13:26)  POCT Blood Glucose.: 118 mg/dL (09 Aug 2020 09:36)    I&O's Summary    09 Aug 2020 07:01  -  10 Aug 2020 07:00  --------------------------------------------------------  IN: 1585 mL / OUT: 0 mL / NET: 1585 mL        PHYSICAL EXAM:  Vital Signs Last 24 Hrs  T(C): 36.7 (10 Aug 2020 05:31), Max: 36.7 (09 Aug 2020 20:32)  T(F): 98 (10 Aug 2020 05:31), Max: 98.1 (09 Aug 2020 20:32)  HR: 75 (10 Aug 2020 05:31) (75 - 77)  BP: 128/75 (10 Aug 2020 05:31) (128/75 - 156/81)  BP(mean): --  RR: 18 (10 Aug 2020 05:31) (18 - 18)  SpO2: 93% (10 Aug 2020 05:31) (93% - 94%)    TELEMETRY:    CONSTITUTIONAL: NAD  RESPIRATORY: Normal respiratory effort; lungs are clear to auscultation bilaterally  CARDIOVASCULAR: Regular rate and rhythm, grade 2 systolic murmur. No peripheral edema.   ABDOMEN: Nontender to palpation, normoactive bowel sounds, no rebound/guarding  PSYCH: grossly oriented. knows he is in the hospital and knows his name. difficult to assess date     LABS:                        11.8   6.32  )-----------( 233      ( 10 Aug 2020 06:56 )             37.1     08-10    136  |  100  |  17  ----------------------------<  151<H>  4.3   |  24  |  0.94    Ca    9.6      10 Aug 2020 06:56  Phos  2.2     08-10  Mg     2.2     08-10                Culture - Urine (collected 08 Aug 2020 00:50)  Source: .Urine Clean Catch (Midstream)  Final Report (08 Aug 2020 21:43):    <10,000 CFU/mL Normal Urogenital Jenn    Culture - Blood (collected 07 Aug 2020 22:58)  Source: .Blood Blood-Peripheral  Preliminary Report (08 Aug 2020 23:01):    No growth to date.        RADIOLOGY & ADDITIONAL TESTS:  Results Reviewed:   Imaging Personally Reviewed:  Electrocardiogram Personally Reviewed:    COORDINATION OF CARE:  Care Discussed with Consultants/Other Providers [Y/N]:  Prior or Outpatient Records Reviewed [Y/N]: PROGRESS NOTE:   ************************************************  Authored by: Phil Noyola MD PGY1  Pager: [phone]  *************************************************    Patient is a 86y old  Male who presents with a chief complaint of AMS (09 Aug 2020 10:59)      SUBJECTIVE / OVERNIGHT EVENTS: No acute events overnight. The patient was seen and examined at bedside. Patient reports that had some confusion yesterday thinking that he was in a hotel, but states that he then realized he was in the hospital. Has had good urine output. Still reports that he has not had a BM in over a week, though, per RN's had one 2 days ago. Mentions something about hearing music in his ear at times.     Spoke to pt's daughter Nitesh, who says that she has been speaking with him, says that he is having hallucinations like hearing music in his ear and has some confusion, which is consistent with what has happened at past hospitalizations and resolved when he went home. She says that he still complains of some headaches but that this is his baseline. She think that it would be helpful for him to come home.     MEDICATIONS  (STANDING):  aspirin enteric coated 81 milliGRAM(s) Oral daily  atorvastatin 80 milliGRAM(s) Oral at bedtime  carbidopa/levodopa  25/250 1 Tablet(s) Oral three times a day  cefTRIAXone   IVPB      cefTRIAXone   IVPB 1000 milliGRAM(s) IV Intermittent every 24 hours  dextrose 5%. 1000 milliLiter(s) (50 mL/Hr) IV Continuous <Continuous>  dextrose 50% Injectable 12.5 Gram(s) IV Push once  dextrose 50% Injectable 25 Gram(s) IV Push once  dextrose 50% Injectable 25 Gram(s) IV Push once  enoxaparin Injectable 40 milliGRAM(s) SubCutaneous daily  insulin lispro (HumaLOG) corrective regimen sliding scale   SubCutaneous three times a day before meals  insulin lispro (HumaLOG) corrective regimen sliding scale   SubCutaneous at bedtime  multivitamin 1 Tablet(s) Oral daily  polyethylene glycol 3350 17 Gram(s) Oral daily  rOPINIRole 0.5 milliGRAM(s) Oral three times a day  senna 2 Tablet(s) Oral at bedtime  tamsulosin 0.4 milliGRAM(s) Oral at bedtime    MEDICATIONS  (PRN):  acetaminophen   Tablet .. 650 milliGRAM(s) Oral every 6 hours PRN Moderate Pain (4 - 6)  dextrose 40% Gel 15 Gram(s) Oral once PRN Blood Glucose LESS THAN 70 milliGRAM(s)/deciliter  glucagon  Injectable 1 milliGRAM(s) IntraMuscular once PRN Glucose LESS THAN 70 milligrams/deciliter      CAPILLARY BLOOD GLUCOSE      POCT Blood Glucose.: 266 mg/dL (10 Aug 2020 08:24)  POCT Blood Glucose.: 109 mg/dL (09 Aug 2020 21:45)  POCT Blood Glucose.: 301 mg/dL (09 Aug 2020 17:51)  POCT Blood Glucose.: 201 mg/dL (09 Aug 2020 13:26)  POCT Blood Glucose.: 118 mg/dL (09 Aug 2020 09:36)    I&O's Summary    09 Aug 2020 07:01  -  10 Aug 2020 07:00  --------------------------------------------------------  IN: 1585 mL / OUT: 0 mL / NET: 1585 mL        PHYSICAL EXAM:  Vital Signs Last 24 Hrs  T(C): 36.7 (10 Aug 2020 05:31), Max: 36.7 (09 Aug 2020 20:32)  T(F): 98 (10 Aug 2020 05:31), Max: 98.1 (09 Aug 2020 20:32)  HR: 75 (10 Aug 2020 05:31) (75 - 77)  BP: 128/75 (10 Aug 2020 05:31) (128/75 - 156/81)  BP(mean): --  RR: 18 (10 Aug 2020 05:31) (18 - 18)  SpO2: 93% (10 Aug 2020 05:31) (93% - 94%)    TELEMETRY:    CONSTITUTIONAL: NAD  RESPIRATORY: Normal respiratory effort; lungs are clear to auscultation bilaterally  CARDIOVASCULAR: Regular rate and rhythm, grade 2 systolic murmur. No peripheral edema.   ABDOMEN: Nontender to palpation, normoactive bowel sounds, no rebound/guarding  PSYCH: grossly oriented. knows he is in the hospital and knows his name. difficult to assess date     LABS:                        11.8   6.32  )-----------( 233      ( 10 Aug 2020 06:56 )             37.1     08-10    136  |  100  |  17  ----------------------------<  151<H>  4.3   |  24  |  0.94    Ca    9.6      10 Aug 2020 06:56  Phos  2.2     08-10  Mg     2.2     08-10                Culture - Urine (collected 08 Aug 2020 00:50)  Source: .Urine Clean Catch (Midstream)  Final Report (08 Aug 2020 21:43):    <10,000 CFU/mL Normal Urogenital Jenn    Culture - Blood (collected 07 Aug 2020 22:58)  Source: .Blood Blood-Peripheral  Preliminary Report (08 Aug 2020 23:01):    No growth to date.        RADIOLOGY & ADDITIONAL TESTS:  Results Reviewed:   Imaging Personally Reviewed:  Electrocardiogram Personally Reviewed:    COORDINATION OF CARE:  Care Discussed with Consultants/Other Providers [Y/N]:  Prior or Outpatient Records Reviewed [Y/N]:

## 2020-08-10 NOTE — PROGRESS NOTE ADULT - PROBLEM SELECTOR PLAN 2
started treatment for UTI outpatient prior to admission with Bactrim. No leukocytosis but PMN predominance. UA + nitrite and neg LE   - improved, can stop antibiotics (completed 3 day course of cefriaxone)  - negative urine cultures

## 2020-08-10 NOTE — PROGRESS NOTE ADULT - PROBLEM SELECTOR PLAN 4
# Hx CABG in 2015  - No troponin elevation now. BNP < 500, no ADHF.  - c/w ASA 81 mg  - c/f mitral regurgitation from exam, grade 2-3 holosystolic murmur, no fluid on CXR however, nonacute, f/u with PCP/outpt cardiologist

## 2020-08-10 NOTE — PROGRESS NOTE ADULT - ASSESSMENT
87 yo M with hx Parkinson's disease, DMT2, HTN, HLD, BPH, CAD s/p CABG and ?CHF, p/w confusion and weakness worsening in last week, recent UTI, admitted for AMS/fatigue w/u, improving s/p fluid + antibiotics for presumed UTI.
87 yo M with hx Parkinson's disease, DMT2, HTN, HLD, BPH, CAD s/p CABG and ?CHF, p/w confusion and weakness worsening in last week, recent UTI, admitted for AMS/fatigue w/u.
85 yo M with hx Parkinson's disease, DMT2, HTN, HLD, BPH, CAD s/p CABG and ?CHF, p/w confusion and weakness worsening in last week, recent UTI, admitted for AMS/fatigue w/u.

## 2020-08-10 NOTE — PROGRESS NOTE ADULT - PROBLEM SELECTOR PROBLEM 4
Coronary artery disease involving coronary bypass graft, angina presence unspecified, unspecified whether native or transplanted heart

## 2020-08-12 LAB
CULTURE RESULTS: SIGNIFICANT CHANGE UP
SPECIMEN SOURCE: SIGNIFICANT CHANGE UP

## 2020-08-12 RX ORDER — CARBIDOPA AND LEVODOPA 25; 100 MG/1; MG/1
25200 TABLET ORAL
Qty: 0 | Refills: 0 | DISCHARGE

## 2020-08-12 RX ORDER — FUROSEMIDE 40 MG
0 TABLET ORAL
Qty: 0 | Refills: 0 | DISCHARGE

## 2020-08-12 RX ORDER — SENNOSIDES/DOCUSATE SODIUM 8.6MG-50MG
0 TABLET ORAL
Qty: 0 | Refills: 0 | DISCHARGE

## 2020-08-21 ENCOUNTER — NON-APPOINTMENT (OUTPATIENT)
Age: 85
End: 2020-08-21

## 2020-08-21 ENCOUNTER — APPOINTMENT (OUTPATIENT)
Dept: CARDIOLOGY | Facility: CLINIC | Age: 85
End: 2020-08-21
Payer: MEDICARE

## 2020-08-21 VITALS
OXYGEN SATURATION: 95 % | TEMPERATURE: 98.6 F | BODY MASS INDEX: 22.14 KG/M2 | RESPIRATION RATE: 17 BRPM | HEART RATE: 75 BPM | SYSTOLIC BLOOD PRESSURE: 123 MMHG | DIASTOLIC BLOOD PRESSURE: 72 MMHG | WEIGHT: 125 LBS

## 2020-08-21 DIAGNOSIS — I10 ESSENTIAL (PRIMARY) HYPERTENSION: ICD-10-CM

## 2020-08-21 DIAGNOSIS — I25.10 ATHEROSCLEROTIC HEART DISEASE OF NATIVE CORONARY ARTERY W/OUT ANGINA PECTORIS: ICD-10-CM

## 2020-08-21 DIAGNOSIS — M25.512 PAIN IN LEFT SHOULDER: ICD-10-CM

## 2020-08-21 DIAGNOSIS — E78.5 HYPERLIPIDEMIA, UNSPECIFIED: ICD-10-CM

## 2020-08-21 DIAGNOSIS — I35.0 NONRHEUMATIC AORTIC (VALVE) STENOSIS: ICD-10-CM

## 2020-08-21 DIAGNOSIS — I48.0 PAROXYSMAL ATRIAL FIBRILLATION: ICD-10-CM

## 2020-08-21 DIAGNOSIS — R06.02 SHORTNESS OF BREATH: ICD-10-CM

## 2020-08-21 PROBLEM — G20 PARKINSON'S DISEASE: Chronic | Status: ACTIVE | Noted: 2020-08-07

## 2020-08-21 PROBLEM — E11.9 TYPE 2 DIABETES MELLITUS WITHOUT COMPLICATIONS: Chronic | Status: ACTIVE | Noted: 2020-08-07

## 2020-08-21 PROCEDURE — 99214 OFFICE O/P EST MOD 30 MIN: CPT

## 2020-08-21 PROCEDURE — 93000 ELECTROCARDIOGRAM COMPLETE: CPT | Mod: 59

## 2020-08-21 RX ORDER — CELECOXIB 200 MG/1
200 CAPSULE ORAL DAILY
Qty: 10 | Refills: 0 | Status: ACTIVE | COMMUNITY
Start: 2020-08-21 | End: 1900-01-01

## 2020-08-21 NOTE — HISTORY OF PRESENT ILLNESS
[FreeTextEntry1] : He has history of CAD with LAD PCI. With subsequent recurrence of chest pain, then he had CABG on 1- and  discharged on 2-7-2017 at [facility].   Again he was admitted to [facility] for shortness of breath in  the beginning of March 2017 for shortness of breath and findings of large pleural effusion of the left. He was tapped and returned home. He has been fine since then for that.\par  He walks with very short steps due to " parkinsonism". He always feel the " head is heavy- dizziness".\par On August 10 2020 he was in the hospital for chest pain and abdominal discomfort at [facility]. He was told nothing  remarkable. Yesterday in the night, he start to experience left shoulder pain  toward the  left upper chest and left upper arm and long lasting until this AM. He describes the pain  was aggravated with arm extension and torso movement and more in the night. Massage feels better.\par

## 2020-08-21 NOTE — PHYSICAL EXAM
[Normal Appearance] : normal appearance [General Appearance - Well Developed] : well developed [Well Groomed] : well groomed [General Appearance - Well Nourished] : well nourished [No Deformities] : no deformities [Normal Conjunctiva] : the conjunctiva exhibited no abnormalities [General Appearance - In No Acute Distress] : no acute distress [Eyelids - No Xanthelasma] : the eyelids demonstrated no xanthelasmas [Normal Oral Mucosa] : normal oral mucosa [No Oral Cyanosis] : no oral cyanosis [No Oral Pallor] : no oral pallor [Normal Jugular Venous V Waves Present] : normal jugular venous V waves present [Normal Jugular Venous A Waves Present] : normal jugular venous A waves present [No Jugular Venous Rod A Waves] : no jugular venous rod A waves [Respiration, Rhythm And Depth] : normal respiratory rhythm and effort [Exaggerated Use Of Accessory Muscles For Inspiration] : no accessory muscle use [Auscultation Breath Sounds / Voice Sounds] : lungs were clear to auscultation bilaterally [Lungs Percussion] : the lungs were normal to percussion [Chest Palpation] : palpation of the chest revealed no abnormalities [Heart Sounds] : normal S1 and S2 [Heart Rate And Rhythm] : heart rate and rhythm were normal [Arterial Pulses Normal] : the arterial pulses were normal [Veins - Varicosity Changes] : no varicosital changes were noted in the lower extremities [Edema] : no peripheral edema present [Systolic grade ___/6] : A grade [unfilled]/6 systolic murmur was heard. [Abdomen Soft] : soft [Abdomen Tenderness] : non-tender [Bowel Sounds] : normal bowel sounds [Abdomen Hernia] : no hernia was discovered [Abdomen Mass (___ Cm)] : no abdominal mass palpated [Nail Clubbing] : no clubbing of the fingernails [Petechial Hemorrhages (___cm)] : no petechial hemorrhages [Cyanosis, Localized] : no localized cyanosis [Skin Turgor] : normal skin turgor [Skin Color & Pigmentation] : normal skin color and pigmentation [No Venous Stasis] : no venous stasis [] : no rash [Skin Lesions] : no skin lesions [No Xanthoma] : no  xanthoma was observed [No Skin Ulcers] : no skin ulcer [Oriented To Time, Place, And Person] : oriented to person, place, and time [Impaired Insight] : insight and judgment were intact [Affect] : the affect was normal [Memory Recent] : recent memory was not impaired [Mood] : the mood was normal [No Anxiety] : not feeling anxious [Memory Remote] : remote memory was not impaired [FreeTextEntry1] : parkinsonian gait, on wheel chair and cane.

## 2020-08-21 NOTE — DISCUSSION/SUMMARY
[Moderate Aortic Stenosis] : moderate aortic stenosis [Atrial Fibrillation] : atrial fibrillation [Coronary Artery Disease] : coronary artery disease [Dietary Modification] : dietary modification [Exercise Regimen] : an exercise regimen [Hyperlipidemia] : hyperlipidemia [None] : none [Exercise] : exercise [Diet Modification] : diet modification [Hypertension] : hypertension [Responding to Treatment] : responding to treatment [Improving] : improving [Weight Loss] : weight loss [Sodium Restriction] : sodium restriction [Low Sodium Diet] : low sodium diet [Minutes spent___] : for [unfilled] ~Uminutes [___ Month(s)] : [unfilled] month(s) [With Me] : with me [Unlikely Cardiac Ischemia (Low Prob.)] : chest pain unlikely to represent cardiac ischemia (low probability) [Non-Cardiac] : non-cardiac chest pain [Stable] : stable [Patient] : the patient [Medication Changes Per Orders] : as documented in orders [de-identified] : ? related to the SOB, functional II [Family] : the patient's family [de-identified] : continue observation, eventually may require TAVR [de-identified] : Patient did not take amiodarone and  Coumadin for some time. He defers the medications. [de-identified] : likely related to the left shoulder arthritis. [de-identified] : at perioperative state [de-identified] : no active chest pain( the current complaint is non-cardiac), considered angina resolved. [de-identified] :  s/p PCI and recent CABG (1/2017) [de-identified] : reactive hypertension [de-identified] : use diuretic for the treatment, and also taking care the " leg edema". [FreeTextEntry1] : No evidence of AF,  no  indication for continue anticoagulation and amiodarone. The AF likely the post operation effect of CABG, or the tremor effect of Parkinsons.\par Today's visit with conclusion:\par 1. stable CV\par 2. Chest pain complaint is atypical and likely related to the osteoarthritis.\par 3. no need for changes of cardiac medications.\par \par  [de-identified] : subjective, not objective. [de-identified] : because of hypertension and leg edema, use  chlorthalidone as the choice of treatment.

## 2020-08-21 NOTE — REVIEW OF SYSTEMS
[Joint Pain] : joint pain [Joint Stiffness] : joint stiffness [Negative] : Endocrine [Fever] : no fever [Headache] : no headache [Feeling Fatigued] : not feeling fatigued [Chills] : no chills [Dyspnea on exertion] : not dyspnea during exertion [Recent Weight Loss (___ Lbs)] : no recent weight loss [Shortness Of Breath] : no shortness of breath [Chest  Pressure] : no chest pressure [Chest Pain] : chest pain [Lower Ext Edema] : no extremity edema [Leg Claudication] : no intermittent leg claudication [Palpitations] : no palpitations [Cough] : no cough [Wheezing] : no wheezing [Coughing Up Blood] : no hemoptysis [Nausea] : no nausea [Abdominal Pain] : no abdominal pain [Vomiting] : no vomiting [Heartburn] : no heartburn [Change In The Stool] : no change in stool [Change in Appetite] : no change in appetite [Dysphagia] : no dysphagia [Muscle Cramps] : no muscle cramps [Joint Swelling] : no joint swelling [Skin: A Rash] : no rash: [Limb Weakness (Paresis)] : no limb weakness [Itching] : no itching [Skin Lesions] : no skin lesions [Tremor] : no tremor was seen [Dizziness] : no dizziness [Convulsions] : no convulsions [Tingling (Paresthesia)] : no tingling [Confusion] : no confusion was observed [Anxiety] : no anxiety [Memory Lapses Or Loss] : no memory lapses or loss [Under Stress] : not under stress [Excessive Thirst] : no polydipsia [Easy Bleeding] : no tendency for easy bleeding [Easy Bruising] : no tendency for easy bruising [Swollen Glands] : no swollen glands [Swollen Glands In The Neck] : no swollen glands in the neck [FreeTextEntry1] : s/p CABG, s/p left knee replacement. sedentary and  on wheelchair.

## 2020-12-24 ENCOUNTER — INPATIENT (INPATIENT)
Facility: HOSPITAL | Age: 85
LOS: 4 days | Discharge: NURSING, REHAB OR LONG-TERM CARE | DRG: 195 | End: 2020-12-29
Attending: INTERNAL MEDICINE | Admitting: INTERNAL MEDICINE
Payer: MEDICARE

## 2020-12-24 VITALS
WEIGHT: 134.92 LBS | HEART RATE: 84 BPM | OXYGEN SATURATION: 98 % | DIASTOLIC BLOOD PRESSURE: 67 MMHG | TEMPERATURE: 99 F | SYSTOLIC BLOOD PRESSURE: 131 MMHG | HEIGHT: 60 IN | RESPIRATION RATE: 18 BRPM

## 2020-12-24 DIAGNOSIS — Z95.1 PRESENCE OF AORTOCORONARY BYPASS GRAFT: Chronic | ICD-10-CM

## 2020-12-24 DIAGNOSIS — Z96.652 PRESENCE OF LEFT ARTIFICIAL KNEE JOINT: Chronic | ICD-10-CM

## 2020-12-24 DIAGNOSIS — R50.9 FEVER, UNSPECIFIED: ICD-10-CM

## 2020-12-24 LAB
ALBUMIN SERPL ELPH-MCNC: 4.2 G/DL — SIGNIFICANT CHANGE UP (ref 3.3–5)
ALP SERPL-CCNC: 54 U/L — SIGNIFICANT CHANGE UP (ref 40–120)
ALT FLD-CCNC: 17 U/L — SIGNIFICANT CHANGE UP (ref 10–45)
ANION GAP SERPL CALC-SCNC: 10 MMOL/L — SIGNIFICANT CHANGE UP (ref 5–17)
ANISOCYTOSIS BLD QL: SLIGHT — SIGNIFICANT CHANGE UP
APPEARANCE UR: CLEAR — SIGNIFICANT CHANGE UP
APTT BLD: 21.2 SEC — LOW (ref 27.5–35.5)
AST SERPL-CCNC: 21 U/L — SIGNIFICANT CHANGE UP (ref 10–40)
BASE EXCESS BLDV CALC-SCNC: 6.1 MMOL/L — HIGH (ref -2–2)
BASOPHILS # BLD AUTO: 0 K/UL — SIGNIFICANT CHANGE UP (ref 0–0.2)
BASOPHILS NFR BLD AUTO: 0 % — SIGNIFICANT CHANGE UP (ref 0–2)
BILIRUB SERPL-MCNC: 0.3 MG/DL — SIGNIFICANT CHANGE UP (ref 0.2–1.2)
BILIRUB UR-MCNC: NEGATIVE — SIGNIFICANT CHANGE UP
BUN SERPL-MCNC: 38 MG/DL — HIGH (ref 7–23)
CA-I SERPL-SCNC: 1.2 MMOL/L — SIGNIFICANT CHANGE UP (ref 1.12–1.3)
CALCIUM SERPL-MCNC: 9.4 MG/DL — SIGNIFICANT CHANGE UP (ref 8.4–10.5)
CHLORIDE BLDV-SCNC: 107 MMOL/L — SIGNIFICANT CHANGE UP (ref 96–108)
CHLORIDE SERPL-SCNC: 105 MMOL/L — SIGNIFICANT CHANGE UP (ref 96–108)
CO2 BLDV-SCNC: 32 MMOL/L — HIGH (ref 22–30)
CO2 SERPL-SCNC: 28 MMOL/L — SIGNIFICANT CHANGE UP (ref 22–31)
COLOR SPEC: SIGNIFICANT CHANGE UP
CREAT SERPL-MCNC: 1.09 MG/DL — SIGNIFICANT CHANGE UP (ref 0.5–1.3)
DACRYOCYTES BLD QL SMEAR: SLIGHT — SIGNIFICANT CHANGE UP
DIFF PNL FLD: NEGATIVE — SIGNIFICANT CHANGE UP
ELLIPTOCYTES BLD QL SMEAR: SLIGHT — SIGNIFICANT CHANGE UP
EOSINOPHIL # BLD AUTO: 0 K/UL — SIGNIFICANT CHANGE UP (ref 0–0.5)
EOSINOPHIL NFR BLD AUTO: 0 % — SIGNIFICANT CHANGE UP (ref 0–6)
GAS PNL BLDV: 141 MMOL/L — SIGNIFICANT CHANGE UP (ref 135–145)
GAS PNL BLDV: SIGNIFICANT CHANGE UP
GAS PNL BLDV: SIGNIFICANT CHANGE UP
GIANT PLATELETS BLD QL SMEAR: PRESENT — SIGNIFICANT CHANGE UP
GLUCOSE BLDC GLUCOMTR-MCNC: 163 MG/DL — HIGH (ref 70–99)
GLUCOSE BLDC GLUCOMTR-MCNC: 252 MG/DL — HIGH (ref 70–99)
GLUCOSE BLDV-MCNC: 199 MG/DL — HIGH (ref 70–99)
GLUCOSE SERPL-MCNC: 214 MG/DL — HIGH (ref 70–99)
GLUCOSE UR QL: ABNORMAL
HCO3 BLDV-SCNC: 31 MMOL/L — HIGH (ref 21–29)
HCT VFR BLD CALC: 16.1 % — CRITICAL LOW (ref 39–50)
HCT VFR BLD CALC: 25.9 % — LOW (ref 39–50)
HCT VFR BLDA CALC: 13 % — CRITICAL LOW (ref 39–50)
HGB BLD CALC-MCNC: 4.2 G/DL — CRITICAL LOW (ref 13–17)
HGB BLD-MCNC: 5 G/DL — CRITICAL LOW (ref 13–17)
HGB BLD-MCNC: 8.5 G/DL — LOW (ref 13–17)
HYPOCHROMIA BLD QL: SIGNIFICANT CHANGE UP
INR BLD: 0.99 RATIO — SIGNIFICANT CHANGE UP (ref 0.88–1.16)
KETONES UR-MCNC: NEGATIVE — SIGNIFICANT CHANGE UP
LACTATE BLDV-MCNC: 1.7 MMOL/L — SIGNIFICANT CHANGE UP (ref 0.7–2)
LEUKOCYTE ESTERASE UR-ACNC: NEGATIVE — SIGNIFICANT CHANGE UP
LYMPHOCYTES # BLD AUTO: 0.92 K/UL — LOW (ref 1–3.3)
LYMPHOCYTES # BLD AUTO: 7.8 % — LOW (ref 13–44)
MACROCYTES BLD QL: SLIGHT — SIGNIFICANT CHANGE UP
MANUAL SMEAR VERIFICATION: SIGNIFICANT CHANGE UP
MCHC RBC-ENTMCNC: 29.6 PG — SIGNIFICANT CHANGE UP (ref 27–34)
MCHC RBC-ENTMCNC: 30.5 PG — SIGNIFICANT CHANGE UP (ref 27–34)
MCHC RBC-ENTMCNC: 31.1 GM/DL — LOW (ref 32–36)
MCHC RBC-ENTMCNC: 32.8 GM/DL — SIGNIFICANT CHANGE UP (ref 32–36)
MCV RBC AUTO: 90.2 FL — SIGNIFICANT CHANGE UP (ref 80–100)
MCV RBC AUTO: 98.2 FL — SIGNIFICANT CHANGE UP (ref 80–100)
MONOCYTES # BLD AUTO: 1.13 K/UL — HIGH (ref 0–0.9)
MONOCYTES NFR BLD AUTO: 9.6 % — SIGNIFICANT CHANGE UP (ref 2–14)
NEUTROPHILS # BLD AUTO: 9.75 K/UL — HIGH (ref 1.8–7.4)
NEUTROPHILS NFR BLD AUTO: 82.6 % — HIGH (ref 43–77)
NITRITE UR-MCNC: NEGATIVE — SIGNIFICANT CHANGE UP
NRBC # BLD: 2 /100 WBCS — HIGH (ref 0–0)
NRBC # BLD: 3 /100 — HIGH (ref 0–0)
OB PNL STL: POSITIVE
PCO2 BLDV: 53 MMHG — HIGH (ref 35–50)
PH BLDV: 7.38 — SIGNIFICANT CHANGE UP (ref 7.35–7.45)
PH UR: 6 — SIGNIFICANT CHANGE UP (ref 5–8)
PLAT MORPH BLD: ABNORMAL
PLATELET # BLD AUTO: 223 K/UL — SIGNIFICANT CHANGE UP (ref 150–400)
PLATELET # BLD AUTO: 308 K/UL — SIGNIFICANT CHANGE UP (ref 150–400)
PO2 BLDV: 22 MMHG — LOW (ref 25–45)
POIKILOCYTOSIS BLD QL AUTO: SLIGHT — SIGNIFICANT CHANGE UP
POLYCHROMASIA BLD QL SMEAR: SIGNIFICANT CHANGE UP
POTASSIUM BLDV-SCNC: 4 MMOL/L — SIGNIFICANT CHANGE UP (ref 3.5–5.3)
POTASSIUM SERPL-MCNC: 4.2 MMOL/L — SIGNIFICANT CHANGE UP (ref 3.5–5.3)
POTASSIUM SERPL-SCNC: 4.2 MMOL/L — SIGNIFICANT CHANGE UP (ref 3.5–5.3)
PROT SERPL-MCNC: 6.3 G/DL — SIGNIFICANT CHANGE UP (ref 6–8.3)
PROT UR-MCNC: SIGNIFICANT CHANGE UP
PROTHROM AB SERPL-ACNC: 11.9 SEC — SIGNIFICANT CHANGE UP (ref 10.6–13.6)
RAPID RVP RESULT: DETECTED
RBC # BLD: 1.64 M/UL — LOW (ref 4.2–5.8)
RBC # BLD: 2.87 M/UL — LOW (ref 4.2–5.8)
RBC # FLD: 16.4 % — HIGH (ref 10.3–14.5)
RBC # FLD: 17 % — HIGH (ref 10.3–14.5)
RBC BLD AUTO: ABNORMAL
RV+EV RNA SPEC QL NAA+PROBE: DETECTED
SAO2 % BLDV: 26 % — LOW (ref 67–88)
SARS-COV-2 RNA SPEC QL NAA+PROBE: SIGNIFICANT CHANGE UP
SODIUM SERPL-SCNC: 143 MMOL/L — SIGNIFICANT CHANGE UP (ref 135–145)
SP GR SPEC: 1.02 — SIGNIFICANT CHANGE UP (ref 1.01–1.02)
UROBILINOGEN FLD QL: NEGATIVE — SIGNIFICANT CHANGE UP
WBC # BLD: 10.13 K/UL — SIGNIFICANT CHANGE UP (ref 3.8–10.5)
WBC # BLD: 11.8 K/UL — HIGH (ref 3.8–10.5)
WBC # FLD AUTO: 10.13 K/UL — SIGNIFICANT CHANGE UP (ref 3.8–10.5)
WBC # FLD AUTO: 11.8 K/UL — HIGH (ref 3.8–10.5)

## 2020-12-24 PROCEDURE — 74177 CT ABD & PELVIS W/CONTRAST: CPT | Mod: 26

## 2020-12-24 PROCEDURE — 99285 EMERGENCY DEPT VISIT HI MDM: CPT | Mod: CS,GC

## 2020-12-24 PROCEDURE — 93010 ELECTROCARDIOGRAM REPORT: CPT

## 2020-12-24 PROCEDURE — 71045 X-RAY EXAM CHEST 1 VIEW: CPT | Mod: 26

## 2020-12-24 RX ORDER — METOPROLOL TARTRATE 50 MG
25 TABLET ORAL
Refills: 0 | Status: DISCONTINUED | OUTPATIENT
Start: 2020-12-24 | End: 2020-12-26

## 2020-12-24 RX ORDER — SERTRALINE 25 MG/1
25 TABLET, FILM COATED ORAL DAILY
Refills: 0 | Status: DISCONTINUED | OUTPATIENT
Start: 2020-12-24 | End: 2020-12-29

## 2020-12-24 RX ORDER — METFORMIN HYDROCHLORIDE 850 MG/1
0 TABLET ORAL
Qty: 0 | Refills: 0 | DISCHARGE

## 2020-12-24 RX ORDER — CEFTRIAXONE 500 MG/1
1000 INJECTION, POWDER, FOR SOLUTION INTRAMUSCULAR; INTRAVENOUS ONCE
Refills: 0 | Status: COMPLETED | OUTPATIENT
Start: 2020-12-24 | End: 2020-12-24

## 2020-12-24 RX ORDER — GLUCAGON INJECTION, SOLUTION 0.5 MG/.1ML
1 INJECTION, SOLUTION SUBCUTANEOUS ONCE
Refills: 0 | Status: DISCONTINUED | OUTPATIENT
Start: 2020-12-24 | End: 2020-12-29

## 2020-12-24 RX ORDER — ACETAMINOPHEN 500 MG
650 TABLET ORAL EVERY 6 HOURS
Refills: 0 | Status: DISCONTINUED | OUTPATIENT
Start: 2020-12-24 | End: 2020-12-29

## 2020-12-24 RX ORDER — CARBIDOPA AND LEVODOPA 25; 100 MG/1; MG/1
1 TABLET ORAL THREE TIMES A DAY
Refills: 0 | Status: DISCONTINUED | OUTPATIENT
Start: 2020-12-24 | End: 2020-12-29

## 2020-12-24 RX ORDER — INSULIN LISPRO 100/ML
VIAL (ML) SUBCUTANEOUS AT BEDTIME
Refills: 0 | Status: DISCONTINUED | OUTPATIENT
Start: 2020-12-24 | End: 2020-12-29

## 2020-12-24 RX ORDER — ACETAMINOPHEN 500 MG
650 TABLET ORAL ONCE
Refills: 0 | Status: COMPLETED | OUTPATIENT
Start: 2020-12-24 | End: 2020-12-24

## 2020-12-24 RX ORDER — ACETAMINOPHEN 500 MG
650 TABLET ORAL ONCE
Refills: 0 | Status: DISCONTINUED | OUTPATIENT
Start: 2020-12-24 | End: 2020-12-24

## 2020-12-24 RX ORDER — CEFTRIAXONE 500 MG/1
1000 INJECTION, POWDER, FOR SOLUTION INTRAMUSCULAR; INTRAVENOUS EVERY 24 HOURS
Refills: 0 | Status: DISCONTINUED | OUTPATIENT
Start: 2020-12-24 | End: 2020-12-29

## 2020-12-24 RX ORDER — HEPARIN SODIUM 5000 [USP'U]/ML
5000 INJECTION INTRAVENOUS; SUBCUTANEOUS EVERY 12 HOURS
Refills: 0 | Status: DISCONTINUED | OUTPATIENT
Start: 2020-12-24 | End: 2020-12-29

## 2020-12-24 RX ORDER — DEXTROSE 50 % IN WATER 50 %
15 SYRINGE (ML) INTRAVENOUS ONCE
Refills: 0 | Status: DISCONTINUED | OUTPATIENT
Start: 2020-12-24 | End: 2020-12-29

## 2020-12-24 RX ORDER — INSULIN LISPRO 100/ML
VIAL (ML) SUBCUTANEOUS
Refills: 0 | Status: DISCONTINUED | OUTPATIENT
Start: 2020-12-24 | End: 2020-12-29

## 2020-12-24 RX ORDER — OMEPRAZOLE 10 MG/1
1 CAPSULE, DELAYED RELEASE ORAL
Qty: 0 | Refills: 0 | DISCHARGE

## 2020-12-24 RX ORDER — AZITHROMYCIN 500 MG/1
250 TABLET, FILM COATED ORAL DAILY
Refills: 0 | Status: DISCONTINUED | OUTPATIENT
Start: 2020-12-24 | End: 2020-12-26

## 2020-12-24 RX ORDER — DEXTROSE 50 % IN WATER 50 %
25 SYRINGE (ML) INTRAVENOUS ONCE
Refills: 0 | Status: DISCONTINUED | OUTPATIENT
Start: 2020-12-24 | End: 2020-12-29

## 2020-12-24 RX ORDER — SODIUM CHLORIDE 9 MG/ML
1000 INJECTION, SOLUTION INTRAVENOUS
Refills: 0 | Status: DISCONTINUED | OUTPATIENT
Start: 2020-12-24 | End: 2020-12-29

## 2020-12-24 RX ORDER — SODIUM CHLORIDE 9 MG/ML
1000 INJECTION, SOLUTION INTRAVENOUS ONCE
Refills: 0 | Status: COMPLETED | OUTPATIENT
Start: 2020-12-24 | End: 2020-12-24

## 2020-12-24 RX ORDER — CARBIDOPA AND LEVODOPA 25; 100 MG/1; MG/1
1 TABLET ORAL
Qty: 0 | Refills: 0 | DISCHARGE

## 2020-12-24 RX ORDER — INSULIN GLARGINE 100 [IU]/ML
10 INJECTION, SOLUTION SUBCUTANEOUS AT BEDTIME
Refills: 0 | Status: DISCONTINUED | OUTPATIENT
Start: 2020-12-24 | End: 2020-12-29

## 2020-12-24 RX ORDER — DEXTROSE 50 % IN WATER 50 %
12.5 SYRINGE (ML) INTRAVENOUS ONCE
Refills: 0 | Status: DISCONTINUED | OUTPATIENT
Start: 2020-12-24 | End: 2020-12-29

## 2020-12-24 RX ORDER — AMIODARONE HYDROCHLORIDE 400 MG/1
200 TABLET ORAL DAILY
Refills: 0 | Status: DISCONTINUED | OUTPATIENT
Start: 2020-12-24 | End: 2020-12-29

## 2020-12-24 RX ORDER — PANTOPRAZOLE SODIUM 20 MG/1
40 TABLET, DELAYED RELEASE ORAL
Refills: 0 | Status: DISCONTINUED | OUTPATIENT
Start: 2020-12-24 | End: 2020-12-29

## 2020-12-24 RX ORDER — OXYBUTYNIN CHLORIDE 5 MG
5 TABLET ORAL
Refills: 0 | Status: DISCONTINUED | OUTPATIENT
Start: 2020-12-24 | End: 2020-12-29

## 2020-12-24 RX ORDER — SENNOSIDES/DOCUSATE SODIUM 8.6MG-50MG
0 TABLET ORAL
Qty: 0 | Refills: 0 | DISCHARGE

## 2020-12-24 RX ORDER — AZITHROMYCIN 500 MG/1
500 TABLET, FILM COATED ORAL ONCE
Refills: 0 | Status: COMPLETED | OUTPATIENT
Start: 2020-12-24 | End: 2020-12-24

## 2020-12-24 RX ADMIN — Medication 650 MILLIGRAM(S): at 10:39

## 2020-12-24 RX ADMIN — Medication 650 MILLIGRAM(S): at 09:44

## 2020-12-24 RX ADMIN — Medication 1: at 17:55

## 2020-12-24 RX ADMIN — Medication 1: at 21:26

## 2020-12-24 RX ADMIN — SODIUM CHLORIDE 1000 MILLILITER(S): 9 INJECTION, SOLUTION INTRAVENOUS at 09:30

## 2020-12-24 RX ADMIN — CEFTRIAXONE 1000 MILLIGRAM(S): 500 INJECTION, POWDER, FOR SOLUTION INTRAMUSCULAR; INTRAVENOUS at 10:54

## 2020-12-24 RX ADMIN — CEFTRIAXONE 100 MILLIGRAM(S): 500 INJECTION, POWDER, FOR SOLUTION INTRAMUSCULAR; INTRAVENOUS at 09:28

## 2020-12-24 RX ADMIN — AZITHROMYCIN 250 MILLIGRAM(S): 500 TABLET, FILM COATED ORAL at 10:53

## 2020-12-24 RX ADMIN — SODIUM CHLORIDE 1000 MILLILITER(S): 9 INJECTION, SOLUTION INTRAVENOUS at 10:39

## 2020-12-24 NOTE — ED PROVIDER NOTE - CLINICAL SUMMARY MEDICAL DECISION MAKING FREE TEXT BOX
jocelynn 86 male mult med issues, dm cad, cabg with general weakness - freq urination as per daughter although pt denies freq urg or dyuria non focal neuro exam -- pt 101 rectal temp abd benig=n lungs cleat systolic murmur on exam -- co sever diffuse jt pain -- will look for source of infxn cxr/ua -- rvp inclusive of covid - as pt is inc aged and dm will give empiric fluids and abx

## 2020-12-24 NOTE — ED PROVIDER NOTE - OBJECTIVE STATEMENT
87yo male with pmh Parkinson's disease, DM, HTN, HLD, CAD s/p CABG, BPH presenting with generalized weakness.  Endorsing diffuse pains, weakness all over over past few days.  Has had difficulty walking and getting around because of this.  Lives at home with family.  No chest pain, cough.  Denies falls. On eliquis.  States he has been covid tested 2x which have been negative. 87yo male with pmh Parkinson's disease, DM, HTN, HLD, CAD s/p CABG, BPH presenting with generalized weakness.  Endorsing diffuse pains, weakness all over over past few days.  Has had difficulty walking and getting around because of this.  Lives at home with family.  No chest pain, cough.  Denies falls.  States he has been covid tested 2x which have been negative.

## 2020-12-24 NOTE — ED PROVIDER NOTE - PROGRESS NOTE DETAILS
Quality 431: Preventive Care And Screening: Unhealthy Alcohol Use - Screening: Patient screened for unhealthy alcohol use using a single question and scores less than 2 times per year Quality 226: Preventive Care And Screening: Tobacco Use: Screening And Cessation Intervention: Patient screened for tobacco and never smoked Detail Level: Detailed Cristóbal: Patient with documented hx chf with holosystolic murmur, will hold full 30cc/kg bolus and start with 1L, antibiotics. Cristóbal: Discussed with Nitesh, daughter who he lives with.  Decreased appetite started sunday, progressively worsening generalized weakness.  Having difficulty walking, standing 2/2 weakness.  Normally walks with cane and had no issues with this prior to sunday.  No recent falls.  Nitesh changes him at him and endorses foul smelling urine.  Warm this morning but did not take temp. Cristóbal: Per Nitesh, patient only on aspirin, no eliquis.  Takes lasix as needed for swelling.  Stopped fluids at 500cc, will consent, occult blood, and order blood products.  Stool with no gross blood, normal color.

## 2020-12-24 NOTE — H&P ADULT - NSICDXFAMILYHX_GEN_ALL_CORE_FT
FAMILY HISTORY:  Family history of coronary artery disease  No pertinent family history in first degree relatives

## 2020-12-24 NOTE — H&P ADULT - NSHPLABSRESULTS_GEN_ALL_CORE
8.5    10.13 )-----------( 223      ( 24 Dec 2020 20:52 )             25.9     12-24    143  |  105  |  38<H>  ----------------------------<  214<H>  4.2   |  28  |  1.09    Ca    9.4      24 Dec 2020 09:24    TPro  6.3  /  Alb  4.2  /  TBili  0.3  /  DBili  x   /  AST  21  /  ALT  17  /  AlkPhos  54  12-24    Rapid RVP Result: Detected (12.24.20 @ 09:52)

## 2020-12-24 NOTE — ED PROVIDER NOTE - PHYSICAL EXAMINATION
General appearance: NAD, conversant, afebrile    Eyes: anicteric sclerae, GARY, EOMI   HENT: Atraumatic; oropharynx clear, dry MM and no ulcerations, no pharyngeal erythema or exudate   Neck: Trachea midline; Full range of motion, supple   Pulm: CTA b/l, normal respiratory effort and no intercostal retractions, normal work of breathing   CV: RRR, Diffuse holosystolic murmur   Abdomen: Soft, non-tender, non-distended; no guarding or rebound   Extremities: No peripheral edema   Skin: Dry, normal temperature, turgor and texture; no rash, no brusing   Psych: Appropriate affect, cooperative; alert and oriented to person, place and time

## 2020-12-24 NOTE — ED ADULT NURSE REASSESSMENT NOTE - NS ED NURSE REASSESS COMMENT FT1
Report received from CAMPBELL Justice.  Pt awaiting type and screen result for pending blood transfusion, consent in chart.

## 2020-12-24 NOTE — H&P ADULT - HISTORY OF PRESENT ILLNESS
87yo male with pmh Parkinson's disease, DM, HTN, HLD, CAD s/p CABG, BPH presenting with generalized weakness.  Endorsing diffuse pains, weakness all over over past few days.  Has had difficulty walking and getting around because of this.  Lives at home with family.  No chest pain, cough.  Denies falls.  States he has been covid tested 2x which have been negative

## 2020-12-24 NOTE — H&P ADULT - NSICDXPASTMEDICALHX_GEN_ALL_CORE_FT
PAST MEDICAL HISTORY:  Benign prostatic hyperplasia     CAD (coronary artery disease)     CAD (coronary artery disease)     Diabetes mellitus     DM (diabetes mellitus)     Dyslipidemia     HLD (hyperlipidemia)     HTN (hypertension)     Hypertension     Osteoporosis     Parkinson disease     Syncope     Urinary frequency

## 2020-12-24 NOTE — ED PROVIDER NOTE - NS ED ROS FT
Constitutional: + fevers, no chills  Resp: No shortness of breath, no cough  Cardio: No chest pain, no palpitations  GI: No abdominal pain, no nausea, no vomiting, no diarrhea  : No dysuria, no urinary frequency, + foul smelling urine  Skin: No rash, no lacerations, no bruising  Neuro: No headache, no numbness, no weakness

## 2020-12-24 NOTE — ED PROVIDER NOTE - PSH
History of knee replacement procedure of left knee  2017  History of total left knee replacement  2014  S/P CABG (coronary artery bypass graft)  2015  S/P coronary artery stent placement in 2003

## 2020-12-24 NOTE — PATIENT PROFILE ADULT - FLU SEASON?
[Well Developed] : well developed Yes... [No Acute Distress] : no acute distress [Well Nourished] : well nourished [Well-Appearing] : well-appearing [Normal Voice/Communication] : normal voice/communication [Normal Sclera/Conjunctiva] : normal sclera/conjunctiva [PERRL] : pupils equal round and reactive to light [EOMI] : extraocular movements intact [Normal Outer Ear/Nose] : the outer ears and nose were normal in appearance [No JVD] : no jugular venous distention [Normal Oropharynx] : the oropharynx was normal [No Lymphadenopathy] : no lymphadenopathy [Thyroid Normal, No Nodules] : the thyroid was normal and there were no nodules present [Supple] : supple [No Accessory Muscle Use] : no accessory muscle use [No Respiratory Distress] : no respiratory distress  [Normal Rate] : normal rate  [Regular Rhythm] : with a regular rhythm [Clear to Auscultation] : lungs were clear to auscultation bilaterally [Normal S1, S2] : normal S1 and S2 [No Murmur] : no murmur heard [No Abdominal Bruit] : a ~M bruit was not heard ~T in the abdomen [No Carotid Bruits] : no carotid bruits [No Varicosities] : no varicosities [Pedal Pulses Present] : the pedal pulses are present [No Edema] : there was no peripheral edema [No Palpable Aorta] : no palpable aorta [No Extremity Clubbing/Cyanosis] : no extremity clubbing/cyanosis [Soft] : abdomen soft [Non Tender] : non-tender [Non-distended] : non-distended [No Masses] : no abdominal mass palpated [No HSM] : no HSM [Normal Bowel Sounds] : normal bowel sounds [Normal Posterior Cervical Nodes] : no posterior cervical lymphadenopathy [Normal Supraclavicular Nodes] : no supraclavicular lymphadenopathy [Normal Anterior Cervical Nodes] : no anterior cervical lymphadenopathy [No CVA Tenderness] : no CVA  tenderness [No Spinal Tenderness] : no spinal tenderness [No Rash] : no rash [Grossly Normal Strength/Tone] : grossly normal strength/tone [No Joint Swelling] : no joint swelling [Coordination Grossly Intact] : coordination grossly intact [Normal Gait] : normal gait [No Focal Deficits] : no focal deficits [Speech Grossly Normal] : speech grossly normal [Deep Tendon Reflexes (DTR)] : deep tendon reflexes were 2+ and symmetric [Alert and Oriented x3] : oriented to person, place, and time [Memory Grossly Normal] : memory grossly normal [Normal Affect] : the affect was normal [Normal Mood] : the mood was normal [Normal Insight/Judgement] : insight and judgment were intact

## 2020-12-24 NOTE — H&P ADULT - NSICDXPASTSURGICALHX_GEN_ALL_CORE_FT
PAST SURGICAL HISTORY:  History of knee replacement procedure of left knee 2017    History of total left knee replacement 2014    S/P CABG (coronary artery bypass graft) 2015    S/P coronary artery stent placement in 2003

## 2020-12-24 NOTE — H&P ADULT - NSHPPHYSICALEXAM_GEN_ALL_CORE
pt. seen and examined  Vital Signs Last 24 Hrs  T(C): 36.6 (25 Dec 2020 00:23), Max: 38.3 (24 Dec 2020 09:36)  T(F): 97.8 (25 Dec 2020 00:23), Max: 100.9 (24 Dec 2020 09:36)  HR: 74 (25 Dec 2020 00:23) (74 - 84)  BP: 133/67 (25 Dec 2020 00:23) (122/49 - 145/64)  BP(mean): --  RR: 18 (25 Dec 2020 00:23) (18 - 20)  SpO2: 98% (25 Dec 2020 00:23) (96% - 99%)  heent: nc/at  neck : supple, no JVD  lungs : basilar ronchi  heart: s1s2 nml  abd: soft, NABS, NT/Nd  ext: no e/c/c, pulses 1+  neuro: aaox3 , weakness , move all ext

## 2020-12-24 NOTE — ED PROVIDER NOTE - PMH
Benign prostatic hyperplasia    CAD (coronary artery disease)    CAD (coronary artery disease)    Diabetes mellitus    DM (diabetes mellitus)    Dyslipidemia    HLD (hyperlipidemia)    HTN (hypertension)    Hypertension    Osteoporosis    Parkinson disease    Syncope    Urinary frequency

## 2020-12-24 NOTE — ED ADULT NURSE NOTE - OBJECTIVE STATEMENT
85 y/o male PMHX  Parkinson's disease, DM, HTN, HLD, CAD s/p CABG, BPH, BIBA, as per EMS pt from home and daughter stated pt is having difficulty ambulating and complaining of pain all over. pt is hard of hearing and poor historian, pt states his joints hurt, denies any CP or SOB, abdomen distended, soft, non tender, denies abdominal pain, denies urinary symptoms, denies nausea, vomiting, diarrhea, breathing unlabored, chest rise symmetrical, no facial droop noted, pt has equal strength bilateral arms and legs, but is unable to keep his legs up in place.  pt placed on cardiac monitor, pending labs, safety precautions in place.

## 2020-12-24 NOTE — ED PROVIDER NOTE - CARE PLAN
Principal Discharge DX:	Febrile illness, acute   Principal Discharge DX:	Febrile illness, acute  Secondary Diagnosis:	Anemia, unspecified type

## 2020-12-25 DIAGNOSIS — D64.9 ANEMIA, UNSPECIFIED: ICD-10-CM

## 2020-12-25 DIAGNOSIS — G20 PARKINSON'S DISEASE: ICD-10-CM

## 2020-12-25 DIAGNOSIS — E11.9 TYPE 2 DIABETES MELLITUS WITHOUT COMPLICATIONS: ICD-10-CM

## 2020-12-25 DIAGNOSIS — I25.10 ATHEROSCLEROTIC HEART DISEASE OF NATIVE CORONARY ARTERY WITHOUT ANGINA PECTORIS: ICD-10-CM

## 2020-12-25 DIAGNOSIS — R50.9 FEVER, UNSPECIFIED: ICD-10-CM

## 2020-12-25 DIAGNOSIS — J12.9 VIRAL PNEUMONIA, UNSPECIFIED: ICD-10-CM

## 2020-12-25 LAB
A1C WITH ESTIMATED AVERAGE GLUCOSE RESULT: 5.9 % — HIGH (ref 4–5.6)
ALBUMIN SERPL ELPH-MCNC: 3.2 G/DL — LOW (ref 3.3–5)
ALP SERPL-CCNC: 50 U/L — SIGNIFICANT CHANGE UP (ref 40–120)
ALT FLD-CCNC: 14 U/L — SIGNIFICANT CHANGE UP (ref 10–45)
ANION GAP SERPL CALC-SCNC: 11 MMOL/L — SIGNIFICANT CHANGE UP (ref 5–17)
AST SERPL-CCNC: 13 U/L — SIGNIFICANT CHANGE UP (ref 10–40)
BILIRUB SERPL-MCNC: 0.5 MG/DL — SIGNIFICANT CHANGE UP (ref 0.2–1.2)
BUN SERPL-MCNC: 27 MG/DL — HIGH (ref 7–23)
CALCIUM SERPL-MCNC: 8.5 MG/DL — SIGNIFICANT CHANGE UP (ref 8.4–10.5)
CHLORIDE SERPL-SCNC: 101 MMOL/L — SIGNIFICANT CHANGE UP (ref 96–108)
CO2 SERPL-SCNC: 24 MMOL/L — SIGNIFICANT CHANGE UP (ref 22–31)
CREAT SERPL-MCNC: 0.84 MG/DL — SIGNIFICANT CHANGE UP (ref 0.5–1.3)
CULTURE RESULTS: SIGNIFICANT CHANGE UP
ESTIMATED AVERAGE GLUCOSE: 123 MG/DL — HIGH (ref 68–114)
GLUCOSE BLDC GLUCOMTR-MCNC: 141 MG/DL — HIGH (ref 70–99)
GLUCOSE BLDC GLUCOMTR-MCNC: 180 MG/DL — HIGH (ref 70–99)
GLUCOSE BLDC GLUCOMTR-MCNC: 230 MG/DL — HIGH (ref 70–99)
GLUCOSE BLDC GLUCOMTR-MCNC: 243 MG/DL — HIGH (ref 70–99)
GLUCOSE SERPL-MCNC: 167 MG/DL — HIGH (ref 70–99)
HCT VFR BLD CALC: 23.6 % — LOW (ref 39–50)
HGB BLD-MCNC: 7.6 G/DL — LOW (ref 13–17)
MCHC RBC-ENTMCNC: 29.1 PG — SIGNIFICANT CHANGE UP (ref 27–34)
MCHC RBC-ENTMCNC: 32.2 GM/DL — SIGNIFICANT CHANGE UP (ref 32–36)
MCV RBC AUTO: 90.4 FL — SIGNIFICANT CHANGE UP (ref 80–100)
NRBC # BLD: 1 /100 WBCS — HIGH (ref 0–0)
PLATELET # BLD AUTO: 202 K/UL — SIGNIFICANT CHANGE UP (ref 150–400)
POTASSIUM SERPL-MCNC: 3.9 MMOL/L — SIGNIFICANT CHANGE UP (ref 3.5–5.3)
POTASSIUM SERPL-SCNC: 3.9 MMOL/L — SIGNIFICANT CHANGE UP (ref 3.5–5.3)
PROT SERPL-MCNC: 5.1 G/DL — LOW (ref 6–8.3)
RBC # BLD: 2.61 M/UL — LOW (ref 4.2–5.8)
RBC # FLD: 16.4 % — HIGH (ref 10.3–14.5)
SARS-COV-2 IGG SERPL QL IA: NEGATIVE — SIGNIFICANT CHANGE UP
SARS-COV-2 IGM SERPL IA-ACNC: 0.06 INDEX — SIGNIFICANT CHANGE UP
SODIUM SERPL-SCNC: 136 MMOL/L — SIGNIFICANT CHANGE UP (ref 135–145)
SPECIMEN SOURCE: SIGNIFICANT CHANGE UP
WBC # BLD: 7.93 K/UL — SIGNIFICANT CHANGE UP (ref 3.8–10.5)
WBC # FLD AUTO: 7.93 K/UL — SIGNIFICANT CHANGE UP (ref 3.8–10.5)

## 2020-12-25 RX ORDER — ACETAMINOPHEN 500 MG
1000 TABLET ORAL ONCE
Refills: 0 | Status: COMPLETED | OUTPATIENT
Start: 2020-12-25 | End: 2020-12-25

## 2020-12-25 RX ADMIN — SERTRALINE 25 MILLIGRAM(S): 25 TABLET, FILM COATED ORAL at 11:21

## 2020-12-25 RX ADMIN — Medication 1000 MILLIGRAM(S): at 21:33

## 2020-12-25 RX ADMIN — CARBIDOPA AND LEVODOPA 1 TABLET(S): 25; 100 TABLET ORAL at 13:06

## 2020-12-25 RX ADMIN — AMIODARONE HYDROCHLORIDE 200 MILLIGRAM(S): 400 TABLET ORAL at 05:08

## 2020-12-25 RX ADMIN — Medication 650 MILLIGRAM(S): at 12:20

## 2020-12-25 RX ADMIN — Medication 5 MILLIGRAM(S): at 05:09

## 2020-12-25 RX ADMIN — Medication 25 MILLIGRAM(S): at 05:08

## 2020-12-25 RX ADMIN — Medication 2: at 11:34

## 2020-12-25 RX ADMIN — INSULIN GLARGINE 10 UNIT(S): 100 INJECTION, SOLUTION SUBCUTANEOUS at 22:11

## 2020-12-25 RX ADMIN — CEFTRIAXONE 100 MILLIGRAM(S): 500 INJECTION, POWDER, FOR SOLUTION INTRAMUSCULAR; INTRAVENOUS at 11:28

## 2020-12-25 RX ADMIN — Medication 400 MILLIGRAM(S): at 17:30

## 2020-12-25 RX ADMIN — Medication 650 MILLIGRAM(S): at 03:59

## 2020-12-25 RX ADMIN — Medication 0: at 22:11

## 2020-12-25 RX ADMIN — CARBIDOPA AND LEVODOPA 1 TABLET(S): 25; 100 TABLET ORAL at 05:08

## 2020-12-25 RX ADMIN — Medication 650 MILLIGRAM(S): at 04:30

## 2020-12-25 RX ADMIN — Medication 650 MILLIGRAM(S): at 11:20

## 2020-12-25 RX ADMIN — PANTOPRAZOLE SODIUM 40 MILLIGRAM(S): 20 TABLET, DELAYED RELEASE ORAL at 05:08

## 2020-12-25 RX ADMIN — AZITHROMYCIN 250 MILLIGRAM(S): 500 TABLET, FILM COATED ORAL at 11:21

## 2020-12-25 RX ADMIN — HEPARIN SODIUM 5000 UNIT(S): 5000 INJECTION INTRAVENOUS; SUBCUTANEOUS at 05:08

## 2020-12-25 RX ADMIN — Medication 1: at 08:04

## 2020-12-25 NOTE — PROGRESS NOTE ADULT - SUBJECTIVE AND OBJECTIVE BOX
Patient is a 86y old  Male who presents with a chief complaint of generalized weakness / FTT (25 Dec 2020 14:04)      INTERVAL HPI/OVERNIGHT EVENTS:  T(C): 36.6 (20 @ 19:51), Max: 36.7 (20 @ 17:12)  HR: 73 (20 @ 19:51) (62 - 79)  BP: 128/69 (20 @ 19:51) (111/66 - 137/65)  RR: 18 (20 @ 19:51) (18 - 20)  SpO2: 97% (20 @ 19:51) (96% - 100%)  Wt(kg): --  I&O's Summary    25 Dec 2020 07:01  -  25 Dec 2020 23:10  --------------------------------------------------------  IN: 780 mL / OUT: 150 mL / NET: 630 mL        PAST MEDICAL & SURGICAL HISTORY:  CAD (coronary artery disease)    DM (diabetes mellitus)    HLD (hyperlipidemia)    HTN (hypertension)    Parkinson disease    Benign prostatic hyperplasia    Syncope    Urinary frequency    Osteoporosis    Hypertension    CAD (coronary artery disease)    Dyslipidemia    Diabetes mellitus    History of knee replacement procedure of left knee  2017    S/P CABG (coronary artery bypass graft)      History of total left knee replacement  2014    S/P coronary artery stent placement in         SOCIAL HISTORY  Alcohol:  Tobacco:  Illicit substance use:    FAMILY HISTORY:    REVIEW OF SYSTEMS:  CONSTITUTIONAL: No fever, weight loss, or fatigue  EYES: No eye pain, visual disturbances, or discharge  ENMT:  No difficulty hearing, tinnitus, vertigo; No sinus or throat pain  NECK: No pain or stiffness  RESPIRATORY: No cough, wheezing, chills or hemoptysis; No shortness of breath  CARDIOVASCULAR: No chest pain, palpitations, dizziness, or leg swelling  GASTROINTESTINAL: No abdominal or epigastric pain. No nausea, vomiting, or hematemesis; No diarrhea or constipation. No melena or hematochezia.  GENITOURINARY: No dysuria, frequency, hematuria, or incontinence  NEUROLOGICAL: No headaches, memory loss, loss of strength, numbness, or tremors  SKIN: No itching, burning, rashes, or lesions   LYMPH NODES: No enlarged glands  ENDOCRINE: No heat or cold intolerance; No hair loss  MUSCULOSKELETAL: No joint pain or swelling; No muscle, back, or extremity pain  PSYCHIATRIC: No depression, anxiety, mood swings, or difficulty sleeping  HEME/LYMPH: No easy bruising, or bleeding gums  ALLERY AND IMMUNOLOGIC: No hives or eczema    RADIOLOGY & ADDITIONAL TESTS:    Imaging Personally Reviewed:  [ ] YES  [ ] NO    Consultant(s) Notes Reviewed:  [ ] YES  [ ] NO    PHYSICAL EXAM:  GENERAL: NAD, well-groomed, well-developed  HEAD:  Atraumatic, Normocephalic  EYES: EOMI, PERRLA, conjunctiva and sclera clear  ENMT: No tonsillar erythema, exudates, or enlargement; Moist mucous membranes, Good dentition, No lesions  NECK: Supple, No JVD, Normal thyroid  NERVOUS SYSTEM:  Alert & Oriented X3, Good concentration; Motor Strength 5/5 B/L upper and lower extremities; DTRs 2+ intact and symmetric  CHEST/LUNG: Clear to percussion bilaterally; No rales, rhonchi, wheezing, or rubs  HEART: Regular rate and rhythm; No murmurs, rubs, or gallops  ABDOMEN: Soft, Nontender, Nondistended; Bowel sounds present  EXTREMITIES:  2+ Peripheral Pulses, No clubbing, cyanosis, or edema  LYMPH: No lymphadenopathy noted  SKIN: No rashes or lesions    LABS:                        7.6    7.93  )-----------( 202      ( 25 Dec 2020 05:46 )             23.6     12-    136  |  101  |  27<H>  ----------------------------<  167<H>  3.9   |  24  |  0.84    Ca    8.5      25 Dec 2020 05:46    TPro  5.1<L>  /  Alb  3.2<L>  /  TBili  0.5  /  DBili  x   /  AST  13  /  ALT  14  /  AlkPhos  50      PT/INR - ( 24 Dec 2020 09:24 )   PT: 11.9 sec;   INR: 0.99 ratio         PTT - ( 24 Dec 2020 09:24 )  PTT:21.2 sec  Urinalysis Basic - ( 24 Dec 2020 09:24 )    Color: Light Yellow / Appearance: Clear / S.020 / pH: x  Gluc: x / Ketone: Negative  / Bili: Negative / Urobili: Negative   Blood: x / Protein: Trace / Nitrite: Negative   Leuk Esterase: Negative / RBC: x / WBC x   Sq Epi: x / Non Sq Epi: x / Bacteria: x      CAPILLARY BLOOD GLUCOSE      POCT Blood Glucose.: 230 mg/dL (25 Dec 2020 21:18)  POCT Blood Glucose.: 141 mg/dL (25 Dec 2020 16:23)  POCT Blood Glucose.: 243 mg/dL (25 Dec 2020 11:26)  POCT Blood Glucose.: 180 mg/dL (25 Dec 2020 07:57)        Urinalysis Basic - ( 24 Dec 2020 09:24 )    Color: Light Yellow / Appearance: Clear / S.020 / pH: x  Gluc: x / Ketone: Negative  / Bili: Negative / Urobili: Negative   Blood: x / Protein: Trace / Nitrite: Negative   Leuk Esterase: Negative / RBC: x / WBC x   Sq Epi: x / Non Sq Epi: x / Bacteria: x        MEDICATIONS  (STANDING):  aMIOdarone    Tablet 200 milliGRAM(s) Oral daily  azithromycin   Tablet 250 milliGRAM(s) Oral daily  carbidopa/levodopa  25/100 1 Tablet(s) Oral three times a day  cefTRIAXone   IVPB 1000 milliGRAM(s) IV Intermittent every 24 hours  dextrose 40% Gel 15 Gram(s) Oral once  dextrose 5%. 1000 milliLiter(s) (50 mL/Hr) IV Continuous <Continuous>  dextrose 5%. 1000 milliLiter(s) (100 mL/Hr) IV Continuous <Continuous>  dextrose 50% Injectable 25 Gram(s) IV Push once  dextrose 50% Injectable 12.5 Gram(s) IV Push once  dextrose 50% Injectable 25 Gram(s) IV Push once  glucagon  Injectable 1 milliGRAM(s) IntraMuscular once  heparin   Injectable 5000 Unit(s) SubCutaneous every 12 hours  insulin glargine Injectable (LANTUS) 10 Unit(s) SubCutaneous at bedtime  insulin lispro (ADMELOG) corrective regimen sliding scale   SubCutaneous three times a day before meals  insulin lispro (ADMELOG) corrective regimen sliding scale   SubCutaneous at bedtime  metoprolol tartrate 25 milliGRAM(s) Oral two times a day  oxybutynin 5 milliGRAM(s) Oral two times a day  pantoprazole    Tablet 40 milliGRAM(s) Oral before breakfast  sertraline 25 milliGRAM(s) Oral daily    MEDICATIONS  (PRN):  acetaminophen   Tablet .. 650 milliGRAM(s) Oral every 6 hours PRN Temp greater or equal to 38C (100.4F), Moderate Pain (4 - 6)      Care Discussed with Consultants/Other Providers [ ] YES  [ ] NO

## 2020-12-25 NOTE — PROGRESS NOTE ADULT - SUBJECTIVE AND OBJECTIVE BOX
Patient is a 86y old  Male who presents with a chief complaint of generalized weakness / FTT (24 Dec 2020 19:20)      HPI:  85yo male with pmh Parkinson's disease, DM, HTN, HLD, CAD s/p CABG, BPH presenting with generalized weakness.  Endorsing diffuse pains, weakness all over over past few days.  Has had difficulty walking and getting around because of this.  Lives at home with family.  No chest pain, cough.  Denies falls.  States he has been covid tested 2x which have been negative (24 Dec 2020 19:20)    called daughter to get more history : From  : he lost his appetite and became weak: and for next three days he became very week and turned cold and not feeling good: and could not go to the bathroom; he never had covid pneumonia  he never smoked: not on oxygen at home:   never used pumps: He was walking on Saturday around the house:  no nobdy has covid infection        ?FOLLOWING PRESENT  [x ] Hx of PE/DVT, [x ] Hx COPD, [x ] Hx of Asthma, [ ] Hx of Hospitalization, [ ]  Hx of BiPAP/CPAP use, [ ] Hx of PEYMAN    Allergies    No Known Allergies    Intolerances        PAST MEDICAL & SURGICAL HISTORY:  CAD (coronary artery disease)    DM (diabetes mellitus)    HLD (hyperlipidemia)    HTN (hypertension)    Parkinson disease    Benign prostatic hyperplasia    Syncope    Urinary frequency    Osteoporosis    Hypertension    CAD (coronary artery disease)    Dyslipidemia    Diabetes mellitus    History of knee replacement procedure of left knee      S/P CABG (coronary artery bypass graft)      History of total left knee replacement  2014    S/P coronary artery stent placement in         FAMILY HISTORY:  No pertinent family history in first degree relatives    Family history of coronary artery disease        Social History: [  x] TOBACCO                  [ x ] ETOH                                 x[  ] IVDA/DRUGS    REVIEW OF SYSTEMS      General:	weaknesss    Skin/Breast:x  	  Ophthalmologic:x  	  ENMT:	x    Respiratory and Thorax: weakness: not SB   	  Cardiovascular:	x    Gastrointestinal:	x    Genitourinary:	x    Musculoskeletal:	 centralised bodyaches    Neurological:	x    Psychiatric:	x    Hematology/Lymphatics:	x    Endocrine:	x    Allergic/Immunologic:	x    MEDICATIONS  (STANDING):  aMIOdarone    Tablet 200 milliGRAM(s) Oral daily  azithromycin   Tablet 250 milliGRAM(s) Oral daily  carbidopa/levodopa  25/100 1 Tablet(s) Oral three times a day  cefTRIAXone   IVPB 1000 milliGRAM(s) IV Intermittent every 24 hours  dextrose 40% Gel 15 Gram(s) Oral once  dextrose 5%. 1000 milliLiter(s) (50 mL/Hr) IV Continuous <Continuous>  dextrose 5%. 1000 milliLiter(s) (100 mL/Hr) IV Continuous <Continuous>  dextrose 50% Injectable 25 Gram(s) IV Push once  dextrose 50% Injectable 12.5 Gram(s) IV Push once  dextrose 50% Injectable 25 Gram(s) IV Push once  glucagon  Injectable 1 milliGRAM(s) IntraMuscular once  heparin   Injectable 5000 Unit(s) SubCutaneous every 12 hours  insulin glargine Injectable (LANTUS) 10 Unit(s) SubCutaneous at bedtime  insulin lispro (ADMELOG) corrective regimen sliding scale   SubCutaneous three times a day before meals  insulin lispro (ADMELOG) corrective regimen sliding scale   SubCutaneous at bedtime  metoprolol tartrate 25 milliGRAM(s) Oral two times a day  oxybutynin 5 milliGRAM(s) Oral two times a day  pantoprazole    Tablet 40 milliGRAM(s) Oral before breakfast  sertraline 25 milliGRAM(s) Oral daily    MEDICATIONS  (PRN):  acetaminophen   Tablet .. 650 milliGRAM(s) Oral every 6 hours PRN Temp greater or equal to 38C (100.4F), Moderate Pain (4 - 6)       Vital Signs Last 24 Hrs  T(C): 36.6 (25 Dec 2020 11:44), Max: 37.2 (24 Dec 2020 18:39)  T(F): 97.9 (25 Dec 2020 11:44), Max: 99 (24 Dec 2020 18:39)  HR: 62 (25 Dec 2020 11:44) (62 - 79)  BP: 124/66 (25 Dec 2020 11:44) (111/66 - 145/64)  BP(mean): --  RR: 18 (25 Dec 2020 11:44) (18 - 20)  SpO2: 96% (25 Dec 2020 11:44) (96% - 99%)        I&O's Summary    25 Dec 2020 07:01  -  25 Dec 2020 14:05  --------------------------------------------------------  IN: 480 mL / OUT: 0 mL / NET: 480 mL        Physical Exam:   GENERAL: NAD, well-groomed, well-developed  HEENT: CRIS/   Atraumatic, Normocephalic  ENMT: No tonsillar erythema, exudates, or enlargement; Moist mucous membranes, Good dentition, No lesions  NECK: Supple, No JVD, Normal thyroid  CHEST/LUNG: mild crackles at home: no wheezing  CVS: Regular rate and rhythm; No murmurs, rubs, or gallops  GI: : Soft, Nontender, Nondistended; Bowel sounds present  NERVOUS SYSTEM:  Alert & awake  EXTREMITIES: - edema  LYMPH: No lymphadenopathy noted  SKIN: No rashes or lesions  ENDOCRINOLOGY: No Thyromegaly  PSYCH: Appropriate    Labs:  Venous<53<4>>22<<7.385>>Venous<<3><<4><<5<<229>>                            7.6    7.93  )-----------( 202      ( 25 Dec 2020 05:46 )             23.6                         8.5    10.13 )-----------( 223      ( 24 Dec 2020 20:52 )             25.9                         5.0    11.80 )-----------( 308      ( 24 Dec 2020 09:24 )             16.1     12-    136  |  101  |  27<H>  ----------------------------<  167<H>  3.9   |  24  |  0.84  12-    143  |  105  |  38<H>  ----------------------------<  214<H>  4.2   |  28  |  1.09    Ca    8.5      25 Dec 2020 05:46  Ca    9.4      24 Dec 2020 09:24    TPro  5.1<L>  /  Alb  3.2<L>  /  TBili  0.5  /  DBili  x   /  AST  13  /  ALT  14  /  AlkPhos  50    TPro  6.3  /  Alb  4.2  /  TBili  0.3  /  DBili  x   /  AST  21  /  ALT  17  /  AlkPhos  54  12-24    CAPILLARY BLOOD GLUCOSE      POCT Blood Glucose.: 243 mg/dL (25 Dec 2020 11:26)  POCT Blood Glucose.: 180 mg/dL (25 Dec 2020 07:57)  POCT Blood Glucose.: 252 mg/dL (24 Dec 2020 21:05)  POCT Blood Glucose.: 163 mg/dL (24 Dec 2020 17:21)    LIVER FUNCTIONS - ( 25 Dec 2020 05:46 )  Alb: 3.2 g/dL / Pro: 5.1 g/dL / ALK PHOS: 50 U/L / ALT: 14 U/L / AST: 13 U/L / GGT: x           PT/INR - ( 24 Dec 2020 09:24 )   PT: 11.9 sec;   INR: 0.99 ratio         PTT - ( 24 Dec 2020 09:24 )  PTT:21.2 sec  Urinalysis Basic - ( 24 Dec 2020 09:24 )    Color: Light Yellow / Appearance: Clear / S.020 / pH: x  Gluc: x / Ketone: Negative  / Bili: Negative / Urobili: Negative   Blood: x / Protein: Trace / Nitrite: Negative   Leuk Esterase: Negative / RBC: x / WBC x   Sq Epi: x / Non Sq Epi: x / Bacteria: x      D DImer      Studies  Chest X-RAY  CT SCAN Chest   CT Abdomen  Venous Dopplers: LE:   Others    < from: CT Abdomen and Pelvis w/ IV Cont (20 @ 13:24) >  Precontrast, Arterial and Delayed phases were performed.  Intravenous contrast: 90 ml Omnipaque 350. 10 ml discarded.  Oral contrast: None.  Sagittal and coronal reformats were performed.    FINDINGS:  LOWER CHEST: Median sternotomy.    LIVER: Within normal limits.  BILE DUCTS: Normal caliber.  GALLBLADDER: Within normal limits.  SPLEEN: Within normal limits.  PANCREAS: Within normal limits.  ADRENALS: Indeterminant 2.0 cm right adrenal nodule is unchanged.  KIDNEYS/URETERS: No hydronephrosis. Renal hypodensities too small to characterize.    BLADDER: Within normal limits.  REPRODUCTIVE ORGANS: Prostate is enlarged.    BOWEL: No bowel obstruction. Appendix is normal. No intraluminal extravasation of intravenous contrast.  PERITONEUM: No ascites.  VESSELS: Atherosclerotic changes.  RETROPERITONEUM/LYMPH NODES: No lymphadenopathy.  ABDOMINAL WALL: Within normal limits.  BONES: Degenerative changes.    IMPRESSION:  No evidence of active GI bleed.                  ARMANDO SANDOVAL MD; Attending Radiologist  This document has been electronically signed. Dec 24 2020  1:36PM    < end of copied text >  < from: Xray Chest 1 View- PORTABLE-Urgent (20 @ 09:56) >  PROCEDURE DATE:  2020            INTERPRETATION:  CLINICAL INFORMATION: Sepsis.    TECHNIQUE: Frontal radiograph of the chest.    COMPARISON: Chest x-ray 2020.    FINDINGS/  IMPRESSION:  Surgical clips overlie the left hemithorax.  The lungs are clear. No pleural effusion or pneumothorax.  Status post sternotomy. The heart size cannot be accurately assessed on this projection.  The visualized osseous structures are unremarkable.                BRITTA SANCHES MD; Resident Radiology  This document has been electronically signed.  JONATHAN VASQUEZ MD; Attending Radiologist  This document has been electronically signed. Dec 24 2020 10:43AM    < end of copied text >  < from: CT Chest No Cont (17 @ 17:34) >  LS: Atherosclerotic calcification of the thoracic aorta. The main   pulmonary arteries normal in caliber. Coronary artery calcifications.  HEART: Mild cardiomegaly. Aortic valve and mitral annular calcifications.   Trace pericardial effusion. Retained epicardial pacing wire is coiled in   the inferior pericardial space.  MEDIASTINUM AND SHERI: Small mediastinal lymph nodes measuring up to 1.2   cm in short axis.  CHEST WALL AND LOWER NECK: Within normal limits.  VISUALIZED UPPER ABDOMEN: Small hiatal hernia. Stable 1.8 cm right   adrenal nodule, likely adenoma.  BONES: Degenerative changes of the spine. Status post median sternotomy.    IMPRESSION: Small bilateral pleural effusions and adjacent atelectasis.   Trace pericardial effusion.    Possible right upper lobe pneumonia.                ARMANDO SANDOVAL M.D., RADIOLOGY RESIDENT  This document has been electronically signed.  SANJEEV ADAMS M.D., ATTENDING RADIOLOGIST  This document has been electronically signed. 2017 10:37AM        < end of copied text >

## 2020-12-26 LAB
ANION GAP SERPL CALC-SCNC: 9 MMOL/L — SIGNIFICANT CHANGE UP (ref 5–17)
BUN SERPL-MCNC: 26 MG/DL — HIGH (ref 7–23)
CALCIUM SERPL-MCNC: 8.3 MG/DL — LOW (ref 8.4–10.5)
CHLORIDE SERPL-SCNC: 101 MMOL/L — SIGNIFICANT CHANGE UP (ref 96–108)
CO2 SERPL-SCNC: 23 MMOL/L — SIGNIFICANT CHANGE UP (ref 22–31)
CREAT SERPL-MCNC: 0.78 MG/DL — SIGNIFICANT CHANGE UP (ref 0.5–1.3)
GLUCOSE BLDC GLUCOMTR-MCNC: 166 MG/DL — HIGH (ref 70–99)
GLUCOSE BLDC GLUCOMTR-MCNC: 216 MG/DL — HIGH (ref 70–99)
GLUCOSE BLDC GLUCOMTR-MCNC: 274 MG/DL — HIGH (ref 70–99)
GLUCOSE BLDC GLUCOMTR-MCNC: 275 MG/DL — HIGH (ref 70–99)
GLUCOSE SERPL-MCNC: 242 MG/DL — HIGH (ref 70–99)
HCT VFR BLD CALC: 25.4 % — LOW (ref 39–50)
HGB BLD-MCNC: 8 G/DL — LOW (ref 13–17)
LEGIONELLA AG UR QL: NEGATIVE — SIGNIFICANT CHANGE UP
MAGNESIUM SERPL-MCNC: 2.2 MG/DL — SIGNIFICANT CHANGE UP (ref 1.6–2.6)
MCHC RBC-ENTMCNC: 28.6 PG — SIGNIFICANT CHANGE UP (ref 27–34)
MCHC RBC-ENTMCNC: 31.5 GM/DL — LOW (ref 32–36)
MCV RBC AUTO: 90.7 FL — SIGNIFICANT CHANGE UP (ref 80–100)
NRBC # BLD: 0 /100 WBCS — SIGNIFICANT CHANGE UP (ref 0–0)
PHOSPHATE SERPL-MCNC: 2.9 MG/DL — SIGNIFICANT CHANGE UP (ref 2.5–4.5)
PLATELET # BLD AUTO: 254 K/UL — SIGNIFICANT CHANGE UP (ref 150–400)
POTASSIUM SERPL-MCNC: 3.8 MMOL/L — SIGNIFICANT CHANGE UP (ref 3.5–5.3)
POTASSIUM SERPL-SCNC: 3.8 MMOL/L — SIGNIFICANT CHANGE UP (ref 3.5–5.3)
RBC # BLD: 2.8 M/UL — LOW (ref 4.2–5.8)
RBC # FLD: 15.5 % — HIGH (ref 10.3–14.5)
SODIUM SERPL-SCNC: 133 MMOL/L — LOW (ref 135–145)
WBC # BLD: 8.32 K/UL — SIGNIFICANT CHANGE UP (ref 3.8–10.5)
WBC # FLD AUTO: 8.32 K/UL — SIGNIFICANT CHANGE UP (ref 3.8–10.5)

## 2020-12-26 PROCEDURE — 71250 CT THORAX DX C-: CPT | Mod: 26

## 2020-12-26 PROCEDURE — 99222 1ST HOSP IP/OBS MODERATE 55: CPT | Mod: GC

## 2020-12-26 RX ORDER — LIDOCAINE 4 G/100G
1 CREAM TOPICAL DAILY
Refills: 0 | Status: DISCONTINUED | OUTPATIENT
Start: 2020-12-26 | End: 2020-12-29

## 2020-12-26 RX ORDER — POTASSIUM CHLORIDE 20 MEQ
20 PACKET (EA) ORAL ONCE
Refills: 0 | Status: DISCONTINUED | OUTPATIENT
Start: 2020-12-26 | End: 2020-12-26

## 2020-12-26 RX ORDER — METOPROLOL TARTRATE 50 MG
50 TABLET ORAL
Refills: 0 | Status: DISCONTINUED | OUTPATIENT
Start: 2020-12-26 | End: 2020-12-29

## 2020-12-26 RX ORDER — POTASSIUM CHLORIDE 20 MEQ
20 PACKET (EA) ORAL ONCE
Refills: 0 | Status: COMPLETED | OUTPATIENT
Start: 2020-12-26 | End: 2020-12-26

## 2020-12-26 RX ADMIN — LIDOCAINE 1 PATCH: 4 CREAM TOPICAL at 19:58

## 2020-12-26 RX ADMIN — AMIODARONE HYDROCHLORIDE 200 MILLIGRAM(S): 400 TABLET ORAL at 05:36

## 2020-12-26 RX ADMIN — Medication 5 MILLIGRAM(S): at 05:36

## 2020-12-26 RX ADMIN — HEPARIN SODIUM 5000 UNIT(S): 5000 INJECTION INTRAVENOUS; SUBCUTANEOUS at 05:36

## 2020-12-26 RX ADMIN — Medication 3: at 11:56

## 2020-12-26 RX ADMIN — CEFTRIAXONE 100 MILLIGRAM(S): 500 INJECTION, POWDER, FOR SOLUTION INTRAMUSCULAR; INTRAVENOUS at 11:24

## 2020-12-26 RX ADMIN — SERTRALINE 25 MILLIGRAM(S): 25 TABLET, FILM COATED ORAL at 15:28

## 2020-12-26 RX ADMIN — PANTOPRAZOLE SODIUM 40 MILLIGRAM(S): 20 TABLET, DELAYED RELEASE ORAL at 05:36

## 2020-12-26 RX ADMIN — Medication 5 MILLIGRAM(S): at 18:00

## 2020-12-26 RX ADMIN — CARBIDOPA AND LEVODOPA 1 TABLET(S): 25; 100 TABLET ORAL at 15:28

## 2020-12-26 RX ADMIN — Medication 50 MILLIGRAM(S): at 17:51

## 2020-12-26 RX ADMIN — CARBIDOPA AND LEVODOPA 1 TABLET(S): 25; 100 TABLET ORAL at 05:36

## 2020-12-26 RX ADMIN — HEPARIN SODIUM 5000 UNIT(S): 5000 INJECTION INTRAVENOUS; SUBCUTANEOUS at 17:48

## 2020-12-26 RX ADMIN — Medication 1: at 07:58

## 2020-12-26 RX ADMIN — Medication 25 MILLIGRAM(S): at 05:36

## 2020-12-26 RX ADMIN — INSULIN GLARGINE 10 UNIT(S): 100 INJECTION, SOLUTION SUBCUTANEOUS at 21:21

## 2020-12-26 RX ADMIN — Medication 3: at 16:53

## 2020-12-26 RX ADMIN — CARBIDOPA AND LEVODOPA 1 TABLET(S): 25; 100 TABLET ORAL at 21:21

## 2020-12-26 RX ADMIN — Medication 20 MILLIEQUIVALENT(S): at 19:59

## 2020-12-26 NOTE — PROGRESS NOTE ADULT - SUBJECTIVE AND OBJECTIVE BOX
Patient is a 86y old  Male who presents with a chief complaint of generalized weakness / FTT (26 Dec 2020 11:50)    doing fair , c/o weakness   INTERVAL HPI/OVERNIGHT EVENTS:  T(C): 37.2 (12-26-20 @ 19:47), Max: 37.2 (12-26-20 @ 19:47)  HR: 63 (12-26-20 @ 19:47) (63 - 72)  BP: 115/61 (12-26-20 @ 19:47) (115/61 - 133/62)  RR: 18 (12-26-20 @ 19:47) (18 - 18)  SpO2: 98% (12-26-20 @ 19:47) (98% - 99%)  Wt(kg): --  I&O's Summary    25 Dec 2020 07:01  -  26 Dec 2020 07:00  --------------------------------------------------------  IN: 780 mL / OUT: 150 mL / NET: 630 mL    26 Dec 2020 07:01  -  26 Dec 2020 20:17  --------------------------------------------------------  IN: 695 mL / OUT: 500 mL / NET: 195 mL        PAST MEDICAL & SURGICAL HISTORY:  CAD (coronary artery disease)    DM (diabetes mellitus)    HLD (hyperlipidemia)    HTN (hypertension)    Parkinson disease    Benign prostatic hyperplasia    Syncope    Urinary frequency    Osteoporosis    Hypertension    CAD (coronary artery disease)    Dyslipidemia    Diabetes mellitus    History of knee replacement procedure of left knee  2017    S/P CABG (coronary artery bypass graft)  2015    History of total left knee replacement  2014    S/P coronary artery stent placement in 2003        SOCIAL HISTORY  Alcohol:  Tobacco:  Illicit substance use:    FAMILY HISTORY:    REVIEW OF SYSTEMS:  CONSTITUTIONAL: No fever, weight loss, or fatigue  EYES: No eye pain, visual disturbances, or discharge  ENMT:  No difficulty hearing, tinnitus, vertigo; No sinus or throat pain  NECK: No pain or stiffness  RESPIRATORY: No cough, wheezing, chills or hemoptysis; No shortness of breath  CARDIOVASCULAR: No chest pain, palpitations, dizziness, or leg swelling  GASTROINTESTINAL: No abdominal or epigastric pain. No nausea, vomiting, or hematemesis; No diarrhea or constipation. No melena or hematochezia.  GENITOURINARY: No dysuria, frequency, hematuria, or incontinence  NEUROLOGICAL: No headaches, memory loss, loss of strength, numbness, or tremors  SKIN: No itching, burning, rashes, or lesions   LYMPH NODES: No enlarged glands  ENDOCRINE: No heat or cold intolerance; No hair loss  MUSCULOSKELETAL: No joint pain or swelling; No muscle, back, or extremity pain  PSYCHIATRIC: No depression, anxiety, mood swings, or difficulty sleeping  HEME/LYMPH: No easy bruising, or bleeding gums  ALLERY AND IMMUNOLOGIC: No hives or eczema    RADIOLOGY & ADDITIONAL TESTS:    Imaging Personally Reviewed:  [ ] YES  [ ] NO    Consultant(s) Notes Reviewed:  [ ] YES  [ ] NO    PHYSICAL EXAM:  GENERAL: NAD, well-groomed, well-developed  HEAD:  Atraumatic, Normocephalic  EYES: EOMI, PERRLA, conjunctiva and sclera clear  ENMT: No tonsillar erythema, exudates, or enlargement; Moist mucous membranes, Good dentition, No lesions  NECK: Supple, No JVD, Normal thyroid  NERVOUS SYSTEM:  Alert & Oriented X3, Good concentration; Motor Strength 5/5 B/L upper and lower extremities; DTRs 2+ intact and symmetric  CHEST/LUNG: Clear to percussion bilaterally; No rales, rhonchi, wheezing, or rubs  HEART: Regular rate and rhythm; No murmurs, rubs, or gallops  ABDOMEN: Soft, Nontender, Nondistended; Bowel sounds present  EXTREMITIES:  2+ Peripheral Pulses, No clubbing, cyanosis, or edema  LYMPH: No lymphadenopathy noted  SKIN: No rashes or lesions    LABS:                        8.0    8.32  )-----------( 254      ( 26 Dec 2020 11:44 )             25.4     12-26    133<L>  |  101  |  26<H>  ----------------------------<  242<H>  3.8   |  23  |  0.78    Ca    8.3<L>      26 Dec 2020 11:44  Phos  2.9     12-26  Mg     2.2     12-26    TPro  5.1<L>  /  Alb  3.2<L>  /  TBili  0.5  /  DBili  x   /  AST  13  /  ALT  14  /  AlkPhos  50  12-25        CAPILLARY BLOOD GLUCOSE      POCT Blood Glucose.: 274 mg/dL (26 Dec 2020 16:34)  POCT Blood Glucose.: 275 mg/dL (26 Dec 2020 11:34)  POCT Blood Glucose.: 166 mg/dL (26 Dec 2020 07:35)  POCT Blood Glucose.: 230 mg/dL (25 Dec 2020 21:18)            MEDICATIONS  (STANDING):  aMIOdarone    Tablet 200 milliGRAM(s) Oral daily  carbidopa/levodopa  25/100 1 Tablet(s) Oral three times a day  cefTRIAXone   IVPB 1000 milliGRAM(s) IV Intermittent every 24 hours  dextrose 40% Gel 15 Gram(s) Oral once  dextrose 5%. 1000 milliLiter(s) (50 mL/Hr) IV Continuous <Continuous>  dextrose 5%. 1000 milliLiter(s) (100 mL/Hr) IV Continuous <Continuous>  dextrose 50% Injectable 25 Gram(s) IV Push once  dextrose 50% Injectable 12.5 Gram(s) IV Push once  dextrose 50% Injectable 25 Gram(s) IV Push once  glucagon  Injectable 1 milliGRAM(s) IntraMuscular once  heparin   Injectable 5000 Unit(s) SubCutaneous every 12 hours  insulin glargine Injectable (LANTUS) 10 Unit(s) SubCutaneous at bedtime  insulin lispro (ADMELOG) corrective regimen sliding scale   SubCutaneous three times a day before meals  insulin lispro (ADMELOG) corrective regimen sliding scale   SubCutaneous at bedtime  lidocaine   Patch 1 Patch Transdermal daily  metoprolol tartrate 50 milliGRAM(s) Oral two times a day  oxybutynin 5 milliGRAM(s) Oral two times a day  pantoprazole    Tablet 40 milliGRAM(s) Oral before breakfast  sertraline 25 milliGRAM(s) Oral daily    MEDICATIONS  (PRN):  acetaminophen   Tablet .. 650 milliGRAM(s) Oral every 6 hours PRN Temp greater or equal to 38C (100.4F), Moderate Pain (4 - 6)      Care Discussed with Consultants/Other Providers [ ] YES  [ ] NO

## 2020-12-26 NOTE — CONSULT NOTE ADULT - SUBJECTIVE AND OBJECTIVE BOX
CHIEF COMPLAINT:Patient is a 86y old  Male who presents with a chief complaint of generalized weakness / FTT (26 Dec 2020 20:17)      HISTORY OF PRESENT ILLNESS:HPI:  85yo male with pmh Parkinson's disease, DM, HTN, HLD, CAD s/p CABG, BPH presenting with generalized weakness.  Endorsing diffuse pains, weakness all over over past few days.  Has had difficulty walking and getting around because of this.  Lives at home with family.  No chest pain, cough.  Denies falls.  States he has been covid tested 2x which have been negative (24 Dec 2020 19:20)      PAST MEDICAL & SURGICAL HISTORY:  CAD (coronary artery disease)    DM (diabetes mellitus)    HLD (hyperlipidemia)    HTN (hypertension)    Parkinson disease    Benign prostatic hyperplasia    Syncope    Urinary frequency    Osteoporosis    Hypertension    CAD (coronary artery disease)    Dyslipidemia    Diabetes mellitus    History of knee replacement procedure of left knee  2017    S/P CABG (coronary artery bypass graft)  2015    History of total left knee replacement  2014    S/P coronary artery stent placement in 2003            MEDICATIONS:  aMIOdarone    Tablet 200 milliGRAM(s) Oral daily  heparin   Injectable 5000 Unit(s) SubCutaneous every 12 hours  metoprolol tartrate 50 milliGRAM(s) Oral two times a day    cefTRIAXone   IVPB 1000 milliGRAM(s) IV Intermittent every 24 hours      acetaminophen   Tablet .. 650 milliGRAM(s) Oral every 6 hours PRN  carbidopa/levodopa  25/100 1 Tablet(s) Oral three times a day  sertraline 25 milliGRAM(s) Oral daily    pantoprazole    Tablet 40 milliGRAM(s) Oral before breakfast    dextrose 40% Gel 15 Gram(s) Oral once  dextrose 50% Injectable 25 Gram(s) IV Push once  dextrose 50% Injectable 12.5 Gram(s) IV Push once  dextrose 50% Injectable 25 Gram(s) IV Push once  glucagon  Injectable 1 milliGRAM(s) IntraMuscular once  insulin glargine Injectable (LANTUS) 10 Unit(s) SubCutaneous at bedtime  insulin lispro (ADMELOG) corrective regimen sliding scale   SubCutaneous three times a day before meals  insulin lispro (ADMELOG) corrective regimen sliding scale   SubCutaneous at bedtime    dextrose 5%. 1000 milliLiter(s) IV Continuous <Continuous>  dextrose 5%. 1000 milliLiter(s) IV Continuous <Continuous>  lidocaine   Patch 1 Patch Transdermal daily  oxybutynin 5 milliGRAM(s) Oral two times a day      FAMILY HISTORY:  No pertinent family history in first degree relatives    Family history of coronary artery disease        Non-contributory    SOCIAL HISTORY:    not a smoker    Allergies    No Known Allergies    Intolerances    	    REVIEW OF SYSTEMS: now  CONSTITUTIONAL: No fever  EYES: No eye pain, visual disturbances, or discharge  ENMT:  No difficulty hearing, tinnitus  NECK: No pain or stiffness  RESPIRATORY: No cough, wheezing,  CARDIOVASCULAR: No chest pain, palpitations, passing out, dizziness, or leg swelling  GASTROINTESTINAL:  No nausea, vomiting, diarrhea or constipation. No melena.  GENITOURINARY: No dysuria, hematuria  NEUROLOGICAL: No stroke like symptoms  SKIN: No burning or lesions   ENDOCRINE: No heat or cold intolerance  MUSCULOSKELETAL: No joint pain or swelling  PSYCHIATRIC: No  anxiety, mood swings  HEME/LYMPH: No bleeding gums  ALLERGY AND IMMUNOLOGIC: No hives or eczema	    All other ROS negative    PHYSICAL EXAM:  T(C): 37.2 (12-26-20 @ 19:47), Max: 37.2 (12-26-20 @ 19:47)  HR: 63 (12-26-20 @ 19:47) (63 - 72)  BP: 115/61 (12-26-20 @ 19:47) (115/61 - 133/62)  RR: 18 (12-26-20 @ 19:47) (18 - 18)  SpO2: 98% (12-26-20 @ 19:47) (98% - 99%)  Wt(kg): --  I&O's Summary    25 Dec 2020 07:01  -  26 Dec 2020 07:00  --------------------------------------------------------  IN: 780 mL / OUT: 150 mL / NET: 630 mL    26 Dec 2020 07:01  -  26 Dec 2020 23:00  --------------------------------------------------------  IN: 695 mL / OUT: 660 mL / NET: 35 mL        Appearance: Normal	  HEENT:   Normal oral mucosa, EOMI	  Cardiovascular:  S1 S2, No JVD,    Respiratory: Lungs clear to auscultation	  Psychiatry: Alert  Gastrointestinal:  Soft, Non-tender, + BS	  Skin: No rashes   Neurologic: Non-focal  Extremities:  No edema  Vascular: Peripheral pulses palpable    	    	  	  CARDIAC MARKERS:  Labs personally reviewed by me                                  8.0    8.32  )-----------( 254      ( 26 Dec 2020 11:44 )             25.4     12-26    133<L>  |  101  |  26<H>  ----------------------------<  242<H>  3.8   |  23  |  0.78    Ca    8.3<L>      26 Dec 2020 11:44  Phos  2.9     12-26  Mg     2.2     12-26    TPro  5.1<L>  /  Alb  3.2<L>  /  TBili  0.5  /  DBili  x   /  AST  13  /  ALT  14  /  AlkPhos  50  12-25          EKG: Personally reviewed by me - NSR  Radiology: Personally reviewed by me -   < from: CT Chest No Cont (12.26.20 @ 10:34) >  IMPRESSION:  Mild linear and groundglass opacities within the upper lungs may represent atypical/viral infection or pneumonitis..          Assessment and Plan:   Problem/Plan - 1:  ·  Problem: Febrile illness, acute.  Plan: likely viral   -covid-19 neg   FTT   gentle hydration.     Problem/Plan - 2:  ·  Problem: CAD (coronary artery disease).  Plan: Holding ASA given anemia  c/w Metoprolol  Will start Lipitor    Problem/Plan - 3  ·  Problem: DM (diabetes mellitus).  Plan: insulin / FSBS.     Problem/Plan - 4:  ·  Problem: Parkinson disease.  Plan: c/w senemet.     Problem/Plan - 6:  Problem: Anemia, unspecified type. Plan: holding ASA, GI recs noted        Advanced care planning/advanced directives discussed with patient/family. DNR status including forceful chest compressions to attempt to restart the heart, ventilator support/artificial breathing, electric shock, artificial nutrition, health care proxy, Molst form all discussed with pt. Pt wishes to consider. Thirty minutes spent on discussing advanced directives.       Oral Ruiz DO [facility]  Cardiovascular Medicine  [address] [place] [address], [address]  Office [phone]  Cell [phone]

## 2020-12-26 NOTE — PROVIDER CONTACT NOTE (CHANGE IN STATUS NOTIFICATION) - BACKGROUND
87 yo male admitted for fever, malaise, anemia. PMH parkinson, HTN, HLD, DM, CAD, urinary freq, syncope, BPH, OA, CAD

## 2020-12-26 NOTE — PROGRESS NOTE ADULT - SUBJECTIVE AND OBJECTIVE BOX
ISIS [id number]  86yMale  T(C): 36.6 (12-26-20 @ 08:16), Max: 36.7 (12-25-20 @ 17:12)  HR: 70 (12-26-20 @ 08:16) (62 - 73)  BP: 117/69 (12-26-20 @ 08:16) (117/69 - 137/65)  RR: 18 (12-26-20 @ 08:16) (18 - 20)  SpO2: 99% (12-26-20 @ 08:16) (96% - 100%)  Wt(kg): --  12-25 @ 07:01  -  12-26 @ 07:00  --------------------------------------------------------  IN: 780 mL / OUT: 150 mL / NET: 630 mL      normal cephalic atraumatic  s1s2   clear to ascultation bilaterally  soft, non tender, non distended no guarding or rebound  no clubbing cyanosis or edema

## 2020-12-26 NOTE — PROVIDER CONTACT NOTE (CHANGE IN STATUS NOTIFICATION) - ACTION/TREATMENT ORDERED:
SHUN Shannon notified. Vitals, Meds and labs reviewed. Mag, phos added to am BMP. Will cont to monitor on tele.

## 2020-12-26 NOTE — CONSULT NOTE ADULT - ASSESSMENT
Assessment:  The patient is an 86 year old male with past medical history of Parkinson's disease, diabetes mellitus, hypertension, hyperlipidemia, CAD s/p CABG, and BPH who presented with generalized weakness.  As per chart review, he was endorsing diffuse pains and associated weakness over the last several days.  Has had difficulty walking and getting around because of this.  Lives at home with family.  He denies fever, chills, chest pain, palpitations, abdominal pain, nausea, vomiting, diarrhea, constipation, or falls. He reports that he has been COVID-19 tested 2x which have been negative (24 Dec 2020 19:20). CBC showed normocytic anemia. CMP shows elevated BUN and hyperglycemia. Hypoalbuminemia and hypoproteinemia were noted. Blood cultures are showing NGTD. Urine cultures was showing coagulase negative staphylococcus. Respiratory viral panel was positive for enterorhinovirus.      Plan:  # Entero-rhinovirus viral pneumonia  - Continue intravenous ceftriaxone and azithromycin  - Follow official blood cultures  - Monitor fever curve  - Trend CBC and CMP daily  - ID will continue to follow    Tuan Landa MD PGY-4   Fellow, Infectious Diseases   Pager: [phone]  If no response, after 5pm and on weekends: Call [phone]   Assessment:  The patient is an 86 year old male with past medical history of Parkinson's disease, diabetes mellitus, hypertension, hyperlipidemia, CAD s/p CABG, and BPH who presented with generalized weakness.  As per chart review, he was endorsing diffuse pains and associated weakness over the last several days.  Has had difficulty walking and getting around because of this.  Lives at home with family.  He denies fever, chills, chest pain, palpitations, abdominal pain, nausea, vomiting, diarrhea, constipation, or falls. He reports that he has been COVID-19 tested 2x which have been negative (24 Dec 2020 19:20). CBC showed normocytic anemia. CMP shows elevated BUN and hyperglycemia. Hypoalbuminemia and hypoproteinemia were noted. Blood cultures are showing NGTD. Urine cultures was showing coagulase negative staphylococcus. Respiratory viral panel was positive for enterorhinovirus.      Plan:  # Entero-rhinovirus infections with underlying suspected pneumonia   - Discontinue intravenous azithromycin  - Continue intravenous ceftriaxone   - Follow official blood cultures  - Urine Legionella antigen  - Recommend GI workup   - Monitor fever curve  - Trend CBC and CMP daily  - ID will continue to follow    Tuan Landa MD PGY-4   Fellow, Infectious Diseases   Pager: [phone]  If no response, after 5pm and on weekends: Call [phone]   Assessment:  The patient is an 86 year old male with past medical history of Parkinson's disease, diabetes mellitus, hypertension, hyperlipidemia, CAD s/p CABG, and BPH who presented with generalized weakness.  As per chart review, he was endorsing diffuse pains and associated weakness over the last several days.  Has had difficulty walking and getting around because of this.  Lives at home with family.  He denies fever, chills, chest pain, palpitations, abdominal pain, nausea, vomiting, diarrhea, constipation, or falls. He reports that he has been COVID-19 tested 2x which have been negative (24 Dec 2020 19:20). CBC showed normocytic anemia. CMP shows elevated BUN and hyperglycemia. Hypoalbuminemia and hypoproteinemia were noted. Blood cultures are showing NGTD. Urine cultures was showing coagulase negative staphylococcus. Respiratory viral panel was positive for enterorhinovirus.      Plan:  # Entero-rhinovirus infections with underlying suspected pneumonia   - Discontinue intravenous azithromycin  - Continue intravenous ceftriaxone   - Follow official blood cultures  - Urine Legionella antigen  - Recommend GI workup   - Monitor fever curve  - Trend CBC and CMP daily  - Plan of care discussed with primary team  - ID will continue to follow    Tuan Landa MD PGY-4   Fellow, Infectious Diseases   Pager: [phone]  If no response, after 5pm and on weekends: Call [phone]

## 2020-12-26 NOTE — CONSULT NOTE ADULT - SUBJECTIVE AND OBJECTIVE BOX
The patient is an 86 year old  male who presents with a chief complaint of generalized weakness / FTT (25 Dec 2020 23:10)    HPI:  The patient is an 86 year old male with past medical history of Parkinson's disease, diabetes mellitus, hypertension, hyperlipidemia, CAD s/p CABG, and BPH who presented with generalized weakness.  As per chart review, he was endorsing diffuse pains and associated weakness over the last several days.  Has had difficulty walking and getting around because of this.  Lives at home with family.  He denies fever, chills, chest pain, palpitations, abdominal pain, nausea, vomiting, diarrhea, constipation, or falls. He reports that he has been COVID-19 tested 2x which have been negative (24 Dec 2020 19:20). CBC showed normocytic anemia. CMP shows elevated BUN and hyperglycemia. Hypoalbuminemia and hypoproteinemia were noted. Blood cultures are showing NGTD. Urine cultures was showing coagulase negative staphylococcus. Respiratory viral panel was positive for enterorhinovirus. COVID-19 PCR and Ab were negative. FOBT was found to be positive. CT abdomen and pelvis showed an indeterminant 2.0 cm right adrenal nodule. CXR was negative for pneumonia. He was started on intravenous ceftriaxone and intravenous azithromycin. CT chest without contrast was ordered. Infectious disease was consulted for evaluation of pneumonia.        prior hospital charts reviewed [x]  primary team notes reviewed [x]  other consultant notes reviewed [x]    PAST MEDICAL & SURGICAL HISTORY:  CAD (coronary artery disease)  DM (diabetes mellitus)  HLD (hyperlipidemia)  HTN (hypertension)  Parkinson disease  Benign prostatic hyperplasia  Syncope  Urinary frequency  Osteoporosis  Hypertension  CAD (coronary artery disease)  Dyslipidemia  Diabetes mellitus  History of knee replacement procedure of left knee    S/P CABG (coronary artery bypass graft)    History of total left knee replacement  2014  S/P coronary artery stent placement in         Allergies  No Known Allergies    ANTIMICROBIALS (past 90 days)  MEDICATIONS  (STANDING):  azithromycin   Tablet   250 milliGRAM(s) Oral (20 @ 11:21)    azithromycin  IVPB   250 mL/Hr IV Intermittent (20 @ 10:53)    cefTRIAXone   IVPB   100 mL/Hr IV Intermittent (20 @ 11:28)    cefTRIAXone   IVPB   100 mL/Hr IV Intermittent (20 @ 09:28)        azithromycin   Tablet 250 daily  cefTRIAXone   IVPB 1000 every 24 hours    OTHER MEDS: MEDICATIONS  (STANDING):  acetaminophen   Tablet .. 650 every 6 hours PRN  aMIOdarone    Tablet 200 daily  carbidopa/levodopa  25/100 1 three times a day  dextrose 40% Gel 15 once  dextrose 50% Injectable 25 once  dextrose 50% Injectable 12.5 once  dextrose 50% Injectable 25 once  glucagon  Injectable 1 once  heparin   Injectable 5000 every 12 hours  insulin glargine Injectable (LANTUS) 10 at bedtime  insulin lispro (ADMELOG) corrective regimen sliding scale  three times a day before meals  insulin lispro (ADMELOG) corrective regimen sliding scale  at bedtime  metoprolol tartrate 25 two times a day  oxybutynin 5 two times a day  pantoprazole    Tablet 40 before breakfast  sertraline 25 daily    SOCIAL HISTORY:  Denies smoking, alcohol, or recreational drug use    FAMILY HISTORY:  No pertinent family history in first degree relatives    Family history of coronary artery disease      REVIEW OF SYSTEMS  [  ] ROS unobtainable because:    [x] All other systems negative except as noted below:	    Constitutional:  [ ] fever [ ] chills  [ ] weight loss  [x] weakness  Skin:  [ ] rash [ ] phlebitis	  Eyes: [ ] icterus [ ] pain  [ ] discharge	  ENMT: [ ] sore throat  [ ] thrush [ ] ulcers [ ] exudates  Respiratory: [x] dyspnea [ ] hemoptysis [ ] cough [ ] sputum	  Cardiovascular:  [ ] chest pain [ ] palpitations [ ] edema	  Gastrointestinal:  [ ] nausea [ ] vomiting [ ] diarrhea [ ] constipation [ ] pain	  Genitourinary:  [ ] dysuria [ ] frequency [ ] hematuria [ ] discharge [ ] flank pain  [ ] incontinence  Musculoskeletal:  [ ] myalgias [ ] arthralgias [ ] arthritis  [ ] back pain  Neurological:  [ ] headache [ ] seizures  [ ] confusion/altered mental status  Psychiatric:  [ ] anxiety [ ] depression	  Hematology/Lymphatics:  [ ] lymphadenopathy  Endocrine:  [ ] adrenal [ ] thyroid  Allergic/Immunologic:	 [ ] transplant [ ] seasonal    Vital Signs Last 24 Hrs  T(F): 97.9 (20 @ 08:16), Max: 100.9 (20 @ 09:36)  Vital Signs Last 24 Hrs  HR: 70 (20 @ 08:16) (62 - 73)  BP: 117/69 (20 @ 08:16) (117/69 - 137/65)  RR: 18 (20 @ 08:16)  SpO2: 99% (20 @ 08:16) (96% - 100%)  Wt(kg): --    PHYSICAL EXAM:  Constitutional: non-toxic, no distress  HEAD/EYES: anicteric, no conjunctival injection  ENT:  supple, no thrush  Cardiovascular:   normal S1, S2, no murmur, no edema  Respiratory:  + decreased BS bilaterally, no wheezes, no rales  GI:  soft, non-tender, normal bowel sounds  :  no mccoy, no CVA tenderness  Musculoskeletal:  no synovitis, normal ROM  Neurologic: awake and alert, normal strength, no focal findings  Skin:  no rash, no erythema, no phlebitis  Heme/Onc: no lymphadenopathy   Psychiatric:  awake, alert, appropriate mood                            7.6    7.93  )-----------( 202      ( 25 Dec 2020 05:46 )             23.6       136  |  101  |  27<H>  ----------------------------<  167<H>  3.9   |  24  |  0.84    Ca    8.5      25 Dec 2020 05:46    TPro  5.1<L>  /  Alb  3.2<L>  /  TBili  0.5  /  DBili  x   /  AST  13  /  ALT  14  /  AlkPhos  50      Urinalysis Basic - ( 24 Dec 2020 09:24 )    Color: Light Yellow / Appearance: Clear / S.020 / pH: x  Gluc: x / Ketone: Negative  / Bili: Negative / Urobili: Negative   Blood: x / Protein: Trace / Nitrite: Negative   Leuk Esterase: Negative / RBC: x / WBC x   Sq Epi: x / Non Sq Epi: x / Bacteria: x    MICROBIOLOGY:  Culture - Urine (collected 24 Dec 2020 13:56)  Source: .Urine Clean Catch (Midstream)  Final Report (25 Dec 2020 21:40):    50,000 - 99,000 CFU/mL Coag Negative Staphylococcus    "Susceptibilities not performed"    Culture - Blood (collected 24 Dec 2020 13:25)  Source: .Blood Blood-Peripheral  Preliminary Report (25 Dec 2020 14:01):    No growth to date.    Culture - Blood (collected 24 Dec 2020 13:25)  Source: .Blood Blood-Peripheral  Preliminary Report (25 Dec 2020 14:01):    No growth to date.          Rapid RVP Result: Detected ( @ 09:52)      RADIOLOGY:    < from: CT Abdomen and Pelvis w/ IV Cont (20 @ 13:24) >  FINDINGS:  LOWER CHEST: Median sternotomy.    LIVER: Within normal limits.  BILE DUCTS: Normal caliber.  GALLBLADDER: Within normal limits.  SPLEEN: Within normal limits.  PANCREAS: Within normal limits.  ADRENALS: Indeterminant 2.0 cm right adrenal nodule is unchanged.  KIDNEYS/URETERS: No hydronephrosis. Renal hypodensities too small to characterize.  BLADDER: Within normal limits.  REPRODUCTIVE ORGANS: Prostate is enlarged.  BOWEL: No bowel obstruction. Appendix is normal. No intraluminal extravasation of intravenous contrast.  PERITONEUM: No ascites.  VESSELS: Atherosclerotic changes.  RETROPERITONEUM/LYMPH NODES: No lymphadenopathy.  ABDOMINAL WALL: Within normal limits.  BONES: Degenerative changes.    IMPRESSION:  No evidence of active GI bleed.    < end of copied text >    < from: Xray Chest 1 View- PORTABLE-Urgent (20 @ 09:56) >  FINDINGS/  IMPRESSION:  Surgical clips overlie the left hemithorax.  The lungs are clear. No pleural effusion or pneumothorax.  Status post sternotomy. The heart size cannot be accurately assessed on this projection.  The visualized osseous structures are unremarkable.      < end of copied text >   The patient is an 86 year old  male who presents with a chief complaint of generalized weakness / FTT (25 Dec 2020 23:10)    HPI:  The patient is an 86 year old male with past medical history of Parkinson's disease, diabetes mellitus, hypertension, hyperlipidemia, CAD s/p CABG, and BPH who presented with generalized weakness.  As per chart review, he was endorsing diffuse pains and associated weakness over the last several days.  Has had difficulty walking and getting around because of this.  Lives at home with family.  He denies fever, chills, chest pain, palpitations, abdominal pain, nausea, vomiting, diarrhea, constipation, or falls. He reports that he has been COVID-19 tested 2x which have been negative (24 Dec 2020 19:20). CBC showed normocytic anemia. CMP shows elevated BUN and hyperglycemia. Hypoalbuminemia and hypoproteinemia were noted. Blood cultures are showing NGTD. Urine cultures was showing coagulase negative staphylococcus. Respiratory viral panel was positive for enterorhinovirus. COVID-19 PCR and Ab were negative. FOBT was found to be positive. CT abdomen and pelvis showed an indeterminant 2.0 cm right adrenal nodule. CXR was negative for pneumonia. He was started on intravenous ceftriaxone and intravenous azithromycin. CT chest without contrast was ordered. Infectious disease was consulted for evaluation of pneumonia.        prior hospital charts reviewed [x]  primary team notes reviewed [x]  other consultant notes reviewed [x]    PAST MEDICAL & SURGICAL HISTORY:  CAD (coronary artery disease)  DM (diabetes mellitus)  HLD (hyperlipidemia)  HTN (hypertension)  Parkinson disease  Benign prostatic hyperplasia  Syncope  Urinary frequency  Osteoporosis  Hypertension  CAD (coronary artery disease)  Dyslipidemia  Diabetes mellitus  History of knee replacement procedure of left knee    S/P CABG (coronary artery bypass graft)    History of total left knee replacement  2014  S/P coronary artery stent placement in         Allergies  No Known Allergies    ANTIMICROBIALS (past 90 days)  MEDICATIONS  (STANDING):  azithromycin   Tablet   250 milliGRAM(s) Oral (20 @ 11:21)    azithromycin  IVPB   250 mL/Hr IV Intermittent (20 @ 10:53)    cefTRIAXone   IVPB   100 mL/Hr IV Intermittent (20 @ 11:28)    cefTRIAXone   IVPB   100 mL/Hr IV Intermittent (20 @ 09:28)        azithromycin   Tablet 250 daily  cefTRIAXone   IVPB 1000 every 24 hours    OTHER MEDS: MEDICATIONS  (STANDING):  acetaminophen   Tablet .. 650 every 6 hours PRN  aMIOdarone    Tablet 200 daily  carbidopa/levodopa  25/100 1 three times a day  dextrose 40% Gel 15 once  dextrose 50% Injectable 25 once  dextrose 50% Injectable 12.5 once  dextrose 50% Injectable 25 once  glucagon  Injectable 1 once  heparin   Injectable 5000 every 12 hours  insulin glargine Injectable (LANTUS) 10 at bedtime  insulin lispro (ADMELOG) corrective regimen sliding scale  three times a day before meals  insulin lispro (ADMELOG) corrective regimen sliding scale  at bedtime  metoprolol tartrate 25 two times a day  oxybutynin 5 two times a day  pantoprazole    Tablet 40 before breakfast  sertraline 25 daily    SOCIAL HISTORY:  Denies smoking, alcohol, or recreational drug use    FAMILY HISTORY:  No pertinent family history in first degree relatives    Family history of coronary artery disease      REVIEW OF SYSTEMS  [  ] ROS unobtainable because:    [x] All other systems negative except as noted below:	    Constitutional:  [ ] fever [ ] chills  [ ] weight loss  [x] weakness  Skin:  [ ] rash [ ] phlebitis	  Eyes: [ ] icterus [ ] pain  [ ] discharge	  ENMT: [ ] sore throat  [ ] thrush [ ] ulcers [ ] exudates  Respiratory: [x] dyspnea [ ] hemoptysis [x] cough [x] sputum	  Cardiovascular:  [ ] chest pain [ ] palpitations [ ] edema	  Gastrointestinal:  [ ] nausea [ ] vomiting [ ] diarrhea [ ] constipation [ ] pain	  Genitourinary:  [ ] dysuria [ ] frequency [ ] hematuria [ ] discharge [ ] flank pain  [ ] incontinence  Musculoskeletal:  [ ] myalgias [ ] arthralgias [ ] arthritis  [ ] back pain  Neurological:  [ ] headache [ ] seizures  [ ] confusion/altered mental status  Psychiatric:  [ ] anxiety [ ] depression	  Hematology/Lymphatics:  [ ] lymphadenopathy  Endocrine:  [ ] adrenal [ ] thyroid  Allergic/Immunologic:	 [ ] transplant [ ] seasonal    Vital Signs Last 24 Hrs  T(F): 97.9 (20 @ 08:16), Max: 100.9 (20 @ 09:36)  Vital Signs Last 24 Hrs  HR: 70 (20 @ 08:16) (62 - 73)  BP: 117/69 (20 @ 08:16) (117/69 - 137/65)  RR: 18 (20 @ 08:16)  SpO2: 99% (20 @ 08:16) (96% - 100%)  Wt(kg): --    PHYSICAL EXAM:  Constitutional: non-toxic, no distress  HEAD/EYES: anicteric, no conjunctival injection  ENT:  supple, no thrush  Cardiovascular:   normal S1, S2, no murmur, no edema  Respiratory:  + decreased BS bilaterally, no wheezes, no rales  GI:  soft, non-tender, normal bowel sounds  :  no mccoy, no CVA tenderness  Musculoskeletal:  no synovitis, normal ROM  Neurologic: awake and alert, normal strength, no focal findings  Skin:  no rash, no erythema, no phlebitis  Heme/Onc: no lymphadenopathy   Psychiatric:  awake, alert, appropriate mood                            7.6    7.93  )-----------( 202      ( 25 Dec 2020 05:46 )             23.6   12    136  |  101  |  27<H>  ----------------------------<  167<H>  3.9   |  24  |  0.84    Ca    8.5      25 Dec 2020 05:46    TPro  5.1<L>  /  Alb  3.2<L>  /  TBili  0.5  /  DBili  x   /  AST  13  /  ALT  14  /  AlkPhos  50      Urinalysis Basic - ( 24 Dec 2020 09:24 )    Color: Light Yellow / Appearance: Clear / S.020 / pH: x  Gluc: x / Ketone: Negative  / Bili: Negative / Urobili: Negative   Blood: x / Protein: Trace / Nitrite: Negative   Leuk Esterase: Negative / RBC: x / WBC x   Sq Epi: x / Non Sq Epi: x / Bacteria: x    MICROBIOLOGY:  Culture - Urine (collected 24 Dec 2020 13:56)  Source: .Urine Clean Catch (Midstream)  Final Report (25 Dec 2020 21:40):    50,000 - 99,000 CFU/mL Coag Negative Staphylococcus    "Susceptibilities not performed"    Culture - Blood (collected 24 Dec 2020 13:25)  Source: .Blood Blood-Peripheral  Preliminary Report (25 Dec 2020 14:01):    No growth to date.    Culture - Blood (collected 24 Dec 2020 13:25)  Source: .Blood Blood-Peripheral  Preliminary Report (25 Dec 2020 14:01):    No growth to date.          Rapid RVP Result: Detected ( @ 09:52)      RADIOLOGY:    < from: CT Abdomen and Pelvis w/ IV Cont (20 @ 13:24) >  FINDINGS:  LOWER CHEST: Median sternotomy.    LIVER: Within normal limits.  BILE DUCTS: Normal caliber.  GALLBLADDER: Within normal limits.  SPLEEN: Within normal limits.  PANCREAS: Within normal limits.  ADRENALS: Indeterminant 2.0 cm right adrenal nodule is unchanged.  KIDNEYS/URETERS: No hydronephrosis. Renal hypodensities too small to characterize.  BLADDER: Within normal limits.  REPRODUCTIVE ORGANS: Prostate is enlarged.  BOWEL: No bowel obstruction. Appendix is normal. No intraluminal extravasation of intravenous contrast.  PERITONEUM: No ascites.  VESSELS: Atherosclerotic changes.  RETROPERITONEUM/LYMPH NODES: No lymphadenopathy.  ABDOMINAL WALL: Within normal limits.  BONES: Degenerative changes.    IMPRESSION:  No evidence of active GI bleed.    < end of copied text >    < from: Xray Chest 1 View- PORTABLE-Urgent (20 @ 09:56) >  FINDINGS/  IMPRESSION:  Surgical clips overlie the left hemithorax.  The lungs are clear. No pleural effusion or pneumothorax.  Status post sternotomy. The heart size cannot be accurately assessed on this projection.  The visualized osseous structures are unremarkable.      < end of copied text >

## 2020-12-26 NOTE — CONSULT NOTE ADULT - ATTENDING COMMENTS
Patient seen and examined with Dr Landa    I agree with his history, exam and plans as noted above.  Patient and ID fellow are fluent Farsi speakers    Patient with extensive past medical history as detailed above, admitted from home (lives with family) with low grade fevers, non toxic appearing , COVID negative on testing  CT scan with viral like process and RVP with entero/rhinovirus on testing- may be the causative agent for his fever and infiltrates    Would discontinue the Azithromycin  Will follow up on blood cultures x 2 sets and urine culture  Can continue the Ceftriaxone pending the blood cultures resulting as negative        Parmjit Edwards MD  [phone]  After 5pm/weekends [phone]

## 2020-12-26 NOTE — PROGRESS NOTE ADULT - SUBJECTIVE AND OBJECTIVE BOX
Patient is a 86y old  Male who presents with a chief complaint of generalized weakness / FTT (26 Dec 2020 08:55)      Any change in ROS: c/p neck and posterior headache     MEDICATIONS  (STANDING):  aMIOdarone    Tablet 200 milliGRAM(s) Oral daily  azithromycin   Tablet 250 milliGRAM(s) Oral daily  carbidopa/levodopa  25/100 1 Tablet(s) Oral three times a day  cefTRIAXone   IVPB 1000 milliGRAM(s) IV Intermittent every 24 hours  dextrose 40% Gel 15 Gram(s) Oral once  dextrose 5%. 1000 milliLiter(s) (50 mL/Hr) IV Continuous <Continuous>  dextrose 5%. 1000 milliLiter(s) (100 mL/Hr) IV Continuous <Continuous>  dextrose 50% Injectable 25 Gram(s) IV Push once  dextrose 50% Injectable 12.5 Gram(s) IV Push once  dextrose 50% Injectable 25 Gram(s) IV Push once  glucagon  Injectable 1 milliGRAM(s) IntraMuscular once  heparin   Injectable 5000 Unit(s) SubCutaneous every 12 hours  insulin glargine Injectable (LANTUS) 10 Unit(s) SubCutaneous at bedtime  insulin lispro (ADMELOG) corrective regimen sliding scale   SubCutaneous three times a day before meals  insulin lispro (ADMELOG) corrective regimen sliding scale   SubCutaneous at bedtime  metoprolol tartrate 25 milliGRAM(s) Oral two times a day  oxybutynin 5 milliGRAM(s) Oral two times a day  pantoprazole    Tablet 40 milliGRAM(s) Oral before breakfast  sertraline 25 milliGRAM(s) Oral daily    MEDICATIONS  (PRN):  acetaminophen   Tablet .. 650 milliGRAM(s) Oral every 6 hours PRN Temp greater or equal to 38C (100.4F), Moderate Pain (4 - 6)    Vital Signs Last 24 Hrs  T(C): 36.6 (26 Dec 2020 08:16), Max: 36.7 (25 Dec 2020 17:12)  T(F): 97.9 (26 Dec 2020 08:16), Max: 98 (25 Dec 2020 17:12)  HR: 70 (26 Dec 2020 08:16) (70 - 73)  BP: 117/69 (26 Dec 2020 08:16) (117/69 - 137/65)  BP(mean): --  RR: 18 (26 Dec 2020 08:16) (18 - 18)  SpO2: 99% (26 Dec 2020 08:16) (97% - 100%)    I&O's Summary    25 Dec 2020 07:01  -  26 Dec 2020 07:00  --------------------------------------------------------  IN: 780 mL / OUT: 150 mL / NET: 630 mL    26 Dec 2020 07:01  -  26 Dec 2020 11:50  --------------------------------------------------------  IN: 290 mL / OUT: 0 mL / NET: 290 mL          Physical Exam:   GENERAL: NAD, well-groomed, well-developed  HEENT: CRIS/   Atraumatic, Normocephalic  ENMT: No tonsillar erythema, exudates, or enlargement; Moist mucous membranes, Good dentition, No lesions  NECK: Supple, No JVD, Normal thyroid  CHEST/LUNG: Clear to auscultaion: no wheezing~  CVS: Regular rate and rhythm; No murmurs, rubs, or gallops  GI: : Soft, Nontender, Nondistended; Bowel sounds present  NERVOUS SYSTEM:  Alert & awake  EXTREMITIES-edema  LYMPH: No lymphadenopathy noted  SKIN: No rashes or lesions  ENDOCRINOLOGY: No Thyromegaly  PSYCH: Appropriate    Labs:  31                            7.6    7.93  )-----------( 202      ( 25 Dec 2020 05:46 )             23.6                         8.5    10.13 )-----------( 223      ( 24 Dec 2020 20:52 )             25.9                         5.0    11.80 )-----------( 308      ( 24 Dec 2020 09:24 )             16.1     12-25    136  |  101  |  27<H>  ----------------------------<  167<H>  3.9   |  24  |  0.84  12-24    143  |  105  |  38<H>  ----------------------------<  214<H>  4.2   |  28  |  1.09    Ca    8.5      25 Dec 2020 05:46    TPro  5.1<L>  /  Alb  3.2<L>  /  TBili  0.5  /  DBili  x   /  AST  13  /  ALT  14  /  AlkPhos  50  12-25  TPro  6.3  /  Alb  4.2  /  TBili  0.3  /  DBili  x   /  AST  21  /  ALT  17  /  AlkPhos  54  12-24    CAPILLARY BLOOD GLUCOSE      POCT Blood Glucose.: 275 mg/dL (26 Dec 2020 11:34)  POCT Blood Glucose.: 166 mg/dL (26 Dec 2020 07:35)  POCT Blood Glucose.: 230 mg/dL (25 Dec 2020 21:18)  POCT Blood Glucose.: 141 mg/dL (25 Dec 2020 16:23)      LIVER FUNCTIONS - ( 25 Dec 2020 05:46 )  Alb: 3.2 g/dL / Pro: 5.1 g/dL / ALK PHOS: 50 U/L / ALT: 14 U/L / AST: 13 U/L / GGT: x             r< from: CT Chest No Cont (12.26.20 @ 10:34) >    INTERPRETATION:  CLINICAL INFORMATION: Fever, positive respiratory viral panel, evaluate for pneumonia.    COMPARISON: CT chest 1/27/2017. CT abdomen pelvis 12/24/2020.    PROCEDURE:  CT of the Chest was performed without intravenous contrast.  Sagittal and coronal reformats were performed.    FINDINGS: The quality of the images are degraded by respiratory motion.    LUNGS AND AIRWAYS: Patent central airways.  Mild groundglass and linear opacities within the upper lungs. No lobar consolidation.  PLEURA: No pleural effusion.  MEDIASTINUM AND SHERI: No lymphadenopathy.  VESSELS: Aortic and coronary artery calcifications. CABG.  HEART: Cardiomegaly. No pericardialeffusion.  CHEST WALL AND LOWER NECK: Within normal limits.  VISUALIZED UPPER ABDOMEN: Right adrenal adenoma    BONES: Median sternotomy. Degenerative changes.    IMPRESSION:    Mild linear and groundglass opacities within the upper lungs may represent atypical/viral infection or pneumonitis..              BRITTA SANCHES MD; Resident Radiology  This document has been electronically signed.  MICKEY YEH MD; Attending Radiologist  This document has been electronically signed. Dec 26 2020 11:02AM    < end of copied text >          RECENT CULTURES:  12-24 @ 13:56 .Urine Clean Catch (Midstream)                50,000 - 99,000 CFU/mL Coag Negative Staphylococcus  "Susceptibilities not performed"    12-24 @ 13:25 .Blood Blood-Peripheral                No growth to date.          RESPIRATORY CULTURES:          Studies  Chest X-RAY  CT SCAN Chest   Venous Dopplers: LE:   CT Abdomen  Others    < from: CT Chest No Cont (12.26.20 @ 10:34) >  INTERPRETATION:  CLINICAL INFORMATION: Fever, positive respiratory viral panel, evaluate for pneumonia.    COMPARISON: CT chest 1/27/2017. CT abdomen pelvis 12/24/2020.    PROCEDURE:  CT of the Chest was performed without intravenous contrast.  Sagittal and coronal reformats were performed.    FINDINGS: The quality of the images are degraded by respiratory motion.    LUNGS AND AIRWAYS: Patent central airways.  Mild groundglass and linear opacities within the upper lungs. No lobar consolidation.  PLEURA: No pleural effusion.  MEDIASTINUM AND SHERI: No lymphadenopathy.  VESSELS: Aortic and coronary artery calcifications. CABG.  HEART: Cardiomegaly. No pericardialeffusion.  CHEST WALL AND LOWER NECK: Within normal limits.  VISUALIZED UPPER ABDOMEN: Right adrenal adenoma    BONES: Median sternotomy. Degenerative changes.    IMPRESSION:    Mild linear and groundglass opacities within the upper lungs may represent atypical/viral infection or pneumonitis..              BRITTA SANCHES MD; Resident Radiology  This document has been electronically signed.  MICKEY YEH MD; Attending Radiologist  This document has been electronically signed. Dec 26 2020 11:02AM    < end of copied text >

## 2020-12-27 LAB
ALBUMIN SERPL ELPH-MCNC: 2.9 G/DL — LOW (ref 3.3–5)
ALP SERPL-CCNC: 58 U/L — SIGNIFICANT CHANGE UP (ref 40–120)
ALT FLD-CCNC: <5 U/L — LOW (ref 10–45)
ANION GAP SERPL CALC-SCNC: 9 MMOL/L — SIGNIFICANT CHANGE UP (ref 5–17)
AST SERPL-CCNC: 10 U/L — SIGNIFICANT CHANGE UP (ref 10–40)
BILIRUB SERPL-MCNC: 0.4 MG/DL — SIGNIFICANT CHANGE UP (ref 0.2–1.2)
BUN SERPL-MCNC: 23 MG/DL — SIGNIFICANT CHANGE UP (ref 7–23)
CALCIUM SERPL-MCNC: 8.2 MG/DL — LOW (ref 8.4–10.5)
CHLORIDE SERPL-SCNC: 103 MMOL/L — SIGNIFICANT CHANGE UP (ref 96–108)
CO2 SERPL-SCNC: 23 MMOL/L — SIGNIFICANT CHANGE UP (ref 22–31)
CREAT SERPL-MCNC: 0.86 MG/DL — SIGNIFICANT CHANGE UP (ref 0.5–1.3)
GLUCOSE BLDC GLUCOMTR-MCNC: 143 MG/DL — HIGH (ref 70–99)
GLUCOSE BLDC GLUCOMTR-MCNC: 155 MG/DL — HIGH (ref 70–99)
GLUCOSE BLDC GLUCOMTR-MCNC: 209 MG/DL — HIGH (ref 70–99)
GLUCOSE BLDC GLUCOMTR-MCNC: 214 MG/DL — HIGH (ref 70–99)
GLUCOSE SERPL-MCNC: 132 MG/DL — HIGH (ref 70–99)
HCT VFR BLD CALC: 24.6 % — LOW (ref 39–50)
HGB BLD-MCNC: 7.6 G/DL — LOW (ref 13–17)
MAGNESIUM SERPL-MCNC: 2.3 MG/DL — SIGNIFICANT CHANGE UP (ref 1.6–2.6)
MCHC RBC-ENTMCNC: 28.5 PG — SIGNIFICANT CHANGE UP (ref 27–34)
MCHC RBC-ENTMCNC: 30.9 GM/DL — LOW (ref 32–36)
MCV RBC AUTO: 92.1 FL — SIGNIFICANT CHANGE UP (ref 80–100)
NRBC # BLD: 0 /100 WBCS — SIGNIFICANT CHANGE UP (ref 0–0)
PHOSPHATE SERPL-MCNC: 2.5 MG/DL — SIGNIFICANT CHANGE UP (ref 2.5–4.5)
PLATELET # BLD AUTO: 259 K/UL — SIGNIFICANT CHANGE UP (ref 150–400)
POTASSIUM SERPL-MCNC: 4.3 MMOL/L — SIGNIFICANT CHANGE UP (ref 3.5–5.3)
POTASSIUM SERPL-SCNC: 4.3 MMOL/L — SIGNIFICANT CHANGE UP (ref 3.5–5.3)
PROT SERPL-MCNC: 5.4 G/DL — LOW (ref 6–8.3)
RBC # BLD: 2.67 M/UL — LOW (ref 4.2–5.8)
RBC # FLD: 15.3 % — HIGH (ref 10.3–14.5)
SODIUM SERPL-SCNC: 135 MMOL/L — SIGNIFICANT CHANGE UP (ref 135–145)
WBC # BLD: 9.1 K/UL — SIGNIFICANT CHANGE UP (ref 3.8–10.5)
WBC # FLD AUTO: 9.1 K/UL — SIGNIFICANT CHANGE UP (ref 3.8–10.5)

## 2020-12-27 RX ORDER — ATORVASTATIN CALCIUM 80 MG/1
10 TABLET, FILM COATED ORAL AT BEDTIME
Refills: 0 | Status: DISCONTINUED | OUTPATIENT
Start: 2020-12-27 | End: 2020-12-29

## 2020-12-27 RX ADMIN — Medication 0.5 MILLIGRAM(S): at 23:58

## 2020-12-27 RX ADMIN — CARBIDOPA AND LEVODOPA 1 TABLET(S): 25; 100 TABLET ORAL at 06:26

## 2020-12-27 RX ADMIN — Medication 5 MILLIGRAM(S): at 06:26

## 2020-12-27 RX ADMIN — CARBIDOPA AND LEVODOPA 1 TABLET(S): 25; 100 TABLET ORAL at 21:42

## 2020-12-27 RX ADMIN — Medication 1: at 17:19

## 2020-12-27 RX ADMIN — INSULIN GLARGINE 10 UNIT(S): 100 INJECTION, SOLUTION SUBCUTANEOUS at 21:42

## 2020-12-27 RX ADMIN — CARBIDOPA AND LEVODOPA 1 TABLET(S): 25; 100 TABLET ORAL at 14:24

## 2020-12-27 RX ADMIN — LIDOCAINE 1 PATCH: 4 CREAM TOPICAL at 07:53

## 2020-12-27 RX ADMIN — Medication 5 MILLIGRAM(S): at 17:16

## 2020-12-27 RX ADMIN — Medication 650 MILLIGRAM(S): at 02:46

## 2020-12-27 RX ADMIN — Medication 50 MILLIGRAM(S): at 06:26

## 2020-12-27 RX ADMIN — Medication 50 MILLIGRAM(S): at 17:17

## 2020-12-27 RX ADMIN — Medication 2: at 12:16

## 2020-12-27 RX ADMIN — SERTRALINE 25 MILLIGRAM(S): 25 TABLET, FILM COATED ORAL at 11:30

## 2020-12-27 RX ADMIN — CEFTRIAXONE 100 MILLIGRAM(S): 500 INJECTION, POWDER, FOR SOLUTION INTRAMUSCULAR; INTRAVENOUS at 11:30

## 2020-12-27 RX ADMIN — LIDOCAINE 1 PATCH: 4 CREAM TOPICAL at 11:29

## 2020-12-27 RX ADMIN — ATORVASTATIN CALCIUM 10 MILLIGRAM(S): 80 TABLET, FILM COATED ORAL at 21:42

## 2020-12-27 RX ADMIN — HEPARIN SODIUM 5000 UNIT(S): 5000 INJECTION INTRAVENOUS; SUBCUTANEOUS at 06:26

## 2020-12-27 RX ADMIN — LIDOCAINE 1 PATCH: 4 CREAM TOPICAL at 23:58

## 2020-12-27 RX ADMIN — HEPARIN SODIUM 5000 UNIT(S): 5000 INJECTION INTRAVENOUS; SUBCUTANEOUS at 17:19

## 2020-12-27 RX ADMIN — AMIODARONE HYDROCHLORIDE 200 MILLIGRAM(S): 400 TABLET ORAL at 06:26

## 2020-12-27 RX ADMIN — Medication 650 MILLIGRAM(S): at 04:09

## 2020-12-27 RX ADMIN — LIDOCAINE 1 PATCH: 4 CREAM TOPICAL at 19:53

## 2020-12-27 NOTE — PHYSICAL THERAPY INITIAL EVALUATION ADULT - PERTINENT HX OF CURRENT PROBLEM, REHAB EVAL
85yo male with pmh Parkinson's disease, DM, HTN, HLD, CAD s/p CABG, BPH presenting with generalized weakness.

## 2020-12-27 NOTE — PROGRESS NOTE ADULT - SUBJECTIVE AND OBJECTIVE BOX
Patient is a 86y old  Male who presents with a chief complaint of generalized weakness / FTT (27 Dec 2020 11:22)    pt. seen and examined, daughter bedside     INTERVAL HPI/OVERNIGHT EVENTS:  T(C): 36.6 (12-27-20 @ 15:47), Max: 37.2 (12-26-20 @ 19:47)  HR: 71 (12-27-20 @ 17:23) (57 - 71)  BP: 136/65 (12-27-20 @ 17:23) (108/55 - 136/65)  RR: 18 (12-27-20 @ 15:47) (18 - 18)  SpO2: 96% (12-27-20 @ 15:47) (96% - 98%)  Wt(kg): --  I&O's Summary    26 Dec 2020 07:01  -  27 Dec 2020 07:00  --------------------------------------------------------  IN: 695 mL / OUT: 660 mL / NET: 35 mL    27 Dec 2020 07:01  -  27 Dec 2020 17:47  --------------------------------------------------------  IN: 510 mL / OUT: 400 mL / NET: 110 mL        PAST MEDICAL & SURGICAL HISTORY:  CAD (coronary artery disease)    DM (diabetes mellitus)    HLD (hyperlipidemia)    HTN (hypertension)    Parkinson disease    Benign prostatic hyperplasia    Syncope    Urinary frequency    Osteoporosis    Hypertension    CAD (coronary artery disease)    Dyslipidemia    Diabetes mellitus    History of knee replacement procedure of left knee  2017    S/P CABG (coronary artery bypass graft)  2015    History of total left knee replacement  2014    S/P coronary artery stent placement in 2003        SOCIAL HISTORY  Alcohol:  Tobacco:  Illicit substance use:    FAMILY HISTORY:    REVIEW OF SYSTEMS:  CONSTITUTIONAL: No fever, weight loss, or fatigue  EYES: No eye pain, visual disturbances, or discharge  ENMT:  No difficulty hearing, tinnitus, vertigo; No sinus or throat pain  NECK: No pain or stiffness  RESPIRATORY: No cough, wheezing, chills or hemoptysis; No shortness of breath  CARDIOVASCULAR: No chest pain, palpitations, dizziness, or leg swelling  GASTROINTESTINAL: No abdominal or epigastric pain. No nausea, vomiting, or hematemesis; No diarrhea or constipation. No melena or hematochezia.  GENITOURINARY: No dysuria, frequency, hematuria, or incontinence  NEUROLOGICAL: No headaches, memory loss, loss of strength, numbness, or tremors  SKIN: No itching, burning, rashes, or lesions   LYMPH NODES: No enlarged glands  ENDOCRINE: No heat or cold intolerance; No hair loss  MUSCULOSKELETAL: No joint pain or swelling; No muscle, back, or extremity pain  PSYCHIATRIC: No depression, anxiety, mood swings, or difficulty sleeping  HEME/LYMPH: No easy bruising, or bleeding gums  ALLERY AND IMMUNOLOGIC: No hives or eczema    RADIOLOGY & ADDITIONAL TESTS:    Imaging Personally Reviewed:  [ ] YES  [ ] NO    Consultant(s) Notes Reviewed:  [ ] YES  [ ] NO    PHYSICAL EXAM:  GENERAL: NAD, well-groomed, well-developed  HEAD:  Atraumatic, Normocephalic  EYES: EOMI, PERRLA, conjunctiva and sclera clear  ENMT: No tonsillar erythema, exudates, or enlargement; Moist mucous membranes, Good dentition, No lesions  NECK: Supple, No JVD, Normal thyroid  NERVOUS SYSTEM:  Alert & Oriented X3, Good concentration; Motor Strength 5/5 B/L upper and lower extremities; DTRs 2+ intact and symmetric  CHEST/LUNG: Clear to percussion bilaterally; No rales, rhonchi, wheezing, or rubs  HEART: Regular rate and rhythm; No murmurs, rubs, or gallops  ABDOMEN: Soft, Nontender, Nondistended; Bowel sounds present  EXTREMITIES:  2+ Peripheral Pulses, No clubbing, cyanosis, or edema  LYMPH: No lymphadenopathy noted  SKIN: No rashes or lesions    LABS:                        7.6    9.10  )-----------( 259      ( 27 Dec 2020 06:27 )             24.6     12-27    135  |  103  |  23  ----------------------------<  132<H>  4.3   |  23  |  0.86    Ca    8.2<L>      27 Dec 2020 06:27  Phos  2.5     12-27  Mg     2.3     12-27    TPro  5.4<L>  /  Alb  2.9<L>  /  TBili  0.4  /  DBili  x   /  AST  10  /  ALT  <5<L>  /  AlkPhos  58  12-27        CAPILLARY BLOOD GLUCOSE      POCT Blood Glucose.: 155 mg/dL (27 Dec 2020 16:30)  POCT Blood Glucose.: 214 mg/dL (27 Dec 2020 11:32)  POCT Blood Glucose.: 143 mg/dL (27 Dec 2020 07:29)  POCT Blood Glucose.: 216 mg/dL (26 Dec 2020 21:14)            MEDICATIONS  (STANDING):  aMIOdarone    Tablet 200 milliGRAM(s) Oral daily  carbidopa/levodopa  25/100 1 Tablet(s) Oral three times a day  cefTRIAXone   IVPB 1000 milliGRAM(s) IV Intermittent every 24 hours  dextrose 40% Gel 15 Gram(s) Oral once  dextrose 5%. 1000 milliLiter(s) (50 mL/Hr) IV Continuous <Continuous>  dextrose 5%. 1000 milliLiter(s) (100 mL/Hr) IV Continuous <Continuous>  dextrose 50% Injectable 25 Gram(s) IV Push once  dextrose 50% Injectable 12.5 Gram(s) IV Push once  dextrose 50% Injectable 25 Gram(s) IV Push once  glucagon  Injectable 1 milliGRAM(s) IntraMuscular once  heparin   Injectable 5000 Unit(s) SubCutaneous every 12 hours  insulin glargine Injectable (LANTUS) 10 Unit(s) SubCutaneous at bedtime  insulin lispro (ADMELOG) corrective regimen sliding scale   SubCutaneous three times a day before meals  insulin lispro (ADMELOG) corrective regimen sliding scale   SubCutaneous at bedtime  lidocaine   Patch 1 Patch Transdermal daily  metoprolol tartrate 50 milliGRAM(s) Oral two times a day  oxybutynin 5 milliGRAM(s) Oral two times a day  pantoprazole    Tablet 40 milliGRAM(s) Oral before breakfast  sertraline 25 milliGRAM(s) Oral daily    MEDICATIONS  (PRN):  acetaminophen   Tablet .. 650 milliGRAM(s) Oral every 6 hours PRN Temp greater or equal to 38C (100.4F), Moderate Pain (4 - 6)      Care Discussed with Consultants/Other Providers [ ] YES  [ ] NO

## 2020-12-27 NOTE — PHYSICAL THERAPY INITIAL EVALUATION ADULT - ADDITIONAL COMMENTS
Patient lives in pvt house with daughter and son in law, 4 steps to enter, multiple steps inside.  Patient ambulated with straight cane independent.

## 2020-12-27 NOTE — PROGRESS NOTE ADULT - SUBJECTIVE AND OBJECTIVE BOX
Patient is a 86y old  Male who presents with a chief complaint of generalized weakness / FTT (26 Dec 2020 20:17)      Any change in ROS: Yeny good:he is alert and awake: responding to simple questions:     MEDICATIONS  (STANDING):  aMIOdarone    Tablet 200 milliGRAM(s) Oral daily  carbidopa/levodopa  25/100 1 Tablet(s) Oral three times a day  cefTRIAXone   IVPB 1000 milliGRAM(s) IV Intermittent every 24 hours  dextrose 40% Gel 15 Gram(s) Oral once  dextrose 5%. 1000 milliLiter(s) (50 mL/Hr) IV Continuous <Continuous>  dextrose 5%. 1000 milliLiter(s) (100 mL/Hr) IV Continuous <Continuous>  dextrose 50% Injectable 25 Gram(s) IV Push once  dextrose 50% Injectable 12.5 Gram(s) IV Push once  dextrose 50% Injectable 25 Gram(s) IV Push once  glucagon  Injectable 1 milliGRAM(s) IntraMuscular once  heparin   Injectable 5000 Unit(s) SubCutaneous every 12 hours  insulin glargine Injectable (LANTUS) 10 Unit(s) SubCutaneous at bedtime  insulin lispro (ADMELOG) corrective regimen sliding scale   SubCutaneous three times a day before meals  insulin lispro (ADMELOG) corrective regimen sliding scale   SubCutaneous at bedtime  lidocaine   Patch 1 Patch Transdermal daily  metoprolol tartrate 50 milliGRAM(s) Oral two times a day  oxybutynin 5 milliGRAM(s) Oral two times a day  pantoprazole    Tablet 40 milliGRAM(s) Oral before breakfast  sertraline 25 milliGRAM(s) Oral daily    MEDICATIONS  (PRN):  acetaminophen   Tablet .. 650 milliGRAM(s) Oral every 6 hours PRN Temp greater or equal to 38C (100.4F), Moderate Pain (4 - 6)    Vital Signs Last 24 Hrs  T(C): 36.8 (27 Dec 2020 04:02), Max: 37.2 (26 Dec 2020 19:47)  T(F): 98.3 (27 Dec 2020 04:02), Max: 98.9 (26 Dec 2020 19:47)  HR: 68 (27 Dec 2020 09:50) (57 - 72)  BP: 124/64 (27 Dec 2020 09:50) (108/55 - 133/62)  BP(mean): --  RR: 18 (27 Dec 2020 09:50) (18 - 18)  SpO2: 98% (27 Dec 2020 09:50) (97% - 98%)    I&O's Summary    26 Dec 2020 07:01  -  27 Dec 2020 07:00  --------------------------------------------------------  IN: 695 mL / OUT: 660 mL / NET: 35 mL    27 Dec 2020 07:01  -  27 Dec 2020 11:23  --------------------------------------------------------  IN: 290 mL / OUT: 0 mL / NET: 290 mL          Physical Exam:   GENERAL: NAD, well-groomed, well-developed  HEENT: CRIS/   Atraumatic, Normocephalic  ENMT: No tonsillar erythema, exudates, or enlargement; Moist mucous membranes, Good dentition, No lesions  NECK: Supple, No JVD, Normal thyroid  CHEST/LUNG: Clear to auscultaion  CVS: Regular rate and rhythm; No murmurs, rubs, or gallops  GI: : Soft, Nontender, Nondistended; Bowel sounds present  NERVOUS SYSTEM:  Alert & Oriented X3  EXTREMITIES: - edema  LYMPH: No lymphadenopathy noted  SKIN: No rashes or lesions  ENDOCRINOLOGY: No Thyromegaly  PSYCH: Appropriate    Labs:  31                            7.6    9.10  )-----------( 259      ( 27 Dec 2020 06:27 )             24.6                         8.0    8.32  )-----------( 254      ( 26 Dec 2020 11:44 )             25.4                         7.6    7.93  )-----------( 202      ( 25 Dec 2020 05:46 )             23.6                         8.5    10.13 )-----------( 223      ( 24 Dec 2020 20:52 )             25.9                         5.0    11.80 )-----------( 308      ( 24 Dec 2020 09:24 )             16.1     12-27    135  |  103  |  23  ----------------------------<  132<H>  4.3   |  23  |  0.86  12-26    133<L>  |  101  |  26<H>  ----------------------------<  242<H>  3.8   |  23  |  0.78  12-25    136  |  101  |  27<H>  ----------------------------<  167<H>  3.9   |  24  |  0.84  12-24    143  |  105  |  38<H>  ----------------------------<  214<H>  4.2   |  28  |  1.09    Ca    8.2<L>      27 Dec 2020 06:27  Ca    8.3<L>      26 Dec 2020 11:44  Phos  2.5     12-27  Phos  2.9     12-26  Mg     2.3     12-27  Mg     2.2     12-26    TPro  5.4<L>  /  Alb  2.9<L>  /  TBili  0.4  /  DBili  x   /  AST  10  /  ALT  <5<L>  /  AlkPhos  58  12-27  TPro  5.1<L>  /  Alb  3.2<L>  /  TBili  0.5  /  DBili  x   /  AST  13  /  ALT  14  /  AlkPhos  50  12-25  TPro  6.3  /  Alb  4.2  /  TBili  0.3  /  DBili  x   /  AST  21  /  ALT  17  /  AlkPhos  54  12-24    CAPILLARY BLOOD GLUCOSE      POCT Blood Glucose.: 143 mg/dL (27 Dec 2020 07:29)  POCT Blood Glucose.: 216 mg/dL (26 Dec 2020 21:14)  POCT Blood Glucose.: 274 mg/dL (26 Dec 2020 16:34)  POCT Blood Glucose.: 275 mg/dL (26 Dec 2020 11:34)      LIVER FUNCTIONS - ( 27 Dec 2020 06:27 )  Alb: 2.9 g/dL / Pro: 5.4 g/dL / ALK PHOS: 58 U/L / ALT: <5 U/L / AST: 10 U/L / GGT: x               r< from: CT Chest No Cont (12.26.20 @ 10:34) >  gittal and coronal reformats were performed.    FINDINGS: The quality of the images are degraded by respiratory motion.    LUNGS AND AIRWAYS: Patent central airways.  Mild groundglass and linear opacities within the upper lungs. No lobar consolidation.  PLEURA: No pleural effusion.  MEDIASTINUM AND SHERI: No lymphadenopathy.  VESSELS: Aortic and coronary artery calcifications. CABG.  HEART: Cardiomegaly. No pericardialeffusion.  CHEST WALL AND LOWER NECK: Within normal limits.  VISUALIZED UPPER ABDOMEN: Right adrenal adenoma    BONES: Median sternotomy. Degenerative changes.    IMPRESSION:    Mild linear and groundglass opacities within the upper lungs may represent atypical/viral infection or pneumonitis..              BRITTA SANCHES MD; Resident Radiology  This document has been electronically signed.  MICKEY YEH MD; Attending Radiologist  This document has been electronically signed. Dec 26 2020 11:02AM    < end of copied text >        RECENT CULTURES:  12-24 @ 13:56 .Urine Clean Catch (Midstream)                50,000 - 99,000 CFU/mL Coag Negative Staphylococcus  "Susceptibilities not performed"    12-24 @ 13:25 .Blood Blood-Peripheral                No growth to date.          RESPIRATORY CULTURES:          Studies  Chest X-RAY  CT SCAN Chest   Venous Dopplers: LE:   CT Abdomen  Others

## 2020-12-27 NOTE — PROGRESS NOTE ADULT - SUBJECTIVE AND OBJECTIVE BOX
Patient is a 86y old  Male who presents with a chief complaint of generalized weakness / FTT (27 Dec 2020 17:47)      INTERVAL HISTORY: feels ok    MEDICATIONS:  aMIOdarone    Tablet 200 milliGRAM(s) Oral daily  metoprolol tartrate 50 milliGRAM(s) Oral two times a day        PHYSICAL EXAM:  T(C): 36.8 (12-27-20 @ 19:32), Max: 36.8 (12-27-20 @ 00:16)  HR: 64 (12-27-20 @ 19:32) (57 - 71)  BP: 165/64 (12-27-20 @ 19:32) (108/55 - 165/64)  RR: 18 (12-27-20 @ 19:32) (18 - 18)  SpO2: 96% (12-27-20 @ 19:32) (96% - 98%)  Wt(kg): --  I&O's Summary    26 Dec 2020 07:01  -  27 Dec 2020 07:00  --------------------------------------------------------  IN: 695 mL / OUT: 660 mL / NET: 35 mL    27 Dec 2020 07:01  -  27 Dec 2020 20:17  --------------------------------------------------------  IN: 690 mL / OUT: 400 mL / NET: 290 mL          Appearance: In no distress	  HEENT:    PERRL, EOMI	  Cardiovascular:  S1 S2, No JVD  Respiratory: Lungs clear to auscultation	  Gastrointestinal:  Soft, Non-tender, + BS	  Vascularature:  No edema of LE  Psychiatric: Appropriate affect   Neuro: no acute focal deficits                               7.6    9.10  )-----------( 259      ( 27 Dec 2020 06:27 )             24.6     12-27    135  |  103  |  23  ----------------------------<  132<H>  4.3   |  23  |  0.86    Ca    8.2<L>      27 Dec 2020 06:27  Phos  2.5     12-27  Mg     2.3     12-27    TPro  5.4<L>  /  Alb  2.9<L>  /  TBili  0.4  /  DBili  x   /  AST  10  /  ALT  <5<L>  /  AlkPhos  58  12-27        Labs personally reviewed      EKG: Personally reviewed by me - NSR  Radiology: Personally reviewed by me -   < from: CT Chest No Cont (12.26.20 @ 10:34) >  IMPRESSION:  Mild linear and groundglass opacities within the upper lungs may represent atypical/viral infection or pneumonitis..          Assessment and Plan:   Problem/Plan - 1:  ·  Problem: Febrile illness, acute.  Plan: likely viral   -covid-19 neg   FTT   gentle hydration.     Problem/Plan - 2:  ·  Problem: CAD (coronary artery disease).  Plan: Holding ASA given anemia  c/w Metoprolol   start Lipitor 10mg    Problem/Plan - 3  ·  Problem: DM (diabetes mellitus).  Plan: insulin / FSBS.     Problem/Plan - 4:  ·  Problem: Parkinson disease.  Plan: c/w senemet.     Problem/Plan - 6:  Problem: Anemia, unspecified type. Plan: holding ASA, GI recs noted          Oral Ruiz DO [facility]  Cardiovascular Medicine  [address], [facility], [address]  Office: [phone]  Cell: [phone]

## 2020-12-27 NOTE — PROGRESS NOTE ADULT - SUBJECTIVE AND OBJECTIVE BOX
ISIS [id number]  86yMale  T(C): 36.8 (12-27-20 @ 04:02), Max: 37.2 (12-26-20 @ 19:47)  HR: 64 (12-27-20 @ 06:25) (57 - 72)  BP: 119/56 (12-27-20 @ 06:25) (108/55 - 133/62)  RR: 18 (12-27-20 @ 04:02) (18 - 18)  SpO2: 98% (12-27-20 @ 04:02) (97% - 98%)  Wt(kg): --  12-26 @ 07:01  -  12-27 @ 07:00  --------------------------------------------------------  IN: 695 mL / OUT: 660 mL / NET: 35 mL    12-27 @ 07:01  -  12-27 @ 09:55  --------------------------------------------------------  IN: 290 mL / OUT: 0 mL / NET: 290 mL      normal cephalic atraumatic  s1s2   clear to ascultation bilaterally  soft, non tender, non distended no guarding or rebound  no clubbing cyanosis or edema

## 2020-12-28 LAB
ANION GAP SERPL CALC-SCNC: 13 MMOL/L — SIGNIFICANT CHANGE UP (ref 5–17)
BASOPHILS # BLD AUTO: 0.03 K/UL — SIGNIFICANT CHANGE UP (ref 0–0.2)
BASOPHILS NFR BLD AUTO: 0.2 % — SIGNIFICANT CHANGE UP (ref 0–2)
BLD GP AB SCN SERPL QL: NEGATIVE — SIGNIFICANT CHANGE UP
BUN SERPL-MCNC: 30 MG/DL — HIGH (ref 7–23)
CALCIUM SERPL-MCNC: 9 MG/DL — SIGNIFICANT CHANGE UP (ref 8.4–10.5)
CHLORIDE SERPL-SCNC: 102 MMOL/L — SIGNIFICANT CHANGE UP (ref 96–108)
CO2 SERPL-SCNC: 20 MMOL/L — LOW (ref 22–31)
CREAT SERPL-MCNC: 0.94 MG/DL — SIGNIFICANT CHANGE UP (ref 0.5–1.3)
EOSINOPHIL # BLD AUTO: 0.02 K/UL — SIGNIFICANT CHANGE UP (ref 0–0.5)
EOSINOPHIL NFR BLD AUTO: 0.1 % — SIGNIFICANT CHANGE UP (ref 0–6)
GLUCOSE BLDC GLUCOMTR-MCNC: 131 MG/DL — HIGH (ref 70–99)
GLUCOSE BLDC GLUCOMTR-MCNC: 132 MG/DL — HIGH (ref 70–99)
GLUCOSE BLDC GLUCOMTR-MCNC: 145 MG/DL — HIGH (ref 70–99)
GLUCOSE BLDC GLUCOMTR-MCNC: 179 MG/DL — HIGH (ref 70–99)
GLUCOSE SERPL-MCNC: 224 MG/DL — HIGH (ref 70–99)
HCT VFR BLD CALC: 25.5 % — LOW (ref 39–50)
HCT VFR BLD CALC: 28.1 % — LOW (ref 39–50)
HGB BLD-MCNC: 8.1 G/DL — LOW (ref 13–17)
HGB BLD-MCNC: 9.2 G/DL — LOW (ref 13–17)
IMM GRANULOCYTES NFR BLD AUTO: 0.7 % — SIGNIFICANT CHANGE UP (ref 0–1.5)
LYMPHOCYTES # BLD AUTO: 0.68 K/UL — LOW (ref 1–3.3)
LYMPHOCYTES # BLD AUTO: 4.7 % — LOW (ref 13–44)
MCHC RBC-ENTMCNC: 28.6 PG — SIGNIFICANT CHANGE UP (ref 27–34)
MCHC RBC-ENTMCNC: 29.1 PG — SIGNIFICANT CHANGE UP (ref 27–34)
MCHC RBC-ENTMCNC: 31.8 GM/DL — LOW (ref 32–36)
MCHC RBC-ENTMCNC: 32.7 GM/DL — SIGNIFICANT CHANGE UP (ref 32–36)
MCV RBC AUTO: 88.9 FL — SIGNIFICANT CHANGE UP (ref 80–100)
MCV RBC AUTO: 90.1 FL — SIGNIFICANT CHANGE UP (ref 80–100)
MONOCYTES # BLD AUTO: 1.04 K/UL — HIGH (ref 0–0.9)
MONOCYTES NFR BLD AUTO: 7.1 % — SIGNIFICANT CHANGE UP (ref 2–14)
NEUTROPHILS # BLD AUTO: 12.69 K/UL — HIGH (ref 1.8–7.4)
NEUTROPHILS NFR BLD AUTO: 87.2 % — HIGH (ref 43–77)
NRBC # BLD: 0 /100 WBCS — SIGNIFICANT CHANGE UP (ref 0–0)
NRBC # BLD: 0 /100 WBCS — SIGNIFICANT CHANGE UP (ref 0–0)
PLATELET # BLD AUTO: 324 K/UL — SIGNIFICANT CHANGE UP (ref 150–400)
PLATELET # BLD AUTO: 344 K/UL — SIGNIFICANT CHANGE UP (ref 150–400)
POTASSIUM SERPL-MCNC: 4.5 MMOL/L — SIGNIFICANT CHANGE UP (ref 3.5–5.3)
POTASSIUM SERPL-SCNC: 4.5 MMOL/L — SIGNIFICANT CHANGE UP (ref 3.5–5.3)
RBC # BLD: 2.83 M/UL — LOW (ref 4.2–5.8)
RBC # BLD: 3.16 M/UL — LOW (ref 4.2–5.8)
RBC # FLD: 15 % — HIGH (ref 10.3–14.5)
RBC # FLD: 15.2 % — HIGH (ref 10.3–14.5)
RH IG SCN BLD-IMP: POSITIVE — SIGNIFICANT CHANGE UP
SODIUM SERPL-SCNC: 135 MMOL/L — SIGNIFICANT CHANGE UP (ref 135–145)
WBC # BLD: 14.56 K/UL — HIGH (ref 3.8–10.5)
WBC # BLD: 17.42 K/UL — HIGH (ref 3.8–10.5)
WBC # FLD AUTO: 14.56 K/UL — HIGH (ref 3.8–10.5)
WBC # FLD AUTO: 17.42 K/UL — HIGH (ref 3.8–10.5)

## 2020-12-28 PROCEDURE — 99232 SBSQ HOSP IP/OBS MODERATE 35: CPT

## 2020-12-28 RX ADMIN — ATORVASTATIN CALCIUM 10 MILLIGRAM(S): 80 TABLET, FILM COATED ORAL at 21:33

## 2020-12-28 RX ADMIN — HEPARIN SODIUM 5000 UNIT(S): 5000 INJECTION INTRAVENOUS; SUBCUTANEOUS at 17:16

## 2020-12-28 RX ADMIN — Medication 5 MILLIGRAM(S): at 17:16

## 2020-12-28 RX ADMIN — CARBIDOPA AND LEVODOPA 1 TABLET(S): 25; 100 TABLET ORAL at 21:33

## 2020-12-28 RX ADMIN — Medication 50 MILLIGRAM(S): at 04:54

## 2020-12-28 RX ADMIN — SERTRALINE 25 MILLIGRAM(S): 25 TABLET, FILM COATED ORAL at 11:11

## 2020-12-28 RX ADMIN — LIDOCAINE 1 PATCH: 4 CREAM TOPICAL at 11:10

## 2020-12-28 RX ADMIN — HEPARIN SODIUM 5000 UNIT(S): 5000 INJECTION INTRAVENOUS; SUBCUTANEOUS at 05:03

## 2020-12-28 RX ADMIN — INSULIN GLARGINE 10 UNIT(S): 100 INJECTION, SOLUTION SUBCUTANEOUS at 21:32

## 2020-12-28 RX ADMIN — Medication 650 MILLIGRAM(S): at 15:36

## 2020-12-28 RX ADMIN — Medication 650 MILLIGRAM(S): at 06:28

## 2020-12-28 RX ADMIN — CEFTRIAXONE 100 MILLIGRAM(S): 500 INJECTION, POWDER, FOR SOLUTION INTRAMUSCULAR; INTRAVENOUS at 11:16

## 2020-12-28 RX ADMIN — CARBIDOPA AND LEVODOPA 1 TABLET(S): 25; 100 TABLET ORAL at 05:03

## 2020-12-28 RX ADMIN — Medication 650 MILLIGRAM(S): at 14:36

## 2020-12-28 RX ADMIN — Medication 1: at 08:00

## 2020-12-28 RX ADMIN — LIDOCAINE 1 PATCH: 4 CREAM TOPICAL at 19:29

## 2020-12-28 RX ADMIN — Medication 50 MILLIGRAM(S): at 17:16

## 2020-12-28 RX ADMIN — Medication 650 MILLIGRAM(S): at 04:54

## 2020-12-28 RX ADMIN — CARBIDOPA AND LEVODOPA 1 TABLET(S): 25; 100 TABLET ORAL at 14:23

## 2020-12-28 RX ADMIN — AMIODARONE HYDROCHLORIDE 200 MILLIGRAM(S): 400 TABLET ORAL at 05:03

## 2020-12-28 RX ADMIN — Medication 5 MILLIGRAM(S): at 05:03

## 2020-12-28 NOTE — PROGRESS NOTE ADULT - SUBJECTIVE AND OBJECTIVE BOX
ISIS [id number]  86yMale  T(C): 36.9 (12-28-20 @ 04:00), Max: 37.1 (12-28-20 @ 00:00)  HR: 57 (12-28-20 @ 06:26) (57 - 83)  BP: 120/57 (12-28-20 @ 06:26) (112/68 - 180/72)  RR: 18 (12-28-20 @ 04:00) (18 - 18)  SpO2: 97% (12-28-20 @ 04:00) (94% - 98%)  Wt(kg): --  12-27 @ 07:01  -  12-28 @ 07:00  --------------------------------------------------------  IN: 690 mL / OUT: 400 mL / NET: 290 mL      normal cephalic atraumatic  s1s2   clear to ascultation bilaterally  soft, non tender, non distended no guarding or rebound  no clubbing cyanosis or edema

## 2020-12-28 NOTE — PROGRESS NOTE ADULT - PROBLEM SELECTOR PLAN 2
started on abx to prevent 2/2 bacterial inf   -pul and house ID consult in am

## 2020-12-28 NOTE — PROGRESS NOTE ADULT - SUBJECTIVE AND OBJECTIVE BOX
Follow-up Pulm Progress Note    No new respiratory events overnight.  Denies SOB/CP.     Medications:  MEDICATIONS  (STANDING):  aMIOdarone    Tablet 200 milliGRAM(s) Oral daily  atorvastatin 10 milliGRAM(s) Oral at bedtime  carbidopa/levodopa  25/100 1 Tablet(s) Oral three times a day  cefTRIAXone   IVPB 1000 milliGRAM(s) IV Intermittent every 24 hours  dextrose 40% Gel 15 Gram(s) Oral once  dextrose 5%. 1000 milliLiter(s) (50 mL/Hr) IV Continuous <Continuous>  dextrose 5%. 1000 milliLiter(s) (100 mL/Hr) IV Continuous <Continuous>  dextrose 50% Injectable 25 Gram(s) IV Push once  dextrose 50% Injectable 12.5 Gram(s) IV Push once  dextrose 50% Injectable 25 Gram(s) IV Push once  glucagon  Injectable 1 milliGRAM(s) IntraMuscular once  heparin   Injectable 5000 Unit(s) SubCutaneous every 12 hours  insulin glargine Injectable (LANTUS) 10 Unit(s) SubCutaneous at bedtime  insulin lispro (ADMELOG) corrective regimen sliding scale   SubCutaneous three times a day before meals  insulin lispro (ADMELOG) corrective regimen sliding scale   SubCutaneous at bedtime  lidocaine   Patch 1 Patch Transdermal daily  metoprolol tartrate 50 milliGRAM(s) Oral two times a day  oxybutynin 5 milliGRAM(s) Oral two times a day  pantoprazole    Tablet 40 milliGRAM(s) Oral before breakfast  sertraline 25 milliGRAM(s) Oral daily    MEDICATIONS  (PRN):  acetaminophen   Tablet .. 650 milliGRAM(s) Oral every 6 hours PRN Temp greater or equal to 38C (100.4F), Moderate Pain (4 - 6)          Vital Signs Last 24 Hrs  T(C): 36.9 (28 Dec 2020 04:00), Max: 37.1 (28 Dec 2020 00:00)  T(F): 98.5 (28 Dec 2020 04:00), Max: 98.8 (28 Dec 2020 00:00)  HR: 57 (28 Dec 2020 06:26) (57 - 83)  BP: 120/57 (28 Dec 2020 06:26) (112/68 - 180/72)  BP(mean): --  RR: 18 (28 Dec 2020 04:00) (18 - 18)  SpO2: 97% (28 Dec 2020 04:00) (94% - 98%)          12-27 @ 07:01  -  12-28 @ 07:00  --------------------------------------------------------  IN: 690 mL / OUT: 400 mL / NET: 290 mL          LABS:                        9.2    17.42 )-----------( 344      ( 28 Dec 2020 05:35 )             28.1     12-28    135  |  102  |  30<H>  ----------------------------<  224<H>  4.5   |  20<L>  |  0.94    Ca    9.0      28 Dec 2020 05:35  Phos  2.5     12-27  Mg     2.3     12-27    TPro  5.4<L>  /  Alb  2.9<L>  /  TBili  0.4  /  DBili  x   /  AST  10  /  ALT  <5<L>  /  AlkPhos  58  12-27          CAPILLARY BLOOD GLUCOSE      POCT Blood Glucose.: 179 mg/dL (28 Dec 2020 07:42)            CULTURES:     Culture - Blood (collected 12-24-20 @ 13:25)  Source: .Blood Blood-Peripheral  Preliminary Report (12-25-20 @ 14:01):    No growth to date.    Culture - Blood (collected 12-24-20 @ 13:25)  Source: .Blood Blood-Peripheral  Preliminary Report (12-25-20 @ 14:01):    No growth to date.        Culture - Urine (collected 12-24-20 @ 13:56)  Source: .Urine Clean Catch (Midstream)  Final Report (12-25-20 @ 21:40):    50,000 - 99,000 CFU/mL Coag Negative Staphylococcus    "Susceptibilities not performed"          Physical Examination:  PULM: Clear to auscultation bilaterally, no significant sputum production  CVS: S1, S2 heard    RADIOLOGY REVIEWED  CT chest: < from: CT Chest No Cont (12.26.20 @ 10:34) >  FINDINGS: The quality of the images are degraded by respiratory motion.    LUNGS AND AIRWAYS: Patent central airways.  Mild groundglass and linear opacities within the upper lungs. No lobar consolidation.  PLEURA: No pleural effusion.  MEDIASTINUM AND SHERI: No lymphadenopathy.  VESSELS: Aortic and coronary artery calcifications. CABG.  HEART: Cardiomegaly. No pericardialeffusion.  CHEST WALL AND LOWER NECK: Within normal limits.  VISUALIZED UPPER ABDOMEN: Right adrenal adenoma    BONES: Median sternotomy. Degenerative changes.    IMPRESSION:    Mild linear and groundglass opacities within the upper lungs may represent atypical/viral infection or pneumonitis..      < end of copied text >

## 2020-12-28 NOTE — PROGRESS NOTE ADULT - PROBLEM SELECTOR PLAN 6
s/p 2 unit transfusion   -GI consult Dr. Purcell called   -no active bleeding right now
s/p 2 unit transfusion   -GI consult Dr. Purcell called : conservative rx   -no active bleeding right now

## 2020-12-28 NOTE — PROGRESS NOTE ADULT - SUBJECTIVE AND OBJECTIVE BOX
Patient is a 86y old  Male who presents with a chief complaint of generalized weakness / FTT (28 Dec 2020 11:29)      INTERVAL HISTORY: feels ok    TELEMETRY Personally reviewed:  no events     REVIEW OF SYSTEMS:   CONSTITUTIONAL: No weakness  EYES/ENT: No visual changes; No throat pain  Neck: No pain or stiffness  Respiratory: No cough, wheezing, No shortness of breath  CARDIOVASCULAR: no chest pain or palpitations  GASTROINTESTINAL: No abdominal pain, no nausea, vomiting or hematemesis  GENITOURINARY: No dysuria, frequency or hematuria  NEUROLOGICAL: No stroke like symptoms  SKIN: No rashes    	  MEDICATIONS:  aMIOdarone    Tablet 200 milliGRAM(s) Oral daily  metoprolol tartrate 50 milliGRAM(s) Oral two times a day        PHYSICAL EXAM:  T(C): 36.7 (12-28-20 @ 11:36), Max: 37.1 (12-28-20 @ 00:00)  HR: 63 (12-28-20 @ 11:36) (57 - 83)  BP: 166/67 (12-28-20 @ 11:36) (120/57 - 180/72)  RR: 18 (12-28-20 @ 11:36) (18 - 18)  SpO2: 98% (12-28-20 @ 11:36) (94% - 98%)  Wt(kg): --  I&O's Summary    27 Dec 2020 07:01  -  28 Dec 2020 07:00  --------------------------------------------------------  IN: 690 mL / OUT: 400 mL / NET: 290 mL    28 Dec 2020 07:01  -  28 Dec 2020 14:26  --------------------------------------------------------  IN: 420 mL / OUT: 150 mL / NET: 270 mL      Appearance: In no distress	  HEENT:    PERRL, EOMI	  Cardiovascular:  S1 S2, No JVD  Respiratory: Lungs clear to auscultation	  Gastrointestinal:  Soft, Non-tender, + BS	  Vascularature:  No edema of LE  Psychiatric: Appropriate affect   Neuro: no acute focal deficits                           8.1    14.56 )-----------( 324      ( 28 Dec 2020 12:34 )             25.5     12-28    135  |  102  |  30<H>  ----------------------------<  224<H>  4.5   |  20<L>  |  0.94    Ca    9.0      28 Dec 2020 05:35  Phos  2.5     12-27  Mg     2.3     12-27    TPro  5.4<L>  /  Alb  2.9<L>  /  TBili  0.4  /  DBili  x   /  AST  10  /  ALT  <5<L>  /  AlkPhos  58  12-27    Labs personally reviewed      Assessment and Plan:   Problem/Plan - 1:  ·  Problem: Febrile illness, acute.  Plan: likely viral   -covid-19 neg     Problem/Plan - 2:  ·  Problem: CAD (coronary artery disease).  Plan: Holding ASA given anemia  c/w Metoprolol  c/w Lipitor 10mg    Problem/Plan - 3  ·  Problem: DM (diabetes mellitus).  Plan: insulin / FSBS.     Problem/Plan - 4:  ·  Problem: Parkinson disease.  Plan: c/w senemet.     Problem/Plan - 5:  Problem: Anemia, unspecified type. Plan: holding ASA, GI recs noted                Starla Sutherland ANP-C  Oral Ruiz DO [facility]  Cardiovascular Medicine  [address] [place] [address], [address]  Office: [phone]  Cell: [phone] Patient is a 86y old  Male who presents with a chief complaint of generalized weakness / FTT (28 Dec 2020 11:29)      INTERVAL HISTORY: feels ok    TELEMETRY Personally reviewed:  no events     REVIEW OF SYSTEMS:   CONSTITUTIONAL: No weakness  EYES/ENT: No visual changes; No throat pain  Neck: No pain or stiffness  Respiratory: No cough, wheezing, No shortness of breath  CARDIOVASCULAR: no chest pain or palpitations  GASTROINTESTINAL: No abdominal pain, no nausea, vomiting or hematemesis  GENITOURINARY: No dysuria, frequency or hematuria  NEUROLOGICAL: No stroke like symptoms  SKIN: No rashes    	  MEDICATIONS:  aMIOdarone    Tablet 200 milliGRAM(s) Oral daily  metoprolol tartrate 50 milliGRAM(s) Oral two times a day        PHYSICAL EXAM:  T(C): 36.7 (12-28-20 @ 11:36), Max: 37.1 (12-28-20 @ 00:00)  HR: 63 (12-28-20 @ 11:36) (57 - 83)  BP: 166/67 (12-28-20 @ 11:36) (120/57 - 180/72)  RR: 18 (12-28-20 @ 11:36) (18 - 18)  SpO2: 98% (12-28-20 @ 11:36) (94% - 98%)  Wt(kg): --  I&O's Summary    27 Dec 2020 07:01  -  28 Dec 2020 07:00  --------------------------------------------------------  IN: 690 mL / OUT: 400 mL / NET: 290 mL    28 Dec 2020 07:01  -  28 Dec 2020 14:26  --------------------------------------------------------  IN: 420 mL / OUT: 150 mL / NET: 270 mL      Appearance: In no distress	  HEENT:    PERRL, EOMI	  Cardiovascular:  S1 S2, No JVD  Respiratory: Lungs clear to auscultation	  Gastrointestinal:  Soft, Non-tender, + BS	  Vascularature:  No edema of LE  Psychiatric: Appropriate affect   Neuro: no acute focal deficits                           8.1    14.56 )-----------( 324      ( 28 Dec 2020 12:34 )             25.5     12-28    135  |  102  |  30<H>  ----------------------------<  224<H>  4.5   |  20<L>  |  0.94    Ca    9.0      28 Dec 2020 05:35  Phos  2.5     12-27  Mg     2.3     12-27    TPro  5.4<L>  /  Alb  2.9<L>  /  TBili  0.4  /  DBili  x   /  AST  10  /  ALT  <5<L>  /  AlkPhos  58  12-27    Labs personally reviewed      Assessment and Plan:   Problem/Plan - 1:  ·  Problem: Febrile illness, acute.  Plan: likely viral   -covid-19 neg     Problem/Plan - 2:  ·  Problem: CAD (coronary artery disease).  Plan: Holding ASA given anemia  c/w Metoprolol  c/w Lipitor 10mg    Problem/Plan - 3  ·  Problem: DM (diabetes mellitus).  Plan: insulin / FSBS.     Problem/Plan - 4:  ·  Problem: Parkinson disease.  Plan: c/w senemet.     Problem/Plan - 5:  Problem: Anemia, unspecified type. Plan: holding ASA, GI recs noted        Starla Sutherland ANP-C  Oral Ruiz DO [facility]  Cardiovascular Medicine  [address] [place] [address], [address]  Office: [phone]  Cell: [phone]

## 2020-12-28 NOTE — PROGRESS NOTE ADULT - PROBLEM SELECTOR PLAN 1
likely viral   -covid-19 neg   FTT   gentle hydration

## 2020-12-28 NOTE — PROGRESS NOTE ADULT - ATTENDING COMMENTS
PT chhaya , d/w daughter , will need STR likely   daughter says he was in [facility] in [place] before .
Pt care and plan discussed and reviewed with NP. Plan as outlined above edited by my to reflect our discussion.
PT chhaya , d/w daughter , will need STR likely   ok to d/c to rehab
Had confusion last night: cont antibiotics: he is on rom air
PT chhaya, may need STR   called daughter cell phone : no answer and no answering service , so not able to leave message
PT eval

## 2020-12-28 NOTE — PROGRESS NOTE ADULT - SUBJECTIVE AND OBJECTIVE BOX
Patient is a 86y old  Male who presents with a chief complaint of generalized weakness / FTT (28 Dec 2020 19:39)    doing fair , no c/o    INTERVAL HPI/OVERNIGHT EVENTS:  T(C): 37.2 (12-28-20 @ 19:57), Max: 37.2 (12-28-20 @ 19:57)  HR: 53 (12-28-20 @ 19:57) (53 - 83)  BP: 136/60 (12-28-20 @ 19:57) (120/57 - 180/72)  RR: 18 (12-28-20 @ 19:57) (18 - 18)  SpO2: 96% (12-28-20 @ 19:57) (96% - 98%)  Wt(kg): --  I&O's Summary    27 Dec 2020 07:01  -  28 Dec 2020 07:00  --------------------------------------------------------  IN: 690 mL / OUT: 400 mL / NET: 290 mL    28 Dec 2020 07:01  -  29 Dec 2020 01:25  --------------------------------------------------------  IN: 540 mL / OUT: 150 mL / NET: 390 mL        PAST MEDICAL & SURGICAL HISTORY:  CAD (coronary artery disease)    DM (diabetes mellitus)    HLD (hyperlipidemia)    HTN (hypertension)    Parkinson disease    Benign prostatic hyperplasia    Syncope    Urinary frequency    Osteoporosis    Hypertension    CAD (coronary artery disease)    Dyslipidemia    Diabetes mellitus    History of knee replacement procedure of left knee  2017    S/P CABG (coronary artery bypass graft)  2015    History of total left knee replacement  2014    S/P coronary artery stent placement in 2003        SOCIAL HISTORY  Alcohol:  Tobacco:  Illicit substance use:    FAMILY HISTORY:    REVIEW OF SYSTEMS:  CONSTITUTIONAL: No fever, weight loss, or fatigue  EYES: No eye pain, visual disturbances, or discharge  ENMT:  No difficulty hearing, tinnitus, vertigo; No sinus or throat pain  NECK: No pain or stiffness  RESPIRATORY: No cough, wheezing, chills or hemoptysis; No shortness of breath  CARDIOVASCULAR: No chest pain, palpitations, dizziness, or leg swelling  GASTROINTESTINAL: No abdominal or epigastric pain. No nausea, vomiting, or hematemesis; No diarrhea or constipation. No melena or hematochezia.  GENITOURINARY: No dysuria, frequency, hematuria, or incontinence  NEUROLOGICAL: No headaches, memory loss, loss of strength, numbness, or tremors  SKIN: No itching, burning, rashes, or lesions   LYMPH NODES: No enlarged glands  ENDOCRINE: No heat or cold intolerance; No hair loss  MUSCULOSKELETAL: No joint pain or swelling; No muscle, back, or extremity pain  PSYCHIATRIC: No depression, anxiety, mood swings, or difficulty sleeping  HEME/LYMPH: No easy bruising, or bleeding gums  ALLERY AND IMMUNOLOGIC: No hives or eczema    RADIOLOGY & ADDITIONAL TESTS:    Imaging Personally Reviewed:  [ ] YES  [ ] NO    Consultant(s) Notes Reviewed:  [ ] YES  [ ] NO    PHYSICAL EXAM:  GENERAL: NAD, well-groomed, well-developed  HEAD:  Atraumatic, Normocephalic  EYES: EOMI, PERRLA, conjunctiva and sclera clear  ENMT: No tonsillar erythema, exudates, or enlargement; Moist mucous membranes, Good dentition, No lesions  NECK: Supple, No JVD, Normal thyroid  NERVOUS SYSTEM:  Alert & Oriented X3, Good concentration; Motor Strength 5/5 B/L upper and lower extremities; DTRs 2+ intact and symmetric  CHEST/LUNG: Clear to percussion bilaterally; No rales, rhonchi, wheezing, or rubs  HEART: Regular rate and rhythm; No murmurs, rubs, or gallops  ABDOMEN: Soft, Nontender, Nondistended; Bowel sounds present  EXTREMITIES:  2+ Peripheral Pulses, No clubbing, cyanosis, or edema  LYMPH: No lymphadenopathy noted  SKIN: No rashes or lesions    LABS:                        8.1    14.56 )-----------( 324      ( 28 Dec 2020 12:34 )             25.5     12-28    135  |  102  |  30<H>  ----------------------------<  224<H>  4.5   |  20<L>  |  0.94    Ca    9.0      28 Dec 2020 05:35  Phos  2.5     12-27  Mg     2.3     12-27    TPro  5.4<L>  /  Alb  2.9<L>  /  TBili  0.4  /  DBili  x   /  AST  10  /  ALT  <5<L>  /  AlkPhos  58  12-27        CAPILLARY BLOOD GLUCOSE      POCT Blood Glucose.: 145 mg/dL (28 Dec 2020 21:08)  POCT Blood Glucose.: 132 mg/dL (28 Dec 2020 16:13)  POCT Blood Glucose.: 131 mg/dL (28 Dec 2020 11:47)  POCT Blood Glucose.: 179 mg/dL (28 Dec 2020 07:42)            MEDICATIONS  (STANDING):  aMIOdarone    Tablet 200 milliGRAM(s) Oral daily  atorvastatin 10 milliGRAM(s) Oral at bedtime  carbidopa/levodopa  25/100 1 Tablet(s) Oral three times a day  cefTRIAXone   IVPB 1000 milliGRAM(s) IV Intermittent every 24 hours  dextrose 40% Gel 15 Gram(s) Oral once  dextrose 5%. 1000 milliLiter(s) (50 mL/Hr) IV Continuous <Continuous>  dextrose 5%. 1000 milliLiter(s) (100 mL/Hr) IV Continuous <Continuous>  dextrose 50% Injectable 25 Gram(s) IV Push once  dextrose 50% Injectable 12.5 Gram(s) IV Push once  dextrose 50% Injectable 25 Gram(s) IV Push once  glucagon  Injectable 1 milliGRAM(s) IntraMuscular once  heparin   Injectable 5000 Unit(s) SubCutaneous every 12 hours  insulin glargine Injectable (LANTUS) 10 Unit(s) SubCutaneous at bedtime  insulin lispro (ADMELOG) corrective regimen sliding scale   SubCutaneous three times a day before meals  insulin lispro (ADMELOG) corrective regimen sliding scale   SubCutaneous at bedtime  lidocaine   Patch 1 Patch Transdermal daily  metoprolol tartrate 50 milliGRAM(s) Oral two times a day  oxybutynin 5 milliGRAM(s) Oral two times a day  pantoprazole    Tablet 40 milliGRAM(s) Oral before breakfast  sertraline 25 milliGRAM(s) Oral daily    MEDICATIONS  (PRN):  acetaminophen   Tablet .. 650 milliGRAM(s) Oral every 6 hours PRN Temp greater or equal to 38C (100.4F), Moderate Pain (4 - 6)      Care Discussed with Consultants/Other Providers [ ] YES  [ ] NO

## 2020-12-28 NOTE — PROGRESS NOTE ADULT - SUBJECTIVE AND OBJECTIVE BOX
INFECTIOUS DISEASES FOLLOW UP-- Lacey Edwards  [phone]    This is a follow up note for this  86yMale with  Fever due to unspecified condition  reports feeling improved        ROS:  CONSTITUTIONAL:  No fever, good appetite, frail appearing  CARDIOVASCULAR:  No chest pain or palpitations  RESPIRATORY:  No dyspnea  GASTROINTESTINAL:  No nausea, vomiting, diarrhea, or abdominal pain  GENITOURINARY:  No dysuria  NEUROLOGIC:  No headache,     Allergies    No Known Allergies    Intolerances        ANTIBIOTICS/RELEVANT:  antimicrobials  cefTRIAXone   IVPB 1000 milliGRAM(s) IV Intermittent every 24 hours    immunologic:    OTHER:  acetaminophen   Tablet .. 650 milliGRAM(s) Oral every 6 hours PRN  aMIOdarone    Tablet 200 milliGRAM(s) Oral daily  atorvastatin 10 milliGRAM(s) Oral at bedtime  carbidopa/levodopa  25/100 1 Tablet(s) Oral three times a day  dextrose 40% Gel 15 Gram(s) Oral once  dextrose 5%. 1000 milliLiter(s) IV Continuous <Continuous>  dextrose 5%. 1000 milliLiter(s) IV Continuous <Continuous>  dextrose 50% Injectable 25 Gram(s) IV Push once  dextrose 50% Injectable 12.5 Gram(s) IV Push once  dextrose 50% Injectable 25 Gram(s) IV Push once  glucagon  Injectable 1 milliGRAM(s) IntraMuscular once  heparin   Injectable 5000 Unit(s) SubCutaneous every 12 hours  insulin glargine Injectable (LANTUS) 10 Unit(s) SubCutaneous at bedtime  insulin lispro (ADMELOG) corrective regimen sliding scale   SubCutaneous three times a day before meals  insulin lispro (ADMELOG) corrective regimen sliding scale   SubCutaneous at bedtime  lidocaine   Patch 1 Patch Transdermal daily  metoprolol tartrate 50 milliGRAM(s) Oral two times a day  oxybutynin 5 milliGRAM(s) Oral two times a day  pantoprazole    Tablet 40 milliGRAM(s) Oral before breakfast  sertraline 25 milliGRAM(s) Oral daily      Objective:  Vital Signs Last 24 Hrs  T(C): 36.3 (28 Dec 2020 15:55), Max: 37.1 (28 Dec 2020 00:00)  T(F): 97.3 (28 Dec 2020 15:55), Max: 98.8 (28 Dec 2020 00:00)  HR: 61 (28 Dec 2020 17:25) (54 - 83)  BP: 150/69 (28 Dec 2020 17:25) (120/57 - 180/72)  BP(mean): --  RR: 18 (28 Dec 2020 15:55) (18 - 18)  SpO2: 98% (28 Dec 2020 15:55) (94% - 98%)    PHYSICAL EXAM:  Constitutional:no acute distress, cachectic  Eyes:CRIS, EOMI  Ear/Nose/Throat: no oral lesions, 	  Respiratory: clear BL anteriorly  Cardiovascular: S1S2  Gastrointestinal:soft, (+) BS, no tenderness  Extremities:no e/e/c  No Lymphadenopathy  IV sites not inflammed.    LABS:                        8.1    14.56 )-----------( 324      ( 28 Dec 2020 12:34 )             25.5     12-28    135  |  102  |  30<H>  ----------------------------<  224<H>  4.5   |  20<L>  |  0.94    Ca    9.0      28 Dec 2020 05:35  Phos  2.5     12-27  Mg     2.3     12-27    TPro  5.4<L>  /  Alb  2.9<L>  /  TBili  0.4  /  DBili  x   /  AST  10  /  ALT  <5<L>  /  AlkPhos  58  12-27          MICROBIOLOGY:            RECENT CULTURES:  12-24 @ 13:56  .Urine Clean Catch (Midstream)  --  --  --    50,000 - 99,000 CFU/mL Coag Negative Staphylococcus  "Susceptibilities not performed"  --  12-24 @ 13:25  .Blood Blood-Peripheral  --  --  --    No growth to date.  --    Legionella Antigen, Urine: Negative: Negative Testing method: Immunochromatographic Assay. L. pneumpohila  serogroup 1 antigen in urine NOT detected, suggesting NO recent or  current infection. Infection due to Legionella cannot be ruled out: other  serogroups and species may cause disease, antigen may not be present in  urine in early infection, or the level of antigens in urine may be below  the detection limit of the test.  Order “Culture –Legionella” is  recommended for uncommon cases of suspected Legionella pneumonia due to  organisms other than L. pneumophila serogroup 1. (12.26.20 @ 13:41)    Rapid RVP Result: Detected (12.24.20 @ 09:52) entero/rhinovirus      RADIOLOGY & ADDITIONAL STUDIES:    < from: CT Chest No Cont (12.26.20 @ 10:34) >  IMPRESSION:    Mild linear and groundglass opacities within the upper lungs may represent atypical/viral infection or pneumonitis..    < end of copied text >

## 2020-12-29 ENCOUNTER — TRANSCRIPTION ENCOUNTER (OUTPATIENT)
Age: 85
End: 2020-12-29

## 2020-12-29 VITALS
TEMPERATURE: 98 F | HEART RATE: 63 BPM | DIASTOLIC BLOOD PRESSURE: 65 MMHG | OXYGEN SATURATION: 100 % | SYSTOLIC BLOOD PRESSURE: 144 MMHG | RESPIRATION RATE: 18 BRPM

## 2020-12-29 LAB
ANION GAP SERPL CALC-SCNC: 10 MMOL/L — SIGNIFICANT CHANGE UP (ref 5–17)
BUN SERPL-MCNC: 40 MG/DL — HIGH (ref 7–23)
CALCIUM SERPL-MCNC: 9.5 MG/DL — SIGNIFICANT CHANGE UP (ref 8.4–10.5)
CHLORIDE SERPL-SCNC: 102 MMOL/L — SIGNIFICANT CHANGE UP (ref 96–108)
CO2 SERPL-SCNC: 23 MMOL/L — SIGNIFICANT CHANGE UP (ref 22–31)
CREAT SERPL-MCNC: 1.28 MG/DL — SIGNIFICANT CHANGE UP (ref 0.5–1.3)
CULTURE RESULTS: SIGNIFICANT CHANGE UP
CULTURE RESULTS: SIGNIFICANT CHANGE UP
GLUCOSE BLDC GLUCOMTR-MCNC: 105 MG/DL — HIGH (ref 70–99)
GLUCOSE BLDC GLUCOMTR-MCNC: 164 MG/DL — HIGH (ref 70–99)
GLUCOSE BLDC GLUCOMTR-MCNC: 202 MG/DL — HIGH (ref 70–99)
GLUCOSE SERPL-MCNC: 109 MG/DL — HIGH (ref 70–99)
HCT VFR BLD CALC: 27.6 % — LOW (ref 39–50)
HGB BLD-MCNC: 8.7 G/DL — LOW (ref 13–17)
MCHC RBC-ENTMCNC: 28.5 PG — SIGNIFICANT CHANGE UP (ref 27–34)
MCHC RBC-ENTMCNC: 31.5 GM/DL — LOW (ref 32–36)
MCV RBC AUTO: 90.5 FL — SIGNIFICANT CHANGE UP (ref 80–100)
NRBC # BLD: 0 /100 WBCS — SIGNIFICANT CHANGE UP (ref 0–0)
PLATELET # BLD AUTO: 367 K/UL — SIGNIFICANT CHANGE UP (ref 150–400)
POTASSIUM SERPL-MCNC: 4.4 MMOL/L — SIGNIFICANT CHANGE UP (ref 3.5–5.3)
POTASSIUM SERPL-SCNC: 4.4 MMOL/L — SIGNIFICANT CHANGE UP (ref 3.5–5.3)
RBC # BLD: 3.05 M/UL — LOW (ref 4.2–5.8)
RBC # FLD: 15.3 % — HIGH (ref 10.3–14.5)
SODIUM SERPL-SCNC: 135 MMOL/L — SIGNIFICANT CHANGE UP (ref 135–145)
SPECIMEN SOURCE: SIGNIFICANT CHANGE UP
SPECIMEN SOURCE: SIGNIFICANT CHANGE UP
WBC # BLD: 13.41 K/UL — HIGH (ref 3.8–10.5)
WBC # FLD AUTO: 13.41 K/UL — HIGH (ref 3.8–10.5)

## 2020-12-29 RX ORDER — ASPIRIN/CALCIUM CARB/MAGNESIUM 324 MG
0 TABLET ORAL
Qty: 0 | Refills: 0 | DISCHARGE

## 2020-12-29 RX ORDER — OXYBUTYNIN CHLORIDE 5 MG
1 TABLET ORAL
Qty: 0 | Refills: 0 | DISCHARGE
Start: 2020-12-29

## 2020-12-29 RX ORDER — LINACLOTIDE 145 UG/1
1 CAPSULE, GELATIN COATED ORAL
Qty: 0 | Refills: 0 | DISCHARGE

## 2020-12-29 RX ORDER — SERTRALINE 25 MG/1
1 TABLET, FILM COATED ORAL
Qty: 0 | Refills: 0 | DISCHARGE
Start: 2020-12-29

## 2020-12-29 RX ORDER — PANTOPRAZOLE SODIUM 20 MG/1
1 TABLET, DELAYED RELEASE ORAL
Qty: 0 | Refills: 0 | DISCHARGE
Start: 2020-12-29

## 2020-12-29 RX ORDER — METOPROLOL TARTRATE 50 MG
1 TABLET ORAL
Qty: 0 | Refills: 0 | DISCHARGE
Start: 2020-12-29

## 2020-12-29 RX ORDER — AMIODARONE HYDROCHLORIDE 400 MG/1
1 TABLET ORAL
Qty: 0 | Refills: 0 | DISCHARGE
Start: 2020-12-29

## 2020-12-29 RX ADMIN — Medication 2: at 07:41

## 2020-12-29 RX ADMIN — Medication 1: at 12:08

## 2020-12-29 RX ADMIN — Medication 50 MILLIGRAM(S): at 04:51

## 2020-12-29 RX ADMIN — LIDOCAINE 1 PATCH: 4 CREAM TOPICAL at 00:03

## 2020-12-29 RX ADMIN — AMIODARONE HYDROCHLORIDE 200 MILLIGRAM(S): 400 TABLET ORAL at 04:51

## 2020-12-29 RX ADMIN — Medication 5 MILLIGRAM(S): at 17:06

## 2020-12-29 RX ADMIN — Medication 50 MILLIGRAM(S): at 17:06

## 2020-12-29 RX ADMIN — HEPARIN SODIUM 5000 UNIT(S): 5000 INJECTION INTRAVENOUS; SUBCUTANEOUS at 04:51

## 2020-12-29 RX ADMIN — Medication 5 MILLIGRAM(S): at 04:52

## 2020-12-29 RX ADMIN — SERTRALINE 25 MILLIGRAM(S): 25 TABLET, FILM COATED ORAL at 12:11

## 2020-12-29 RX ADMIN — CARBIDOPA AND LEVODOPA 1 TABLET(S): 25; 100 TABLET ORAL at 14:06

## 2020-12-29 RX ADMIN — Medication 650 MILLIGRAM(S): at 14:06

## 2020-12-29 RX ADMIN — PANTOPRAZOLE SODIUM 40 MILLIGRAM(S): 20 TABLET, DELAYED RELEASE ORAL at 04:52

## 2020-12-29 RX ADMIN — CARBIDOPA AND LEVODOPA 1 TABLET(S): 25; 100 TABLET ORAL at 04:51

## 2020-12-29 NOTE — DISCHARGE NOTE PROVIDER - HOSPITAL COURSE
86 year old male with past medical history of Parkinson's disease, diabetes mellitus, hypertension, hyperlipidemia, CAD s/p CABG, and BPH who presented with generalized weakness.  As per chart review, he was endorsing diffuse pains and associated weakness over the last several days. Found to have Rhinovirus positive. Seen and evaluated by Pulm and ID. s/p IV ABx for superimposed bacterial PNA. Leukocytosis improving. Also noted to be anemic. FOBT positive. As per GI, patient is a poor candidate for endoscopic eval. s/p 2 units PRBC. Hgb stable. PAT noted on tele. Lopressor increased to 50 BID. Patient remains stable for DC with close follow up with PCP as per Dr. Quiroz. 86 year old male with past medical history of Parkinson's disease, diabetes mellitus, hypertension, hyperlipidemia, CAD s/p CABG, and BPH who presented with generalized weakness.  As per chart review, he was endorsing diffuse pains and associated weakness over the last several days. Found to be Rhinovirus positive. Seen and evaluated by Pulm and ID. s/p IV ABx for superimposed bacterial PNA. Leukocytosis improving. Also noted to be anemic. FOBT positive. As per GI, patient is a poor candidate for endoscopic eval. s/p 2 units PRBC. Hgb stable. PAT noted on tele. Lopressor increased to 50 BID. Patient remains stable for DC with close follow up with PCP as per Dr. Quiroz.     Problem/Plan - 1:  ·  Problem: Viral pneumonia.  Plan: started on abx to prevent 2/2 bacterial inf   -Completed course of ceftriaxone      Problem/Plan - 3:  ·  Problem: CAD (coronary artery disease).  Plan: c/w home meds.      Problem/Plan - 4:  ·  Problem: DM (diabetes mellitus).  Plan: insulin / FSBS.      Problem/Plan - 5:  ·  Problem: Parkinson disease.  Plan: c/w senemet.      Problem/Plan - 6:  Problem: Anemia, unspecified type. Plan: s/p 2 unit transfusion   -GI consult Dr. Purcell called : conservative management   -no active bleeding right now.    DCP with medication reconciliation discussed with Dr. Quiroz.   Patient cleared for discharge to [facility].   Follow up with PCP in 1 week

## 2020-12-29 NOTE — DISCHARGE NOTE PROVIDER - CARE PROVIDER_API CALL
YAQUELIN LINK  [phone]  [place] [address]  Phone: ([phone]  Fax: ()-  Follow Up Time:    Fernando Tapia  [id number]  [place] [address]  Phone: [phone]  Fax: (   )    -  Follow Up Time:

## 2020-12-29 NOTE — PROGRESS NOTE ADULT - REASON FOR ADMISSION
generalized weakness / FTT

## 2020-12-29 NOTE — PROGRESS NOTE ADULT - ASSESSMENT
85yo male with pmh Parkinson's disease, DM, HTN, HLD, CAD s/p CABG, BPH presenting with generalized weakness.  Endorsing diffuse pains, weakness all over over past few days.  Has had difficulty walking and getting around because of this.  Lives at home with family.  No chest pain, cough.  Denies falls.  States he has been covid tested 2x which have been negative (24 Dec 2020 19:20)  called daughter to get more history   His RVP came back positive   From Sunday : he lost his appetite and became weak: and for next three days he became very week and turned cold and not feeling good: and could not go to the bathroom; he never had covid pneumonia   he never smoked: not on oxygen at home:   never used pumps: He was walking on Saturday around the house:   no nobdy has covid infection    rhinovirus infection: ? CAP: Do ct chest : non contrast: cont antibiotics: legionella: urine: : mantiano2 sao2 above 905   DM: monitor and control  HTN: HLD" controlled  CAD: S/P CABG  PARKINSON'S Disease Cont home meds:  DVT propahyxlis    DW Becky    12/26:    rhinovirus infection: ? CAP: ct c hest recviewed: some subtle GGO opacities  ? viral pneumonia: on antibiotics at this time: covid is negative!  DM: monitor and control  HTN: HLD" controlled  CAD: S/P CABG  PARKINSON'S Disease Cont home meds:  DVT propahyxlis  DW NP    DW Becky    12/27:    rhinovirus infection: ? CAP: ct c hest reviewed some subtle GGO opacities  ? viral pneumonia: on antibiotics at this time: covid is negative! and so is the legionella:   DM: monitor and control  HTN: HLD" controlled  CAD: S/P CABG  PARKINSON'S Disease Cont home meds:  DVT propahyxlis  DW NP    DW Becky    12/28:    rhinovirus infection: ? CAP: ct c hest reviewed some subtle GGO opacities  RVP + enterovirus? viral pneumonia: on antibiotics at this time as per ID: covid is negative! and so is the legionella:   WBC uptrended to 17K today, afebrile. Suggest repeat CBC. Clinically non toxic appearing, saturating well on RA.  DM: monitor and control  HTN: HLD" controlled  CAD: S/P CABG  PARKINSON'S Disease Cont home meds:  DVT opal    12/29:    rhinovirus infection: ? CAP: dong ok : likely viral pneumonia: rpt ct chest in 8 weeks time following dc  DM: monitor and control  HTN: HLD" controlled  CAD: S/P CABG  PARKINSON'S Disease Cont home meds:  DVT opal JERONIMO NP; for dc today 
85yo male with pmh Parkinson's disease, DM, HTN, HLD, CAD s/p CABG, BPH presenting with generalized weakness.  Endorsing diffuse pains, weakness all over over past few days.  Has had difficulty walking and getting around because of this.  Lives at home with family.  No chest pain, cough.  Denies falls.  States he has been covid tested 2x which have been negative (24 Dec 2020 19:20)  called daughter to get more history   His RVP came back positive   From Sunday : he lost his appetite and became weak: and for next three days he became very week and turned cold and not feeling good: and could not go to the bathroom; he never had covid pneumonia   he never smoked: not on oxygen at home:   never used pumps: He was walking on Saturday around the house:   no nobdy has covid infection    rhinovirus infection: ? CAP: Do ct chest : non contrast: cont antibiotics: legionella: urine: : mantiano2 sao2 above 905   DM: monitor and control  HTN: HLD" controlled  CAD: S/P CABG  PARKINSON'S Disease Cont home meds:  DVT propahyxlis    KANDACE Pena    12/26:    rhinovirus infection: ? CAP: ct c hest recviewed: some subtle GGO opacities  ? viral pneumonia: on antibiotics at this time: covid is negative!  DM: monitor and control  HTN: HLD" controlled  CAD: S/P CABG  PARKINSON'S Disease Cont home meds:  DVT propahyxlis  DW NP    KANDACE Pena    12/27:    rhinovirus infection: ? CAP: ct c hest reviewed some subtle GGO opacities  ? viral pneumonia: on antibiotics at this time: covid is negative! and so is the legionella:   DM: monitor and control  HTN: HLD" controlled  CAD: S/P CABG  PARKINSON'S Disease Cont home meds:  DVT propahyxlis  KANDACE Grangerbie
Assessment:  The patient is an 86 year old male with past medical history of Parkinson's disease, diabetes mellitus, hypertension, hyperlipidemia, CAD s/p CABG, and BPH who presented with generalized weakness.  As per chart review, he was endorsing diffuse pains and associated weakness over the last several days.  Has had difficulty walking and getting around because of this.  Lives at home with family.  He denies fever, chills, chest pain, palpitations, abdominal pain, nausea, vomiting, diarrhea, constipation, or falls. He reports that he has been COVID-19 tested 2x which have been negative (24 Dec 2020 19:20). CBC showed normocytic anemia. CMP shows elevated BUN and hyperglycemia. Hypoalbuminemia and hypoproteinemia were noted. Blood cultures are showing NGTD. Urine cultures was showing coagulase negative staphylococcus. Respiratory viral panel was positive for enterorhinovirus.      Plan:  # Entero-rhinovirus infections with underlying suspected pneumonia   - clinically improving  - can discontinue the Ceftriaxone  -discharge planning      ID will sign off  call if questions arise    Parmjit Edwards MD  [phone]  After 5pm/weekends [phone]  
conservatige mangment, ppi, poor candide for endscopic evaluation
continue ppi and conservative gi mangment
follow hgb, no plan for endosopic evauaton, conservative mangment
remains stable from GI standpoint, no bleeding, conservative management
85yo male with pmh Parkinson's disease, DM, HTN, HLD, CAD s/p CABG, BPH presenting with generalized weakness.  Endorsing diffuse pains, weakness all over over past few days.  Has had difficulty walking and getting around because of this.  Lives at home with family.  No chest pain, cough.  Denies falls.  States he has been covid tested 2x which have been negative (24 Dec 2020 19:20)  called daughter to get more history   His RVP came back positive   From Sunday : he lost his appetite and became weak: and for next three days he became very week and turned cold and not feeling good: and could not go to the bathroom; he never had covid pneumonia   he never smoked: not on oxygen at home:   never used pumps: He was walking on Saturday around the house:   no nobdy has covid infection    rhinovirus infection: ? CAP: Do ct chest : non contrast: cont antibiotics: legionella: urine: : mantiano2 sao2 above 905   DM: monitor and control  HTN: HLD" controlled  CAD: S/P CABG  PARKINSON'S Disease Cont home meds:  DVT opal Pena
87yo male with pmh Parkinson's disease, DM, HTN, HLD, CAD s/p CABG, BPH presenting with generalized weakness.  Endorsing diffuse pains, weakness all over over past few days.  Has had difficulty walking and getting around because of this.  Lives at home with family.  No chest pain, cough.  Denies falls.  States he has been covid tested 2x which have been negative (24 Dec 2020 19:20)  called daughter to get more history   His RVP came back positive   From Sunday : he lost his appetite and became weak: and for next three days he became very week and turned cold and not feeling good: and could not go to the bathroom; he never had covid pneumonia   he never smoked: not on oxygen at home:   never used pumps: He was walking on Saturday around the house:   no nobdy has covid infection    rhinovirus infection: ? CAP: Do ct chest : non contrast: cont antibiotics: legionella: urine: : mantiano2 sao2 above 905   DM: monitor and control  HTN: HLD" controlled  CAD: S/P CABG  PARKINSON'S Disease Cont home meds:  DVT propahyxlis    DW Becky    12/26:    rhinovirus infection: ? CAP: ct c hest recviewed: some subtle GGO opacities  ? viral pneumonia: on antibiotics at this time: covid is negative!  DM: monitor and control  HTN: HLD" controlled  CAD: S/P CABG  PARKINSON'S Disease Cont home meds:  DVT propahyxlis  DW NP    DW Becky    12/27:    rhinovirus infection: ? CAP: ct c hest reviewed some subtle GGO opacities  ? viral pneumonia: on antibiotics at this time: covid is negative! and so is the legionella:   DM: monitor and control  HTN: HLD" controlled  CAD: S/P CABG  PARKINSON'S Disease Cont home meds:  DVT propahyxlis  DW NP    DW Becky    12/28:    rhinovirus infection: ? CAP: ct c hest reviewed some subtle GGO opacities  RVP + enterovirus? viral pneumonia: on antibiotics at this time as per ID: covid is negative! and so is the legionella:   WBC uptrended to 17K today, afebrile. Suggest repeat CBC. Clinically non toxic appearing, saturating well on RA.  DM: monitor and control  HTN: HLD" controlled  CAD: S/P CABG  PARKINSON'S Disease Cont home meds:  DVT propahyxlis
87yo male with pmh Parkinson's disease, DM, HTN, HLD, CAD s/p CABG, BPH presenting with generalized weakness.  Endorsing diffuse pains, weakness all over over past few days.  Has had difficulty walking and getting around because of this.  Lives at home with family.  No chest pain, cough.  Denies falls.  States he has been covid tested 2x which have been negative (24 Dec 2020 19:20)  called daughter to get more history   His RVP came back positive   From Sunday : he lost his appetite and became weak: and for next three days he became very week and turned cold and not feeling good: and could not go to the bathroom; he never had covid pneumonia   he never smoked: not on oxygen at home:   never used pumps: He was walking on Saturday around the house:   no nobdy has covid infection    rhinovirus infection: ? CAP: Do ct chest : non contrast: cont antibiotics: legionella: urine: : mantiano2 sao2 above 905   DM: monitor and control  HTN: HLD" controlled  CAD: S/P CABG  PARKINSON'S Disease Cont home meds:  DVT chidiyxaldo Pena    12/26:    rhinovirus infection: ? CAP: ct c hest recviewed: some subtle GGO opacities  ? viral pneumonia: on antibiotics at this time: covid is negative!  DM: monitor and control  HTN: HLD" controlled  CAD: S/P CABG  PARKINSON'S Disease Cont home meds:  DVT propscottyyxfransiscos  KANDACE Pena

## 2020-12-29 NOTE — DISCHARGE NOTE PROVIDER - PROVIDER TOKENS
PROVIDER:[TOKEN:[[id number]]] FREE:[LAST:[Regina],FIRST:[Fernando],PHONE:[phone]],FAX:[(   )    -],ADDRESS:[[phone]  [place] [id number]]]

## 2020-12-29 NOTE — PROGRESS NOTE ADULT - PROVIDER SPECIALTY LIST ADULT
Cardiology
Gastroenterology
Pulmonology
Gastroenterology
Gastroenterology
Pulmonology
Gastroenterology
Infectious Disease
Pulmonology
Pulmonology
Internal Medicine
Pulmonology
Internal Medicine

## 2020-12-29 NOTE — PROGRESS NOTE ADULT - SUBJECTIVE AND OBJECTIVE BOX
Patient is a 86y old  Male who presents with a chief complaint of generalized weakness / FTT (29 Dec 2020 13:13)      Any change in ROS: Doing OK: no resp distress:     MEDICATIONS  (STANDING):  aMIOdarone    Tablet 200 milliGRAM(s) Oral daily  atorvastatin 10 milliGRAM(s) Oral at bedtime  carbidopa/levodopa  25/100 1 Tablet(s) Oral three times a day  dextrose 40% Gel 15 Gram(s) Oral once  dextrose 5%. 1000 milliLiter(s) (50 mL/Hr) IV Continuous <Continuous>  dextrose 5%. 1000 milliLiter(s) (100 mL/Hr) IV Continuous <Continuous>  dextrose 50% Injectable 25 Gram(s) IV Push once  dextrose 50% Injectable 12.5 Gram(s) IV Push once  dextrose 50% Injectable 25 Gram(s) IV Push once  glucagon  Injectable 1 milliGRAM(s) IntraMuscular once  heparin   Injectable 5000 Unit(s) SubCutaneous every 12 hours  insulin glargine Injectable (LANTUS) 10 Unit(s) SubCutaneous at bedtime  insulin lispro (ADMELOG) corrective regimen sliding scale   SubCutaneous three times a day before meals  insulin lispro (ADMELOG) corrective regimen sliding scale   SubCutaneous at bedtime  lidocaine   Patch 1 Patch Transdermal daily  metoprolol tartrate 50 milliGRAM(s) Oral two times a day  oxybutynin 5 milliGRAM(s) Oral two times a day  pantoprazole    Tablet 40 milliGRAM(s) Oral before breakfast  sertraline 25 milliGRAM(s) Oral daily    MEDICATIONS  (PRN):  acetaminophen   Tablet .. 650 milliGRAM(s) Oral every 6 hours PRN Temp greater or equal to 38C (100.4F), Moderate Pain (4 - 6)    Vital Signs Last 24 Hrs  T(C): 36.4 (29 Dec 2020 11:26), Max: 37.2 (28 Dec 2020 19:57)  T(F): 97.5 (29 Dec 2020 11:26), Max: 99 (28 Dec 2020 19:57)  HR: 56 (29 Dec 2020 11:26) (53 - 63)  BP: 131/68 (29 Dec 2020 11:26) (120/63 - 150/69)  BP(mean): --  RR: 18 (29 Dec 2020 11:26) (18 - 18)  SpO2: 96% (29 Dec 2020 11:26) (96% - 98%)    I&O's Summary    28 Dec 2020 07:01  -  29 Dec 2020 07:00  --------------------------------------------------------  IN: 540 mL / OUT: 300 mL / NET: 240 mL    29 Dec 2020 07:01  -  29 Dec 2020 13:55  --------------------------------------------------------  IN: 240 mL / OUT: 0 mL / NET: 240 mL          Physical Exam:   GENERAL: NAD, well-groomed, well-developed  HEENT: CRIS/   Atraumatic, Normocephalic  ENMT: No tonsillar erythema, exudates, or enlargement; Moist mucous membranes, Good dentition, No lesions  NECK: Supple, No JVD, Normal thyroid  CHEST/LUNG: Clear to auscultaion, ; No rales, rhonchi, wheezing, or rubs  CVS: Regular rate and rhythm; No murmurs, rubs, or gallops  GI: : Soft, Nontender, Nondistended; Bowel sounds present  NERVOUS SYSTEM:  Alert & Oriented X3  EXTREMITIES:  -edema  LYMPH: No lymphadenopathy noted  SKIN: No rashes or lesions  ENDOCRINOLOGY: No Thyromegaly  PSYCH: Appropriate    Labs:  31                            8.7    13.41 )-----------( 367      ( 29 Dec 2020 06:48 )             27.6                         8.1    14.56 )-----------( 324      ( 28 Dec 2020 12:34 )             25.5                         9.2    17.42 )-----------( 344      ( 28 Dec 2020 05:35 )             28.1                         7.6    9.10  )-----------( 259      ( 27 Dec 2020 06:27 )             24.6                         8.0    8.32  )-----------( 254      ( 26 Dec 2020 11:44 )             25.4     12-29    135  |  102  |  40<H>  ----------------------------<  109<H>  4.4   |  23  |  1.28  12-28    135  |  102  |  30<H>  ----------------------------<  224<H>  4.5   |  20<L>  |  0.94  12-27    135  |  103  |  23  ----------------------------<  132<H>  4.3   |  23  |  0.86  12-26    133<L>  |  101  |  26<H>  ----------------------------<  242<H>  3.8   |  23  |  0.78    Ca    9.5      29 Dec 2020 06:48  Ca    9.0      28 Dec 2020 05:35    TPro  5.4<L>  /  Alb  2.9<L>  /  TBili  0.4  /  DBili  x   /  AST  10  /  ALT  <5<L>  /  AlkPhos  58  12-27    CAPILLARY BLOOD GLUCOSE      POCT Blood Glucose.: 164 mg/dL (29 Dec 2020 11:24)  POCT Blood Glucose.: 202 mg/dL (29 Dec 2020 07:23)  POCT Blood Glucose.: 145 mg/dL (28 Dec 2020 21:08)  POCT Blood Glucose.: 132 mg/dL (28 Dec 2020 16:13)          < from: CT Chest No Cont (12.26.20 @ 10:34) >      INTERPRETATION:  CLINICAL INFORMATION: Fever, positive respiratory viral panel, evaluate for pneumonia.    COMPARISON: CT chest 1/27/2017. CT abdomen pelvis 12/24/2020.    PROCEDURE:  CT of the Chest was performed without intravenous contrast.  Sagittal and coronal reformats were performed.    FINDINGS: The quality of the images are degraded by respiratory motion.    LUNGS AND AIRWAYS: Patent central airways.  Mild groundglass and linear opacities within the upper lungs. No lobar consolidation.  PLEURA: No pleural effusion.  MEDIASTINUM AND SHERI: No lymphadenopathy.  VESSELS: Aortic and coronary artery calcifications. CABG.  HEART: Cardiomegaly. No pericardialeffusion.  CHEST WALL AND LOWER NECK: Within normal limits.  VISUALIZED UPPER ABDOMEN: Right adrenal adenoma    BONES: Median sternotomy. Degenerative changes.    IMPRESSION:    Mild linear and groundglass opacities within the upper lungs may represent atypical/viral infection or pneumonitis..              BRITTA SANCHES MD; Resident Radiology  This document has been electronically signed.  MICKEY YEH MD; Attending Radiologist  This document has been electronically signed. Dec 26 2020 11:02AM    < end of copied text >          RECENT CULTURES:  12-24 @ 13:56 .Urine Clean Catch (Midstream)                50,000 - 99,000 CFU/mL Coag Negative Staphylococcus  "Susceptibilities not performed"    12-24 @ 13:25 .Blood Blood-Peripheral                No growth to date.          RESPIRATORY CULTURES:          Studies  Chest X-RAY  CT SCAN Chest   Venous Dopplers: LE:   CT Abdomen  Others

## 2020-12-29 NOTE — DISCHARGE NOTE PROVIDER - NSDCMRMEDTOKEN_GEN_ALL_CORE_FT
albuterol 90 mcg/inh inhalation aerosol with adapter: 2 puff(s) inhaled 4 times a day, As Needed  aspirin 81 mg oral tablet: orally once a day  carbidopa-levodopa 25 mg-100 mg oral tablet: 1 tab(s) orally 3 times a day  docusate sodium 100 mg oral capsule: 1 cap(s) orally 2 times a day, As Needed  finasteride 5 mg oral tablet: 1 tab(s) orally once a day  Januvia 100 mg oral tablet: 1 tab(s) orally once a day  Linzess 145 mcg oral capsule: 1 cap(s) orally once a day  metFORMIN 500 mg oral tablet: 1 tab(s) orally 3 times a day  multivitamin: 1 tab(s) orally once a day  rOPINIRole 0.5 mg oral tablet: 1 tab(s) orally 3 times a day  rosuvastatin 20 mg oral tablet: 1 tab(s) orally once a day  Symbicort 160 mcg-4.5 mcg/inh inhalation aerosol: 2 puff(s) inhaled 2 times a day  tamsulosin 0.4 mg oral capsule: 1 cap(s) orally once a day   albuterol 90 mcg/inh inhalation aerosol with adapter: 2 puff(s) inhaled 4 times a day, As Needed  amiodarone 200 mg oral tablet: 1 tab(s) orally once a day  carbidopa-levodopa 25 mg-100 mg oral tablet: 1 tab(s) orally 3 times a day  docusate sodium 100 mg oral capsule: 1 cap(s) orally 2 times a day, As Needed  finasteride 5 mg oral tablet: 1 tab(s) orally once a day  Januvia 100 mg oral tablet: 1 tab(s) orally once a day  metFORMIN 500 mg oral tablet: 1 tab(s) orally 3 times a day  metoprolol tartrate 50 mg oral tablet: 1 tab(s) orally 2 times a day  multivitamin: 1 tab(s) orally once a day  oxybutynin 5 mg oral tablet: 1 tab(s) orally 2 times a day  pantoprazole 40 mg oral delayed release tablet: 1 tab(s) orally once a day (before a meal)  rOPINIRole 0.5 mg oral tablet: 1 tab(s) orally 3 times a day  rosuvastatin 20 mg oral tablet: 1 tab(s) orally once a day  sertraline 25 mg oral tablet: 1 tab(s) orally once a day  Symbicort 160 mcg-4.5 mcg/inh inhalation aerosol: 2 puff(s) inhaled 2 times a day  tamsulosin 0.4 mg oral capsule: 1 cap(s) orally once a day

## 2020-12-29 NOTE — DISCHARGE NOTE PROVIDER - NSDCCPCAREPLAN_GEN_ALL_CORE_FT
PRINCIPAL DISCHARGE DIAGNOSIS  Diagnosis: Febrile illness, acute  Assessment and Plan of Treatment: You have viral infection with superimposed bacterial infection in the lung. You completed antibiotics for pneumonia.   Pneumonia is a lung infection that can cause a fever, cough, and trouble breathing.  Nutrition is important, eat small frequent meals.  Get lots of rest and drink fluids.  Call your health care provider upon arrival home from hospital and make a follow up appointment for one week.  If your cough worsens, you develop fever greater than 101', you have shaking chills, a fast heartbeat, trouble breathing and/or feel your are breathing much faster than usual, call your healthcare provider.  Make sure you wash your hands frequently.        SECONDARY DISCHARGE DIAGNOSES  Diagnosis: Anemia, unspecified type  Assessment and Plan of Treatment: You received two units of blood transfusion. Symptoms to report, bleeding, palpitations, fatigue, pale skin, cold skin, dizziness. Take medications as ordered by PCP       PRINCIPAL DISCHARGE DIAGNOSIS  Diagnosis: Viral pneumonia  Assessment and Plan of Treatment: You have viral infection with superimposed bacterial infection in the lung. You completed antibiotics for pneumonia.   Pneumonia is a lung infection that can cause a fever, cough, and trouble breathing.  Nutrition is important, eat small frequent meals.  Get lots of rest and drink fluids.  Call your health care provider upon arrival home from hospital and make a follow up appointment for one week.  If your cough worsens, you develop fever greater than 101', you have shaking chills, a fast heartbeat, trouble breathing and/or feel your are breathing much faster than usual, call your healthcare provider.  Make sure you wash your hands frequently.      SECONDARY DISCHARGE DIAGNOSES  Diagnosis: Anemia, unspecified type  Assessment and Plan of Treatment: You received two units of blood transfusion. Symptoms to report to your doctor are bleeding, palpitations, fatigue, pale skin, cold skin, dizziness. Take medications as ordered by PCP

## 2020-12-29 NOTE — DISCHARGE NOTE PROVIDER - NSDCFUADDAPPT_GEN_ALL_CORE_FT
Please follow up with your PCP, Dr. Tapia in a week for further management including repeating blood work to monitor hemoglobin level.

## 2020-12-29 NOTE — PROGRESS NOTE ADULT - SUBJECTIVE AND OBJECTIVE BOX
INTERVAL HPI/OVERNIGHT EVENTS:    MEDICATIONS  (STANDING):  aMIOdarone    Tablet 200 milliGRAM(s) Oral daily  atorvastatin 10 milliGRAM(s) Oral at bedtime  carbidopa/levodopa  25/100 1 Tablet(s) Oral three times a day  dextrose 40% Gel 15 Gram(s) Oral once  dextrose 5%. 1000 milliLiter(s) (50 mL/Hr) IV Continuous <Continuous>  dextrose 5%. 1000 milliLiter(s) (100 mL/Hr) IV Continuous <Continuous>  dextrose 50% Injectable 25 Gram(s) IV Push once  dextrose 50% Injectable 12.5 Gram(s) IV Push once  dextrose 50% Injectable 25 Gram(s) IV Push once  glucagon  Injectable 1 milliGRAM(s) IntraMuscular once  heparin   Injectable 5000 Unit(s) SubCutaneous every 12 hours  insulin glargine Injectable (LANTUS) 10 Unit(s) SubCutaneous at bedtime  insulin lispro (ADMELOG) corrective regimen sliding scale   SubCutaneous three times a day before meals  insulin lispro (ADMELOG) corrective regimen sliding scale   SubCutaneous at bedtime  lidocaine   Patch 1 Patch Transdermal daily  metoprolol tartrate 50 milliGRAM(s) Oral two times a day  oxybutynin 5 milliGRAM(s) Oral two times a day  pantoprazole    Tablet 40 milliGRAM(s) Oral before breakfast  sertraline 25 milliGRAM(s) Oral daily    MEDICATIONS  (PRN):  acetaminophen   Tablet .. 650 milliGRAM(s) Oral every 6 hours PRN Temp greater or equal to 38C (100.4F), Moderate Pain (4 - 6)      Allergies    No Known Allergies    Intolerances            PHYSICAL EXAM:   Vital Signs:  Vital Signs Last 24 Hrs  T(C): 36.8 (29 Dec 2020 04:00), Max: 37.2 (28 Dec 2020 19:57)  T(F): 98.3 (29 Dec 2020 04:00), Max: 99 (28 Dec 2020 19:57)  HR: 63 (29 Dec 2020 04:00) (53 - 63)  BP: 146/58 (29 Dec 2020 04:00) (120/63 - 166/67)  BP(mean): --  RR: 18 (29 Dec 2020 04:00) (18 - 18)  SpO2: 98% (29 Dec 2020 04:00) (96% - 98%)  Daily     Daily     GENERAL:  no distress  HEENT:  NC/AT,  anicteric  CHEST:   no increased effort, breath sounds clear  HEART:  Regular rhythm  ABDOMEN:  Soft, non-tender, non-distended, normoactive bowel sounds,  no masses ,no hepato-splenomegaly, no signs of chronic liver disease  EXTEREMITIES:  no cyanosis      LABS:                        8.7    13.41 )-----------( 367      ( 29 Dec 2020 06:48 )             27.6     12-29    135  |  102  |  40<H>  ----------------------------<  109<H>  4.4   |  23  |  1.28    Ca    9.5      29 Dec 2020 06:48            RADIOLOGY & ADDITIONAL TESTS:

## 2020-12-29 NOTE — PROGRESS NOTE ADULT - SUBJECTIVE AND OBJECTIVE BOX
Patient is a 86y old  Male who presents with a chief complaint of generalized weakness / FTT (29 Dec 2020 13:54)      INTERVAL HISTORY: resting in bed    TELEMETRY Personally reviewed: sr 60-70's    REVIEW OF SYSTEMS:   CONSTITUTIONAL: No weakness  EYES/ENT: No visual changes; No throat pain  Neck: No pain or stiffness  Respiratory: No cough, wheezing, No shortness of breath  CARDIOVASCULAR: no chest pain or palpitations  GASTROINTESTINAL: No abdominal pain, no nausea, vomiting or hematemesis  GENITOURINARY: No dysuria, frequency or hematuria  NEUROLOGICAL: No stroke like symptoms  SKIN: No rashes    	  MEDICATIONS:  aMIOdarone    Tablet 200 milliGRAM(s) Oral daily  metoprolol tartrate 50 milliGRAM(s) Oral two times a day        PHYSICAL EXAM:  T(C): 36.4 (12-29-20 @ 11:26), Max: 37.2 (12-28-20 @ 19:57)  HR: 56 (12-29-20 @ 11:26) (53 - 63)  BP: 131/68 (12-29-20 @ 11:26) (131/68 - 150/69)  RR: 18 (12-29-20 @ 11:26) (18 - 18)  SpO2: 96% (12-29-20 @ 11:26) (96% - 98%)  Wt(kg): --  I&O's Summary    28 Dec 2020 07:01  -  29 Dec 2020 07:00  --------------------------------------------------------  IN: 540 mL / OUT: 300 mL / NET: 240 mL    29 Dec 2020 07:01  -  29 Dec 2020 15:55  --------------------------------------------------------  IN: 240 mL / OUT: 0 mL / NET: 240 mL    Appearance: In no distress	  HEENT:    PERRL, EOMI	  Cardiovascular:  S1 S2, No JVD  Respiratory: Lungs clear to auscultation	  Gastrointestinal:  Soft, Non-tender, + BS	  Vascularature:  No edema of LE  Psychiatric: Appropriate affect   Neuro: no acute focal deficits                           8.7    13.41 )-----------( 367      ( 29 Dec 2020 06:48 )             27.6     12-29    135  |  102  |  40<H>  ----------------------------<  109<H>  4.4   |  23  |  1.28    Ca    9.5      29 Dec 2020 06:48      Labs personally reviewed      ASSESSMENT/PLAN: 	  Assessment and Plan:   Problem/Plan - 1:  ·  Problem: Febrile illness, acute.  Plan: likely viral   -covid-19 neg   - now afebrile    Problem/Plan - 2:  ·  Problem: CAD (coronary artery disease).  Plan: Holding ASA given anemia  c/w Metoprolol  c/w Lipitor 10mg  may resume asa if ok with GI     Problem/Plan - 3  ·  Problem: DM (diabetes mellitus).  Plan: insulin / FSBS.     Problem/Plan - 4:  ·  Problem: Parkinson disease.  Plan: c/w senemet.     Problem/Plan - 5:  Problem: Anemia, unspecified type. Plan: holding ASA,   - as per Gi no further workup- conservative management   -hgb stable for now    pending DC to rehab  pending covid PCR             Starla Sutherland ANP-C  Oral Ruiz DO [facility]  Cardiovascular Medicine  [address] [place] [address], [address]  Office: [phone]  Cell: [phone]

## 2021-01-10 ENCOUNTER — INPATIENT (INPATIENT)
Facility: HOSPITAL | Age: 86
LOS: 4 days | Discharge: HOME | DRG: 378 | End: 2021-01-15
Attending: INTERNAL MEDICINE | Admitting: INTERNAL MEDICINE
Payer: MEDICARE

## 2021-01-10 VITALS
SYSTOLIC BLOOD PRESSURE: 122 MMHG | HEART RATE: 77 BPM | HEIGHT: 60 IN | OXYGEN SATURATION: 97 % | DIASTOLIC BLOOD PRESSURE: 68 MMHG | WEIGHT: 119.93 LBS | TEMPERATURE: 98 F | RESPIRATION RATE: 18 BRPM

## 2021-01-10 DIAGNOSIS — Z29.9 ENCOUNTER FOR PROPHYLACTIC MEASURES, UNSPECIFIED: ICD-10-CM

## 2021-01-10 DIAGNOSIS — I25.10 ATHEROSCLEROTIC HEART DISEASE OF NATIVE CORONARY ARTERY WITHOUT ANGINA PECTORIS: ICD-10-CM

## 2021-01-10 DIAGNOSIS — Z95.1 PRESENCE OF AORTOCORONARY BYPASS GRAFT: Chronic | ICD-10-CM

## 2021-01-10 DIAGNOSIS — N40.0 BENIGN PROSTATIC HYPERPLASIA WITHOUT LOWER URINARY TRACT SYMPTOMS: ICD-10-CM

## 2021-01-10 DIAGNOSIS — Z96.652 PRESENCE OF LEFT ARTIFICIAL KNEE JOINT: Chronic | ICD-10-CM

## 2021-01-10 DIAGNOSIS — G20 PARKINSON'S DISEASE: ICD-10-CM

## 2021-01-10 DIAGNOSIS — R53.1 WEAKNESS: ICD-10-CM

## 2021-01-10 DIAGNOSIS — D64.9 ANEMIA, UNSPECIFIED: ICD-10-CM

## 2021-01-10 DIAGNOSIS — E11.9 TYPE 2 DIABETES MELLITUS WITHOUT COMPLICATIONS: ICD-10-CM

## 2021-01-10 DIAGNOSIS — N40.1 BENIGN PROSTATIC HYPERPLASIA WITH LOWER URINARY TRACT SYMPTOMS: ICD-10-CM

## 2021-01-10 LAB
ALBUMIN SERPL ELPH-MCNC: 2.5 G/DL — LOW (ref 3.3–5)
ALP SERPL-CCNC: 54 U/L — SIGNIFICANT CHANGE UP (ref 40–120)
ALT FLD-CCNC: <5 U/L — LOW (ref 10–45)
ANION GAP SERPL CALC-SCNC: 12 MMOL/L — SIGNIFICANT CHANGE UP (ref 5–17)
ANION GAP SERPL CALC-SCNC: 14 MMOL/L — SIGNIFICANT CHANGE UP (ref 5–17)
APPEARANCE UR: CLEAR — SIGNIFICANT CHANGE UP
APTT BLD: 21.1 SEC — LOW (ref 27.5–35.5)
AST SERPL-CCNC: 13 U/L — SIGNIFICANT CHANGE UP (ref 10–40)
BASOPHILS # BLD AUTO: 0 K/UL — SIGNIFICANT CHANGE UP (ref 0–0.2)
BASOPHILS NFR BLD AUTO: 0 % — SIGNIFICANT CHANGE UP (ref 0–2)
BILIRUB SERPL-MCNC: 0.1 MG/DL — LOW (ref 0.2–1.2)
BILIRUB UR-MCNC: NEGATIVE — SIGNIFICANT CHANGE UP
BLD GP AB SCN SERPL QL: NEGATIVE — SIGNIFICANT CHANGE UP
BUN SERPL-MCNC: 101 MG/DL — HIGH (ref 7–23)
BUN SERPL-MCNC: 113 MG/DL — HIGH (ref 7–23)
CALCIUM SERPL-MCNC: 10.1 MG/DL — SIGNIFICANT CHANGE UP (ref 8.4–10.5)
CALCIUM SERPL-MCNC: 9.7 MG/DL — SIGNIFICANT CHANGE UP (ref 8.4–10.5)
CHLORIDE SERPL-SCNC: 105 MMOL/L — SIGNIFICANT CHANGE UP (ref 96–108)
CHLORIDE SERPL-SCNC: 105 MMOL/L — SIGNIFICANT CHANGE UP (ref 96–108)
CO2 SERPL-SCNC: 17 MMOL/L — LOW (ref 22–31)
CO2 SERPL-SCNC: 21 MMOL/L — LOW (ref 22–31)
COLOR SPEC: SIGNIFICANT CHANGE UP
CREAT SERPL-MCNC: 2.13 MG/DL — HIGH (ref 0.5–1.3)
CREAT SERPL-MCNC: 2.46 MG/DL — HIGH (ref 0.5–1.3)
DIFF PNL FLD: NEGATIVE — SIGNIFICANT CHANGE UP
EOSINOPHIL # BLD AUTO: 0 K/UL — SIGNIFICANT CHANGE UP (ref 0–0.5)
EOSINOPHIL NFR BLD AUTO: 0 % — SIGNIFICANT CHANGE UP (ref 0–6)
GAS PNL BLDV: SIGNIFICANT CHANGE UP
GLUCOSE BLDC GLUCOMTR-MCNC: 138 MG/DL — HIGH (ref 70–99)
GLUCOSE BLDC GLUCOMTR-MCNC: 149 MG/DL — HIGH (ref 70–99)
GLUCOSE SERPL-MCNC: 143 MG/DL — HIGH (ref 70–99)
GLUCOSE SERPL-MCNC: 175 MG/DL — HIGH (ref 70–99)
GLUCOSE UR QL: NEGATIVE — SIGNIFICANT CHANGE UP
HCT VFR BLD CALC: 15.5 % — CRITICAL LOW (ref 39–50)
HCT VFR BLD CALC: 29.6 % — LOW (ref 39–50)
HGB BLD-MCNC: 10 G/DL — LOW (ref 13–17)
HGB BLD-MCNC: 4.8 G/DL — CRITICAL LOW (ref 13–17)
INR BLD: 1.01 RATIO — SIGNIFICANT CHANGE UP (ref 0.88–1.16)
KETONES UR-MCNC: NEGATIVE — SIGNIFICANT CHANGE UP
LEUKOCYTE ESTERASE UR-ACNC: ABNORMAL
LYMPHOCYTES # BLD AUTO: 0.64 K/UL — LOW (ref 1–3.3)
LYMPHOCYTES # BLD AUTO: 4.4 % — LOW (ref 13–44)
MCHC RBC-ENTMCNC: 28.6 PG — SIGNIFICANT CHANGE UP (ref 27–34)
MCHC RBC-ENTMCNC: 29 PG — SIGNIFICANT CHANGE UP (ref 27–34)
MCHC RBC-ENTMCNC: 31 GM/DL — LOW (ref 32–36)
MCHC RBC-ENTMCNC: 33.8 GM/DL — SIGNIFICANT CHANGE UP (ref 32–36)
MCV RBC AUTO: 85.8 FL — SIGNIFICANT CHANGE UP (ref 80–100)
MCV RBC AUTO: 92.3 FL — SIGNIFICANT CHANGE UP (ref 80–100)
MONOCYTES # BLD AUTO: 0.25 K/UL — SIGNIFICANT CHANGE UP (ref 0–0.9)
MONOCYTES NFR BLD AUTO: 1.7 % — LOW (ref 2–14)
NEUTROPHILS # BLD AUTO: 13.65 K/UL — HIGH (ref 1.8–7.4)
NEUTROPHILS NFR BLD AUTO: 93.9 % — HIGH (ref 43–77)
NITRITE UR-MCNC: NEGATIVE — SIGNIFICANT CHANGE UP
NRBC # BLD: 0 /100 WBCS — SIGNIFICANT CHANGE UP (ref 0–0)
NT-PROBNP SERPL-SCNC: 1126 PG/ML — HIGH (ref 0–300)
PH UR: 6 — SIGNIFICANT CHANGE UP (ref 5–8)
PLATELET # BLD AUTO: 278 K/UL — SIGNIFICANT CHANGE UP (ref 150–400)
PLATELET # BLD AUTO: 387 K/UL — SIGNIFICANT CHANGE UP (ref 150–400)
POTASSIUM SERPL-MCNC: 5.3 MMOL/L — SIGNIFICANT CHANGE UP (ref 3.5–5.3)
POTASSIUM SERPL-MCNC: 6 MMOL/L — HIGH (ref 3.5–5.3)
POTASSIUM SERPL-SCNC: 5.3 MMOL/L — SIGNIFICANT CHANGE UP (ref 3.5–5.3)
POTASSIUM SERPL-SCNC: 6 MMOL/L — HIGH (ref 3.5–5.3)
PROT SERPL-MCNC: 5.7 G/DL — LOW (ref 6–8.3)
PROT UR-MCNC: SIGNIFICANT CHANGE UP
PROTHROM AB SERPL-ACNC: 12.1 SEC — SIGNIFICANT CHANGE UP (ref 10.6–13.6)
RBC # BLD: 1.68 M/UL — LOW (ref 4.2–5.8)
RBC # BLD: 3.45 M/UL — LOW (ref 4.2–5.8)
RBC # FLD: 14.1 % — SIGNIFICANT CHANGE UP (ref 10.3–14.5)
RBC # FLD: 15.5 % — HIGH (ref 10.3–14.5)
RH IG SCN BLD-IMP: POSITIVE — SIGNIFICANT CHANGE UP
SARS-COV-2 RNA SPEC QL NAA+PROBE: SIGNIFICANT CHANGE UP
SODIUM SERPL-SCNC: 136 MMOL/L — SIGNIFICANT CHANGE UP (ref 135–145)
SODIUM SERPL-SCNC: 138 MMOL/L — SIGNIFICANT CHANGE UP (ref 135–145)
SP GR SPEC: 1.02 — SIGNIFICANT CHANGE UP (ref 1.01–1.02)
TROPONIN T, HIGH SENSITIVITY RESULT: 33 NG/L — SIGNIFICANT CHANGE UP (ref 0–51)
UROBILINOGEN FLD QL: NEGATIVE — SIGNIFICANT CHANGE UP
WBC # BLD: 14.07 K/UL — HIGH (ref 3.8–10.5)
WBC # BLD: 14.54 K/UL — HIGH (ref 3.8–10.5)
WBC # FLD AUTO: 14.07 K/UL — HIGH (ref 3.8–10.5)
WBC # FLD AUTO: 14.54 K/UL — HIGH (ref 3.8–10.5)

## 2021-01-10 PROCEDURE — 99223 1ST HOSP IP/OBS HIGH 75: CPT

## 2021-01-10 PROCEDURE — 71045 X-RAY EXAM CHEST 1 VIEW: CPT | Mod: 26

## 2021-01-10 PROCEDURE — 99291 CRITICAL CARE FIRST HOUR: CPT | Mod: GC

## 2021-01-10 PROCEDURE — 93010 ELECTROCARDIOGRAM REPORT: CPT | Mod: GC

## 2021-01-10 RX ORDER — ATORVASTATIN CALCIUM 80 MG/1
80 TABLET, FILM COATED ORAL AT BEDTIME
Refills: 0 | Status: DISCONTINUED | OUTPATIENT
Start: 2021-01-10 | End: 2021-01-15

## 2021-01-10 RX ORDER — INSULIN LISPRO 100/ML
VIAL (ML) SUBCUTANEOUS
Refills: 0 | Status: DISCONTINUED | OUTPATIENT
Start: 2021-01-10 | End: 2021-01-15

## 2021-01-10 RX ORDER — SODIUM CHLORIDE 9 MG/ML
1000 INJECTION, SOLUTION INTRAVENOUS ONCE
Refills: 0 | Status: DISCONTINUED | OUTPATIENT
Start: 2021-01-10 | End: 2021-01-10

## 2021-01-10 RX ORDER — GLUCAGON INJECTION, SOLUTION 0.5 MG/.1ML
1 INJECTION, SOLUTION SUBCUTANEOUS ONCE
Refills: 0 | Status: DISCONTINUED | OUTPATIENT
Start: 2021-01-10 | End: 2021-01-15

## 2021-01-10 RX ORDER — DEXTROSE 50 % IN WATER 50 %
12.5 SYRINGE (ML) INTRAVENOUS ONCE
Refills: 0 | Status: DISCONTINUED | OUTPATIENT
Start: 2021-01-10 | End: 2021-01-15

## 2021-01-10 RX ORDER — DEXTROSE 50 % IN WATER 50 %
25 SYRINGE (ML) INTRAVENOUS ONCE
Refills: 0 | Status: DISCONTINUED | OUTPATIENT
Start: 2021-01-10 | End: 2021-01-15

## 2021-01-10 RX ORDER — AMIODARONE HYDROCHLORIDE 400 MG/1
200 TABLET ORAL DAILY
Refills: 0 | Status: DISCONTINUED | OUTPATIENT
Start: 2021-01-10 | End: 2021-01-15

## 2021-01-10 RX ORDER — SERTRALINE 25 MG/1
25 TABLET, FILM COATED ORAL DAILY
Refills: 0 | Status: DISCONTINUED | OUTPATIENT
Start: 2021-01-10 | End: 2021-01-15

## 2021-01-10 RX ORDER — FUROSEMIDE 40 MG
40 TABLET ORAL ONCE
Refills: 0 | Status: COMPLETED | OUTPATIENT
Start: 2021-01-10 | End: 2021-01-10

## 2021-01-10 RX ORDER — ALBUTEROL 90 UG/1
1 AEROSOL, METERED ORAL
Refills: 0 | Status: DISCONTINUED | OUTPATIENT
Start: 2021-01-10 | End: 2021-01-15

## 2021-01-10 RX ORDER — BUDESONIDE AND FORMOTEROL FUMARATE DIHYDRATE 160; 4.5 UG/1; UG/1
2 AEROSOL RESPIRATORY (INHALATION)
Refills: 0 | Status: DISCONTINUED | OUTPATIENT
Start: 2021-01-10 | End: 2021-01-15

## 2021-01-10 RX ORDER — DEXTROSE 50 % IN WATER 50 %
15 SYRINGE (ML) INTRAVENOUS ONCE
Refills: 0 | Status: DISCONTINUED | OUTPATIENT
Start: 2021-01-10 | End: 2021-01-15

## 2021-01-10 RX ORDER — ROPINIROLE 8 MG/1
0.5 TABLET, FILM COATED, EXTENDED RELEASE ORAL THREE TIMES A DAY
Refills: 0 | Status: DISCONTINUED | OUTPATIENT
Start: 2021-01-10 | End: 2021-01-15

## 2021-01-10 RX ORDER — SODIUM CHLORIDE 9 MG/ML
1000 INJECTION, SOLUTION INTRAVENOUS
Refills: 0 | Status: DISCONTINUED | OUTPATIENT
Start: 2021-01-10 | End: 2021-01-15

## 2021-01-10 RX ORDER — FINASTERIDE 5 MG/1
5 TABLET, FILM COATED ORAL DAILY
Refills: 0 | Status: DISCONTINUED | OUTPATIENT
Start: 2021-01-10 | End: 2021-01-15

## 2021-01-10 RX ORDER — CARBIDOPA AND LEVODOPA 25; 100 MG/1; MG/1
1 TABLET ORAL THREE TIMES A DAY
Refills: 0 | Status: DISCONTINUED | OUTPATIENT
Start: 2021-01-10 | End: 2021-01-15

## 2021-01-10 RX ORDER — INSULIN LISPRO 100/ML
VIAL (ML) SUBCUTANEOUS AT BEDTIME
Refills: 0 | Status: DISCONTINUED | OUTPATIENT
Start: 2021-01-10 | End: 2021-01-15

## 2021-01-10 RX ORDER — SODIUM CHLORIDE 9 MG/ML
500 INJECTION, SOLUTION INTRAVENOUS ONCE
Refills: 0 | Status: COMPLETED | OUTPATIENT
Start: 2021-01-10 | End: 2021-01-10

## 2021-01-10 RX ORDER — TAMSULOSIN HYDROCHLORIDE 0.4 MG/1
0.4 CAPSULE ORAL AT BEDTIME
Refills: 0 | Status: DISCONTINUED | OUTPATIENT
Start: 2021-01-10 | End: 2021-01-15

## 2021-01-10 RX ORDER — PANTOPRAZOLE SODIUM 20 MG/1
40 TABLET, DELAYED RELEASE ORAL
Refills: 0 | Status: DISCONTINUED | OUTPATIENT
Start: 2021-01-10 | End: 2021-01-15

## 2021-01-10 RX ADMIN — AMIODARONE HYDROCHLORIDE 200 MILLIGRAM(S): 400 TABLET ORAL at 17:38

## 2021-01-10 RX ADMIN — Medication 40 MILLIGRAM(S): at 13:55

## 2021-01-10 RX ADMIN — ROPINIROLE 0.5 MILLIGRAM(S): 8 TABLET, FILM COATED, EXTENDED RELEASE ORAL at 21:08

## 2021-01-10 RX ADMIN — BUDESONIDE AND FORMOTEROL FUMARATE DIHYDRATE 2 PUFF(S): 160; 4.5 AEROSOL RESPIRATORY (INHALATION) at 17:38

## 2021-01-10 RX ADMIN — SERTRALINE 25 MILLIGRAM(S): 25 TABLET, FILM COATED ORAL at 17:38

## 2021-01-10 RX ADMIN — PANTOPRAZOLE SODIUM 40 MILLIGRAM(S): 20 TABLET, DELAYED RELEASE ORAL at 17:38

## 2021-01-10 RX ADMIN — SODIUM CHLORIDE 500 MILLILITER(S): 9 INJECTION, SOLUTION INTRAVENOUS at 11:25

## 2021-01-10 RX ADMIN — TAMSULOSIN HYDROCHLORIDE 0.4 MILLIGRAM(S): 0.4 CAPSULE ORAL at 21:08

## 2021-01-10 RX ADMIN — FINASTERIDE 5 MILLIGRAM(S): 5 TABLET, FILM COATED ORAL at 17:39

## 2021-01-10 RX ADMIN — CARBIDOPA AND LEVODOPA 1 TABLET(S): 25; 100 TABLET ORAL at 21:08

## 2021-01-10 RX ADMIN — ATORVASTATIN CALCIUM 80 MILLIGRAM(S): 80 TABLET, FILM COATED ORAL at 21:08

## 2021-01-10 NOTE — ED PROVIDER NOTE - ATTENDING CONTRIBUTION TO CARE
Patient BIBEMS for weakness.  Recently admitted [facility] for same found to be rhinovirus + as well as anemic due to occult blood losses.  Since going home generalized weakness has worsened.  Denying fevers, chest pains, shortness of breath.    A 14 point review of systems is negative except as in HPI or otherwise documented.    Exam:  General: Patient well appearing, vital signs within normal limits  HEENT: airway patent with moist mucous membranes  Cardiac: RRR with bilateral pitting edema  Respiratory: no respiratory distress  GI: abdomen soft, palpable distended bladder  Neuro: no gross neurologic deficits  Skin: pale appearing  Psych: normal mood and affect    Patient recently admitted for generalized weakness secondary to anemia from occult stool losses, presenting again with generalized weakness - am concerned for recurrence of anemia - plan for labs, troponin/proBNP to screen for cardiac etiology of weakness, likely readmission.

## 2021-01-10 NOTE — H&P ADULT - NSHPREVIEWOFSYSTEMS_GEN_ALL_CORE
Review of Systems:   CONSTITUTIONAL: No fever, weight loss  EYES: No eye pain, visual disturbances, or discharge  ENMT:  No difficulty hearing, tinnitus, vertigo; No sinus or throat pain  RESPIRATORY: No SOB. No cough, wheezing, chills or hemoptysis  CARDIOVASCULAR: No chest pain, palpitations  GASTROINTESTINAL: No abdominal or epigastric pain. No nausea, vomiting  GENITOURINARY: No dysuria, frequency  NEUROLOGICAL: No headaches, numbness  SKIN: No itching, burning, rashes, or lesions   LYMPH NODES: No enlarged glands  ENDOCRINE: No heat or cold intolerance; No hair loss  MUSCULOSKELETAL: No joint pain or swelling; No muscle, back pain  PSYCHIATRIC: No depression, anxiety  HEME/LYMPH: No easy bruising, or bleeding gums

## 2021-01-10 NOTE — ED PROVIDER NOTE - PROGRESS NOTE DETAILS
Justyn ANGULO PGY-5:  Unable to successfully pass urethral catheter. Attempted pediatric mccoy without success. Paged urology to help. They agree and will be down shortly. Justyn ANGULO PGY-5:  successful insertion of 12Fr mccoy cath by urology. urine sent. Justyn ANGULO PGY-5:  successful insertion of 12Fr mccoy cath by urology. urine sent. So far 600cc of urine returned. Justyn ANGULO PGY-5:  Chest xray reassuring. Hgb on the VBG 4.9. Awaiting further labs. Justyn ANGULO PGY-5:  Hgb on cbc also 4.9. will write for 3U prbc, each over 3 hours given cardiac history. Will give lasix after first unit Justyn ANGULO PGY-5:  Hgb on cbc also 4.9. will write for 3U prbc, each over 3 hours given cardiac history. Will give lasix after first unit. Pt also with potassium of 6 and a new STUART. Blood to be administered soon. Type to result soon, spoke with the lab. No EKGs to suggest hyperk.

## 2021-01-10 NOTE — ED PROVIDER NOTE - NS ED ROS FT
Gen: No fever, +dec appetite  ENT: No ear pain, congestion, sore throat  Resp: No cough or trouble breathing  Cardiovascular: No chest pain or palpitation  Gastroenteric: No diarrhea  :  +Difficulty urinating  Skin: No rashes  Neuro: No headache  Remainder negative, except as per the HPI

## 2021-01-10 NOTE — ED ADULT NURSE REASSESSMENT NOTE - NS ED NURSE REASSESS COMMENT FT1
Per MD, pt can eat/drink. Pt fed breakfast tray by RN. tolerated well. Per MD, pt can eat/drink. Pt fed breakfast tray by RN, tolerated well.

## 2021-01-10 NOTE — H&P ADULT - ASSESSMENT
This is a 86 year old male with PMH Parkinsons disease, DMII, HTN, CAD s/p CABG, BPH who presented to the ED with generalized weakness. With symptomatic anemia likely from UGIB given melena at home.

## 2021-01-10 NOTE — ED PROVIDER NOTE - CLINICAL SUMMARY MEDICAL DECISION MAKING FREE TEXT BOX
86 year old male with past medical history of Parkinson's disease, diabetes mellitus, hypertension, hyperlipidemia, CAD s/p CABG, and BPH who presented with generalized weakness. Pt presented with the same at the end of December and was admitted to [facility] at that time. Found to have anemia from slow GIB requiring transfusion, rhinovirus with superimposed bacterial PNA, and new PAT. Today patient appears pale, likely anemic again. Also appears to have urinary retention, so could be superimposed UTI. WIll need cbc, cmp, coags, type and screen, UA, straight cath, cxr, trops, ekg and readmission.

## 2021-01-10 NOTE — H&P ADULT - PROBLEM SELECTOR PLAN 2
- with STUART and hyperkalemia   - mccoy placed in ED  - f/u repeat BMP  - continue finasteride and tamsulosin. appears to also have been on oxybutynin - will hold

## 2021-01-10 NOTE — ED PROVIDER NOTE - OBJECTIVE STATEMENT
86 year old male with past medical history of Parkinson's disease, diabetes mellitus, hypertension, hyperlipidemia, CAD s/p CABG, and BPH who presented with generalized weakness. Pt presented with the same at the end of December and was admitted to [facility] at that time. Found to have a low HGB, received 2U PRBC on that admission. FOBT positive. s/p 2 units PRBC. As per GI, patient is a poor candidate for endoscopic eval.  Found to be Rhinovirus positive. s/p IV ABx for superimposed bacterial PNA. PAT noted on tele at that time. Lopressor increased to 50 BID. Pt was discharged to rehab from [facility] but left AMA because he wanted to go home. At home pt's daughter had difficulty taking care of the patient. Wasn't eating/drinking and now feels weak again so was brought back to the ED. Pt denies chest pain, SOB. Per EMS pt was vomiting today and last night, non-bloody. No diarrhea. Denies fevers/chills. Fingerstick with EMS was 171.,
normal

## 2021-01-10 NOTE — H&P ADULT - HISTORY OF PRESENT ILLNESS
This is a 86 year old male with PMH Parkinsons disease, DMII, HTN, CAD s/p CABG, BPH who presented to the ED with generalized weakness. Patient was just admitted from 12/24-12/29 for the same presentation, found to have hbg 5.0 with FOBT + but was too high risk for scope so treated conservatively for GIB. He was discharged to rehab but left rehab AMA and has been home for the past 4 days. Collateral obtained from daughter. Patient has been feeling fatigued, refusing to take his medications except some of his parkinsons medications and prostate medications. She noted one episode of black stool yesterday after which her father felt very weak, but no other melena/BRB. Patient has been urinating at home but noted it was less. Endorses "not feeling well" but denies CP, SOB, fever, chills, abd pain.     In the ED, afebrile and hemodynamically stable. Hbg noted to be 4.8 in the ED, K 6.0 and cr elevated. No peaked TW on EKG. Ordered for 3 units of prbcs, 500cc NS, and given lasix x1. Kapoor placed by urology in the ED with 600cc initial drainage.

## 2021-01-10 NOTE — ED ADULT NURSE REASSESSMENT NOTE - NS ED NURSE REASSESS COMMENT FT1
#1 unit prbcs administered as per MD order with 2 RN's at the bedside. Type and Screen resulted. Consent in chart. Pt educated on S/S of transfusion reaction. Call bell within reach.

## 2021-01-10 NOTE — ED PROVIDER NOTE - CRITICAL CARE PROVIDED
Hypotension
direct patient care (not related to procedure)/additional history taking/interpretation of diagnostic studies/documentation/consultation with other physicians

## 2021-01-10 NOTE — ED ADULT NURSE REASSESSMENT NOTE - NS ED NURSE REASSESS COMMENT FT1
Pt has hypospadias, RN unable to straight catheterize pt. Urology at bedside inserted 12 Fr Kapoor catheter, 600 mL initially drained. UA/UC sent per MD order.

## 2021-01-10 NOTE — H&P ADULT - NSICDXFAMILYHX_GEN_ALL_CORE_FT
FAMILY HISTORY:  Family history of coronary artery disease  No pertinent family history in first degree relatives, colon cancer

## 2021-01-10 NOTE — ED PROVIDER NOTE - PHYSICAL EXAMINATION
General: Pale appearing, emaciated, NAD, elderly, masked facies, cooperative  Head: Atraumatic  Eyes: PERRLA, EOMI  ENT: dry mucous membranes  Neurology: Difficult to assess orientation, but alert and answers some quesitons, nonfocal, RAINES x 4, pill rolling tremor of left hand  Respiratory: CTA B/L, normal respiratory effort, no wheezes, crackles, rales  CV: RRR, S1S2, holosystolic murmur, but no rubs or gallops  Abdominal: Large firm suprapubic abdominal mass, NT,+BS  Extremities: MIld 1+ pitting edema in BLE, + peripheral pulses  Incisions: none  Tubes: none  : Hypospadias, no discharge, clear urine

## 2021-01-10 NOTE — ED ADULT NURSE NOTE - OBJECTIVE STATEMENT
Pt is a 86 Y M with hx of CAD, DM, HLD, HTN, Parkinson's presenting to ED via EMS from home with c/o generalized weakness. Pt unable to answer all orientation questions, oriented to self. Pt able to follow commands. Pt recently admitted and received 2 blood transfusions in 12/20. Pt arrives to ED breathing unlabored on RA. Dry mucous membranes. Abd soft, bladder distended and tender to palpation. Skin is warm and dry, appears pale. Pt RAINES spontaneously. 2 IV's established. Pt afebrile rectally. Safety and comfort maintained. Red socks on. Call bell within reach.

## 2021-01-10 NOTE — H&P ADULT - PROBLEM SELECTOR PLAN 1
- Currently has 2nd unit of prbc hanging, ordered for 3 units total. likely w/ slow UGIB  - f/u post-transfusion CBC  - maintain 2 large bore IVs  - Hemodynamically stable, GI c/s in the AM. Was seen last admission by GI, was not a candidate for scope for elevated risk   - PPI IV BID for now

## 2021-01-10 NOTE — H&P ADULT - NSHPLABSRESULTS_GEN_ALL_CORE
LABS:                        4.8    14.54 )-----------( 387      ( 10 Inocencio 2021 08:39 )             15.5     01-10    136  |  105  |  113<H>  ----------------------------<  175<H>  6.0<H>   |  17<L>  |  2.46<H>    Ca    9.7      10 Inocencio 2021 08:39    TPro  5.7<L>  /  Alb  2.5<L>  /  TBili  0.1<L>  /  DBili  x   /  AST  13  /  ALT  <5<L>  /  AlkPhos  54  01-10    PT/INR - ( 10 Inocencio 2021 08:39 )   PT: 12.1 sec;   INR: 1.01 ratio         PTT - ( 10 Inocencio 2021 08:39 )  PTT:21.1 sec      Urinalysis Basic - ( 10 Inocencio 2021 09:13 )    Color: Light Yellow / Appearance: Clear / S.020 / pH: x  Gluc: x / Ketone: Negative  / Bili: Negative / Urobili: Negative   Blood: x / Protein: Trace / Nitrite: Negative   Leuk Esterase: Small / RBC: 3 /hpf / WBC 11 /HPF   Sq Epi: x / Non Sq Epi: 0 /hpf / Bacteria: Negative          RADIOLOGY & ADDITIONAL TESTS:  Results Reviewed:   Imaging Personally Reviewed:  Electrocardiogram Personally Reviewed:    COORDINATION OF CARE:  Care Discussed with Consultants/Other Providers [Y/N]:  Prior or Outpatient Records Reviewed [Y/N]: LABS:                        4.8    14.54 )-----------( 387      ( 10 Inocencio 2021 08:39 )             15.5     01-10    136  |  105  |  113<H>  ----------------------------<  175<H>  6.0<H>   |  17<L>  |  2.46<H>    Ca    9.7      10 Inocencio 2021 08:39    TPro  5.7<L>  /  Alb  2.5<L>  /  TBili  0.1<L>  /  DBili  x   /  AST  13  /  ALT  <5<L>  /  AlkPhos  54  01-10    PT/INR - ( 10 Inocencio 2021 08:39 )   PT: 12.1 sec;   INR: 1.01 ratio         PTT - ( 10 Inocencio 2021 08:39 )  PTT:21.1 sec      Urinalysis Basic - ( 10 Inocencio 2021 09:13 )    Color: Light Yellow / Appearance: Clear / S.020 / pH: x  Gluc: x / Ketone: Negative  / Bili: Negative / Urobili: Negative   Blood: x / Protein: Trace / Nitrite: Negative   Leuk Esterase: Small / RBC: 3 /hpf / WBC 11 /HPF   Sq Epi: x / Non Sq Epi: 0 /hpf / Bacteria: Negative          RADIOLOGY & ADDITIONAL TESTS:  Results Reviewed:   Imaging Personally Reviewed: CXR without consolidation or effusion   Electrocardiogram Personally Reviewed: NSR, similar to prior from 2020    COORDINATION OF CARE:  Care Discussed with Consultants/Other Providers [Y/N]:  Prior or Outpatient Records Reviewed [Y/N]:

## 2021-01-10 NOTE — PROCEDURE NOTE - NSURITECHNIQUE_GU_A_CORE
Proper hand hygiene was performed/Sterile gloves were worn for the duration of the procedure/A sterile drape was used to cover all adjacent areas/The site was cleaned with soap/water and sterile solution (betadine)/The catheter was appropriately lubricated/The catheter was secured with a securement device (e.g. StatLock)/The collection bag is below the level of the patient and urinary bladder/All applicable medical record documentation is completed

## 2021-01-10 NOTE — H&P ADULT - NSHPPHYSICALEXAM_GEN_ALL_CORE
PHYSICAL EXAM:  Vital Signs Last 24 Hrs  T(C): 36.4 (10 Inocencio 2021 16:11), Max: 37.1 (10 Inocencio 2021 08:35)  T(F): 97.6 (10 Inocencio 2021 16:11), Max: 98.8 (10 Inocencio 2021 08:35)  HR: 68 (10 Inocencio 2021 16:11) (63 - 77)  BP: 150/93 (10 Inocencio 2021 16:11) (122/68 - 150/93)  BP(mean): 72 (10 Inocencio 2021 14:56) (65 - 80)  RR: 18 (10 Inocencio 2021 16:11) (17 - 20)  SpO2: 98% (10 Inocencio 2021 16:11) (96% - 100%)    CONSTITUTIONAL: NAD, well-developed, well-groomed  EYES: PERRLA; conjunctiva and sclera clear  ENMT: Moist oral mucosa, no pharyngeal injection or exudates; normal dentition  NECK: Supple, no palpable masses; no thyromegaly  RESPIRATORY: Normal respiratory effort; lungs are clear to auscultation bilaterally  CARDIOVASCULAR: Regular rate and rhythm, normal S1 and S2, no murmur/rub/gallop; No lower extremity edema; Peripheral pulses are 2+ bilaterally  ABDOMEN: Nontender to palpation, normoactive bowel sounds, no rebound/guarding; No hepatosplenomegaly  MUSCULOSKELETAL:  Normal gait; no clubbing or cyanosis of digits; no joint swelling or tenderness to palpation  PSYCH: A+O to person, place, and time; affect appropriate  NEUROLOGY: CN 2-12 are intact and symmetric; no gross sensory deficits   SKIN: No rashes; no palpable lesions PHYSICAL EXAM:  Vital Signs Last 24 Hrs  T(C): 36.4 (10 Inocencio 2021 16:11), Max: 37.1 (10 Inocencio 2021 08:35)  T(F): 97.6 (10 Inocencio 2021 16:11), Max: 98.8 (10 Inocencio 2021 08:35)  HR: 68 (10 Inocencio 2021 16:11) (63 - 77)  BP: 150/93 (10 Inocencio 2021 16:11) (122/68 - 150/93)  BP(mean): 72 (10 Inocencio 2021 14:56) (65 - 80)  RR: 18 (10 Inocencio 2021 16:11) (17 - 20)  SpO2: 98% (10 Inocencio 2021 16:11) (96% - 100%)    CONSTITUTIONAL: NAD, well-developed, well-groomed  EYES: PERRL; conjunctiva pale  ENMT: dry oral mucosa, no pharyngeal injection or exudates  NECK: Supple, no palpable masses; no thyromegaly  RESPIRATORY: Normal respiratory effort; lungs are clear to auscultation bilaterally  CARDIOVASCULAR: Regular rate and rhythm, normal S1 and S2, + systolic murmur; trace lower extremity edema  ABDOMEN: Nontender to palpation, normoactive bowel sounds, no rebound/guarding  MUSCULOSKELETAL:  no clubbing or cyanosis of digits; no joint swelling or tenderness to palpation  PSYCH: A+O to person, refusing to answer place and time; affect appropriate  NEUROLOGY: CN 2-12 are intact and symmetric; no gross sensory deficits   SKIN: No rashes; no palpable lesions

## 2021-01-10 NOTE — ED ADULT NURSE REASSESSMENT NOTE - NS ED NURSE REASSESS COMMENT FT1
#1 unit prbc's administered as per MD order with 2 RN's at the bedside. Type and Screen resulted. Consent in chart. Pt educated on S/S of transfusion reaction, verbalized understanding. Call bell within reach.

## 2021-01-10 NOTE — ED ADULT NURSE REASSESSMENT NOTE - NS ED NURSE REASSESS COMMENT FT1
As per blood bank. pt does not need 2nd Type and Screen. As per blood bank, pt does not need 2nd Type and Screen.

## 2021-01-10 NOTE — ED PROVIDER NOTE - CARE PLAN
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 R groin site with no bleeding.No  hematoma. CMS is intact on R LE. Denied pain. Pt is on strict bed rest with legs straight until mid night.   Principal Discharge DX:	Weakness generalized  Secondary Diagnosis:	Failure to thrive in adult

## 2021-01-11 LAB
ANION GAP SERPL CALC-SCNC: 10 MMOL/L — SIGNIFICANT CHANGE UP (ref 5–17)
BASOPHILS # BLD AUTO: 0.04 K/UL — SIGNIFICANT CHANGE UP (ref 0–0.2)
BASOPHILS NFR BLD AUTO: 0.4 % — SIGNIFICANT CHANGE UP (ref 0–2)
BUN SERPL-MCNC: 81 MG/DL — HIGH (ref 7–23)
CALCIUM SERPL-MCNC: 9 MG/DL — SIGNIFICANT CHANGE UP (ref 8.4–10.5)
CHLORIDE SERPL-SCNC: 106 MMOL/L — SIGNIFICANT CHANGE UP (ref 96–108)
CO2 SERPL-SCNC: 23 MMOL/L — SIGNIFICANT CHANGE UP (ref 22–31)
CREAT SERPL-MCNC: 1.69 MG/DL — HIGH (ref 0.5–1.3)
CULTURE RESULTS: NO GROWTH — SIGNIFICANT CHANGE UP
EOSINOPHIL # BLD AUTO: 0.1 K/UL — SIGNIFICANT CHANGE UP (ref 0–0.5)
EOSINOPHIL NFR BLD AUTO: 1 % — SIGNIFICANT CHANGE UP (ref 0–6)
GLUCOSE SERPL-MCNC: 116 MG/DL — HIGH (ref 70–99)
HCT VFR BLD CALC: 27.3 % — LOW (ref 39–50)
HGB BLD-MCNC: 9.3 G/DL — LOW (ref 13–17)
IMM GRANULOCYTES NFR BLD AUTO: 2.2 % — HIGH (ref 0–1.5)
INR BLD: 0.99 RATIO — SIGNIFICANT CHANGE UP (ref 0.88–1.16)
LYMPHOCYTES # BLD AUTO: 0.77 K/UL — LOW (ref 1–3.3)
LYMPHOCYTES # BLD AUTO: 7.8 % — LOW (ref 13–44)
MAGNESIUM SERPL-MCNC: 2 MG/DL — SIGNIFICANT CHANGE UP (ref 1.6–2.6)
MCHC RBC-ENTMCNC: 29.3 PG — SIGNIFICANT CHANGE UP (ref 27–34)
MCHC RBC-ENTMCNC: 34.1 GM/DL — SIGNIFICANT CHANGE UP (ref 32–36)
MCV RBC AUTO: 86.1 FL — SIGNIFICANT CHANGE UP (ref 80–100)
MONOCYTES # BLD AUTO: 0.66 K/UL — SIGNIFICANT CHANGE UP (ref 0–0.9)
MONOCYTES NFR BLD AUTO: 6.7 % — SIGNIFICANT CHANGE UP (ref 2–14)
NEUTROPHILS # BLD AUTO: 8.04 K/UL — HIGH (ref 1.8–7.4)
NEUTROPHILS NFR BLD AUTO: 81.9 % — HIGH (ref 43–77)
NRBC # BLD: 0 /100 WBCS — SIGNIFICANT CHANGE UP (ref 0–0)
PHOSPHATE SERPL-MCNC: 3.1 MG/DL — SIGNIFICANT CHANGE UP (ref 2.5–4.5)
PLATELET # BLD AUTO: 273 K/UL — SIGNIFICANT CHANGE UP (ref 150–400)
POTASSIUM SERPL-MCNC: 3.9 MMOL/L — SIGNIFICANT CHANGE UP (ref 3.5–5.3)
POTASSIUM SERPL-SCNC: 3.9 MMOL/L — SIGNIFICANT CHANGE UP (ref 3.5–5.3)
PROTHROM AB SERPL-ACNC: 11.8 SEC — SIGNIFICANT CHANGE UP (ref 10.6–13.6)
RBC # BLD: 3.17 M/UL — LOW (ref 4.2–5.8)
RBC # FLD: 14.4 % — SIGNIFICANT CHANGE UP (ref 10.3–14.5)
SODIUM SERPL-SCNC: 139 MMOL/L — SIGNIFICANT CHANGE UP (ref 135–145)
SPECIMEN SOURCE: SIGNIFICANT CHANGE UP
WBC # BLD: 9.83 K/UL — SIGNIFICANT CHANGE UP (ref 3.8–10.5)
WBC # FLD AUTO: 9.83 K/UL — SIGNIFICANT CHANGE UP (ref 3.8–10.5)

## 2021-01-11 RX ORDER — CADEXOMER IODINE 0.9 %
1 PADS, MEDICATED (EA) TOPICAL DAILY
Refills: 0 | Status: DISCONTINUED | OUTPATIENT
Start: 2021-01-11 | End: 2021-01-15

## 2021-01-11 RX ADMIN — TAMSULOSIN HYDROCHLORIDE 0.4 MILLIGRAM(S): 0.4 CAPSULE ORAL at 21:13

## 2021-01-11 RX ADMIN — ROPINIROLE 0.5 MILLIGRAM(S): 8 TABLET, FILM COATED, EXTENDED RELEASE ORAL at 05:03

## 2021-01-11 RX ADMIN — ROPINIROLE 0.5 MILLIGRAM(S): 8 TABLET, FILM COATED, EXTENDED RELEASE ORAL at 13:08

## 2021-01-11 RX ADMIN — PANTOPRAZOLE SODIUM 40 MILLIGRAM(S): 20 TABLET, DELAYED RELEASE ORAL at 05:04

## 2021-01-11 RX ADMIN — CARBIDOPA AND LEVODOPA 1 TABLET(S): 25; 100 TABLET ORAL at 13:08

## 2021-01-11 RX ADMIN — FINASTERIDE 5 MILLIGRAM(S): 5 TABLET, FILM COATED ORAL at 13:08

## 2021-01-11 RX ADMIN — AMIODARONE HYDROCHLORIDE 200 MILLIGRAM(S): 400 TABLET ORAL at 05:04

## 2021-01-11 RX ADMIN — CARBIDOPA AND LEVODOPA 1 TABLET(S): 25; 100 TABLET ORAL at 05:04

## 2021-01-11 RX ADMIN — PANTOPRAZOLE SODIUM 40 MILLIGRAM(S): 20 TABLET, DELAYED RELEASE ORAL at 17:41

## 2021-01-11 RX ADMIN — CARBIDOPA AND LEVODOPA 1 TABLET(S): 25; 100 TABLET ORAL at 21:13

## 2021-01-11 RX ADMIN — ATORVASTATIN CALCIUM 80 MILLIGRAM(S): 80 TABLET, FILM COATED ORAL at 21:13

## 2021-01-11 RX ADMIN — ROPINIROLE 0.5 MILLIGRAM(S): 8 TABLET, FILM COATED, EXTENDED RELEASE ORAL at 21:13

## 2021-01-11 RX ADMIN — BUDESONIDE AND FORMOTEROL FUMARATE DIHYDRATE 2 PUFF(S): 160; 4.5 AEROSOL RESPIRATORY (INHALATION) at 05:04

## 2021-01-11 RX ADMIN — SERTRALINE 25 MILLIGRAM(S): 25 TABLET, FILM COATED ORAL at 13:08

## 2021-01-11 NOTE — PROGRESS NOTE ADULT - SUBJECTIVE AND OBJECTIVE BOX
Patient is a 86y old  Male who presents with a chief complaint of anemia (10 Inocencio 2021 16:54)      INTERVAL HPI/OVERNIGHT EVENTS:  T(C): 36.6 (21 @ 20:36), Max: 36.6 (21 @ 20:36)  HR: 67 (21 @ 20:36) (61 - 67)  BP: 146/65 (21 @ 20:36) (125/56 - 146/65)  RR: 18 (21 @ 20:36) (18 - 18)  SpO2: 97% (21 @ 20:36) (97% - 100%)  Wt(kg): --  I&O's Summary    10 Inocencio 2021 07:  -  2021 07:00  --------------------------------------------------------  IN: 590 mL / OUT: 4250 mL / NET: -3660 mL    2021 07:01  -  2021 23:03  --------------------------------------------------------  IN: 560 mL / OUT: 700 mL / NET: -140 mL        PAST MEDICAL & SURGICAL HISTORY:  CAD (coronary artery disease)    DM (diabetes mellitus)    HLD (hyperlipidemia)    HTN (hypertension)    Parkinson disease    Benign prostatic hyperplasia    Syncope    Urinary frequency    Osteoporosis    Hypertension    CAD (coronary artery disease)    Dyslipidemia    Diabetes mellitus    History of knee replacement procedure of left knee  2017    S/P CABG (coronary artery bypass graft)      History of total left knee replacement  2014    S/P coronary artery stent placement in         SOCIAL HISTORY  Alcohol:  Tobacco:  Illicit substance use:    FAMILY HISTORY:    REVIEW OF SYSTEMS:  CONSTITUTIONAL: No fever, weight loss, or fatigue  EYES: No eye pain, visual disturbances, or discharge  ENMT:  No difficulty hearing, tinnitus, vertigo; No sinus or throat pain  NECK: No pain or stiffness  RESPIRATORY: No cough, wheezing, chills or hemoptysis; No shortness of breath  CARDIOVASCULAR: No chest pain, palpitations, dizziness, or leg swelling  GASTROINTESTINAL: No abdominal or epigastric pain. No nausea, vomiting, or hematemesis; No diarrhea or constipation. No melena or hematochezia.  GENITOURINARY: No dysuria, frequency, hematuria, or incontinence  NEUROLOGICAL: No headaches, memory loss, loss of strength, numbness, or tremors  SKIN: No itching, burning, rashes, or lesions   LYMPH NODES: No enlarged glands  ENDOCRINE: No heat or cold intolerance; No hair loss  MUSCULOSKELETAL: No joint pain or swelling; No muscle, back, or extremity pain  PSYCHIATRIC: No depression, anxiety, mood swings, or difficulty sleeping  HEME/LYMPH: No easy bruising, or bleeding gums  ALLERY AND IMMUNOLOGIC: No hives or eczema    RADIOLOGY & ADDITIONAL TESTS:    Imaging Personally Reviewed:  [ ] YES  [ ] NO    Consultant(s) Notes Reviewed:  [ ] YES  [ ] NO    PHYSICAL EXAM:  GENERAL: NAD, well-groomed, well-developed  HEAD:  Atraumatic, Normocephalic  EYES: EOMI, PERRLA, conjunctiva and sclera clear  ENMT: No tonsillar erythema, exudates, or enlargement; Moist mucous membranes, Good dentition, No lesions  NECK: Supple, No JVD, Normal thyroid  NERVOUS SYSTEM:  Alert & Oriented X3, Good concentration; Motor Strength 5/5 B/L upper and lower extremities; DTRs 2+ intact and symmetric  CHEST/LUNG: Clear to percussion bilaterally; No rales, rhonchi, wheezing, or rubs  HEART: Regular rate and rhythm; No murmurs, rubs, or gallops  ABDOMEN: Soft, Nontender, Nondistended; Bowel sounds present  EXTREMITIES:  2+ Peripheral Pulses, No clubbing, cyanosis, or edema  LYMPH: No lymphadenopathy noted  SKIN: No rashes or lesions    LABS:                        9.3    9.83  )-----------( 273      ( 2021 06:24 )             27.3     01-11    139  |  106  |  81<H>  ----------------------------<  116<H>  3.9   |  23  |  1.69<H>    Ca    9.0      2021 06:24  Phos  3.1       Mg     2.0         TPro  5.7<L>  /  Alb  2.5<L>  /  TBili  0.1<L>  /  DBili  x   /  AST  13  /  ALT  <5<L>  /  AlkPhos  54  01-10    PT/INR - ( 2021 07:59 )   PT: 11.8 sec;   INR: 0.99 ratio         PTT - ( 10 Inocencio 2021 08:39 )  PTT:21.1 sec  Urinalysis Basic - ( 10 Inocencio 2021 09:13 )    Color: Light Yellow / Appearance: Clear / S.020 / pH: x  Gluc: x / Ketone: Negative  / Bili: Negative / Urobili: Negative   Blood: x / Protein: Trace / Nitrite: Negative   Leuk Esterase: Small / RBC: 3 /hpf / WBC 11 /HPF   Sq Epi: x / Non Sq Epi: 0 /hpf / Bacteria: Negative      CAPILLARY BLOOD GLUCOSE            Urinalysis Basic - ( 10 Inocencio 2021 09:13 )    Color: Light Yellow / Appearance: Clear / S.020 / pH: x  Gluc: x / Ketone: Negative  / Bili: Negative / Urobili: Negative   Blood: x / Protein: Trace / Nitrite: Negative   Leuk Esterase: Small / RBC: 3 /hpf / WBC 11 /HPF   Sq Epi: x / Non Sq Epi: 0 /hpf / Bacteria: Negative        MEDICATIONS  (STANDING):  aMIOdarone    Tablet 200 milliGRAM(s) Oral daily  atorvastatin 80 milliGRAM(s) Oral at bedtime  budesonide 160 MICROgram(s)/formoterol 4.5 MICROgram(s) Inhaler 2 Puff(s) Inhalation two times a day  carbidopa/levodopa  25/100 1 Tablet(s) Oral three times a day  dextrose 40% Gel 15 Gram(s) Oral once  dextrose 5%. 1000 milliLiter(s) (50 mL/Hr) IV Continuous <Continuous>  dextrose 5%. 1000 milliLiter(s) (100 mL/Hr) IV Continuous <Continuous>  dextrose 50% Injectable 25 Gram(s) IV Push once  dextrose 50% Injectable 12.5 Gram(s) IV Push once  dextrose 50% Injectable 25 Gram(s) IV Push once  finasteride 5 milliGRAM(s) Oral daily  glucagon  Injectable 1 milliGRAM(s) IntraMuscular once  insulin lispro (ADMELOG) corrective regimen sliding scale   SubCutaneous three times a day before meals  insulin lispro (ADMELOG) corrective regimen sliding scale   SubCutaneous at bedtime  pantoprazole  Injectable 40 milliGRAM(s) IV Push two times a day  rOPINIRole 0.5 milliGRAM(s) Oral three times a day  sertraline 25 milliGRAM(s) Oral daily  tamsulosin 0.4 milliGRAM(s) Oral at bedtime    MEDICATIONS  (PRN):  ALBUTerol    90 MICROgram(s) HFA Inhaler 1 Puff(s) Inhalation four times a day PRN Shortness of Breath and/or Wheezing      Care Discussed with Consultants/Other Providers [ ] YES  [ ] NO Patient is a 86y old  Male who presents with a chief complaint of anemia (10 Inocencio 2021 16:54)    pt. seen and examined, NAD   INTERVAL HPI/OVERNIGHT EVENTS:  T(C): 36.6 (21 @ 20:36), Max: 36.6 (21 @ 20:36)  HR: 67 (21 @ 20:36) (61 - 67)  BP: 146/65 (21 @ 20:36) (125/56 - 146/65)  RR: 18 (21 @ 20:36) (18 - 18)  SpO2: 97% (21 @ 20:36) (97% - 100%)  Wt(kg): --  I&O's Summary    10 Inocencio 2021 07:01  -  2021 07:00  --------------------------------------------------------  IN: 590 mL / OUT: 4250 mL / NET: -3660 mL    2021 07:01  -  2021 23:03  --------------------------------------------------------  IN: 560 mL / OUT: 700 mL / NET: -140 mL        PAST MEDICAL & SURGICAL HISTORY:  CAD (coronary artery disease)    DM (diabetes mellitus)    HLD (hyperlipidemia)    HTN (hypertension)    Parkinson disease    Benign prostatic hyperplasia    Syncope    Urinary frequency    Osteoporosis    Hypertension    CAD (coronary artery disease)    Dyslipidemia    Diabetes mellitus    History of knee replacement procedure of left knee  2017    S/P CABG (coronary artery bypass graft)      History of total left knee replacement  2014    S/P coronary artery stent placement in         SOCIAL HISTORY  Alcohol:  Tobacco:  Illicit substance use:    FAMILY HISTORY:    REVIEW OF SYSTEMS:  CONSTITUTIONAL: No fever, weight loss, or fatigue  EYES: No eye pain, visual disturbances, or discharge  ENMT:  No difficulty hearing, tinnitus, vertigo; No sinus or throat pain  NECK: No pain or stiffness  RESPIRATORY: No cough, wheezing, chills or hemoptysis; No shortness of breath  CARDIOVASCULAR: No chest pain, palpitations, dizziness, or leg swelling  GASTROINTESTINAL: No abdominal or epigastric pain. No nausea, vomiting, or hematemesis; No diarrhea or constipation. No melena or hematochezia.  GENITOURINARY: No dysuria, frequency, hematuria, or incontinence  NEUROLOGICAL: No headaches, memory loss, loss of strength, numbness, or tremors  SKIN: No itching, burning, rashes, or lesions   LYMPH NODES: No enlarged glands  ENDOCRINE: No heat or cold intolerance; No hair loss  MUSCULOSKELETAL: No joint pain or swelling; No muscle, back, or extremity pain  PSYCHIATRIC: No depression, anxiety, mood swings, or difficulty sleeping  HEME/LYMPH: No easy bruising, or bleeding gums  ALLERY AND IMMUNOLOGIC: No hives or eczema    RADIOLOGY & ADDITIONAL TESTS:    Imaging Personally Reviewed:  [ ] YES  [ ] NO    Consultant(s) Notes Reviewed:  [ ] YES  [ ] NO    PHYSICAL EXAM:  GENERAL: NAD, well-groomed, well-developed  HEAD:  Atraumatic, Normocephalic  EYES: EOMI, PERRLA, conjunctiva and sclera clear  ENMT: No tonsillar erythema, exudates, or enlargement; Moist mucous membranes, Good dentition, No lesions  NECK: Supple, No JVD, Normal thyroid  NERVOUS SYSTEM:  Alert & Oriented X3, Good concentration; Motor Strength 5/5 B/L upper and lower extremities; DTRs 2+ intact and symmetric  CHEST/LUNG: Clear to percussion bilaterally; No rales, rhonchi, wheezing, or rubs  HEART: Regular rate and rhythm; No murmurs, rubs, or gallops  ABDOMEN: Soft, Nontender, Nondistended; Bowel sounds present  EXTREMITIES:  2+ Peripheral Pulses, No clubbing, cyanosis, or edema  LYMPH: No lymphadenopathy noted  SKIN: No rashes or lesions    LABS:                        9.3    9.83  )-----------( 273      ( 2021 06:24 )             27.3     -11    139  |  106  |  81<H>  ----------------------------<  116<H>  3.9   |  23  |  1.69<H>    Ca    9.0      2021 06:24  Phos  3.1       Mg     2.0         TPro  5.7<L>  /  Alb  2.5<L>  /  TBili  0.1<L>  /  DBili  x   /  AST  13  /  ALT  <5<L>  /  AlkPhos  54  01-10    PT/INR - ( 2021 07:59 )   PT: 11.8 sec;   INR: 0.99 ratio         PTT - ( 10 Inocencio 2021 08:39 )  PTT:21.1 sec  Urinalysis Basic - ( 10 Inocencio 2021 09:13 )    Color: Light Yellow / Appearance: Clear / S.020 / pH: x  Gluc: x / Ketone: Negative  / Bili: Negative / Urobili: Negative   Blood: x / Protein: Trace / Nitrite: Negative   Leuk Esterase: Small / RBC: 3 /hpf / WBC 11 /HPF   Sq Epi: x / Non Sq Epi: 0 /hpf / Bacteria: Negative      CAPILLARY BLOOD GLUCOSE            Urinalysis Basic - ( 10 Inocencio 2021 09:13 )    Color: Light Yellow / Appearance: Clear / S.020 / pH: x  Gluc: x / Ketone: Negative  / Bili: Negative / Urobili: Negative   Blood: x / Protein: Trace / Nitrite: Negative   Leuk Esterase: Small / RBC: 3 /hpf / WBC 11 /HPF   Sq Epi: x / Non Sq Epi: 0 /hpf / Bacteria: Negative        MEDICATIONS  (STANDING):  aMIOdarone    Tablet 200 milliGRAM(s) Oral daily  atorvastatin 80 milliGRAM(s) Oral at bedtime  budesonide 160 MICROgram(s)/formoterol 4.5 MICROgram(s) Inhaler 2 Puff(s) Inhalation two times a day  carbidopa/levodopa  25/100 1 Tablet(s) Oral three times a day  dextrose 40% Gel 15 Gram(s) Oral once  dextrose 5%. 1000 milliLiter(s) (50 mL/Hr) IV Continuous <Continuous>  dextrose 5%. 1000 milliLiter(s) (100 mL/Hr) IV Continuous <Continuous>  dextrose 50% Injectable 25 Gram(s) IV Push once  dextrose 50% Injectable 12.5 Gram(s) IV Push once  dextrose 50% Injectable 25 Gram(s) IV Push once  finasteride 5 milliGRAM(s) Oral daily  glucagon  Injectable 1 milliGRAM(s) IntraMuscular once  insulin lispro (ADMELOG) corrective regimen sliding scale   SubCutaneous three times a day before meals  insulin lispro (ADMELOG) corrective regimen sliding scale   SubCutaneous at bedtime  pantoprazole  Injectable 40 milliGRAM(s) IV Push two times a day  rOPINIRole 0.5 milliGRAM(s) Oral three times a day  sertraline 25 milliGRAM(s) Oral daily  tamsulosin 0.4 milliGRAM(s) Oral at bedtime    MEDICATIONS  (PRN):  ALBUTerol    90 MICROgram(s) HFA Inhaler 1 Puff(s) Inhalation four times a day PRN Shortness of Breath and/or Wheezing      Care Discussed with Consultants/Other Providers [ ] YES  [ ] NO

## 2021-01-11 NOTE — CONSULT NOTE ADULT - SUBJECTIVE AND OBJECTIVE BOX
Patient is a 86y old  Male who presents with a chief complaint of anemia (11 Jan 2021 22:03)      HPI:  This is a 86 year old male with PMH Parkinsons disease, DMII, HTN, CAD s/p CABG, BPH who presented to the ED with generalized weakness. Patient was just admitted from 12/24-12/29 for the same presentation, found to have hbg 5.0 with FOBT + but was too high risk for scope so treated conservatively for GIB. He was discharged to rehab but left rehab AMA and has been home for the past 4 days. Collateral obtained from daughter. Patient has been feeling fatigued, refusing to take his medications except some of his parkinsons medications and prostate medications. She noted one episode of black stool yesterday after which her father felt very weak, but no other melena/BRB. Patient has been urinating at home but noted it was less. Endorses "not feeling well" but denies CP, SOB, fever, chills, abd pain.     In the ED, afebrile and hemodynamically stable. Hbg noted to be 4.8 in the ED, K 6.0 and cr elevated. No peaked TW on EKG. Ordered for 3 units of prbcs, 500cc NS, and given lasix x1. Kapoor placed by urology in the ED with 600cc initial drainage.  (10 Inocencio 2021 16:54)      PAST MEDICAL & SURGICAL HISTORY:  CAD (coronary artery disease)    DM (diabetes mellitus)    HLD (hyperlipidemia)    HTN (hypertension)    Parkinson disease    Benign prostatic hyperplasia    Syncope    Urinary frequency    Osteoporosis    Hypertension    CAD (coronary artery disease)    Dyslipidemia    Diabetes mellitus    History of knee replacement procedure of left knee  2017    S/P CABG (coronary artery bypass graft)  2015    History of total left knee replacement  2014    S/P coronary artery stent placement in 2003        MEDICATIONS  (STANDING):  aMIOdarone    Tablet 200 milliGRAM(s) Oral daily  atorvastatin 80 milliGRAM(s) Oral at bedtime  budesonide 160 MICROgram(s)/formoterol 4.5 MICROgram(s) Inhaler 2 Puff(s) Inhalation two times a day  carbidopa/levodopa  25/100 1 Tablet(s) Oral three times a day  dextrose 40% Gel 15 Gram(s) Oral once  dextrose 5%. 1000 milliLiter(s) (50 mL/Hr) IV Continuous <Continuous>  dextrose 5%. 1000 milliLiter(s) (100 mL/Hr) IV Continuous <Continuous>  dextrose 50% Injectable 25 Gram(s) IV Push once  dextrose 50% Injectable 12.5 Gram(s) IV Push once  dextrose 50% Injectable 25 Gram(s) IV Push once  finasteride 5 milliGRAM(s) Oral daily  glucagon  Injectable 1 milliGRAM(s) IntraMuscular once  insulin lispro (ADMELOG) corrective regimen sliding scale   SubCutaneous three times a day before meals  insulin lispro (ADMELOG) corrective regimen sliding scale   SubCutaneous at bedtime  pantoprazole  Injectable 40 milliGRAM(s) IV Push two times a day  rOPINIRole 0.5 milliGRAM(s) Oral three times a day  sertraline 25 milliGRAM(s) Oral daily  tamsulosin 0.4 milliGRAM(s) Oral at bedtime    MEDICATIONS  (PRN):  ALBUTerol    90 MICROgram(s) HFA Inhaler 1 Puff(s) Inhalation four times a day PRN Shortness of Breath and/or Wheezing      Allergies    No Known Allergies    Intolerances        VITALS:    Vital Signs Last 24 Hrs  T(C): 36.6 (11 Jan 2021 20:36), Max: 36.6 (11 Jan 2021 20:36)  T(F): 97.9 (11 Jan 2021 20:36), Max: 97.9 (11 Jan 2021 20:36)  HR: 67 (11 Jan 2021 20:36) (61 - 67)  BP: 146/65 (11 Jan 2021 20:36) (125/56 - 146/65)  BP(mean): --  RR: 18 (11 Jan 2021 20:36) (18 - 18)  SpO2: 97% (11 Jan 2021 20:36) (97% - 100%)    LABS:                          9.3    9.83  )-----------( 273      ( 11 Jan 2021 06:24 )             27.3       01-11    139  |  106  |  81<H>  ----------------------------<  116<H>  3.9   |  23  |  1.69<H>    Ca    9.0      11 Jan 2021 06:24  Phos  3.1     01-11  Mg     2.0     01-11    TPro  5.7<L>  /  Alb  2.5<L>  /  TBili  0.1<L>  /  DBili  x   /  AST  13  /  ALT  <5<L>  /  AlkPhos  54  01-10      CAPILLARY BLOOD GLUCOSE          PT/INR - ( 11 Jan 2021 07:59 )   PT: 11.8 sec;   INR: 0.99 ratio         PTT - ( 10 Inocencio 2021 08:39 )  PTT:21.1 sec    LOWER EXTREMITY PHYSICAL EXAM:    Vasular: DP/PT 1/4, B/L, CFT <3 seconds B/L, Temperature gradient WNL, B/L.   Neuro: Epicritic sensation diminished to the level of foot B/L.  Musculoskeletal/Ortho: bilat lower extremity edema noted  Skin: left heel deep tissue injury with no signs of infection, no crepitus, no malodor

## 2021-01-11 NOTE — CONSULT NOTE ADULT - ASSESSMENT
85 y/o male pt with heel deep tissue injury  pt seen and evaluated   - deep tissue injury with no signs of infection  - rec daily dressing changes with iodosorb and allevyn pad to the area  - z flows in bed at all times  - will monitor during this admission

## 2021-01-12 ENCOUNTER — RESULT REVIEW (OUTPATIENT)
Age: 86
End: 2021-01-12

## 2021-01-12 DIAGNOSIS — L97.409 NON-PRESSURE CHRONIC ULCER OF UNSPECIFIED HEEL AND MIDFOOT WITH UNSPECIFIED SEVERITY: ICD-10-CM

## 2021-01-12 DIAGNOSIS — R53.81 OTHER MALAISE: ICD-10-CM

## 2021-01-12 DIAGNOSIS — Z51.5 ENCOUNTER FOR PALLIATIVE CARE: ICD-10-CM

## 2021-01-12 LAB
ANION GAP SERPL CALC-SCNC: 9 MMOL/L — SIGNIFICANT CHANGE UP (ref 5–17)
BUN SERPL-MCNC: 41 MG/DL — HIGH (ref 7–23)
CALCIUM SERPL-MCNC: 8.6 MG/DL — SIGNIFICANT CHANGE UP (ref 8.4–10.5)
CHLORIDE SERPL-SCNC: 107 MMOL/L — SIGNIFICANT CHANGE UP (ref 96–108)
CO2 SERPL-SCNC: 26 MMOL/L — SIGNIFICANT CHANGE UP (ref 22–31)
CREAT SERPL-MCNC: 1.29 MG/DL — SIGNIFICANT CHANGE UP (ref 0.5–1.3)
GLUCOSE BLDC GLUCOMTR-MCNC: 164 MG/DL — HIGH (ref 70–99)
GLUCOSE BLDC GLUCOMTR-MCNC: 97 MG/DL — SIGNIFICANT CHANGE UP (ref 70–99)
GLUCOSE SERPL-MCNC: 99 MG/DL — SIGNIFICANT CHANGE UP (ref 70–99)
HCT VFR BLD CALC: 27.1 % — LOW (ref 39–50)
HGB BLD-MCNC: 8.9 G/DL — LOW (ref 13–17)
MCHC RBC-ENTMCNC: 29.4 PG — SIGNIFICANT CHANGE UP (ref 27–34)
MCHC RBC-ENTMCNC: 32.8 GM/DL — SIGNIFICANT CHANGE UP (ref 32–36)
MCV RBC AUTO: 89.4 FL — SIGNIFICANT CHANGE UP (ref 80–100)
NRBC # BLD: 0 /100 WBCS — SIGNIFICANT CHANGE UP (ref 0–0)
PLATELET # BLD AUTO: 278 K/UL — SIGNIFICANT CHANGE UP (ref 150–400)
POTASSIUM SERPL-MCNC: 4 MMOL/L — SIGNIFICANT CHANGE UP (ref 3.5–5.3)
POTASSIUM SERPL-SCNC: 4 MMOL/L — SIGNIFICANT CHANGE UP (ref 3.5–5.3)
RBC # BLD: 3.03 M/UL — LOW (ref 4.2–5.8)
RBC # FLD: 14.5 % — SIGNIFICANT CHANGE UP (ref 10.3–14.5)
SODIUM SERPL-SCNC: 142 MMOL/L — SIGNIFICANT CHANGE UP (ref 135–145)
WBC # BLD: 7.47 K/UL — SIGNIFICANT CHANGE UP (ref 3.8–10.5)
WBC # FLD AUTO: 7.47 K/UL — SIGNIFICANT CHANGE UP (ref 3.8–10.5)

## 2021-01-12 PROCEDURE — 88312 SPECIAL STAINS GROUP 1: CPT | Mod: 26

## 2021-01-12 PROCEDURE — 88305 TISSUE EXAM BY PATHOLOGIST: CPT | Mod: 26

## 2021-01-12 PROCEDURE — 99223 1ST HOSP IP/OBS HIGH 75: CPT

## 2021-01-12 RX ORDER — SODIUM CHLORIDE 9 MG/ML
500 INJECTION INTRAMUSCULAR; INTRAVENOUS; SUBCUTANEOUS
Refills: 0 | Status: COMPLETED | OUTPATIENT
Start: 2021-01-12 | End: 2021-01-12

## 2021-01-12 RX ADMIN — AMIODARONE HYDROCHLORIDE 200 MILLIGRAM(S): 400 TABLET ORAL at 08:45

## 2021-01-12 RX ADMIN — ATORVASTATIN CALCIUM 80 MILLIGRAM(S): 80 TABLET, FILM COATED ORAL at 22:06

## 2021-01-12 RX ADMIN — CARBIDOPA AND LEVODOPA 1 TABLET(S): 25; 100 TABLET ORAL at 22:06

## 2021-01-12 RX ADMIN — ROPINIROLE 0.5 MILLIGRAM(S): 8 TABLET, FILM COATED, EXTENDED RELEASE ORAL at 08:44

## 2021-01-12 RX ADMIN — Medication 1 APPLICATION(S): at 11:19

## 2021-01-12 RX ADMIN — ROPINIROLE 0.5 MILLIGRAM(S): 8 TABLET, FILM COATED, EXTENDED RELEASE ORAL at 22:06

## 2021-01-12 RX ADMIN — BUDESONIDE AND FORMOTEROL FUMARATE DIHYDRATE 2 PUFF(S): 160; 4.5 AEROSOL RESPIRATORY (INHALATION) at 22:05

## 2021-01-12 RX ADMIN — PANTOPRAZOLE SODIUM 40 MILLIGRAM(S): 20 TABLET, DELAYED RELEASE ORAL at 22:05

## 2021-01-12 RX ADMIN — FINASTERIDE 5 MILLIGRAM(S): 5 TABLET, FILM COATED ORAL at 22:06

## 2021-01-12 RX ADMIN — SODIUM CHLORIDE 30 MILLILITER(S): 9 INJECTION INTRAMUSCULAR; INTRAVENOUS; SUBCUTANEOUS at 22:06

## 2021-01-12 RX ADMIN — CARBIDOPA AND LEVODOPA 1 TABLET(S): 25; 100 TABLET ORAL at 08:44

## 2021-01-12 RX ADMIN — TAMSULOSIN HYDROCHLORIDE 0.4 MILLIGRAM(S): 0.4 CAPSULE ORAL at 22:06

## 2021-01-12 RX ADMIN — PANTOPRAZOLE SODIUM 40 MILLIGRAM(S): 20 TABLET, DELAYED RELEASE ORAL at 08:45

## 2021-01-12 RX ADMIN — SERTRALINE 25 MILLIGRAM(S): 25 TABLET, FILM COATED ORAL at 22:06

## 2021-01-12 NOTE — CONSULT NOTE ADULT - PROBLEM SELECTOR RECOMMENDATION 9
Condition:  Degree:  Active treatment:  Clinical impact on complexity: Condition: Parkinson's   Degree: collateral sought to determine baseline  Active treatment: carbidopa/levodopa  Clinical impact on complexity: significant

## 2021-01-12 NOTE — PHYSICAL THERAPY INITIAL EVALUATION ADULT - ACTIVE RANGE OF MOTION EXAMINATION, REHAB EVAL
gretel. upper extremity Active ROM was WNL (within normal limits)/bilateral  lower extremity Active ROM was WFL (within functional limits)

## 2021-01-12 NOTE — PHYSICAL THERAPY INITIAL EVALUATION ADULT - LIVES WITH, PROFILE
Pt resides with family in a private home with 5 steps to enter.  Pt left rehab AMA ~4 days prior to admission.  Since leaving rehab pt has required assist x2 for mobility./children

## 2021-01-12 NOTE — DIETITIAN INITIAL EVALUATION ADULT. - PERTINENT LABORATORY DATA
01-12 Na142 mmol/L Glu 99 mg/dL K+ 4.0 mmol/L Cr  1.29 mg/dL BUN 41 mg/dL<H> Phos n/a   Alb n/a   PAB n/a

## 2021-01-12 NOTE — PHYSICAL THERAPY INITIAL EVALUATION ADULT - DIAGNOSIS, PT EVAL
decreased functional mobility secondary to generalized weakness, impaired balance, impaired cognition, decreased endurance

## 2021-01-12 NOTE — CONSULT NOTE ADULT - PROBLEM SELECTOR RECOMMENDATION 3
PPSv2 prior to admission:  Current functional PPSv2:  Nursing care required:  Code status documented:  A review of the paper chart has been conducted  HCP form present:               Yes and valid []               Yes but invalid []                No []   MOLST present:                   Yes and valid []               Yes but invalid []                No []  Incapacity form present:   Yes and valid []                Yes but invalid []                No []                 N/A []  Living Will:                            Yes and valid []                Yes but invalid []                No []      Family Health Care Decision Act (FHCDA) Surrogate Decision Maker Hierarchy in the absence of a health care agent  [facility] Article [address] Guardian-->Spouse or domestic partner--> Adult child-->Parent-->Sibling--> Close friend PPSv2 prior to admission: TBD  Current functional PPSv2: 40  Nursing care required: Assistance wit ADLs  Code status documented: Full  A review of the paper chart has been conducted  HCP form present:               Yes and valid []               Yes but invalid []                No [x]   MOLST present:                   Yes and valid []               Yes but invalid []                No [x]  Incapacity form present:   Yes and valid []                Yes but invalid []                No [x]                 N/A []  Living Will:                            Yes and valid []                Yes but invalid []                No [x]      Family Health Care Decision Act (FHCDA) Surrogate Decision Maker Hierarchy in the absence of a health care agent  [facility] Article 81 Guardian-->Spouse or domestic partner--> Adult child-->Parent-->Sibling--> Close friend

## 2021-01-12 NOTE — DIETITIAN INITIAL EVALUATION ADULT. - REASON
Full Nutrition focused physical exam not appropriate at this time, however, RD able to visually inspect moderate muscle/fat loss

## 2021-01-12 NOTE — DIETITIAN INITIAL EVALUATION ADULT. - CHIEF COMPLAINT
86 year old male with PMH Parkinsons disease, DMII, HTN, CAD s/p CABG, BPH who presented to the ED with generalized weakness. With symptomatic anemia likely from UGIB given melena at home.

## 2021-01-12 NOTE — PRE PROCEDURE NOTE - PRE PROCEDURE EVALUATION
Attending Physician:  dante                          Procedure: EGD    Indication for Procedure: GI bleed/anemia  ________________________________________________________  PAST MEDICAL & SURGICAL HISTORY:  CAD (coronary artery disease)    DM (diabetes mellitus)    HLD (hyperlipidemia)    HTN (hypertension)    Parkinson disease    Benign prostatic hyperplasia    Syncope    Urinary frequency    Osteoporosis    Hypertension    CAD (coronary artery disease)    Dyslipidemia    Diabetes mellitus    History of knee replacement procedure of left knee  2017    S/P CABG (coronary artery bypass graft)  2015    History of total left knee replacement  2014    S/P coronary artery stent placement in 2003      ALLERGIES:  No Known Allergies    HOME MEDICATIONS:  albuterol 90 mcg/inh inhalation aerosol with adapter: 2 puff(s) inhaled 4 times a day, As Needed  amiodarone 200 mg oral tablet: 1 tab(s) orally once a day  carbidopa-levodopa 25 mg-100 mg oral tablet: 1 tab(s) orally 3 times a day  docusate sodium 100 mg oral capsule: 1 cap(s) orally 2 times a day, As Needed  finasteride 5 mg oral tablet: 1 tab(s) orally once a day  Januvia 100 mg oral tablet: 1 tab(s) orally once a day  metFORMIN 500 mg oral tablet: 1 tab(s) orally 3 times a day  metoprolol tartrate 50 mg oral tablet: 1 tab(s) orally 2 times a day  multivitamin: 1 tab(s) orally once a day  oxybutynin 5 mg oral tablet: 1 tab(s) orally 2 times a day  pantoprazole 40 mg oral delayed release tablet: 1 tab(s) orally once a day (before a meal)  rOPINIRole 0.5 mg oral tablet: 1 tab(s) orally 3 times a day  rosuvastatin 20 mg oral tablet: 1 tab(s) orally once a day  sertraline 25 mg oral tablet: 1 tab(s) orally once a day  Symbicort 160 mcg-4.5 mcg/inh inhalation aerosol: 2 puff(s) inhaled 2 times a day  tamsulosin 0.4 mg oral capsule: 1 cap(s) orally once a day    AICD/PPM: [ ] yes   [ ] no    PERTINENT LAB DATA:                        8.9    7.47  )-----------( 278      ( 12 Jan 2021 09:12 )             27.1     01-12    142  |  107  |  41<H>  ----------------------------<  99  4.0   |  26  |  1.29    Ca    8.6      12 Jan 2021 09:12  Phos  3.1     01-11  Mg     2.0     01-11      PT/INR - ( 11 Jan 2021 07:59 )   PT: 11.8 sec;   INR: 0.99 ratio                     PHYSICAL EXAMINATION:    Height (cm): 167.6  Weight (kg): 55.4  BMI (kg/m2): 19.7  BSA (m2): 1.62T(C): 36.1  HR: 62  BP: 153/53  RR: 16  SpO2: 99%    Constitutional: NAD    Neck:  No JVD  Respiratory: CTAB/L  Cardiovascular: S1 and S2  Gastrointestinal: BS+, soft, NT/ND  Extremities: No peripheral edema  Neurological: A/O x 3, no focal deficits        COMMENTS:    The patient is a suitable candidate for the planned procedure unless box checked [ ]  No, explain:

## 2021-01-12 NOTE — DIETITIAN INITIAL EVALUATION ADULT. - ORAL INTAKE PTA/DIET HISTORY
Per H&P, NKFA and micronutrient supplementation PTA included Multivitamin. Hx of DM, A1c 5.9% (12/25/2020). Medically managed PTA with Januvia and metformin. Per H&P "Patient has been feeling fatigued, refusing to take his medications except some of his parkinsons medications and prostate medications." Pt disoriented/confused and unable to provide much information on diet/wt Hx PTA. Per H&P, NKFA and micronutrient supplementation PTA included Multivitamin. Hx of DM, A1c 5.9% (12/25/2020). Medically managed PTA with Januvia and metformin. Per H&P "Patient has been feeling fatigued, refusing to take his medications except some of his parkinsons medications and prostate medications."

## 2021-01-12 NOTE — DIETITIAN INITIAL EVALUATION ADULT. - ETIOLOGY
related to increased physiological demand in setting of wound healing related to inadequate protein, calorie intake with generalized weakness/FTT and increased physiological demand

## 2021-01-12 NOTE — CONSULT NOTE ADULT - SUBJECTIVE AND OBJECTIVE BOX
HPI:  This is a 86 year old male with PMH Parkinsons disease, DMII, HTN, CAD s/p CABG, BPH who presented to the ED with generalized weakness. Patient was just admitted from 12/24-12/29 for the same presentation, found to have hbg 5.0 with FOBT + but was too high risk for scope so treated conservatively for GIB. He was discharged to rehab but left rehab AMA and has been home for the past 4 days. Collateral obtained from daughter. Patient has been feeling fatigued, refusing to take his medications except some of his parkinsons medications and prostate medications. She noted one episode of black stool yesterday after which her father felt very weak, but no other melena/BRB. Patient has been urinating at home but noted it was less. Endorses "not feeling well" but denies CP, SOB, fever, chills, abd pain.     In the ED, afebrile and hemodynamically stable. Hbg noted to be 4.8 in the ED, K 6.0 and cr elevated. No peaked TW on EKG. Ordered for 3 units of prbcs, 500cc NS, and given lasix x1. Kapoor placed by urology in the ED with 600cc initial drainage.  (10 Inocencio 2021 16:54)    PERTINENT PM/SXH:   CAD (coronary artery disease)    DM (diabetes mellitus)    HLD (hyperlipidemia)    HTN (hypertension)    Parkinson disease    Benign prostatic hyperplasia    Syncope    Urinary frequency    Osteoporosis    Hypertension    CAD (coronary artery disease)    Dyslipidemia    Diabetes mellitus      History of knee replacement procedure of left knee    S/P CABG (coronary artery bypass graft)    History of total left knee replacement    S/P coronary artery stent placement in 2003      FAMILY HISTORY:  No pertinent family history in first degree relatives  colon cancer    Family history of coronary artery disease      ITEMS NOT CHECKED ARE NOT PRESENT    SOCIAL HISTORY:   Significant other/partner[ ]  Children[ ]  Anabaptist/Spirituality:  Substance hx:  [ ]   Tobacco hx:  [ ]   Alcohol hx: [ ]   Home Opioid hx:  [ ] I-Stop Reference No:  Living Situation: [ ]Home  [ ]Long term care  [ ]Rehab [ ]Other    ADVANCE DIRECTIVES:    DNR  MOLST  [ ]  Living Will  [ ]   DECISION MAKER(s):  [ ] Health Care Proxy(s)  [ ] Surrogate(s)  [ ] Guardian           Name(s): Phone Number(s):    BASELINE (I)ADL(s) (prior to admission):  [place]: [ ]Total  [ ] Moderate [ ]Dependent    Allergies    No Known Allergies    Intolerances    MEDICATIONS  (STANDING):  aMIOdarone    Tablet 200 milliGRAM(s) Oral daily  atorvastatin 80 milliGRAM(s) Oral at bedtime  budesonide 160 MICROgram(s)/formoterol 4.5 MICROgram(s) Inhaler 2 Puff(s) Inhalation two times a day  cadexomer iodine 0.9% Gel 1 Application(s) Topical daily  carbidopa/levodopa  25/100 1 Tablet(s) Oral three times a day  dextrose 40% Gel 15 Gram(s) Oral once  dextrose 5%. 1000 milliLiter(s) (50 mL/Hr) IV Continuous <Continuous>  dextrose 5%. 1000 milliLiter(s) (100 mL/Hr) IV Continuous <Continuous>  dextrose 50% Injectable 25 Gram(s) IV Push once  dextrose 50% Injectable 12.5 Gram(s) IV Push once  dextrose 50% Injectable 25 Gram(s) IV Push once  finasteride 5 milliGRAM(s) Oral daily  glucagon  Injectable 1 milliGRAM(s) IntraMuscular once  insulin lispro (ADMELOG) corrective regimen sliding scale   SubCutaneous three times a day before meals  insulin lispro (ADMELOG) corrective regimen sliding scale   SubCutaneous at bedtime  pantoprazole  Injectable 40 milliGRAM(s) IV Push two times a day  rOPINIRole 0.5 milliGRAM(s) Oral three times a day  sertraline 25 milliGRAM(s) Oral daily  tamsulosin 0.4 milliGRAM(s) Oral at bedtime    MEDICATIONS  (PRN):  ALBUTerol    90 MICROgram(s) HFA Inhaler 1 Puff(s) Inhalation four times a day PRN Shortness of Breath and/or Wheezing    PRESENT SYMPTOMS: [ ]Unable to obtain due to poor mentation   Source if other than patient:  [ ]Family   [ ]Team     Pain: [ ]yes [ ]no  QOL impact -   Location -                    Aggravating factors -  Quality -  Radiation -  Timing-  Severity (0-10 scale):  Minimal acceptable level (0-10 scale):     PAIN AD Score:     [email or web address].[email or web address].[email or web address]/cog/painad.pdf (press ctrl +  left click to view)    Dyspnea:                           [ ]Mild [ ]Moderate [ ]Severe  Anxiety:                             [ ]Mild [ ]Moderate [ ]Severe  Fatigue:                             [ ]Mild [ ]Moderate [ ]Severe  Nausea:                             [ ]Mild [ ]Moderate [ ]Severe  Loss of appetite:              [ ]Mild [ ]Moderate [ ]Severe  Constipation:                    [ ]Mild [ ]Moderate [ ]Severe    Other Symptoms:  [ ]All other review of systems negative     Palliative Performance Status Version 2:         %    [email or web address]  PHYSICAL EXAM:  Vital Signs Last 24 Hrs  T(C): 36.6 (12 Jan 2021 06:04), Max: 36.6 (11 Jan 2021 20:36)  T(F): 97.9 (12 Jan 2021 06:04), Max: 97.9 (11 Jan 2021 20:36)  HR: 64 (12 Jan 2021 06:04) (61 - 67)  BP: 138/54 (12 Jan 2021 06:04) (132/56 - 146/65)  BP(mean): --  RR: 18 (12 Jan 2021 06:04) (18 - 18)  SpO2: 97% (12 Jan 2021 06:04) (97% - 100%) I&O's Summary    11 Jan 2021 07:01  -  12 Jan 2021 07:00  --------------------------------------------------------  IN: 560 mL / OUT: 700 mL / NET: -140 mL      GENERAL:  [ ]Alert  [ ]Oriented x   [ ]Lethargic  [ ]Cachexia  [ ]Unarousable  [ ]Verbal  [ ]Non-Verbal  Behavioral:   [ ] Anxiety  [ ] Delirium [ ] Agitation [ ] Other  HEENT:  [ ]Normal   [ ]Dry mouth   [ ]ET Tube/Trach  [ ]Oral lesions  PULMONARY:   [ ]Clear [ ]Tachypnea  [ ]Audible excessive secretions   [ ]Rhonchi        [ ]Right [ ]Left [ ]Bilateral  [ ]Crackles        [ ]Right [ ]Left [ ]Bilateral  [ ]Wheezing     [ ]Right [ ]Left [ ]Bilatera  [ ]Diminished breath sounds [ ]right [ ]left [ ]bilateral  CARDIOVASCULAR:    [ ]Regular [ ]Irregular [ ]Tachy  [ ]Pierre [ ]Murmur [ ]Other  GASTROINTESTINAL:  [ ]Soft  [ ]Distended   [ ]+BS  [ ]Non tender [ ]Tender  [ ]PEG [ ]OGT/ NGT  Last BM:     GENITOURINARY:  [ ]Normal [ ] Incontinent   [ ]Oliguria/Anuria   [ ]Kapoor  MUSCULOSKELETAL:   [ ]Normal   [ ]Weakness  [ ]Bed/Wheelchair bound [ ]Edema  NEUROLOGIC:   [ ]No focal deficits  [ ]Cognitive impairment  [ ]Dysphagia [ ]Dysarthria [ ]Paresis [ ]Other   SKIN:   [ ]Normal    [ ]Rash  [ ]Pressure ulcer(s)       Present on admission [ ]y [ ]n    CRITICAL CARE:  [ ] Shock Present  [ ]Septic [ ]Cardiogenic [ ]Neurologic [ ]Hypovolemic  [ ]  Vasopressors [ ]  Inotropes   [ ]Respiratory failure present [ ]Mechanical ventilation [ ]Non-invasive ventilatory support [ ]High flow  [ ]Acute  [ ]Chronic [ ]Hypoxic  [ ]Hypercarbic [ ]Other  [ ]Other organ failure     LABS:                        8.9    7.47  )-----------( 278      ( 12 Jan 2021 09:12 )             27.1   01-12    142  |  107  |  41<H>  ----------------------------<  99  4.0   |  26  |  1.29    Ca    8.6      12 Jan 2021 09:12  Phos  3.1     01-11  Mg     2.0     01-11    PT/INR - ( 11 Jan 2021 07:59 )   PT: 11.8 sec;   INR: 0.99 ratio               RADIOLOGY & ADDITIONAL STUDIES:    PROTEIN CALORIE MALNUTRITION PRESENT: [ ]mild [ ]moderate [ ]severe [ ]underweight [ ]morbid obesity  https://www.andeal.org/vault/7340/web/files/ONC/Table_Clinical%20Characteristics%20to%20Document%20Malnutrition-White%20JV%20et%20al%202012.pdf    Height (cm): 167.6 (01-10-21 @ 16:11), 152.4 (12-24-20 @ 08:24), 152.4 (08-07-20 @ 22:27)  Weight (kg): 55.4 (01-10-21 @ 16:11), 61.2 (12-24-20 @ 08:24), 64.5 (08-07-20 @ 22:27)  BMI (kg/m2): 19.7 (01-10-21 @ 16:11), 26.4 (12-24-20 @ 08:24), 27.8 (08-07-20 @ 22:27)    [ ]PPSV2 < or = to 30% [ ]significant weight loss  [ ]poor nutritional intake  [ ]anasarca     Albumin, Serum: 2.5 g/dL (01-10-21 @ 08:39)   [ ]Artificial Nutrition      REFERRALS:   [ ]Chaplaincy  [ ]Hospice  [ ]Child Life  [ ]Social Work  [ ]Case management [ ]Holistic Therapy     Goals of Care Document:  HPI:  This is a 86 year old male with PMH Parkinsons disease, DMII, HTN, CAD s/p CABG, BPH who presented to the ED with generalized weakness. Patient was just admitted from 12/24-12/29 for the same presentation, found to have hbg 5.0 with FOBT + but was too high risk for scope so treated conservatively for GIB. He was discharged to rehab but left rehab AMA and has been home for the past 4 days. Collateral obtained from daughter. Patient has been feeling fatigued, refusing to take his medications except some of his parkinsons medications and prostate medications. She noted one episode of black stool yesterday after which her father felt very weak, but no other melena/BRB. Patient has been urinating at home but noted it was less. Endorses "not feeling well" but denies CP, SOB, fever, chills, abd pain.     In the ED, afebrile and hemodynamically stable. Hbg noted to be 4.8 in the ED, K 6.0 and cr elevated. No peaked TW on EKG. Ordered for 3 units of prbcs, 500cc NS, and given lasix x1. Kapoor placed by urology in the ED with 600cc initial drainage.  (10 Inocencio 2021 16:54)    PERTINENT PM/SXH:   CAD (coronary artery disease)    DM (diabetes mellitus)    HLD (hyperlipidemia)    HTN (hypertension)    Parkinson disease    Benign prostatic hyperplasia    Syncope    Urinary frequency    Osteoporosis    Hypertension    CAD (coronary artery disease)    Dyslipidemia    Diabetes mellitus      History of knee replacement procedure of left knee    S/P CABG (coronary artery bypass graft)    History of total left knee replacement    S/P coronary artery stent placement in 2003      FAMILY HISTORY:  No pertinent family history in first degree relatives  colon cancer    Family history of coronary artery disease      ITEMS NOT CHECKED ARE NOT PRESENT    SOCIAL HISTORY:   Significant other/partner[ ]  Children[x] (4)  Zoroastrian/Spirituality:  Substance hx:  [ ]   Tobacco hx:  [ ]   Alcohol hx: [ ]   Home Opioid hx:  [ ] I-Stop Reference No:  Living Situation: [ ]Home  [ ]Long term care  [ ]Rehab [ ]Other    ADVANCE DIRECTIVES:    DNR  MOLST  [ ]  Living Will  [ ]   DECISION MAKER(s):  [ ] Health Care Proxy(s)  [ ] Surrogate(s)  [ ] Guardian           Name(s): Phone Number(s):    BASELINE (I)ADL(s) (prior to admission):  [place]: [ ]Total  [ ] Moderate [ ]Dependent    Allergies    No Known Allergies    Intolerances    MEDICATIONS  (STANDING):  aMIOdarone    Tablet 200 milliGRAM(s) Oral daily  atorvastatin 80 milliGRAM(s) Oral at bedtime  budesonide 160 MICROgram(s)/formoterol 4.5 MICROgram(s) Inhaler 2 Puff(s) Inhalation two times a day  cadexomer iodine 0.9% Gel 1 Application(s) Topical daily  carbidopa/levodopa  25/100 1 Tablet(s) Oral three times a day  dextrose 40% Gel 15 Gram(s) Oral once  dextrose 5%. 1000 milliLiter(s) (50 mL/Hr) IV Continuous <Continuous>  dextrose 5%. 1000 milliLiter(s) (100 mL/Hr) IV Continuous <Continuous>  dextrose 50% Injectable 25 Gram(s) IV Push once  dextrose 50% Injectable 12.5 Gram(s) IV Push once  dextrose 50% Injectable 25 Gram(s) IV Push once  finasteride 5 milliGRAM(s) Oral daily  glucagon  Injectable 1 milliGRAM(s) IntraMuscular once  insulin lispro (ADMELOG) corrective regimen sliding scale   SubCutaneous three times a day before meals  insulin lispro (ADMELOG) corrective regimen sliding scale   SubCutaneous at bedtime  pantoprazole  Injectable 40 milliGRAM(s) IV Push two times a day  rOPINIRole 0.5 milliGRAM(s) Oral three times a day  sertraline 25 milliGRAM(s) Oral daily  tamsulosin 0.4 milliGRAM(s) Oral at bedtime    MEDICATIONS  (PRN):  ALBUTerol    90 MICROgram(s) HFA Inhaler 1 Puff(s) Inhalation four times a day PRN Shortness of Breath and/or Wheezing    PRESENT SYMPTOMS: [ ]Unable to obtain due to poor mentation   Source if other than patient:  [ ]Family   [ ]Team     Pain: [ ]yes [x ]no  QOL impact -   Location -                    Aggravating factors -  Quality -  Radiation -  Timing-  Severity (0-10 scale):  Minimal acceptable level (0-10 scale):     PAIN AD Score: 0    [email or web address]/painad.[email or web address] (press ctrl +  left click to view)    Dyspnea:                           [ ]Mild [ ]Moderate [ ]Severe  Anxiety:                             [ ]Mild [ ]Moderate [ ]Severe  Fatigue:                             [ ]Mild [ x]Moderate [ ]Severe  Nausea:                             [ ]Mild [ ]Moderate [ ]Severe  Loss of appetite:              [x ]Mild [x ]Moderate [ ]Severe  Constipation:                    [ ]Mild [ ]Moderate [ ]Severe    Other Symptoms:  [ x]All other review of systems negative     Palliative Performance Status Version 2:    40     %    [email or web address]  PHYSICAL EXAM:  Vital Signs Last 24 Hrs  T(C): 36.6 (12 Jan 2021 06:04), Max: 36.6 (11 Jan 2021 20:36)  T(F): 97.9 (12 Jan 2021 06:04), Max: 97.9 (11 Jan 2021 20:36)  HR: 64 (12 Jan 2021 06:04) (61 - 67)  BP: 138/54 (12 Jan 2021 06:04) (132/56 - 146/65)  BP(mean): --  RR: 18 (12 Jan 2021 06:04) (18 - 18)  SpO2: 97% (12 Jan 2021 06:04) (97% - 100%) I&O's Summary    11 Jan 2021 07:01  -  12 Jan 2021 07:00  --------------------------------------------------------  IN: 560 mL / OUT: 700 mL / NET: -140 mL      GENERAL:  [x ]Alert  [ ]Oriented x   [ ]Lethargic  [ ]Cachexia  [ ]Unarousable  [ ]Verbal  [ ]Non-Verbal  Behavioral:   [ ] Anxiety  [ ] Delirium [ ] Agitation [x ] Other  HEENT:  [ ]Normal   [x ]Dry mouth   [ ]ET Tube/Trach  [ ]Oral lesions  PULMONARY:  Physical exam deferred during COVID surge  [ ]Clear [ ]Tachypnea  [ ]Audible excessive secretions   [ ]Rhonchi        [ ]Right [ ]Left [ ]Bilateral  [ ]Crackles        [ ]Right [ ]Left [ ]Bilateral  [ ]Wheezing     [ ]Right [ ]Left [ ]Bilatera  [ ]Diminished breath sounds [ ]right [ ]left [ ]bilateral  CARDIOVASCULAR:  Physical exam deferred during COVID surge  [ ]Regular [ ]Irregular [ ]Tachy  [ ]Pierre [ ]Murmur [ ]Other  GASTROINTESTINAL:Physical exam deferred during COVID surge  [ ]Soft  [ ]Distended   [ ]+BS  [ ]Non tender [ ]Tender  [ ]PEG [ ]OGT/ NGT  Last BM:     GENITOURINARY:  [ ]Normal [x ] Incontinent   [ ]Oliguria/Anuria   [ ]Kapoor  MUSCULOSKELETAL:  Physical exam deferred during COVID surge  [ ]Normal   [ ]Weakness  [ ]Bed/Wheelchair bound [ ]Edema  NEUROLOGIC:  Physical exam deferred during COVID surge  [ ]No focal deficits  [ ]Cognitive impairment  [ ]Dysphagia [ ]Dysarthria [ ]Paresis [ ]Other   SKIN: Physical exam deferred during COVID surge  [ ]Normal    [ ]Rash  [ ]Pressure ulcer(s)       Present on admission [ ]y [ ]n    CRITICAL CARE:  [ ] Shock Present  [ ]Septic [ ]Cardiogenic [ ]Neurologic [ ]Hypovolemic  [ ]  Vasopressors [ ]  Inotropes   [ ]Respiratory failure present [ ]Mechanical ventilation [ ]Non-invasive ventilatory support [ ]High flow  [ ]Acute  [ ]Chronic [ ]Hypoxic  [ ]Hypercarbic [ ]Other  [ ]Other organ failure     LABS:                        8.9    7.47  )-----------( 278      ( 12 Jan 2021 09:12 )             27.1   01-12    142  |  107  |  41<H>  ----------------------------<  99  4.0   |  26  |  1.29    Ca    8.6      12 Jan 2021 09:12  Phos  3.1     01-11  Mg     2.0     01-11    PT/INR - ( 11 Jan 2021 07:59 )   PT: 11.8 sec;   INR: 0.99 ratio               RADIOLOGY & ADDITIONAL STUDIES:    PROTEIN CALORIE MALNUTRITION PRESENT: [ ]mild [ ]moderate [ ]severe [ ]underweight [ ]morbid obesity  https://www.andeal.org/vault/8230/web/files/ONC/Table_Clinical%20Characteristics%20to%20Document%20Malnutrition-White%20JV%20et%20al%202012.pdf    Height (cm): 167.6 (01-10-21 @ 16:11), 152.4 (12-24-20 @ 08:24), 152.4 (08-07-20 @ 22:27)  Weight (kg): 55.4 (01-10-21 @ 16:11), 61.2 (12-24-20 @ 08:24), 64.5 (08-07-20 @ 22:27)  BMI (kg/m2): 19.7 (01-10-21 @ 16:11), 26.4 (12-24-20 @ 08:24), 27.8 (08-07-20 @ 22:27)    [ ]PPSV2 < or = to 30% [ ]significant weight loss  [ ]poor nutritional intake  [ ]anasarca     Albumin, Serum: 2.5 g/dL (01-10-21 @ 08:39)   [ ]Artificial Nutrition      REFERRALS:   [ ]Chaplaincy  [ ]Hospice  [ ]Child Life  [ ]Social Work  [ ]Case management [ ]Holistic Therapy     Goals of Care Document:

## 2021-01-12 NOTE — DIETITIAN INITIAL EVALUATION ADULT. - PERTINENT MEDS FT
Home Medications:  albuterol 90 mcg/inh inhalation aerosol with adapter: 2 puff(s) inhaled 4 times a day, As Needed (10 Inocencio 2021 16:40)  amiodarone 200 mg oral tablet: 1 tab(s) orally once a day (10 Inocencio 2021 16:40)  carbidopa-levodopa 25 mg-100 mg oral tablet: 1 tab(s) orally 3 times a day (10 Inocencio 2021 16:40)  docusate sodium 100 mg oral capsule: 1 cap(s) orally 2 times a day, As Needed (10 Inocencio 2021 16:40)  finasteride 5 mg oral tablet: 1 tab(s) orally once a day (10 Inocencio 2021 16:40)  Januvia 100 mg oral tablet: 1 tab(s) orally once a day (10 Inocencio 2021 16:40)  metFORMIN 500 mg oral tablet: 1 tab(s) orally 3 times a day (10 Inocencio 2021 16:40)  metoprolol tartrate 50 mg oral tablet: 1 tab(s) orally 2 times a day (10 Inocencio 2021 16:40)  multivitamin: 1 tab(s) orally once a day (10 Inocencio 2021 16:40)  oxybutynin 5 mg oral tablet: 1 tab(s) orally 2 times a day (10 Inocencio 2021 16:40)  pantoprazole 40 mg oral delayed release tablet: 1 tab(s) orally once a day (before a meal) (10 Inocencio 2021 16:40)  rOPINIRole 0.5 mg oral tablet: 1 tab(s) orally 3 times a day (10 Inocencio 2021 16:40)  rosuvastatin 20 mg oral tablet: 1 tab(s) orally once a day (10 Inocencio 2021 16:40)  sertraline 25 mg oral tablet: 1 tab(s) orally once a day (10 Inocencio 2021 16:40)  Symbicort 160 mcg-4.5 mcg/inh inhalation aerosol: 2 puff(s) inhaled 2 times a day (10 Inocencio 2021 16:40)  tamsulosin 0.4 mg oral capsule: 1 cap(s) orally once a day (10 Inocencio 2021 16:40)

## 2021-01-12 NOTE — DIETITIAN INITIAL EVALUATION ADULT. - ADD RECOMMEND
1. As feasible, advance diet to Low fiber. 2. Consider addition of Multivitamin & Vitamin C supplementation pending no existing contraindications to help facilitate wound healing. 3. Monitor PO intake/tolerance, weights, labs, hydration status, bowels, and skin integrity. 4. New malnutrition alert placed - Will follow-up according to protocol.

## 2021-01-12 NOTE — DIETITIAN NUTRITION RISK NOTIFICATION - ADDITIONAL COMMENTS/DIETITIAN RECOMMENDATIONS
1. As feasible, advance diet to Low fiber with Ensure Enlive x2 daily  2. Consider addition of Multivitamin & Vitamin C supplementation pending no existing contraindications to help facilitate wound healing.

## 2021-01-12 NOTE — CONSULT NOTE ADULT - ATTENDING COMMENTS
Discussion with team. He was expressing despondence. Limited engagement by the patient today. Discussed PEG. He is permitting me to speak with his daughter Discussion with team. He was expressing despondence. Limited engagement by the patient today. Discussed endoscopy. He is permitting me to speak with his daughter

## 2021-01-12 NOTE — PHYSICAL THERAPY INITIAL EVALUATION ADULT - TRANSFER TRAINING, PT EVAL
GOAL: Pt will perform ALL transfers with MOD assist, w/use of appropriate assistive device as needed, in 4 weeks.

## 2021-01-12 NOTE — CONSULT NOTE ADULT - CONSULT REASON
heel DTI
Potential consulted for assistance with medical decision making versus psychosocial support

## 2021-01-12 NOTE — PROGRESS NOTE ADULT - SUBJECTIVE AND OBJECTIVE BOX
Patient is a 86y Male     Patient is a 86y old  Male who presents with a chief complaint of anemia (11 Jan 2021 22:03)      HPI:  This is a 86 year old male with PMH Parkinsons disease, DMII, HTN, CAD s/p CABG, BPH who presented to the ED with generalized weakness. Patient was just admitted from 12/24-12/29 for the same presentation, found to have hbg 5.0 with FOBT + but was too high risk for scope so treated conservatively for GIB. He was discharged to rehab but left rehab AMA and has been home for the past 4 days. Collateral obtained from daughter. Patient has been feeling fatigued, refusing to take his medications except some of his parkinsons medications and prostate medications. She noted one episode of black stool yesterday after which her father felt very weak, but no other melena/BRB. Patient has been urinating at home but noted it was less. Endorses "not feeling well" but denies CP, SOB, fever, chills, abd pain.     In the ED, afebrile and hemodynamically stable. Hbg noted to be 4.8 in the ED, K 6.0 and cr elevated. No peaked TW on EKG. Ordered for 3 units of prbcs, 500cc NS, and given lasix x1. Kapoor placed by urology in the ED with 600cc initial drainage.  (10 Inocencio 2021 16:54)      PAST MEDICAL & SURGICAL HISTORY:  CAD (coronary artery disease)    DM (diabetes mellitus)    HLD (hyperlipidemia)    HTN (hypertension)    Parkinson disease    Benign prostatic hyperplasia    Syncope    Urinary frequency    Osteoporosis    Hypertension    CAD (coronary artery disease)    Dyslipidemia    Diabetes mellitus    History of knee replacement procedure of left knee  2017    S/P CABG (coronary artery bypass graft)  2015    History of total left knee replacement  2014    S/P coronary artery stent placement in 2003        MEDICATIONS  (STANDING):  aMIOdarone    Tablet 200 milliGRAM(s) Oral daily  atorvastatin 80 milliGRAM(s) Oral at bedtime  budesonide 160 MICROgram(s)/formoterol 4.5 MICROgram(s) Inhaler 2 Puff(s) Inhalation two times a day  cadexomer iodine 0.9% Gel 1 Application(s) Topical daily  carbidopa/levodopa  25/100 1 Tablet(s) Oral three times a day  dextrose 40% Gel 15 Gram(s) Oral once  dextrose 5%. 1000 milliLiter(s) (50 mL/Hr) IV Continuous <Continuous>  dextrose 5%. 1000 milliLiter(s) (100 mL/Hr) IV Continuous <Continuous>  dextrose 50% Injectable 25 Gram(s) IV Push once  dextrose 50% Injectable 12.5 Gram(s) IV Push once  dextrose 50% Injectable 25 Gram(s) IV Push once  finasteride 5 milliGRAM(s) Oral daily  glucagon  Injectable 1 milliGRAM(s) IntraMuscular once  insulin lispro (ADMELOG) corrective regimen sliding scale   SubCutaneous three times a day before meals  insulin lispro (ADMELOG) corrective regimen sliding scale   SubCutaneous at bedtime  pantoprazole  Injectable 40 milliGRAM(s) IV Push two times a day  rOPINIRole 0.5 milliGRAM(s) Oral three times a day  sertraline 25 milliGRAM(s) Oral daily  tamsulosin 0.4 milliGRAM(s) Oral at bedtime      Allergies    No Known Allergies    Intolerances        SOCIAL HISTORY:  Denies ETOh,Smoking,     FAMILY HISTORY:  No pertinent family history in first degree relatives  colon cancer    Family history of coronary artery disease        REVIEW OF SYSTEMS:    CONSTITUTIONAL: No weakness, fevers or chills  EYES/ENT: No visual changes;  No vertigo or throat pain   NECK: No pain or stiffness  RESPIRATORY: No cough, wheezing, hemoptysis; No shortness of breath  CARDIOVASCULAR: No chest pain or palpitations  GASTROINTESTINAL: No abdominal or epigastric pain. No nausea, vomiting, or hematemesis; No diarrhea or constipation. No melena or hematochezia.  GENITOURINARY: No dysuria, frequency or hematuria  NEUROLOGICAL: No numbness or weakness  SKIN: No itching, burning, rashes, or lesions   All other review of systems is negative unless indicated above.    VITAL:  T(C): , Max: 36.6 (01-11-21 @ 20:36)  T(F): , Max: 97.9 (01-11-21 @ 20:36)  HR: 64 (01-12-21 @ 06:04)  BP: 138/54 (01-12-21 @ 06:04)  BP(mean): --  RR: 18 (01-12-21 @ 06:04)  SpO2: 97% (01-12-21 @ 06:04)  Wt(kg): --    I and O's:    01-10 @ 07:01  -  01-11 @ 07:00  --------------------------------------------------------  IN: 590 mL / OUT: 4250 mL / NET: -3660 mL    01-11 @ 07:01  -  01-12 @ 07:00  --------------------------------------------------------  IN: 560 mL / OUT: 700 mL / NET: -140 mL          PHYSICAL EXAM:    Constitutional: NAD  HEENT: PERRLA,   Neck: No JVD  Respiratory: CTA B/L  Cardiovascular: S1 and S2  Gastrointestinal: BS+, soft, NT/ND  Extremities: No peripheral edema  Neurological: A/O x 3, no focal deficits  Psychiatric: Normal mood, normal affect  : No Kapoor  Skin: No rashes  Access: Not applicable  Back: No CVA tenderness    LABS:                        9.3    9.83  )-----------( 273      ( 11 Jan 2021 06:24 )             27.3     01-11    139  |  106  |  81<H>  ----------------------------<  116<H>  3.9   |  23  |  1.69<H>    Ca    9.0      11 Jan 2021 06:24  Phos  3.1     01-11  Mg     2.0     01-11    TPro  5.7<L>  /  Alb  2.5<L>  /  TBili  0.1<L>  /  DBili  x   /  AST  13  /  ALT  <5<L>  /  AlkPhos  54  01-10          RADIOLOGY & ADDITIONAL STUDIES:

## 2021-01-12 NOTE — PHYSICAL THERAPY INITIAL EVALUATION ADULT - DISCHARGE DISPOSITION, PT EVAL
to be determined following completion of functional eval for transfers and ambulation subacute rehab/rehabilitation facility

## 2021-01-12 NOTE — PROGRESS NOTE ADULT - ASSESSMENT
stable overnight, no melena or BMs  comfortable, Hb up to 9  called to discuss plan for possible EGD with daughter but unable to leave message  will try again, clears ok for today  possible EGD tomorrow if family/pt agree  IV PPI BID, serial CBCs stable overnight, no melena or BMs  comfortable, Hb up to 9  called and discussed  EGD with daughter r/b/a discussed and she wishes to proceed  keep NPO for now, EGD pending schedule  IV PPI BID, serial CBCs

## 2021-01-12 NOTE — PROGRESS NOTE ADULT - SUBJECTIVE AND OBJECTIVE BOX
Patient is a 86y old  Male who presents with a chief complaint of anemia (12 Jan 2021 10:16)    pt. seen and examined, s/p EGD     INTERVAL HPI/OVERNIGHT EVENTS:  T(C): 36.3 (01-12-21 @ 20:31), Max: 36.6 (01-12-21 @ 06:04)  HR: 75 (01-12-21 @ 20:31) (59 - 75)  BP: 119/45 (01-12-21 @ 20:31) (113/44 - 159/53)  RR: 18 (01-12-21 @ 20:31) (12 - 25)  SpO2: 97% (01-12-21 @ 20:31) (96% - 100%)  Wt(kg): --  I&O's Summary    11 Jan 2021 07:01  -  12 Jan 2021 07:00  --------------------------------------------------------  IN: 560 mL / OUT: 700 mL / NET: -140 mL    12 Jan 2021 07:01  -  13 Jan 2021 01:59  --------------------------------------------------------  IN: 480 mL / OUT: 860 mL / NET: -380 mL        PAST MEDICAL & SURGICAL HISTORY:  CAD (coronary artery disease)    DM (diabetes mellitus)    HLD (hyperlipidemia)    HTN (hypertension)    Parkinson disease    Benign prostatic hyperplasia    Syncope    Urinary frequency    Osteoporosis    Hypertension    CAD (coronary artery disease)    Dyslipidemia    Diabetes mellitus    History of knee replacement procedure of left knee  2017    S/P CABG (coronary artery bypass graft)  2015    History of total left knee replacement  2014    S/P coronary artery stent placement in 2003        SOCIAL HISTORY  Alcohol:  Tobacco:  Illicit substance use:    FAMILY HISTORY:    REVIEW OF SYSTEMS:  CONSTITUTIONAL: No fever, weight loss, or fatigue  EYES: No eye pain, visual disturbances, or discharge  ENMT:  No difficulty hearing, tinnitus, vertigo; No sinus or throat pain  NECK: No pain or stiffness  RESPIRATORY: No cough, wheezing, chills or hemoptysis; No shortness of breath  CARDIOVASCULAR: No chest pain, palpitations, dizziness, or leg swelling  GASTROINTESTINAL: No abdominal or epigastric pain. No nausea, vomiting, or hematemesis; No diarrhea or constipation. No melena or hematochezia.  GENITOURINARY: No dysuria, frequency, hematuria, or incontinence  NEUROLOGICAL: No headaches, memory loss, loss of strength, numbness, or tremors  SKIN: No itching, burning, rashes, or lesions   LYMPH NODES: No enlarged glands  ENDOCRINE: No heat or cold intolerance; No hair loss  MUSCULOSKELETAL: No joint pain or swelling; No muscle, back, or extremity pain  PSYCHIATRIC: No depression, anxiety, mood swings, or difficulty sleeping  HEME/LYMPH: No easy bruising, or bleeding gums  ALLERY AND IMMUNOLOGIC: No hives or eczema    RADIOLOGY & ADDITIONAL TESTS:    Imaging Personally Reviewed:  [ ] YES  [ ] NO    Consultant(s) Notes Reviewed:  [ ] YES  [ ] NO    PHYSICAL EXAM:  GENERAL: NAD, well-groomed, well-developed  HEAD:  Atraumatic, Normocephalic  EYES: EOMI, PERRLA, conjunctiva and sclera clear  ENMT: No tonsillar erythema, exudates, or enlargement; Moist mucous membranes, Good dentition, No lesions  NECK: Supple, No JVD, Normal thyroid  NERVOUS SYSTEM:  Alert & Oriented X3, Good concentration; Motor Strength 5/5 B/L upper and lower extremities; DTRs 2+ intact and symmetric  CHEST/LUNG: Clear to percussion bilaterally; No rales, rhonchi, wheezing, or rubs  HEART: Regular rate and rhythm; No murmurs, rubs, or gallops  ABDOMEN: Soft, Nontender, Nondistended; Bowel sounds present  EXTREMITIES:  2+ Peripheral Pulses, No clubbing, cyanosis, or edema  LYMPH: No lymphadenopathy noted  SKIN: No rashes or lesions    LABS:                        8.9    7.47  )-----------( 278      ( 12 Jan 2021 09:12 )             27.1     01-12    142  |  107  |  41<H>  ----------------------------<  99  4.0   |  26  |  1.29    Ca    8.6      12 Jan 2021 09:12  Phos  3.1     01-11  Mg     2.0     01-11      PT/INR - ( 11 Jan 2021 07:59 )   PT: 11.8 sec;   INR: 0.99 ratio             CAPILLARY BLOOD GLUCOSE      POCT Blood Glucose.: 164 mg/dL (12 Jan 2021 21:36)  POCT Blood Glucose.: 97 mg/dL (12 Jan 2021 17:38)            MEDICATIONS  (STANDING):  aMIOdarone    Tablet 200 milliGRAM(s) Oral daily  atorvastatin 80 milliGRAM(s) Oral at bedtime  budesonide 160 MICROgram(s)/formoterol 4.5 MICROgram(s) Inhaler 2 Puff(s) Inhalation two times a day  cadexomer iodine 0.9% Gel 1 Application(s) Topical daily  carbidopa/levodopa  25/100 1 Tablet(s) Oral three times a day  dextrose 40% Gel 15 Gram(s) Oral once  dextrose 5%. 1000 milliLiter(s) (50 mL/Hr) IV Continuous <Continuous>  dextrose 5%. 1000 milliLiter(s) (100 mL/Hr) IV Continuous <Continuous>  dextrose 50% Injectable 25 Gram(s) IV Push once  dextrose 50% Injectable 12.5 Gram(s) IV Push once  dextrose 50% Injectable 25 Gram(s) IV Push once  finasteride 5 milliGRAM(s) Oral daily  glucagon  Injectable 1 milliGRAM(s) IntraMuscular once  insulin lispro (ADMELOG) corrective regimen sliding scale   SubCutaneous three times a day before meals  insulin lispro (ADMELOG) corrective regimen sliding scale   SubCutaneous at bedtime  pantoprazole  Injectable 40 milliGRAM(s) IV Push two times a day  rOPINIRole 0.5 milliGRAM(s) Oral three times a day  sertraline 25 milliGRAM(s) Oral daily  tamsulosin 0.4 milliGRAM(s) Oral at bedtime    MEDICATIONS  (PRN):  ALBUTerol    90 MICROgram(s) HFA Inhaler 1 Puff(s) Inhalation four times a day PRN Shortness of Breath and/or Wheezing      Care Discussed with Consultants/Other Providers [ ] YES  [ ] NO

## 2021-01-12 NOTE — DIETITIAN INITIAL EVALUATION ADULT. - SIGNS/SYMPTOMS
8% wt loss x5mo, mod muscle/fat loss, presumed meeting <75% est needs >1 mo, BMI 18.5 as evidenced by YVAN SANCHEZ

## 2021-01-12 NOTE — CONSULT NOTE ADULT - PROBLEM SELECTOR RECOMMENDATION 4
Actions:  [] Rapport building     [] Symptom assessment    [] Eliciting preferences of goals   [] Prognostic understanding    [] Emotional Support  [] Coping skill developement  []  Other  Interdisciplinary Referrals:  Communication:  Documentation Review: [] Primary Team [] Consultants [] Interdisciplinary team  Content: Actions:  [s] Rapport building     [s] Symptom assessment    [x] Eliciting preferences of goals   [] Prognostic understanding    [] Emotional Support  [] Coping skill development  []  Other  Interdisciplinary Referrals: None  Communication: d/w patient and team  Documentation Review: [x] Primary Team [x] Consultants [] Interdisciplinary team  Content: PEG forthcoming Actions:  [s] Rapport building     [s] Symptom assessment    [x] Eliciting preferences of goals   [] Prognostic understanding    [] Emotional Support  [] Coping skill development  []  Other  Interdisciplinary Referrals: None  Communication: d/w patient and team  Documentation Review: [x] Primary Team [x] Consultants [] Interdisciplinary team  Content: endoscopy forthcoming

## 2021-01-12 NOTE — PHYSICAL THERAPY INITIAL EVALUATION ADULT - PERTINENT HX OF CURRENT PROBLEM, REHAB EVAL
6 year old male with past medical history of Parkinson's disease, diabetes mellitus, hypertension, hyperlipidemia, CAD s/p CABG, and BPH who presented with generalized weakness. Found to have anemia from slow GIB requiring transfusion

## 2021-01-12 NOTE — PRE PROCEDURE NOTE - PROCEDURE SERVICE
Patient educated on medication, has a solid grasp on pain contract and maintaining care, understands limits and needs, verbalizes understanding of such 
Endoscopy

## 2021-01-12 NOTE — DIETITIAN INITIAL EVALUATION ADULT. - OTHER INFO
Diet Hx in-house: CLD 1/10-1/12, Pt now NPO pending possible EGD. Last documented BM 1/11, no bowel regimen noted; presented w/ melena. No additional GI distress noted at this time. Per Patient Profile, "swallows foods/liquids without difficulty".    Wt Hx per chart: 133 Ibs (12/2018), 125 Ibs (8/2020). Bedscale dosing wt upon current admit = 122.1 Ibs, and standing weight obtained 1/11 = 114.6 Ibs.

## 2021-01-12 NOTE — PHYSICAL THERAPY INITIAL EVALUATION ADULT - CRITERIA FOR SKILLED THERAPEUTIC INTERVENTIONS
functional limitations in following categories/rehab potential/therapy frequency/anticipated discharge recommendation

## 2021-01-12 NOTE — DIETITIAN INITIAL EVALUATION ADULT. - REASON INDICATOR FOR ASSESSMENT
Nutrition consult warranted for Pressure Injury Stage 2 or >  Source: EMR, Team; Pending Pt interview/assessment Nutrition consult warranted for Pressure Injury Stage 2 or >  Source: EMR, Team, Pt

## 2021-01-13 LAB
ANION GAP SERPL CALC-SCNC: 6 MMOL/L — SIGNIFICANT CHANGE UP (ref 5–17)
BUN SERPL-MCNC: 26 MG/DL — HIGH (ref 7–23)
CALCIUM SERPL-MCNC: 8.5 MG/DL — SIGNIFICANT CHANGE UP (ref 8.4–10.5)
CHLORIDE SERPL-SCNC: 106 MMOL/L — SIGNIFICANT CHANGE UP (ref 96–108)
CO2 SERPL-SCNC: 26 MMOL/L — SIGNIFICANT CHANGE UP (ref 22–31)
CREAT SERPL-MCNC: 1.05 MG/DL — SIGNIFICANT CHANGE UP (ref 0.5–1.3)
GLUCOSE BLDC GLUCOMTR-MCNC: 153 MG/DL — HIGH (ref 70–99)
GLUCOSE SERPL-MCNC: 124 MG/DL — HIGH (ref 70–99)
HCT VFR BLD CALC: 28.4 % — LOW (ref 39–50)
HGB BLD-MCNC: 9 G/DL — LOW (ref 13–17)
MAGNESIUM SERPL-MCNC: 1.6 MG/DL — SIGNIFICANT CHANGE UP (ref 1.6–2.6)
MCHC RBC-ENTMCNC: 28.8 PG — SIGNIFICANT CHANGE UP (ref 27–34)
MCHC RBC-ENTMCNC: 31.7 GM/DL — LOW (ref 32–36)
MCV RBC AUTO: 91 FL — SIGNIFICANT CHANGE UP (ref 80–100)
NRBC # BLD: 0 /100 WBCS — SIGNIFICANT CHANGE UP (ref 0–0)
PLATELET # BLD AUTO: 294 K/UL — SIGNIFICANT CHANGE UP (ref 150–400)
POTASSIUM SERPL-MCNC: 4 MMOL/L — SIGNIFICANT CHANGE UP (ref 3.5–5.3)
POTASSIUM SERPL-SCNC: 4 MMOL/L — SIGNIFICANT CHANGE UP (ref 3.5–5.3)
RBC # BLD: 3.12 M/UL — LOW (ref 4.2–5.8)
RBC # FLD: 14.4 % — SIGNIFICANT CHANGE UP (ref 10.3–14.5)
SODIUM SERPL-SCNC: 138 MMOL/L — SIGNIFICANT CHANGE UP (ref 135–145)
WBC # BLD: 7.34 K/UL — SIGNIFICANT CHANGE UP (ref 3.8–10.5)
WBC # FLD AUTO: 7.34 K/UL — SIGNIFICANT CHANGE UP (ref 3.8–10.5)

## 2021-01-13 PROCEDURE — 99233 SBSQ HOSP IP/OBS HIGH 50: CPT

## 2021-01-13 RX ORDER — ACETAMINOPHEN 500 MG
650 TABLET ORAL ONCE
Refills: 0 | Status: COMPLETED | OUTPATIENT
Start: 2021-01-13 | End: 2021-01-13

## 2021-01-13 RX ADMIN — BUDESONIDE AND FORMOTEROL FUMARATE DIHYDRATE 2 PUFF(S): 160; 4.5 AEROSOL RESPIRATORY (INHALATION) at 05:18

## 2021-01-13 RX ADMIN — ROPINIROLE 0.5 MILLIGRAM(S): 8 TABLET, FILM COATED, EXTENDED RELEASE ORAL at 12:49

## 2021-01-13 RX ADMIN — CARBIDOPA AND LEVODOPA 1 TABLET(S): 25; 100 TABLET ORAL at 12:49

## 2021-01-13 RX ADMIN — AMIODARONE HYDROCHLORIDE 200 MILLIGRAM(S): 400 TABLET ORAL at 05:18

## 2021-01-13 RX ADMIN — PANTOPRAZOLE SODIUM 40 MILLIGRAM(S): 20 TABLET, DELAYED RELEASE ORAL at 05:18

## 2021-01-13 RX ADMIN — PANTOPRAZOLE SODIUM 40 MILLIGRAM(S): 20 TABLET, DELAYED RELEASE ORAL at 17:12

## 2021-01-13 RX ADMIN — Medication 1 APPLICATION(S): at 05:00

## 2021-01-13 RX ADMIN — Medication 650 MILLIGRAM(S): at 21:56

## 2021-01-13 RX ADMIN — SERTRALINE 25 MILLIGRAM(S): 25 TABLET, FILM COATED ORAL at 12:49

## 2021-01-13 RX ADMIN — BUDESONIDE AND FORMOTEROL FUMARATE DIHYDRATE 2 PUFF(S): 160; 4.5 AEROSOL RESPIRATORY (INHALATION) at 17:11

## 2021-01-13 RX ADMIN — TAMSULOSIN HYDROCHLORIDE 0.4 MILLIGRAM(S): 0.4 CAPSULE ORAL at 21:58

## 2021-01-13 RX ADMIN — ROPINIROLE 0.5 MILLIGRAM(S): 8 TABLET, FILM COATED, EXTENDED RELEASE ORAL at 05:18

## 2021-01-13 RX ADMIN — FINASTERIDE 5 MILLIGRAM(S): 5 TABLET, FILM COATED ORAL at 12:49

## 2021-01-13 RX ADMIN — CARBIDOPA AND LEVODOPA 1 TABLET(S): 25; 100 TABLET ORAL at 21:58

## 2021-01-13 RX ADMIN — ROPINIROLE 0.5 MILLIGRAM(S): 8 TABLET, FILM COATED, EXTENDED RELEASE ORAL at 21:57

## 2021-01-13 RX ADMIN — ATORVASTATIN CALCIUM 80 MILLIGRAM(S): 80 TABLET, FILM COATED ORAL at 21:57

## 2021-01-13 RX ADMIN — CARBIDOPA AND LEVODOPA 1 TABLET(S): 25; 100 TABLET ORAL at 05:19

## 2021-01-13 NOTE — PROGRESS NOTE ADULT - ASSESSMENT
doing well, no bleeding overnight, Hb stable  EGD findings discussed with pt and daughter via telephone  follows a soft diet at home  follow CBC today, if remains stable then can plan d/c tomorrow on protonix 40mg daily  avoid anticoagulation, ASA 81mg ok if necessary

## 2021-01-13 NOTE — PROGRESS NOTE ADULT - SUBJECTIVE AND OBJECTIVE BOX
INTERVAL HPI/OVERNIGHT EVENTS:    MEDICATIONS  (STANDING):  aMIOdarone    Tablet 200 milliGRAM(s) Oral daily  atorvastatin 80 milliGRAM(s) Oral at bedtime  budesonide 160 MICROgram(s)/formoterol 4.5 MICROgram(s) Inhaler 2 Puff(s) Inhalation two times a day  cadexomer iodine 0.9% Gel 1 Application(s) Topical daily  carbidopa/levodopa  25/100 1 Tablet(s) Oral three times a day  dextrose 40% Gel 15 Gram(s) Oral once  dextrose 5%. 1000 milliLiter(s) (50 mL/Hr) IV Continuous <Continuous>  dextrose 5%. 1000 milliLiter(s) (100 mL/Hr) IV Continuous <Continuous>  dextrose 50% Injectable 25 Gram(s) IV Push once  dextrose 50% Injectable 12.5 Gram(s) IV Push once  dextrose 50% Injectable 25 Gram(s) IV Push once  finasteride 5 milliGRAM(s) Oral daily  glucagon  Injectable 1 milliGRAM(s) IntraMuscular once  insulin lispro (ADMELOG) corrective regimen sliding scale   SubCutaneous three times a day before meals  insulin lispro (ADMELOG) corrective regimen sliding scale   SubCutaneous at bedtime  pantoprazole  Injectable 40 milliGRAM(s) IV Push two times a day  rOPINIRole 0.5 milliGRAM(s) Oral three times a day  sertraline 25 milliGRAM(s) Oral daily  tamsulosin 0.4 milliGRAM(s) Oral at bedtime    MEDICATIONS  (PRN):  ALBUTerol    90 MICROgram(s) HFA Inhaler 1 Puff(s) Inhalation four times a day PRN Shortness of Breath and/or Wheezing      Allergies    No Known Allergies    Intolerances            PHYSICAL EXAM:   Vital Signs:  Vital Signs Last 24 Hrs  T(C): 36.4 (2021 05:00), Max: 36.4 (2021 05:00)  T(F): 97.6 (2021 05:00), Max: 97.6 (2021 05:00)  HR: 62 (2021 05:00) (59 - 75)  BP: 131/46 (2021 05:00) (113/44 - 159/53)  BP(mean): --  RR: 18 (2021 05:00) (12 - 25)  SpO2: 96% (2021 05:00) (96% - 100%)  Daily Height in cm: 167.64 (2021 17:31)    Daily Weight in k.1 (2021 10:51)    GENERAL:  no distress  HEENT:  NC/AT,  anicteric  CHEST:   no increased effort, breath sounds clear  HEART:  Regular rhythm  ABDOMEN:  Soft, non-tender, non-distended, normoactive bowel sounds,  no masses ,no hepato-splenomegaly, no signs of chronic liver disease  EXTEREMITIES:  no cyanosis      LABS:                        8.9    7.47  )-----------( 278      ( 2021 09:12 )             27.1     01-12    142  |  107  |  41<H>  ----------------------------<  99  4.0   |  26  |  1.29    Ca    8.6      2021 09:12      PT/INR - ( 2021 07:59 )   PT: 11.8 sec;   INR: 0.99 ratio               RADIOLOGY & ADDITIONAL TESTS:

## 2021-01-13 NOTE — GOALS OF CARE CONVERSATION - ADVANCED CARE PLANNING - CONVERSATION DETAILS
[place] Care plan delineation    Topic discussed previously    Yes [x]      No []    Complexity        Low[]              Medium []      High [x]       Unknown []    Potential barriers to expeditious decision making:    Objectives defined in premeeting huddle:    Provider:  Saul German        Content:  Pt is bedbound/chairbound.  Significant caregiver burden noted by the daughter Nitesh. She discussed his urging to be taken to the hospital. Discussed Medicaid status, and she reported that the patient is refusing placement in a facility but she wants to encourage him to possible entertain the idea. .She gets paid as his aide but wants to have other people work as the aide since she is raising four kids.  She is open to visiting physicians and [facility] (home care). Nitesh inquired about prognosis. I recommended DNR and explained my recommendation based upon CPR outcomes. She will talk about it with the patient and his other children as an outpatient        Tone:  Calm and appreciative        Action Items:  CM referral for [facility], [facility] (home care) and Visiting Physicians program    Follow up:  Palliative Medicine Service      The patient's symptoms are well controlled.  The patient's care plan has been delineated.  Thank you for the consult, feel free to reconsult when clinical needs arise      In the event of newly developing, evolving, or worsening symptoms, please contact the Palliative Medicine team via pager (if the patient is at [facility] #[phone] or if the patient is at [facility] #[phone]) The Geriatric and Palliative Medicine service has coverage 24 hours a day/ 7 days a week to provide medical recommendations regarding symptom management needs via telephone        Saul German MD   of Geriatric and Palliative Medicine  [facility]      Please page the following number for clinical matters between the hours of 9 am and 5 pm   from Monday through Friday : [phone]    After 5pm and on weekends, please see the contact information below:    In the event of newly developing, evolving, or worsening symptoms, please contact the Palliative Medicine team via pager (if the patient is at [facility] #[phone] or if the patient is at [facility] #[phone]) The Geriatric and Palliative Medicine service has coverage 24 hours a day/ 7 days a week to provide medical recommendations regarding symptom management needs via telephone

## 2021-01-13 NOTE — PROVIDER CONTACT NOTE (OTHER) - BACKGROUND
Pt admit with weakness, anemia, hx GIB. Kapoor placed in ED for retention. Removed 1/12 @ 6248.
Pt admitted with weakness. PMH: Parkinson's, HTN, DM, hyperlipidemia, BPH, CAD s/p CABG

## 2021-01-13 NOTE — PROVIDER CONTACT NOTE (OTHER) - SITUATION
Patient failed TOV x2  Attempts to straight cath unsuccessful x2, unable to advance catheter
Pt refusing am medication

## 2021-01-13 NOTE — PROGRESS NOTE ADULT - SUBJECTIVE AND OBJECTIVE BOX
Patient is a 86y old  Male who presents with a chief complaint of anemia (13 Jan 2021 07:55)    pt. jatinder nd examined , c/o weakness   INTERVAL HPI/OVERNIGHT EVENTS:  T(C): 36.7 (01-13-21 @ 20:28), Max: 36.7 (01-13-21 @ 20:28)  HR: 71 (01-13-21 @ 20:28) (62 - 71)  BP: 145/66 (01-13-21 @ 20:28) (126/64 - 145/66)  RR: 16 (01-13-21 @ 20:28) (16 - 18)  SpO2: 99% (01-13-21 @ 20:28) (96% - 99%)  Wt(kg): --  I&O's Summary    12 Jan 2021 07:01  -  13 Jan 2021 07:00  --------------------------------------------------------  IN: 720 mL / OUT: 1760 mL / NET: -1040 mL    13 Jan 2021 07:01  -  14 Jan 2021 00:03  --------------------------------------------------------  IN: 120 mL / OUT: 0 mL / NET: 120 mL        PAST MEDICAL & SURGICAL HISTORY:  CAD (coronary artery disease)    DM (diabetes mellitus)    HLD (hyperlipidemia)    HTN (hypertension)    Parkinson disease    Benign prostatic hyperplasia    Syncope    Urinary frequency    Osteoporosis    Hypertension    CAD (coronary artery disease)    Dyslipidemia    Diabetes mellitus    History of knee replacement procedure of left knee  2017    S/P CABG (coronary artery bypass graft)  2015    History of total left knee replacement  2014    S/P coronary artery stent placement in 2003        SOCIAL HISTORY  Alcohol:  Tobacco:  Illicit substance use:    FAMILY HISTORY:    REVIEW OF SYSTEMS:  CONSTITUTIONAL: No fever, weight loss, or fatigue  EYES: No eye pain, visual disturbances, or discharge  ENMT:  No difficulty hearing, tinnitus, vertigo; No sinus or throat pain  NECK: No pain or stiffness  RESPIRATORY: No cough, wheezing, chills or hemoptysis; No shortness of breath  CARDIOVASCULAR: No chest pain, palpitations, dizziness, or leg swelling  GASTROINTESTINAL: No abdominal or epigastric pain. No nausea, vomiting, or hematemesis; No diarrhea or constipation. No melena or hematochezia.  GENITOURINARY: No dysuria, frequency, hematuria, or incontinence  NEUROLOGICAL: No headaches, memory loss, loss of strength, numbness, or tremors  SKIN: No itching, burning, rashes, or lesions   LYMPH NODES: No enlarged glands  ENDOCRINE: No heat or cold intolerance; No hair loss  MUSCULOSKELETAL: No joint pain or swelling; No muscle, back, or extremity pain  PSYCHIATRIC: No depression, anxiety, mood swings, or difficulty sleeping  HEME/LYMPH: No easy bruising, or bleeding gums  ALLERY AND IMMUNOLOGIC: No hives or eczema    RADIOLOGY & ADDITIONAL TESTS:    Imaging Personally Reviewed:  [ ] YES  [ ] NO    Consultant(s) Notes Reviewed:  [ ] YES  [ ] NO    PHYSICAL EXAM:  GENERAL: NAD, well-groomed, well-developed  HEAD:  Atraumatic, Normocephalic  EYES: EOMI, PERRLA, conjunctiva and sclera clear  ENMT: No tonsillar erythema, exudates, or enlargement; Moist mucous membranes, Good dentition, No lesions  NECK: Supple, No JVD, Normal thyroid  NERVOUS SYSTEM:  Alert & Oriented X3, Good concentration; Motor Strength 5/5 B/L upper and lower extremities; DTRs 2+ intact and symmetric  CHEST/LUNG: Clear to percussion bilaterally; No rales, rhonchi, wheezing, or rubs  HEART: Regular rate and rhythm; No murmurs, rubs, or gallops  ABDOMEN: Soft, Nontender, Nondistended; Bowel sounds present  EXTREMITIES:  2+ Peripheral Pulses, No clubbing, cyanosis, or edema  LYMPH: No lymphadenopathy noted  SKIN: No rashes or lesions    LABS:                        9.0    7.34  )-----------( 294      ( 13 Jan 2021 09:18 )             28.4     01-13    138  |  106  |  26<H>  ----------------------------<  124<H>  4.0   |  26  |  1.05    Ca    8.5      13 Jan 2021 09:18  Mg     1.6     01-13          CAPILLARY BLOOD GLUCOSE      POCT Blood Glucose.: 153 mg/dL (13 Jan 2021 22:01)            MEDICATIONS  (STANDING):  aMIOdarone    Tablet 200 milliGRAM(s) Oral daily  atorvastatin 80 milliGRAM(s) Oral at bedtime  budesonide 160 MICROgram(s)/formoterol 4.5 MICROgram(s) Inhaler 2 Puff(s) Inhalation two times a day  cadexomer iodine 0.9% Gel 1 Application(s) Topical daily  carbidopa/levodopa  25/100 1 Tablet(s) Oral three times a day  dextrose 40% Gel 15 Gram(s) Oral once  dextrose 5%. 1000 milliLiter(s) (50 mL/Hr) IV Continuous <Continuous>  dextrose 5%. 1000 milliLiter(s) (100 mL/Hr) IV Continuous <Continuous>  dextrose 50% Injectable 25 Gram(s) IV Push once  dextrose 50% Injectable 12.5 Gram(s) IV Push once  dextrose 50% Injectable 25 Gram(s) IV Push once  finasteride 5 milliGRAM(s) Oral daily  glucagon  Injectable 1 milliGRAM(s) IntraMuscular once  insulin lispro (ADMELOG) corrective regimen sliding scale   SubCutaneous three times a day before meals  insulin lispro (ADMELOG) corrective regimen sliding scale   SubCutaneous at bedtime  oxyCODONE    IR 2.5 milliGRAM(s) Oral once  pantoprazole  Injectable 40 milliGRAM(s) IV Push two times a day  rOPINIRole 0.5 milliGRAM(s) Oral three times a day  sertraline 25 milliGRAM(s) Oral daily  tamsulosin 0.4 milliGRAM(s) Oral at bedtime    MEDICATIONS  (PRN):  ALBUTerol    90 MICROgram(s) HFA Inhaler 1 Puff(s) Inhalation four times a day PRN Shortness of Breath and/or Wheezing      Care Discussed with Consultants/Other Providers [ ] YES  [ ] NO

## 2021-01-14 LAB
GLUCOSE BLDC GLUCOMTR-MCNC: 133 MG/DL — HIGH (ref 70–99)
GLUCOSE BLDC GLUCOMTR-MCNC: 139 MG/DL — HIGH (ref 70–99)
GLUCOSE BLDC GLUCOMTR-MCNC: 141 MG/DL — HIGH (ref 70–99)
GLUCOSE BLDC GLUCOMTR-MCNC: 148 MG/DL — HIGH (ref 70–99)
HCT VFR BLD CALC: 30.1 % — LOW (ref 39–50)
HGB BLD-MCNC: 9.2 G/DL — LOW (ref 13–17)
MCHC RBC-ENTMCNC: 29.2 PG — SIGNIFICANT CHANGE UP (ref 27–34)
MCHC RBC-ENTMCNC: 30.6 GM/DL — LOW (ref 32–36)
MCV RBC AUTO: 95.6 FL — SIGNIFICANT CHANGE UP (ref 80–100)
NRBC # BLD: 0 /100 WBCS — SIGNIFICANT CHANGE UP (ref 0–0)
PLATELET # BLD AUTO: 270 K/UL — SIGNIFICANT CHANGE UP (ref 150–400)
RBC # BLD: 3.15 M/UL — LOW (ref 4.2–5.8)
RBC # FLD: 14.5 % — SIGNIFICANT CHANGE UP (ref 10.3–14.5)
SARS-COV-2 RNA SPEC QL NAA+PROBE: SIGNIFICANT CHANGE UP
WBC # BLD: 6.84 K/UL — SIGNIFICANT CHANGE UP (ref 3.8–10.5)
WBC # FLD AUTO: 6.84 K/UL — SIGNIFICANT CHANGE UP (ref 3.8–10.5)

## 2021-01-14 PROCEDURE — 51703 INSERT BLADDER CATH COMPLEX: CPT

## 2021-01-14 RX ORDER — OXYCODONE HYDROCHLORIDE 5 MG/1
2.5 TABLET ORAL ONCE
Refills: 0 | Status: DISCONTINUED | OUTPATIENT
Start: 2021-01-14 | End: 2021-01-14

## 2021-01-14 RX ORDER — MORPHINE SULFATE 50 MG/1
1 CAPSULE, EXTENDED RELEASE ORAL ONCE
Refills: 0 | Status: DISCONTINUED | OUTPATIENT
Start: 2021-01-14 | End: 2021-01-14

## 2021-01-14 RX ORDER — MORPHINE SULFATE 50 MG/1
2 CAPSULE, EXTENDED RELEASE ORAL ONCE
Refills: 0 | Status: DISCONTINUED | OUTPATIENT
Start: 2021-01-14 | End: 2021-01-14

## 2021-01-14 RX ADMIN — BUDESONIDE AND FORMOTEROL FUMARATE DIHYDRATE 2 PUFF(S): 160; 4.5 AEROSOL RESPIRATORY (INHALATION) at 05:00

## 2021-01-14 RX ADMIN — AMIODARONE HYDROCHLORIDE 200 MILLIGRAM(S): 400 TABLET ORAL at 05:00

## 2021-01-14 RX ADMIN — PANTOPRAZOLE SODIUM 40 MILLIGRAM(S): 20 TABLET, DELAYED RELEASE ORAL at 05:00

## 2021-01-14 RX ADMIN — CARBIDOPA AND LEVODOPA 1 TABLET(S): 25; 100 TABLET ORAL at 05:00

## 2021-01-14 RX ADMIN — ROPINIROLE 0.5 MILLIGRAM(S): 8 TABLET, FILM COATED, EXTENDED RELEASE ORAL at 15:02

## 2021-01-14 RX ADMIN — ROPINIROLE 0.5 MILLIGRAM(S): 8 TABLET, FILM COATED, EXTENDED RELEASE ORAL at 21:47

## 2021-01-14 RX ADMIN — FINASTERIDE 5 MILLIGRAM(S): 5 TABLET, FILM COATED ORAL at 15:03

## 2021-01-14 RX ADMIN — ATORVASTATIN CALCIUM 80 MILLIGRAM(S): 80 TABLET, FILM COATED ORAL at 21:47

## 2021-01-14 RX ADMIN — TAMSULOSIN HYDROCHLORIDE 0.4 MILLIGRAM(S): 0.4 CAPSULE ORAL at 21:47

## 2021-01-14 RX ADMIN — SERTRALINE 25 MILLIGRAM(S): 25 TABLET, FILM COATED ORAL at 15:03

## 2021-01-14 RX ADMIN — CARBIDOPA AND LEVODOPA 1 TABLET(S): 25; 100 TABLET ORAL at 15:03

## 2021-01-14 RX ADMIN — CARBIDOPA AND LEVODOPA 1 TABLET(S): 25; 100 TABLET ORAL at 21:47

## 2021-01-14 RX ADMIN — PANTOPRAZOLE SODIUM 40 MILLIGRAM(S): 20 TABLET, DELAYED RELEASE ORAL at 17:41

## 2021-01-14 RX ADMIN — Medication 1 APPLICATION(S): at 15:02

## 2021-01-14 RX ADMIN — ROPINIROLE 0.5 MILLIGRAM(S): 8 TABLET, FILM COATED, EXTENDED RELEASE ORAL at 05:00

## 2021-01-14 NOTE — PROGRESS NOTE ADULT - PROBLEM SELECTOR PROBLEM 2
Urinary retention due to benign prostatic hyperplasia

## 2021-01-14 NOTE — PROCEDURE NOTE - ADDITIONAL PROCEDURE DETAILS
Called for difficult mccoy placement. Pt with coronal hypospadias & urinary retention   12f silicon mccoy placed easily and aseptically. Yellow urine drained  may fu with his urologist Dr. Lafleur [phone]
called for difficult mccoy in patient with retention. ED staff reports patient with hypospadius and tight meatus. unable to get a "5f".     12f placed using sterile technique. pt tolerated well. clear yellow urine drained.

## 2021-01-14 NOTE — PROGRESS NOTE ADULT - PROBLEM SELECTOR PLAN 6
hold chem DVT ppx for GIB

## 2021-01-14 NOTE — PROGRESS NOTE ADULT - SUBJECTIVE AND OBJECTIVE BOX
Patient is a 86y old  Male who presents with a chief complaint of anemia (14 Jan 2021 21:23)    pt. seen and examined, doing fair     INTERVAL HPI/OVERNIGHT EVENTS:  T(C): 36.6 (01-14-21 @ 20:53), Max: 36.8 (01-14-21 @ 13:14)  HR: 64 (01-14-21 @ 20:53) (62 - 64)  BP: 132/69 (01-14-21 @ 20:53) (132/69 - 144/67)  RR: 18 (01-14-21 @ 20:53) (18 - 18)  SpO2: 97% (01-14-21 @ 20:53) (97% - 98%)  Wt(kg): --  I&O's Summary    13 Jan 2021 07:01  -  14 Jan 2021 07:00  --------------------------------------------------------  IN: 120 mL / OUT: 0 mL / NET: 120 mL    14 Jan 2021 07:01  -  14 Jan 2021 22:19  --------------------------------------------------------  IN: 790 mL / OUT: 1200 mL / NET: -410 mL        PAST MEDICAL & SURGICAL HISTORY:  CAD (coronary artery disease)    DM (diabetes mellitus)    HLD (hyperlipidemia)    HTN (hypertension)    Parkinson disease    Benign prostatic hyperplasia    Syncope    Urinary frequency    Osteoporosis    Hypertension    CAD (coronary artery disease)    Dyslipidemia    Diabetes mellitus    History of knee replacement procedure of left knee  2017    S/P CABG (coronary artery bypass graft)  2015    History of total left knee replacement  2014    S/P coronary artery stent placement in 2003        SOCIAL HISTORY  Alcohol:  Tobacco:  Illicit substance use:    FAMILY HISTORY:    REVIEW OF SYSTEMS:  CONSTITUTIONAL: No fever, weight loss, or fatigue  EYES: No eye pain, visual disturbances, or discharge  ENMT:  No difficulty hearing, tinnitus, vertigo; No sinus or throat pain  NECK: No pain or stiffness  RESPIRATORY: No cough, wheezing, chills or hemoptysis; No shortness of breath  CARDIOVASCULAR: No chest pain, palpitations, dizziness, or leg swelling  GASTROINTESTINAL: No abdominal or epigastric pain. No nausea, vomiting, or hematemesis; No diarrhea or constipation. No melena or hematochezia.  GENITOURINARY: No dysuria, frequency, hematuria, or incontinence  NEUROLOGICAL: No headaches, memory loss, loss of strength, numbness, or tremors  SKIN: No itching, burning, rashes, or lesions   LYMPH NODES: No enlarged glands  ENDOCRINE: No heat or cold intolerance; No hair loss  MUSCULOSKELETAL: No joint pain or swelling; No muscle, back, or extremity pain  PSYCHIATRIC: No depression, anxiety, mood swings, or difficulty sleeping  HEME/LYMPH: No easy bruising, or bleeding gums  ALLERY AND IMMUNOLOGIC: No hives or eczema    RADIOLOGY & ADDITIONAL TESTS:    Imaging Personally Reviewed:  [ ] YES  [ ] NO    Consultant(s) Notes Reviewed:  [ ] YES  [ ] NO    PHYSICAL EXAM:  GENERAL: NAD, well-groomed, well-developed  HEAD:  Atraumatic, Normocephalic  EYES: EOMI, PERRLA, conjunctiva and sclera clear  ENMT: No tonsillar erythema, exudates, or enlargement; Moist mucous membranes, Good dentition, No lesions  NECK: Supple, No JVD, Normal thyroid  NERVOUS SYSTEM:  Alert & Oriented X3, Good concentration; Motor Strength 5/5 B/L upper and lower extremities; DTRs 2+ intact and symmetric  CHEST/LUNG: Clear to percussion bilaterally; No rales, rhonchi, wheezing, or rubs  HEART: Regular rate and rhythm; No murmurs, rubs, or gallops  ABDOMEN: Soft, Nontender, Nondistended; Bowel sounds present  EXTREMITIES:  2+ Peripheral Pulses, No clubbing, cyanosis, or edema  LYMPH: No lymphadenopathy noted  SKIN: No rashes or lesions    LABS:                        9.2    6.84  )-----------( 270      ( 14 Jan 2021 08:58 )             30.1     01-13    138  |  106  |  26<H>  ----------------------------<  124<H>  4.0   |  26  |  1.05    Ca    8.5      13 Jan 2021 09:18  Mg     1.6     01-13          CAPILLARY BLOOD GLUCOSE      POCT Blood Glucose.: 148 mg/dL (14 Jan 2021 21:31)  POCT Blood Glucose.: 139 mg/dL (14 Jan 2021 17:01)  POCT Blood Glucose.: 141 mg/dL (14 Jan 2021 12:11)  POCT Blood Glucose.: 133 mg/dL (14 Jan 2021 07:58)            MEDICATIONS  (STANDING):  aMIOdarone    Tablet 200 milliGRAM(s) Oral daily  atorvastatin 80 milliGRAM(s) Oral at bedtime  budesonide 160 MICROgram(s)/formoterol 4.5 MICROgram(s) Inhaler 2 Puff(s) Inhalation two times a day  cadexomer iodine 0.9% Gel 1 Application(s) Topical daily  carbidopa/levodopa  25/100 1 Tablet(s) Oral three times a day  dextrose 40% Gel 15 Gram(s) Oral once  dextrose 5%. 1000 milliLiter(s) (50 mL/Hr) IV Continuous <Continuous>  dextrose 5%. 1000 milliLiter(s) (100 mL/Hr) IV Continuous <Continuous>  dextrose 50% Injectable 25 Gram(s) IV Push once  dextrose 50% Injectable 12.5 Gram(s) IV Push once  dextrose 50% Injectable 25 Gram(s) IV Push once  finasteride 5 milliGRAM(s) Oral daily  glucagon  Injectable 1 milliGRAM(s) IntraMuscular once  insulin lispro (ADMELOG) corrective regimen sliding scale   SubCutaneous three times a day before meals  insulin lispro (ADMELOG) corrective regimen sliding scale   SubCutaneous at bedtime  pantoprazole  Injectable 40 milliGRAM(s) IV Push two times a day  rOPINIRole 0.5 milliGRAM(s) Oral three times a day  sertraline 25 milliGRAM(s) Oral daily  tamsulosin 0.4 milliGRAM(s) Oral at bedtime    MEDICATIONS  (PRN):  ALBUTerol    90 MICROgram(s) HFA Inhaler 1 Puff(s) Inhalation four times a day PRN Shortness of Breath and/or Wheezing      Care Discussed with Consultants/Other Providers [ ] YES  [ ] NO

## 2021-01-14 NOTE — PROGRESS NOTE ADULT - SUBJECTIVE AND OBJECTIVE BOX
ISIS [id number]  86yMale  T(C): 36.7 (01-14-21 @ 04:18), Max: 36.7 (01-13-21 @ 20:28)  HR: 64 (01-14-21 @ 04:18) (64 - 71)  BP: 136/59 (01-14-21 @ 04:18) (126/64 - 145/66)  RR: 18 (01-14-21 @ 04:18) (16 - 18)  SpO2: 97% (01-14-21 @ 04:18) (97% - 99%)  Wt(kg): --  01-13 @ 07:01  -  01-14 @ 07:00  --------------------------------------------------------  IN: 120 mL / OUT: 0 mL / NET: 120 mL      normal cephalic atraumatic  s1s2   clear to ascultation bilaterally  soft, non tender, non distended no guarding or rebound  no clubbing cyanosis or edema

## 2021-01-14 NOTE — PROGRESS NOTE ADULT - PROBLEM SELECTOR PLAN 1
s/p PRBC 3 units   -H/H better  s/p EGD : shows gastric and uodenal ulcer   -protonix bid   -adv diet
s/p PRBC 3 units   -H/H better  -GI consult Dr. Purcell called
s/p PRBC 3 units   -H/H better  s/p EGD : shows gastric and duodenal ulcer   -protonix bid   -adv diet
s/p PRBC 3 units   -H/H better  s/p EGD : shows gastric and uodenal ulcer   -protonix bid   -adv diet

## 2021-01-14 NOTE — PROGRESS NOTE ADULT - ASSESSMENT
· Assessment	  87 y/o male pt with heel deep tissue injury--improving  pt seen and evaluated   - deep tissue injury with no signs of infection  - rec daily dressing changes with iodosorb and allevyn pad to the area  - z flows in bed at all times  - will monitor during this admission

## 2021-01-14 NOTE — PROGRESS NOTE ADULT - PROBLEM SELECTOR PLAN 2
Anesthesia Pre Eval Note    Anesthesia ROS/Med Hx        Anesthetic Complication History:  Patient does not have a history of anesthetic complications      Pulmonary Review:  Patient does not have a pulmonary history      Neuro/Psych Review:  Patient does not have a neuro/psych history       Cardiovascular Review:  Patient does not have a cardiovascular history       GI/HEPATIC/RENAL Review:    Positive for GERD    End/Other Review:    Positive for obesity class I - 30.00 - 34.99      Relevant Problems   No relevant active problems       Physical Exam     Airway   Mallampati: II  TM Distance: >3 FB  Neck: Able to place in sniff position    Cardiovascular    Cardio Rhythm: Regular  Cardio Rate: Normal    Head Assessment  Head assessment: Normocephalic and Atraumatic    General Assessment  General Assessment: Alert and oriented and No acute distress    Dental Exam    Dental Note: Denies loose teeth    Pulmonary Exam    Pulmonary Note: Respirations are non-labored; symmetric chest wall expansion          Anesthesia Plan    ASA Status: 2    Anesthesia Type: General    Induction: Intravenous  Preferred Airway Type: ETT  Maintenance: Inhalational      Risks Discussed: Nausea, Vomiting, Dental Injury, Sore Throat, Hypotension and Allergic Reaction    Post-op Pain Management: Per Surgeon      Checklist  Reviewed: NPO Status, Allergies, Medications, Problem list and Past Med History    Informed Consent  The proposed anesthetic plan, including its risks and benefits, have been discussed with the Patient  - along with the risks and benefits of alternatives.  Questions were encouraged and answered and the patient and/or representative understands and agrees to proceed.     Blood Products: Not Anticipated    
s/p mccoy   600 cc urine came out initally  -on flomax/ proscar   -house urology consult in am

## 2021-01-14 NOTE — PROGRESS NOTE ADULT - PROBLEM SELECTOR PLAN 3
off asa   -stable  denies any CP

## 2021-01-14 NOTE — PROGRESS NOTE ADULT - SUBJECTIVE AND OBJECTIVE BOX
Podiatry pager #: [phone]/ [phone]    Patient is a 86y old  Male who presents with a chief complaint of anemia (14 Jan 2021 10:06) podiatry seen for f/u left heel wound       INTERVAL HPI/OVERNIGHT EVENTS:  Patient seen and evaluated at bedside.  Pt is resting comfortable in NAD. Denies N/V/F/C.  Pain rated at X/10    Allergies    No Known Allergies    Intolerances        Vital Signs Last 24 Hrs  T(C): 36.6 (14 Jan 2021 20:53), Max: 36.8 (14 Jan 2021 13:14)  T(F): 97.8 (14 Jan 2021 20:53), Max: 98.3 (14 Jan 2021 13:14)  HR: 64 (14 Jan 2021 20:53) (62 - 64)  BP: 132/69 (14 Jan 2021 20:53) (132/69 - 144/67)  BP(mean): --  RR: 18 (14 Jan 2021 20:53) (18 - 18)  SpO2: 97% (14 Jan 2021 20:53) (97% - 98%)    ALBUTerol    90 MICROgram(s) HFA Inhaler 1 Puff(s) Inhalation four times a day PRN  aMIOdarone    Tablet 200 milliGRAM(s) Oral daily  atorvastatin 80 milliGRAM(s) Oral at bedtime  budesonide 160 MICROgram(s)/formoterol 4.5 MICROgram(s) Inhaler 2 Puff(s) Inhalation two times a day  cadexomer iodine 0.9% Gel 1 Application(s) Topical daily  carbidopa/levodopa  25/100 1 Tablet(s) Oral three times a day  dextrose 40% Gel 15 Gram(s) Oral once  dextrose 5%. 1000 milliLiter(s) IV Continuous <Continuous>  dextrose 5%. 1000 milliLiter(s) IV Continuous <Continuous>  dextrose 50% Injectable 25 Gram(s) IV Push once  dextrose 50% Injectable 12.5 Gram(s) IV Push once  dextrose 50% Injectable 25 Gram(s) IV Push once  finasteride 5 milliGRAM(s) Oral daily  glucagon  Injectable 1 milliGRAM(s) IntraMuscular once  insulin lispro (ADMELOG) corrective regimen sliding scale   SubCutaneous three times a day before meals  insulin lispro (ADMELOG) corrective regimen sliding scale   SubCutaneous at bedtime  pantoprazole  Injectable 40 milliGRAM(s) IV Push two times a day  rOPINIRole 0.5 milliGRAM(s) Oral three times a day  sertraline 25 milliGRAM(s) Oral daily  tamsulosin 0.4 milliGRAM(s) Oral at bedtime      LABS:                        9.2    6.84  )-----------( 270      ( 14 Jan 2021 08:58 )             30.1     01-13    138  |  106  |  26<H>  ----------------------------<  124<H>  4.0   |  26  |  1.05    Ca    8.5      13 Jan 2021 09:18  Mg     1.6     01-13          CAPILLARY BLOOD GLUCOSE      POCT Blood Glucose.: 139 mg/dL (14 Jan 2021 17:01)  POCT Blood Glucose.: 141 mg/dL (14 Jan 2021 12:11)  POCT Blood Glucose.: 133 mg/dL (14 Jan 2021 07:58)  POCT Blood Glucose.: 153 mg/dL (13 Jan 2021 22:01)      Lower Extremity Physical Exam:  Vasular: DP/PT 1/4, B/L, CFT <3 seconds B/L, Temperature gradient WNL, B/L.   Neuro: Epicritic sensation diminished to the level of foot B/L.  Musculoskeletal/Ortho: bilat lower extremity edema noted  Skin: left heel deep tissue injury with no signs of infection, no crepitus, no malodor    RADIOLOGY & ADDITIONAL TESTS:

## 2021-01-14 NOTE — PROGRESS NOTE ADULT - PROBLEM SELECTOR PLAN 4
- insulin scale, monitor FS

## 2021-01-14 NOTE — CHART NOTE - NSCHARTNOTEFT_GEN_A_CORE
Endorsed by primary team that patient had mccoy DC'd on 1/12 @ 2230 and was subsequently straight cath'd on the morning of 1/13 in which 900cc of urine was drained.  Post straight cath patient failed trial void.  An attempt x 2 made by nursing staff during the day to catheterize patient.  Patient was evaluated at bedside and bladder scanned was performed revealing over 700cc of urine.  Patient denies bladder fullness, distention, abdominal or flank pain, nausea/vomitting.             Attempt to place a 16F mccoy was unsuccessful.   Call was placed to Urology PA, who attempted catheterization with a 12F mccoy.  Unable to place 12F catheter. Patient was placed on bedside commode by nursing staff to see if he could void on his own while sitting down. He was unable to void. Patient now refusing additional attempts to have mccoy catheter placed. Urology made aware Educated on importance of removal of urine to avoid over distended bladder. UTI.       Plan:  > Will reassess patient later in AM as he currently is asymptomatic and re-page urology if he agrees to mccoy placement  >Will premedicate with IV analgesic if patient agrees to catheter placement  >F/U labs in AM  > Continue to monitor for s/s of urinary retention/bladder discomfort   >Will endorse to primary team in AM    Andrey Acuña NP  [facility] Prov[facility]  Spectra # [phone]
Wound Care Team Note:      Request for wound care consult received for foot wound and deferred to wound care team podiatrist, Dr. Huerta. Will defer to Dr. Huerta for management.    Dorina Pat NP-C, [facility] [id number]
pt with h/o hypospadias, 12F mccoy placed for retention by urology and mccoy was removed for TOV. NP called stating unable to replace mccoy, 12F was attempted at bedside but difficult to visualize meatus and patient was not tolerating procedure and asked to stop. Pt with PVR of 700. Had a lengthy conversation with patient to allow another attempt to place catheter along with NP, but pt adamantly refusing. Pt comfortable at this time, abdomen nontender, +mild distension. Asked NP to monitor patient overnight and if he is agreeable for catheter insertion, please page urology at [phone].

## 2021-01-15 ENCOUNTER — TRANSCRIPTION ENCOUNTER (OUTPATIENT)
Age: 86
End: 2021-01-15

## 2021-01-15 VITALS
TEMPERATURE: 98 F | SYSTOLIC BLOOD PRESSURE: 127 MMHG | OXYGEN SATURATION: 99 % | RESPIRATION RATE: 18 BRPM | HEART RATE: 67 BPM

## 2021-01-15 LAB
GLUCOSE BLDC GLUCOMTR-MCNC: 126 MG/DL — HIGH (ref 70–99)
GLUCOSE BLDC GLUCOMTR-MCNC: 167 MG/DL — HIGH (ref 70–99)
HCT VFR BLD CALC: 27 % — LOW (ref 39–50)
HGB BLD-MCNC: 8.6 G/DL — LOW (ref 13–17)
MCHC RBC-ENTMCNC: 29.3 PG — SIGNIFICANT CHANGE UP (ref 27–34)
MCHC RBC-ENTMCNC: 31.9 GM/DL — LOW (ref 32–36)
MCV RBC AUTO: 91.8 FL — SIGNIFICANT CHANGE UP (ref 80–100)
NRBC # BLD: 0 /100 WBCS — SIGNIFICANT CHANGE UP (ref 0–0)
PLATELET # BLD AUTO: 260 K/UL — SIGNIFICANT CHANGE UP (ref 150–400)
RBC # BLD: 2.94 M/UL — LOW (ref 4.2–5.8)
RBC # FLD: 14.3 % — SIGNIFICANT CHANGE UP (ref 10.3–14.5)
WBC # BLD: 8.72 K/UL — SIGNIFICANT CHANGE UP (ref 3.8–10.5)
WBC # FLD AUTO: 8.72 K/UL — SIGNIFICANT CHANGE UP (ref 3.8–10.5)

## 2021-01-15 PROCEDURE — 82803 BLOOD GASES ANY COMBINATION: CPT

## 2021-01-15 PROCEDURE — 85730 THROMBOPLASTIN TIME PARTIAL: CPT

## 2021-01-15 PROCEDURE — P9016: CPT

## 2021-01-15 PROCEDURE — U0003: CPT

## 2021-01-15 PROCEDURE — 84100 ASSAY OF PHOSPHORUS: CPT

## 2021-01-15 PROCEDURE — 97162 PT EVAL MOD COMPLEX 30 MIN: CPT

## 2021-01-15 PROCEDURE — 85014 HEMATOCRIT: CPT

## 2021-01-15 PROCEDURE — 99285 EMERGENCY DEPT VISIT HI MDM: CPT | Mod: 25

## 2021-01-15 PROCEDURE — 88305 TISSUE EXAM BY PATHOLOGIST: CPT

## 2021-01-15 PROCEDURE — 84132 ASSAY OF SERUM POTASSIUM: CPT

## 2021-01-15 PROCEDURE — 86850 RBC ANTIBODY SCREEN: CPT

## 2021-01-15 PROCEDURE — 87086 URINE CULTURE/COLONY COUNT: CPT

## 2021-01-15 PROCEDURE — 36430 TRANSFUSION BLD/BLD COMPNT: CPT | Mod: XU

## 2021-01-15 PROCEDURE — 82947 ASSAY GLUCOSE BLOOD QUANT: CPT

## 2021-01-15 PROCEDURE — 82962 GLUCOSE BLOOD TEST: CPT

## 2021-01-15 PROCEDURE — 80053 COMPREHEN METABOLIC PANEL: CPT

## 2021-01-15 PROCEDURE — 71045 X-RAY EXAM CHEST 1 VIEW: CPT

## 2021-01-15 PROCEDURE — 83735 ASSAY OF MAGNESIUM: CPT

## 2021-01-15 PROCEDURE — 83880 ASSAY OF NATRIURETIC PEPTIDE: CPT

## 2021-01-15 PROCEDURE — 85610 PROTHROMBIN TIME: CPT

## 2021-01-15 PROCEDURE — 86900 BLOOD TYPING SEROLOGIC ABO: CPT

## 2021-01-15 PROCEDURE — 80048 BASIC METABOLIC PNL TOTAL CA: CPT

## 2021-01-15 PROCEDURE — 83605 ASSAY OF LACTIC ACID: CPT

## 2021-01-15 PROCEDURE — 88312 SPECIAL STAINS GROUP 1: CPT

## 2021-01-15 PROCEDURE — 97116 GAIT TRAINING THERAPY: CPT

## 2021-01-15 PROCEDURE — 82435 ASSAY OF BLOOD CHLORIDE: CPT

## 2021-01-15 PROCEDURE — 85018 HEMOGLOBIN: CPT

## 2021-01-15 PROCEDURE — 86923 COMPATIBILITY TEST ELECTRIC: CPT

## 2021-01-15 PROCEDURE — 97530 THERAPEUTIC ACTIVITIES: CPT

## 2021-01-15 PROCEDURE — 82330 ASSAY OF CALCIUM: CPT

## 2021-01-15 PROCEDURE — 85027 COMPLETE CBC AUTOMATED: CPT

## 2021-01-15 PROCEDURE — 51702 INSERT TEMP BLADDER CATH: CPT

## 2021-01-15 PROCEDURE — 85025 COMPLETE CBC W/AUTO DIFF WBC: CPT

## 2021-01-15 PROCEDURE — 84484 ASSAY OF TROPONIN QUANT: CPT

## 2021-01-15 PROCEDURE — 86901 BLOOD TYPING SEROLOGIC RH(D): CPT

## 2021-01-15 PROCEDURE — U0005: CPT

## 2021-01-15 PROCEDURE — 84295 ASSAY OF SERUM SODIUM: CPT

## 2021-01-15 PROCEDURE — 94640 AIRWAY INHALATION TREATMENT: CPT

## 2021-01-15 PROCEDURE — 93005 ELECTROCARDIOGRAM TRACING: CPT | Mod: XU

## 2021-01-15 PROCEDURE — 81001 URINALYSIS AUTO W/SCOPE: CPT

## 2021-01-15 RX ORDER — CADEXOMER IODINE 0.9 %
1 PADS, MEDICATED (EA) TOPICAL
Qty: 0 | Refills: 0 | DISCHARGE
Start: 2021-01-15

## 2021-01-15 RX ADMIN — Medication 1: at 12:53

## 2021-01-15 RX ADMIN — Medication 1 APPLICATION(S): at 10:47

## 2021-01-15 RX ADMIN — SERTRALINE 25 MILLIGRAM(S): 25 TABLET, FILM COATED ORAL at 10:47

## 2021-01-15 RX ADMIN — ROPINIROLE 0.5 MILLIGRAM(S): 8 TABLET, FILM COATED, EXTENDED RELEASE ORAL at 12:59

## 2021-01-15 RX ADMIN — PANTOPRAZOLE SODIUM 40 MILLIGRAM(S): 20 TABLET, DELAYED RELEASE ORAL at 05:18

## 2021-01-15 RX ADMIN — CARBIDOPA AND LEVODOPA 1 TABLET(S): 25; 100 TABLET ORAL at 05:18

## 2021-01-15 RX ADMIN — ROPINIROLE 0.5 MILLIGRAM(S): 8 TABLET, FILM COATED, EXTENDED RELEASE ORAL at 05:18

## 2021-01-15 RX ADMIN — CARBIDOPA AND LEVODOPA 1 TABLET(S): 25; 100 TABLET ORAL at 13:00

## 2021-01-15 RX ADMIN — BUDESONIDE AND FORMOTEROL FUMARATE DIHYDRATE 2 PUFF(S): 160; 4.5 AEROSOL RESPIRATORY (INHALATION) at 05:16

## 2021-01-15 RX ADMIN — AMIODARONE HYDROCHLORIDE 200 MILLIGRAM(S): 400 TABLET ORAL at 05:18

## 2021-01-15 RX ADMIN — FINASTERIDE 5 MILLIGRAM(S): 5 TABLET, FILM COATED ORAL at 10:48

## 2021-01-15 NOTE — DISCHARGE NOTE PROVIDER - NSDCCPCAREPLAN_GEN_ALL_CORE_FT
PRINCIPAL DISCHARGE DIAGNOSIS  Diagnosis: Anemia  Assessment and Plan of Treatment: You were seen and evaluated by GI. Received 3 units of blood. EGD shows gastric and duodenal ulcer. Symptoms to report, bleeding, palpitations, fatigue, pale skin, cold skin, dizziness. Take medications as ordered by PCP        SECONDARY DISCHARGE DIAGNOSES  Diagnosis: Urinary retention due to benign prostatic hyperplasia  Assessment and Plan of Treatment: Urinary retention is a condition that develops if your bladder does not empty when you urinate.  You will be discharged with mccoy. Please follow up with your Urologist for further management  Contact your primary healthcare provider if:  You have blood in your urine.  You have questions or concerns about your condition or care.  Seek care immediately or call 911 if:  You have a fever, chills, or feel weak and achy.  You cannot urinate, or if you have a catheter, no urine is filling the bag.  You have a blocked catheter or your catheter falls out.  You have lower abdominal pain or back pain that does not go away.  You have redness, pain, blood, or drainage where the catheter enters.  Your symptoms are getting worse or coming back.  Your urine becomes very cloudy and smells bad.

## 2021-01-15 NOTE — DISCHARGE NOTE PROVIDER - PROVIDER TOKENS
FREE:[LAST:[Dr. Lafleur],PHONE:[phone]],FAX:[(   )    -]],PROVIDER:[TOKEN:[[id number]],FOLLOWUP:[1 week]]

## 2021-01-15 NOTE — DISCHARGE NOTE PROVIDER - NSDCFUADDAPPT_GEN_ALL_CORE_FT
You will be discharged to rehab. Please continue to work with physical therapy to re-gain strength. Please follow up with your PCP, Dr. Tapia in a week after being discharged from rehab for further management. Please also follow up with your Urologist Dr. Lafleur for further urinary retention management

## 2021-01-15 NOTE — DISCHARGE NOTE PROVIDER - NSDCMRMEDTOKEN_GEN_ALL_CORE_FT
albuterol 90 mcg/inh inhalation aerosol with adapter: 2 puff(s) inhaled 4 times a day, As Needed  amiodarone 200 mg oral tablet: 1 tab(s) orally once a day  carbidopa-levodopa 25 mg-100 mg oral tablet: 1 tab(s) orally 3 times a day  docusate sodium 100 mg oral capsule: 1 cap(s) orally 2 times a day, As Needed  finasteride 5 mg oral tablet: 1 tab(s) orally once a day  Januvia 100 mg oral tablet: 1 tab(s) orally once a day  metFORMIN 500 mg oral tablet: 1 tab(s) orally 3 times a day  metoprolol tartrate 50 mg oral tablet: 1 tab(s) orally 2 times a day  multivitamin: 1 tab(s) orally once a day  oxybutynin 5 mg oral tablet: 1 tab(s) orally 2 times a day  pantoprazole 40 mg oral delayed release tablet: 1 tab(s) orally once a day (before a meal)  rOPINIRole 0.5 mg oral tablet: 1 tab(s) orally 3 times a day  rosuvastatin 20 mg oral tablet: 1 tab(s) orally once a day  sertraline 25 mg oral tablet: 1 tab(s) orally once a day  Symbicort 160 mcg-4.5 mcg/inh inhalation aerosol: 2 puff(s) inhaled 2 times a day  tamsulosin 0.4 mg oral capsule: 1 cap(s) orally once a day

## 2021-01-15 NOTE — DISCHARGE NOTE NURSING/CASE MANAGEMENT/SOCIAL WORK - PATIENT PORTAL LINK FT
You can access the FollowMyHealth Patient Portal offered by [facility] by registering at the following website: [email or web address]. By joining [facility]’s FollowMyHealth portal, you will also be able to view your health information using other applications (apps) compatible with our system.

## 2021-01-15 NOTE — DISCHARGE NOTE PROVIDER - HOSPITAL COURSE
86 year old male with PMH Parkinsons disease, DMII, HTN, CAD s/p CABG, BPH who presented to the ED with generalized weakness. With symptomatic anemia likely from UGIB given melena at home. Seen and evaluated by GI. s/p total of 3 units of PRBC with improved and stable H/H. s/p EGD with gastric and duodenal ulcer. Hospital course complicated by urinary retention 2/2 BPH. As per Uro, mccoy inserted, to be DC with mccoy. Patient remains stable for DC with close follow up with PCP, and Urology as per Dr. Quiroz

## 2021-01-15 NOTE — DISCHARGE NOTE PROVIDER - DETAILS OF MALNUTRITION DIAGNOSIS/DIAGNOSES
This patient has been assessed with a concern for Malnutrition and was treated during this hospitalization for the following Nutrition diagnosis/diagnoses:     -  01/12/2021: Moderate protein-calorie malnutrition   -  01/12/2021: Underweight (BMI < 19)

## 2021-01-15 NOTE — PROGRESS NOTE ADULT - SUBJECTIVE AND OBJECTIVE BOX
ISIS [id number]  86yMale  T(C): 36.5 (01-15-21 @ 04:42), Max: 36.8 (01-14-21 @ 13:14)  HR: 65 (01-15-21 @ 04:42) (62 - 65)  BP: 154/61 (01-15-21 @ 04:42) (132/69 - 154/61)  RR: 18 (01-15-21 @ 04:42) (18 - 18)  SpO2: 97% (01-15-21 @ 04:42) (97% - 98%)  Wt(kg): --  01-13 @ 07:01  -  01-14 @ 07:00  --------------------------------------------------------  IN: 120 mL / OUT: 0 mL / NET: 120 mL    01-14 @ 07:01  -  01-15 @ 06:19  --------------------------------------------------------  IN: 790 mL / OUT: 1200 mL / NET: -410 mL      normal cephalic atraumatic  s1s2   clear to ascultation bilaterally  soft, non tender, non distended no guarding or rebound  no clubbing cyanosis or edema

## 2021-01-15 NOTE — PROGRESS NOTE ADULT - PROVIDER SPECIALTY LIST ADULT
Gastroenterology
Internal Medicine
Gastroenterology
Podiatry
Internal Medicine

## 2021-01-15 NOTE — DISCHARGE NOTE PROVIDER - CARE PROVIDER_API CALL
Dr. Lafleur,   Phone: [phone]  Fax: (   )    -  Follow Up Time:     YAQUELIN LINK  [id number], [facility] [address]  [place] [address]  Phone: ()-  Fax: ()-  Follow Up Time: 1 week

## 2021-01-20 PROCEDURE — 83605 ASSAY OF LACTIC ACID: CPT

## 2021-01-20 PROCEDURE — 74177 CT ABD & PELVIS W/CONTRAST: CPT

## 2021-01-20 PROCEDURE — 84132 ASSAY OF SERUM POTASSIUM: CPT

## 2021-01-20 PROCEDURE — 85027 COMPLETE CBC AUTOMATED: CPT

## 2021-01-20 PROCEDURE — 82947 ASSAY GLUCOSE BLOOD QUANT: CPT

## 2021-01-20 PROCEDURE — 80048 BASIC METABOLIC PNL TOTAL CA: CPT

## 2021-01-20 PROCEDURE — 86850 RBC ANTIBODY SCREEN: CPT

## 2021-01-20 PROCEDURE — 85610 PROTHROMBIN TIME: CPT

## 2021-01-20 PROCEDURE — 84295 ASSAY OF SERUM SODIUM: CPT

## 2021-01-20 PROCEDURE — 83735 ASSAY OF MAGNESIUM: CPT

## 2021-01-20 PROCEDURE — 80053 COMPREHEN METABOLIC PANEL: CPT

## 2021-01-20 PROCEDURE — 85014 HEMATOCRIT: CPT

## 2021-01-20 PROCEDURE — 97110 THERAPEUTIC EXERCISES: CPT

## 2021-01-20 PROCEDURE — 71250 CT THORAX DX C-: CPT

## 2021-01-20 PROCEDURE — 82435 ASSAY OF BLOOD CHLORIDE: CPT

## 2021-01-20 PROCEDURE — 85730 THROMBOPLASTIN TIME PARTIAL: CPT

## 2021-01-20 PROCEDURE — 81003 URINALYSIS AUTO W/O SCOPE: CPT

## 2021-01-20 PROCEDURE — 97162 PT EVAL MOD COMPLEX 30 MIN: CPT

## 2021-01-20 PROCEDURE — 0225U NFCT DS DNA&RNA 21 SARSCOV2: CPT

## 2021-01-20 PROCEDURE — 86900 BLOOD TYPING SEROLOGIC ABO: CPT

## 2021-01-20 PROCEDURE — 82803 BLOOD GASES ANY COMBINATION: CPT

## 2021-01-20 PROCEDURE — 99285 EMERGENCY DEPT VISIT HI MDM: CPT | Mod: 25

## 2021-01-20 PROCEDURE — 87086 URINE CULTURE/COLONY COUNT: CPT

## 2021-01-20 PROCEDURE — 87449 NOS EACH ORGANISM AG IA: CPT

## 2021-01-20 PROCEDURE — 84100 ASSAY OF PHOSPHORUS: CPT

## 2021-01-20 PROCEDURE — 87040 BLOOD CULTURE FOR BACTERIA: CPT

## 2021-01-20 PROCEDURE — 71045 X-RAY EXAM CHEST 1 VIEW: CPT

## 2021-01-20 PROCEDURE — 82272 OCCULT BLD FECES 1-3 TESTS: CPT

## 2021-01-20 PROCEDURE — 96375 TX/PRO/DX INJ NEW DRUG ADDON: CPT

## 2021-01-20 PROCEDURE — 85018 HEMOGLOBIN: CPT

## 2021-01-20 PROCEDURE — 82330 ASSAY OF CALCIUM: CPT

## 2021-01-20 PROCEDURE — 85025 COMPLETE CBC W/AUTO DIFF WBC: CPT

## 2021-01-20 PROCEDURE — 86769 SARS-COV-2 COVID-19 ANTIBODY: CPT

## 2021-01-20 PROCEDURE — 96365 THER/PROPH/DIAG IV INF INIT: CPT | Mod: XU

## 2021-01-20 PROCEDURE — 36430 TRANSFUSION BLD/BLD COMPNT: CPT

## 2021-01-20 PROCEDURE — 82962 GLUCOSE BLOOD TEST: CPT

## 2021-01-20 PROCEDURE — 83036 HEMOGLOBIN GLYCOSYLATED A1C: CPT

## 2021-01-20 PROCEDURE — 86901 BLOOD TYPING SEROLOGIC RH(D): CPT

## 2021-01-20 PROCEDURE — 93005 ELECTROCARDIOGRAM TRACING: CPT

## 2021-03-17 ENCOUNTER — EMERGENCY (EMERGENCY)
Facility: HOSPITAL | Age: 86
LOS: 1 days | Discharge: HOME | End: 2021-03-17
Attending: STUDENT IN AN ORGANIZED HEALTH CARE EDUCATION/TRAINING PROGRAM
Payer: MEDICARE

## 2021-03-17 VITALS
HEIGHT: 66 IN | RESPIRATION RATE: 18 BRPM | SYSTOLIC BLOOD PRESSURE: 137 MMHG | OXYGEN SATURATION: 97 % | TEMPERATURE: 98 F | WEIGHT: 139.99 LBS | DIASTOLIC BLOOD PRESSURE: 72 MMHG | HEART RATE: 68 BPM

## 2021-03-17 DIAGNOSIS — Z95.1 PRESENCE OF AORTOCORONARY BYPASS GRAFT: Chronic | ICD-10-CM

## 2021-03-17 DIAGNOSIS — Z96.652 PRESENCE OF LEFT ARTIFICIAL KNEE JOINT: Chronic | ICD-10-CM

## 2021-03-17 LAB
BASOPHILS # BLD AUTO: 0.05 K/UL — SIGNIFICANT CHANGE UP (ref 0–0.2)
BASOPHILS NFR BLD AUTO: 0.7 % — SIGNIFICANT CHANGE UP (ref 0–2)
EOSINOPHIL # BLD AUTO: 0.27 K/UL — SIGNIFICANT CHANGE UP (ref 0–0.5)
EOSINOPHIL NFR BLD AUTO: 3.8 % — SIGNIFICANT CHANGE UP (ref 0–6)
HCT VFR BLD CALC: 33 % — LOW (ref 39–50)
HGB BLD-MCNC: 10.1 G/DL — LOW (ref 13–17)
IMM GRANULOCYTES NFR BLD AUTO: 0.4 % — SIGNIFICANT CHANGE UP (ref 0–1.5)
LYMPHOCYTES # BLD AUTO: 0.96 K/UL — LOW (ref 1–3.3)
LYMPHOCYTES # BLD AUTO: 13.3 % — SIGNIFICANT CHANGE UP (ref 13–44)
MCHC RBC-ENTMCNC: 27.5 PG — SIGNIFICANT CHANGE UP (ref 27–34)
MCHC RBC-ENTMCNC: 30.6 GM/DL — LOW (ref 32–36)
MCV RBC AUTO: 89.9 FL — SIGNIFICANT CHANGE UP (ref 80–100)
MONOCYTES # BLD AUTO: 0.76 K/UL — SIGNIFICANT CHANGE UP (ref 0–0.9)
MONOCYTES NFR BLD AUTO: 10.6 % — SIGNIFICANT CHANGE UP (ref 2–14)
NEUTROPHILS # BLD AUTO: 5.13 K/UL — SIGNIFICANT CHANGE UP (ref 1.8–7.4)
NEUTROPHILS NFR BLD AUTO: 71.2 % — SIGNIFICANT CHANGE UP (ref 43–77)
NRBC # BLD: 0 /100 WBCS — SIGNIFICANT CHANGE UP (ref 0–0)
PLATELET # BLD AUTO: 366 K/UL — SIGNIFICANT CHANGE UP (ref 150–400)
RBC # BLD: 3.67 M/UL — LOW (ref 4.2–5.8)
RBC # FLD: 15.8 % — HIGH (ref 10.3–14.5)
WBC # BLD: 7.2 K/UL — SIGNIFICANT CHANGE UP (ref 3.8–10.5)
WBC # FLD AUTO: 7.2 K/UL — SIGNIFICANT CHANGE UP (ref 3.8–10.5)

## 2021-03-17 PROCEDURE — 51702 INSERT TEMP BLADDER CATH: CPT

## 2021-03-17 PROCEDURE — 36000 PLACE NEEDLE IN VEIN: CPT

## 2021-03-17 PROCEDURE — 99284 EMERGENCY DEPT VISIT MOD MDM: CPT

## 2021-03-17 PROCEDURE — 87186 SC STD MICRODIL/AGAR DIL: CPT

## 2021-03-17 PROCEDURE — 80053 COMPREHEN METABOLIC PANEL: CPT

## 2021-03-17 PROCEDURE — 87077 CULTURE AEROBIC IDENTIFY: CPT

## 2021-03-17 PROCEDURE — 99284 EMERGENCY DEPT VISIT MOD MDM: CPT | Mod: 25

## 2021-03-17 PROCEDURE — 81001 URINALYSIS AUTO W/SCOPE: CPT

## 2021-03-17 PROCEDURE — 85025 COMPLETE CBC W/AUTO DIFF WBC: CPT

## 2021-03-17 PROCEDURE — 87086 URINE CULTURE/COLONY COUNT: CPT

## 2021-03-17 NOTE — ED PROVIDER NOTE - PHYSICAL EXAMINATION
Gen: NAD, non-toxic, conversational  Eyes: PERRLA, EOMI   HENT: Normocephalic, atraumatic. External ears normal, no rhinorrhea, moist mucous membranes.   CV: wwp all 4 extremities   Resp: non-labored, speaking without difficulty on room air  Abd: soft, non tender, non rigid, no guarding or rebound tenderness  Back: No CVAT bilaterally, no midline ttp  Skin: dry, wwp   Neuro: AOx3, speech is fluent and appropriate  Psych: Mood "ok", affect euthymic

## 2021-03-17 NOTE — ED ADULT TRIAGE NOTE - NS ED TRIAGE AVPU SCALE
Quality 110: Preventive Care And Screening: Influenza Immunization: Influenza Immunization Administered during Influenza season
Detail Level: Detailed
Alert-The patient is alert, awake and responds to voice. The patient is oriented to time, place, and person. The triage nurse is able to obtain subjective information.

## 2021-03-17 NOTE — ED PROVIDER NOTE - CLINICAL SUMMARY MEDICAL DECISION MAKING FREE TEXT BOX
87M presenting for evaluation of urinary retention in setting of having bph + hypospadia, hx of difficult placement of mccoy and placed by urology multiple times in past, will check labs, u/a, bladder vol of 70ml on bedside scan, follow up studies, reassess, dispo.

## 2021-03-17 NOTE — ED ADULT NURSE NOTE - NSIMPLEMENTINTERV_GEN_ALL_ED
Implemented All Fall with Harm Risk Interventions:  [place] to call system. Call bell, personal items and telephone within reach. Instruct patient to call for assistance. Room bathroom lighting operational. Non-slip footwear when patient is off stretcher. Physically safe environment: no spills, clutter or unnecessary equipment. Stretcher in lowest position, wheels locked, appropriate side rails in place. Provide visual cue, wrist band, yellow gown, etc. Monitor gait and stability. Monitor for mental status changes and reorient to person, place, and time. Review medications for side effects contributing to fall risk. Reinforce activity limits and safety measures with patient and family. Provide visual clues: red socks.

## 2021-03-17 NOTE — ED PROVIDER NOTE - PMH
Benign prostatic hyperplasia    CAD (coronary artery disease)    CAD (coronary artery disease)    Diabetes mellitus    DM (diabetes mellitus)    Dyslipidemia    HLD (hyperlipidemia)    HTN (hypertension)    Hypertension    Hypospadias    Osteoporosis    Parkinson disease    Syncope    Urinary frequency

## 2021-03-17 NOTE — ED ADULT NURSE NOTE - OBJECTIVE STATEMENT
87 year old male brought in by EMS for urinary retention. Pt is Farsi speaking. As per EMS the patient had a Kapoor placed about a month ago, then for the past 12 hours he's had little to no out put. Pt stating "pain" and pointing to his suprapubic area/ lower abdominal area. Pt denies any nausea or vomiting, no change in bowel pattern. Pt also denies any fever, chills, or body aches. MD Demarco at bedside to assess bladder via ultra sound. Pt breathing is clear and unlabored. Pt denies any chest pain, pressure, or palpitations. Kapoor is in place, pt does have hypospadia, site looks clean minimal redness noted. Kapoor connected to leg bag, with yellow urin output no clots noted.

## 2021-03-17 NOTE — ED PROVIDER NOTE - NSFOLLOWUPINSTRUCTIONS_ED_ALL_ED_FT
Follow up with Dr. Lafleur your urologist for repeat evaluation in 2-3 days. Ensure your mccoy catheter continues to drain appropriately. Today the balloon of your mcocy catheter was dislodged due to being underinflated, subsequently it was re-advanced and inflated and worked well -- please inform Dr. Lafleur of this when you have your appointment. Follow up with Dr. Lafleur your urologist for repeat evaluation in 2-3 days. Ensure your mccoy catheter continues to drain appropriately. Today the balloon of your mccoy catheter was dislodged due to being underinflated, subsequently it was re-advanced and inflated and worked well -- please inform Dr. Lafleur of this when you have your appointment.    Take the antibiotic that was prescribed for 7 days to treat a urinary infection    If you have fevers, back pain, persistent vomiting or abdominal pain, trouble breathing, or any other major concern, return to the ED right away.

## 2021-03-17 NOTE — ED PROVIDER NOTE - CARE PLAN
Principal Discharge DX:	Kapoor catheter problem   Principal Discharge DX:	Kapoor catheter problem  Secondary Diagnosis:	Urinary tract infection associated with indwelling urethral catheter, initial encounter

## 2021-03-17 NOTE — ED PROVIDER NOTE - PROGRESS NOTE DETAILS
ISELA Demarco: D/w urology -- advanced mccoy and flushed / irrigated, mccoy balloon inflated to 10cc found that it had only 1cc within balloon likely etiology of issue, patient now without discomfort, feels improved, mccoy appears to be working appropriately, awaiting labs + u/a, if no abnormalities outpatient follow up with Dr. Lafleur, if infx will treat and d/c, stable at this time. Dr. Recinos's note: At the beginning of my shift, I took over care of the patient from outgoing physician with plan to follow up UA results and labs. labs unremarkable. UA shows +leuks, many wbc and many bacteria, consistent with UTI. pt without sytemic symptoms. pt safe for d/c w oral abx, urology f/u, return precautions.

## 2021-03-17 NOTE — ED PROVIDER NOTE - PATIENT PORTAL LINK FT
You can access the FollowMyHealth Patient Portal offered by [facility] by registering at the following website: [email or web address]. By joining [employer]’s FollowMyHealth portal, you will also be able to view your health information using other applications (apps) compatible with our system.

## 2021-03-17 NOTE — ED PROVIDER NOTE - OBJECTIVE STATEMENT
Hx of hypospadia chronic use of mccoy catheter presenting with no urinary output for 12+hrs, has no hx of this, does have some lower abdominal pain that feels like a pressure, denies other sx of concern including fevers or chills. No nausea or vomiting, no diarrhea or constipation.

## 2021-03-18 VITALS
RESPIRATION RATE: 18 BRPM | HEART RATE: 73 BPM | SYSTOLIC BLOOD PRESSURE: 159 MMHG | TEMPERATURE: 98 F | DIASTOLIC BLOOD PRESSURE: 80 MMHG | OXYGEN SATURATION: 96 %

## 2021-03-18 LAB
ALBUMIN SERPL ELPH-MCNC: 4 G/DL — SIGNIFICANT CHANGE UP (ref 3.3–5)
ALP SERPL-CCNC: 75 U/L — SIGNIFICANT CHANGE UP (ref 40–120)
ALT FLD-CCNC: 16 U/L — SIGNIFICANT CHANGE UP (ref 10–45)
ANION GAP SERPL CALC-SCNC: 11 MMOL/L — SIGNIFICANT CHANGE UP (ref 5–17)
APPEARANCE UR: ABNORMAL
AST SERPL-CCNC: 13 U/L — SIGNIFICANT CHANGE UP (ref 10–40)
BACTERIA # UR AUTO: ABNORMAL
BILIRUB SERPL-MCNC: 0.2 MG/DL — SIGNIFICANT CHANGE UP (ref 0.2–1.2)
BILIRUB UR-MCNC: NEGATIVE — SIGNIFICANT CHANGE UP
BUN SERPL-MCNC: 22 MG/DL — SIGNIFICANT CHANGE UP (ref 7–23)
CALCIUM SERPL-MCNC: 10.6 MG/DL — HIGH (ref 8.4–10.5)
CHLORIDE SERPL-SCNC: 103 MMOL/L — SIGNIFICANT CHANGE UP (ref 96–108)
CO2 SERPL-SCNC: 27 MMOL/L — SIGNIFICANT CHANGE UP (ref 22–31)
COLOR SPEC: YELLOW — SIGNIFICANT CHANGE UP
CREAT SERPL-MCNC: 1.02 MG/DL — SIGNIFICANT CHANGE UP (ref 0.5–1.3)
DIFF PNL FLD: ABNORMAL
EPI CELLS # UR: 1 /HPF — SIGNIFICANT CHANGE UP
GLUCOSE SERPL-MCNC: 124 MG/DL — HIGH (ref 70–99)
GLUCOSE UR QL: NEGATIVE — SIGNIFICANT CHANGE UP
HYALINE CASTS # UR AUTO: 11 /LPF — HIGH (ref 0–7)
KETONES UR-MCNC: SIGNIFICANT CHANGE UP
LEUKOCYTE ESTERASE UR-ACNC: ABNORMAL
NITRITE UR-MCNC: NEGATIVE — SIGNIFICANT CHANGE UP
PH UR: 6 — SIGNIFICANT CHANGE UP (ref 5–8)
POTASSIUM SERPL-MCNC: 4.2 MMOL/L — SIGNIFICANT CHANGE UP (ref 3.5–5.3)
POTASSIUM SERPL-SCNC: 4.2 MMOL/L — SIGNIFICANT CHANGE UP (ref 3.5–5.3)
PROT SERPL-MCNC: 7.3 G/DL — SIGNIFICANT CHANGE UP (ref 6–8.3)
PROT UR-MCNC: 100 — SIGNIFICANT CHANGE UP
RBC CASTS # UR COMP ASSIST: 53 /HPF — HIGH (ref 0–4)
SODIUM SERPL-SCNC: 141 MMOL/L — SIGNIFICANT CHANGE UP (ref 135–145)
SP GR SPEC: 1.03 — HIGH (ref 1.01–1.02)
UROBILINOGEN FLD QL: ABNORMAL
WBC UR QL: 174 /HPF — HIGH (ref 0–5)

## 2021-03-18 RX ORDER — AZTREONAM 2 G
1 VIAL (EA) INJECTION
Qty: 14 | Refills: 0
Start: 2021-03-18 | End: 2021-03-24

## 2021-03-18 NOTE — ED ADULT NURSE REASSESSMENT NOTE - NS ED NURSE REASSESS COMMENT FT1
Pt to be discharged requiring his daughter to pick him up. Pts daughter Nitesh, number listed in chart, called multiple times with no answer. Will continue to attempt to reach her. Will continue to monitor pt.

## 2021-03-18 NOTE — ED ADULT NURSE REASSESSMENT NOTE - NS ED NURSE REASSESS COMMENT FT1
Pt resting comfortably in stretcher pt no longer complaining of pain. Kapoor draining properly. Pt to be given ABX and dc

## 2021-03-18 NOTE — ED ADULT NURSE REASSESSMENT NOTE - NS ED NURSE REASSESS COMMENT FT1
Report received from NAYELY Andrews at 0653 Pt AAOx3, NAD, resp nonlabored, skin warm/dry, resting comfortably in bed. No complaints at this time. Pt denies headache, dizziness, chest pain, palpitations, SOB, abd pain, n/v/d, urinary symptoms, fevers, chills, weakness at this time. Pt awaiting DC, waiting for his family  the phone . Safety maintained with call bell within reach. Report received from NAYELY Andrews at 0653 Pt AAOx3, NAD, resp nonlabored, skin warm/dry, resting comfortably in bed. No complaints at this time. Pt denies headache, dizziness, chest pain, palpitations, SOB, abd pain, n/v/d, urinary symptoms, fevers, chills, weakness at this time. Pt awaiting DC, waiting for his family  the phone,  consult  . Safety maintained with call bell within reach.

## 2021-03-18 NOTE — ED ADULT NURSE REASSESSMENT NOTE - NS ED NURSE REASSESS COMMENT FT1
RN wheeled pt to the car and given instructions  to his daughter , pt's daughter and pt  verbalizes understanding to f/u with PCP/ urologist and return to ED for any worsening symptoms.

## 2021-03-18 NOTE — ED ADULT NURSE REASSESSMENT NOTE - NS ED NURSE REASSESS COMMENT FT1
PT family member attempted to be reached multiple times with still no answer. Pt to wait for family member contact  or social work in the morning. Report given to CAMPBELL Kapoor intact 300cc yellow urine drained.

## 2021-03-20 NOTE — ED POST DISCHARGE NOTE - DETAILS
3/21: UCx Klebsiella/Pseudomonas sensitive to 3rd ceph. advised pt to discontinue bactrim and start cefdinir. urged strict return precautions SHUN Phillips

## 2021-03-20 NOTE — ED POST DISCHARGE NOTE - ADDITIONAL DOCUMENTATION
3/21 received call from lab stating patient's final culture is >100,000 pseudomonas and klebsiella. SHUN Phillips to review. -Nakita Isidro PA-C

## 2021-03-21 LAB
-  AMIKACIN: SIGNIFICANT CHANGE UP
-  AMIKACIN: SIGNIFICANT CHANGE UP
-  AMOXICILLIN/CLAVULANIC ACID: SIGNIFICANT CHANGE UP
-  AMPICILLIN/SULBACTAM: SIGNIFICANT CHANGE UP
-  AMPICILLIN: SIGNIFICANT CHANGE UP
-  AZTREONAM: SIGNIFICANT CHANGE UP
-  AZTREONAM: SIGNIFICANT CHANGE UP
-  CEFAZOLIN: SIGNIFICANT CHANGE UP
-  CEFEPIME: SIGNIFICANT CHANGE UP
-  CEFEPIME: SIGNIFICANT CHANGE UP
-  CEFOXITIN: SIGNIFICANT CHANGE UP
-  CEFTAZIDIME: SIGNIFICANT CHANGE UP
-  CEFTRIAXONE: SIGNIFICANT CHANGE UP
-  CIPROFLOXACIN: SIGNIFICANT CHANGE UP
-  CIPROFLOXACIN: SIGNIFICANT CHANGE UP
-  ERTAPENEM: SIGNIFICANT CHANGE UP
-  GENTAMICIN: SIGNIFICANT CHANGE UP
-  GENTAMICIN: SIGNIFICANT CHANGE UP
-  IMIPENEM: SIGNIFICANT CHANGE UP
-  IMIPENEM: SIGNIFICANT CHANGE UP
-  LEVOFLOXACIN: SIGNIFICANT CHANGE UP
-  LEVOFLOXACIN: SIGNIFICANT CHANGE UP
-  MEROPENEM: SIGNIFICANT CHANGE UP
-  MEROPENEM: SIGNIFICANT CHANGE UP
-  NITROFURANTOIN: SIGNIFICANT CHANGE UP
-  PIPERACILLIN/TAZOBACTAM: SIGNIFICANT CHANGE UP
-  PIPERACILLIN/TAZOBACTAM: SIGNIFICANT CHANGE UP
-  TIGECYCLINE: SIGNIFICANT CHANGE UP
-  TOBRAMYCIN: SIGNIFICANT CHANGE UP
-  TOBRAMYCIN: SIGNIFICANT CHANGE UP
-  TRIMETHOPRIM/SULFAMETHOXAZOLE: SIGNIFICANT CHANGE UP
CULTURE RESULTS: SIGNIFICANT CHANGE UP
METHOD TYPE: SIGNIFICANT CHANGE UP
METHOD TYPE: SIGNIFICANT CHANGE UP
ORGANISM # SPEC MICROSCOPIC CNT: SIGNIFICANT CHANGE UP
SPECIMEN SOURCE: SIGNIFICANT CHANGE UP

## 2021-03-21 RX ORDER — CEFDINIR 250 MG/5ML
1 POWDER, FOR SUSPENSION ORAL
Qty: 20 | Refills: 0
Start: 2021-03-21 | End: 2021-03-30

## 2021-06-17 NOTE — ED ADULT NURSE NOTE - ISOLATION TYPE:
Subjective:     Chief Complaint   Patient presents with   • Diabetes     Follow up:  Blood sugars are avg 200.  Needs to get A1C down to have knee surgary       Trina Dill is a 49 y.o. female who is is being seen for follow-up of Type 2 diabetes mellitus.    The initial diagnosis of diabetes was made in 1999.  Diabetic complications: none.  Had TIA.  Eye exam current (within one year): it has been 2 years since the last exam.   Foot care and dental care: discussed.  She denies any numbness or pain in the feet, no urinary sx or yeast infections.     Current diabetic medications include metformin and Basaglar. Currently taking 55 units BID.    Bydureon is not covered by insurance.     Monitoring  - checks glucose  1-2 times per day in the morning. 180 fasting and 200-240.    Hypothyroidism for years, she has been on 250 mcg daily.     Hyperlipidemia - managed with statin.     HTN - controlled with ACEi.    Vit D deficiency - 2000 units daily     S/p right knee pain, her activity is limited.     MEDICATIONS    Current Outpatient Medications:   •  albuterol sulfate  (90 Base) MCG/ACT inhaler, Inhale 2 puffs Every 4 (Four) Hours As Needed for Wheezing., Disp: 18 g, Rfl: 5  •  amLODIPine (NORVASC) 5 MG tablet, TAKE 1 TABLET BY MOUTH EVERY DAY, Disp: 30 tablet, Rfl: 5  •  aspirin 81 MG EC tablet, Take 81 mg by mouth Daily. Stopped because patient ran out, Disp: , Rfl:   •  atorvastatin (LIPITOR) 40 MG tablet, TAKE 1 TABLET BY MOUTH EVERY DAY, Disp: 30 tablet, Rfl: 11  •  baclofen (LIORESAL) 10 MG tablet, Take 1 tablet by mouth 2 (Two) Times a Day. (Patient taking differently: Take 10 mg by mouth As Needed.), Disp: 180 tablet, Rfl: 3  •  BD Pen Needle Marianela 2nd Gen 32G X 4 MM misc, , Disp: , Rfl:   •  busPIRone (BUSPAR) 15 MG tablet, TAKE 1/2 TO 1 TABLET DAILY AS NEEDED FOR PANIC EPISODES, Disp: 30 tablet, Rfl: 5  •  Cinnamon 500 MG capsule, Take 500 mg by mouth 2 (two) times a day., Disp: , Rfl:   •   cyclobenzaprine (FLEXERIL) 10 MG tablet, Take 1 tablet by mouth 3 (Three) Times a Day As Needed for Muscle Spasms for up to 15 doses., Disp: 15 tablet, Rfl: 0  •  FLUoxetine (PROzac) 40 MG capsule, TAKE 1 CAPSULE BY MOUTH EVERY DAY, Disp: 30 capsule, Rfl: 11  •  fluticasone (FLONASE) 50 MCG/ACT nasal spray, USE 2 SPRAYS INTO THE NOSTRIL(S) AS DIRECTED BY PROVIDER DAILY., Disp: 16 mL, Rfl: 11  •  gabapentin (NEURONTIN) 600 MG tablet, Take 1 tablet by mouth 3 (Three) Times a Day., Disp: 90 tablet, Rfl: 5  •  glucose blood (FREESTYLE TEST STRIPS) test strip, Check blood sugar four times daily, Disp: 200 each, Rfl: 11  •  glucose monitoring kit (FREESTYLE) monitoring kit, 1 each 4 (Four) Times a Day. Check blood sugar four times daily, Disp: 1 each, Rfl: 0  •  insulin aspart prot & aspart (NovoLOG Mix 70/30 FlexPen) (70-30) 100 UNIT/ML suspension pen-injector injection, Inject 0.5 mL under the skin into the appropriate area as directed 2 (Two) Times a Day Before Meals. 40 units before breakfast and 40 units before supper., Disp: 10 pen, Rfl: 11  •  Lancets (freestyle) lancets, Check blood sugar four times daily, Disp: 100 each, Rfl: 12  •  levothyroxine (SYNTHROID, LEVOTHROID) 200 MCG tablet, TAKE 1 TABLET BY MOUTH EVERY DAY, Disp: 30 tablet, Rfl: 11  •  levothyroxine (SYNTHROID, LEVOTHROID) 50 MCG tablet, TAKE 1 TABLET BY MOUTH EVERY DAY, Disp: 30 tablet, Rfl: 11  •  lisinopril-hydrochlorothiazide (PRINZIDE,ZESTORETIC) 20-25 MG per tablet, Take 1 tablet by mouth Every Morning., Disp: 30 tablet, Rfl: 11  •  loratadine (CLARITIN) 10 MG tablet, TAKE 1 TABLET BY MOUTH EVERY DAY, Disp: 30 tablet, Rfl: 11  •  meloxicam (MOBIC) 15 MG tablet, TAKE 1 TABLET BY MOUTH EVERY DAY, Disp: 30 tablet, Rfl: 5  •  metFORMIN (GLUCOPHAGE) 1000 MG tablet, TAKE 1 TABLET BY MOUTH TWICE A DAY WITH MEALS (Patient taking differently: Take 1,000 mg by mouth 2 (Two) Times a Day With Meals.), Disp: 60 tablet, Rfl: 11  •  norethindrone (AYGESTIN) 5  "MG tablet, Take 1 tablet by mouth Daily. (Patient taking differently: Take 5 mg by mouth Every Morning.), Disp: 30 tablet, Rfl: 5  •  terbinafine (lamiSIL) 250 MG tablet, TAKE 1 TABLET BY MOUTH EVERY DAY, Disp: 30 tablet, Rfl: 2  •  traMADol (ULTRAM) 50 MG tablet, Take 1 tablet by mouth Every 8 (Eight) Hours As Needed for Moderate Pain ., Disp: 60 tablet, Rfl: 5  •  traZODone (DESYREL) 100 MG tablet, TAKE 2 TABLETS BY MOUTH AT BEDTIME, Disp: 60 tablet, Rfl: 5  •  Dulaglutide (Trulicity) 3 MG/0.5ML solution pen-injector, Inject 3 mg under the skin into the appropriate area as directed 1 (One) Time Per Week., Disp: 4 pen, Rfl: 6  •  Dulaglutide (Trulicity) 4.5 MG/0.5ML solution pen-injector, Inject 4.5 mg under the skin into the appropriate area as directed 1 (One) Time Per Week., Disp: 4 pen, Rfl: 6    Review of Systems  Review of Systems   Constitutional: Positive for fatigue.   Musculoskeletal: Positive for arthralgias and back pain.   Psychiatric/Behavioral: Positive for decreased concentration.   All other systems reviewed and are negative.         Objective:      /74   Pulse 94   Ht 162.6 cm (64\")   Wt 108 kg (237 lb 11.2 oz)   SpO2 97%   BMI 40.80 kg/m² Body mass index is 40.8 kg/m².  Physical Exam   Constitutional: She is oriented to person, place, and time. She appears well-developed.   obese   HENT:   Head: Normocephalic and atraumatic.   Eyes: Conjunctivae are normal.   Neck: No thyroid mass present.   Cardiovascular: Normal rate, regular rhythm, normal heart sounds and normal pulses.   Pulmonary/Chest: Effort normal and breath sounds normal.   Neurological: She is alert and oriented to person, place, and time. She has normal reflexes.   Skin: Skin is warm.   Psychiatric: Thought content normal.   Vitals reviewed.          LABS AND IMAGING    Labs:   Lab Results   Component Value Date    HGBA1C 9.2 06/17/2021    HGBA1C 9.8 01/28/2021    HGBA1C 8.5 10/10/2019             Assessment: "         Diagnoses and all orders for this visit:    Type 2 diabetes mellitus with hyperglycemia, with long-term current use of insulin (CMS/[facility])  -     POC Glucose, Blood  -     POC Glycosylated Hemoglobin (Hb A1C)  -     Comprehensive Metabolic Panel  -     Ambulatory Referral to Diabetic Education    Acquired hypothyroidism  -     TSH  -     T4, Free    Mixed hyperlipidemia  -     Lipid Panel    Vitamin D deficiency  -     Vitamin D 25 Hydroxy    Other orders  -     Dulaglutide (Trulicity) 3 MG/0.5ML solution pen-injector; Inject 3 mg under the skin into the appropriate area as directed 1 (One) Time Per Week.  -     Dulaglutide (Trulicity) 4.5 MG/0.5ML solution pen-injector; Inject 4.5 mg under the skin into the appropriate area as directed 1 (One) Time Per Week.  -     insulin aspart prot & aspart (NovoLOG Mix 70/30 FlexPen) (70-30) 100 UNIT/ML suspension pen-injector injection; Inject 0.5 mL under the skin into the appropriate area as directed 2 (Two) Times a Day Before Meals. 40 units before breakfast and 40 units before supper.        Plan:       RX changes:            Patient Instructions     Diabetes Treatment Recommendations    [unfilled]                                                                                              Trina Dill 1972     Your A1C is:   Lab Results   Component Value Date    HGBA1C 9.2 06/17/2021    HGBA1C 9.8 01/28/2021    HGBA1C 8.5 10/10/2019       ADA General Goals: A1c: < 7%                                                  Fasting/before meal glucose: <150 mg/dL                                    2 Hour after meal glucoses: < 180 mg/dL                                        Bedtime glucose:120-180              Medication changes:  Continue metformin  IncreaseTrulicity to 3 mg once a week. In 1 month increase to 4.5 mg weekly.     Premixed Insulin Dose:         Novolog Mix 70/30  50 units before breakfast and 50 units before supper.          If after 2 weeks,  before meal blood sugars are not lower than 150, call the office.    If you are having frequent blood sugars lower than 70, call the office.    Kelly Vera MD  Glycemic control improved after trulicity added. Maximize the dose of this medication Titration instructions given.      Hypothyroidism - synthroid 250 mcg daily.   Repeat labs today. Cont Vit D.       Follow up:  3 months.          None

## 2021-08-31 NOTE — ED ADULT TRIAGE NOTE - INTERNATIONAL TRAVEL
Subjective     Karlene Walters is a 81 y.o. female who presents with Cancer (EST/ Malignanr Neoplasm Of Rectum/ Prechemo)            HPI    Patient seen today in follow-up for reevaluation for shortness of breath and cough.  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  She presents accompanied by her  for today's visit.    Please see past multiple notes with regards to patient's extensive cancer related history.    Interval history  Patient was seen in clinic last week with complaint of severe shortness of breath and cough which has been persistent over the last 3-4 months.  She has had multiple hospitalizations with this complaint.  She had significant improvement approximately 2 weeks ago and it was felt that patient was stable and strong enough to proceed with chemotherapy again.  Her chemo was delayed by 6 weeks however, she was given cycle 2 of her chemotherapy Irinotecan/Avastin 2 weeks ago.  Approximately 10 days following chemotherapy she had experienced significant decline in her respiratory status, very reminiscent than previous.  She had significant cough, shortness of breath and thick green sputum production.  Chest x-ray completed did note some slight right basilar atelectasis.  After further discussion with previous pulmonary provider patient has seen, it was recommended she be placed on a respiratory fluoroquinolone and was given a 5-day course of Levaquin.  She did tolerate this well and took her last dose today.  She was also given prednisone taper in which she currently is taking at this time.  Patient stated approximately day 2 of taking antibiotics she did have some improvement overall.  She does continue to have fatigue and is lost another 5 pounds in the last 5 days.  She stated the first few days she had no appetite but she is doing much better as of yesterday and today.  Her oxygenation has been staying around 99% and she is wearing continuous oxygen at this time.   "Heart rate continues to be quite tachycardic anywhere between 103-130.  Patient stated yesterday she got down into the 80s.  She is scheduled to see cardiologist, Dr. Haque next month, 10/6/2021.  Patient still required to sleep reclined up in a chair due to significant shortness of breath and cough when laying flat.  She denies any nausea, vomiting, diarrhea or constipation her output via her ostomy is normal.    Allergies   Allergen Reactions   • Oxaliplatin Anaphylaxis   • Codeine      \"gets drunk\"   • Pcn [Penicillins] Itching     itching   • Sulfa Drugs Itching     itching   • Tape Rash     PAPER TAPE OK   • Amoxicillin Rash     Rash all over body     Current Outpatient Medications on File Prior to Visit   Medication Sig Dispense Refill   • levoFLOXacin (LEVAQUIN) 750 MG tablet Take 1 Tablet by mouth every day for 5 days. 5 Tablet 0   • predniSONE (DELTASONE) 10 MG Tab Take 30 mg x3 days - then 20 mg x3 days - then 10 mg x3 days and stop 18 Tablet 0   • STIOLTO RESPIMAT 2.5-2.5 MCG/ACT Aero Soln INHALE 2 PUFFS BY MOUTH ONCE DAILY 1 Each 5   • ondansetron (ZOFRAN) 4 MG Tab tablet Take 1 tablet by mouth every four hours as needed for Nausea/Vomiting (for nausea, vomiting). 30 tablet 6   • lidocaine-prilocaine (EMLA) 2.5-2.5 % Cream Apply to port one hour prior to access and cover with plastic wrap. 1 Each 3   • albuterol (PROVENTIL) 2.5mg/3ml Nebu Soln solution for nebulization Take 3 mL by nebulization every four hours as needed for Shortness of Breath. 120 mL 11   • albuterol 108 (90 Base) MCG/ACT Aero Soln inhalation aerosol Inhale 2 Puffs every 6 hours as needed for Shortness of Breath. 1 Each 11   • potassium chloride ER (KLOR-CON) 10 MEQ tablet Take 1 tablet by mouth every day. 100 tablet 3   • rivaroxaban (XARELTO) 10 MG Tab tablet Take 1 Tab by mouth every day. 30 Tab 5   • cyanocobalamin (VITAMIN B-12) 500 MCG Tab Take 1,000 mcg by mouth every day.     • Multiple Vitamins-Minerals (CENTRUM SILVER PO) " "Take 1 Tab by mouth every day.     • Cholecalciferol (VITAMIN D3) 400 UNITS CAPS Take 1 Cap by mouth every day.     • loratadine (CLARITIN) 10 MG TABS Take 10 mg by mouth every day. Indications: Hayfever     • pantoprazole (PROTONIX) 40 MG Tablet Delayed Response Take 1 tablet by mouth every day. (Patient not taking: Reported on 8/31/2021) 90 tablet 3     No current facility-administered medications on file prior to visit.           Review of Systems   Constitutional: Positive for malaise/fatigue and weight loss (last 5 pounds in the last 5 days). Negative for chills and fever.   Respiratory: Positive for cough, sputum production (green to dark in the AM but yellow to clear as the day goes on) and shortness of breath. Negative for hemoptysis.    Cardiovascular: Positive for chest pain and palpitations.   Gastrointestinal: Negative for constipation, diarrhea, nausea and vomiting.   Genitourinary: Negative for dysuria.   Neurological: Negative for dizziness.              Objective     /68 (BP Location: Right arm, Patient Position: Sitting, BP Cuff Size: Adult)   Pulse (!) 125   Temp 36 °C (96.8 °F) (Temporal)   Resp 16   Ht 1.575 m (5' 2.01\")   Wt 60.6 kg (133 lb 7.8 oz)   LMP  (LMP Unknown)   SpO2 97%   BMI 24.41 kg/m²      Physical Exam  Vitals reviewed.   Constitutional:       General: She is not in acute distress.     Appearance: She is not diaphoretic.   Cardiovascular:      Rate and Rhythm: Regular rhythm. Tachycardia present.   Pulmonary:      Effort: No respiratory distress.      Breath sounds: No wheezing.      Comments: O2 in place - SOB noted but improved from last week. Cough present mildly during visit.   Lung sounds still with coarse sounds throughout.   Musculoskeletal:      Comments: Wjeelchair d/t significant fatigue d/t SOB   Skin:     General: Skin is warm and dry.   Neurological:      Mental Status: She is alert and oriented to person, place, and time.   Psychiatric:         Mood and " Affect: Mood normal.         Behavior: Behavior normal.           DX-CHEST-2 VIEWS    Result Date: 8/26/2021 8/26/2021 3:45 PM HISTORY/REASON FOR EXAM:  Shortness of Breath TECHNIQUE/EXAM DESCRIPTION: PA and lateral views of the chest. COMPARISON:  Chest x-ray 8/5/2021 FINDINGS: There is a left IJ Port-A-Cath with its distal tip extending into the right atrium. There is a slight curvilinear opacity in the right lung base. The cardiac silhouette is normal in size. No effusions or pneumothoraces are present. There are no significant osseous abnormalities. The visualized portions of the upper abdomen are within normal limits.     1.  Slight right basilar atelectasis. 2.  Left IJ Port-A-Cath in place.           Assessment & Plan        1. Malignant neoplasm of rectum (HCC)     2. Secondary malignant neoplasm of liver (HCC)     3. SOB (shortness of breath)     4. Cough       At this time patient to continue on current plan and complete the prednisone taper for her shortness of breath and cough.  Patient is scheduled to be seen by pulmonary provider in 2 weeks for follow-up visits.  There is been to some discussion on possible bronchoscopy which I will defer to pulmonary team.    With regards to her metastatic cancer, her chemotherapy is currently on hold again due to her current clinical status.  I did discuss this with the patient and her  today and he did verbalize understanding.  At this time we will have patient follow-up with us in the clinic in approximately 2 weeks for reevaluation.  I have requested that patient contact us next week to inform us on how she is doing after being off of all treatment for presumed respiratory infection.    Discussed with patient contact cardiology with request for closer follow-up visit as well due to persistent shortness of breath.      Please note that this dictation was created using voice recognition software. I have made every reasonable attempt to correct obvious  errors, but I expect that there are errors of grammar and possibly content that I did not discover before finalizing the note.                 No

## 2021-09-06 ENCOUNTER — INPATIENT (INPATIENT)
Facility: HOSPITAL | Age: 86
LOS: 2 days | Discharge: HOME | DRG: 204 | End: 2021-09-09
Attending: HOSPITALIST | Admitting: HOSPITALIST
Payer: MEDICARE

## 2021-09-06 VITALS
DIASTOLIC BLOOD PRESSURE: 53 MMHG | WEIGHT: 286.6 LBS | SYSTOLIC BLOOD PRESSURE: 123 MMHG | RESPIRATION RATE: 22 BRPM | OXYGEN SATURATION: 98 % | HEART RATE: 105 BPM | HEIGHT: 66 IN | TEMPERATURE: 98 F

## 2021-09-06 DIAGNOSIS — Z96.652 PRESENCE OF LEFT ARTIFICIAL KNEE JOINT: Chronic | ICD-10-CM

## 2021-09-06 DIAGNOSIS — R06.89 OTHER ABNORMALITIES OF BREATHING: ICD-10-CM

## 2021-09-06 DIAGNOSIS — Z95.1 PRESENCE OF AORTOCORONARY BYPASS GRAFT: Chronic | ICD-10-CM

## 2021-09-06 LAB
ALBUMIN SERPL ELPH-MCNC: 4.4 G/DL — SIGNIFICANT CHANGE UP (ref 3.3–5)
ALP SERPL-CCNC: 64 U/L — SIGNIFICANT CHANGE UP (ref 40–120)
ALT FLD-CCNC: 6 U/L — LOW (ref 10–45)
ANION GAP SERPL CALC-SCNC: 11 MMOL/L — SIGNIFICANT CHANGE UP (ref 5–17)
AST SERPL-CCNC: 19 U/L — SIGNIFICANT CHANGE UP (ref 10–40)
BASE EXCESS BLDV CALC-SCNC: 2.9 MMOL/L — HIGH (ref -2–2)
BASOPHILS # BLD AUTO: 0.04 K/UL — SIGNIFICANT CHANGE UP (ref 0–0.2)
BASOPHILS NFR BLD AUTO: 0.8 % — SIGNIFICANT CHANGE UP (ref 0–2)
BILIRUB SERPL-MCNC: 0.3 MG/DL — SIGNIFICANT CHANGE UP (ref 0.2–1.2)
BLD GP AB SCN SERPL QL: NEGATIVE — SIGNIFICANT CHANGE UP
BUN SERPL-MCNC: 34 MG/DL — HIGH (ref 7–23)
CA-I SERPL-SCNC: 1.32 MMOL/L — SIGNIFICANT CHANGE UP (ref 1.15–1.33)
CALCIUM SERPL-MCNC: 10.4 MG/DL — SIGNIFICANT CHANGE UP (ref 8.4–10.5)
CHLORIDE BLDV-SCNC: 105 MMOL/L — SIGNIFICANT CHANGE UP (ref 96–108)
CHLORIDE SERPL-SCNC: 104 MMOL/L — SIGNIFICANT CHANGE UP (ref 96–108)
CO2 BLDV-SCNC: 30 MMOL/L — HIGH (ref 22–26)
CO2 SERPL-SCNC: 26 MMOL/L — SIGNIFICANT CHANGE UP (ref 22–31)
CREAT SERPL-MCNC: 1.46 MG/DL — HIGH (ref 0.5–1.3)
EOSINOPHIL # BLD AUTO: 0.12 K/UL — SIGNIFICANT CHANGE UP (ref 0–0.5)
EOSINOPHIL NFR BLD AUTO: 2.5 % — SIGNIFICANT CHANGE UP (ref 0–6)
GAS PNL BLDA: SIGNIFICANT CHANGE UP
GAS PNL BLDV: 138 MMOL/L — SIGNIFICANT CHANGE UP (ref 136–145)
GAS PNL BLDV: SIGNIFICANT CHANGE UP
GLUCOSE BLDV-MCNC: 108 MG/DL — HIGH (ref 70–99)
GLUCOSE SERPL-MCNC: 120 MG/DL — HIGH (ref 70–99)
HCO3 BLDV-SCNC: 29 MMOL/L — SIGNIFICANT CHANGE UP (ref 22–29)
HCT VFR BLD CALC: 32.2 % — LOW (ref 39–50)
HCT VFR BLDA CALC: 31 % — LOW (ref 39–51)
HGB BLD CALC-MCNC: 10.3 G/DL — LOW (ref 12.6–17.4)
HGB BLD-MCNC: 10.1 G/DL — LOW (ref 13–17)
IMM GRANULOCYTES NFR BLD AUTO: 0.4 % — SIGNIFICANT CHANGE UP (ref 0–1.5)
LACTATE BLDV-MCNC: 0.7 MMOL/L — SIGNIFICANT CHANGE UP (ref 0.7–2)
LYMPHOCYTES # BLD AUTO: 0.84 K/UL — LOW (ref 1–3.3)
LYMPHOCYTES # BLD AUTO: 17.7 % — SIGNIFICANT CHANGE UP (ref 13–44)
MCHC RBC-ENTMCNC: 29.4 PG — SIGNIFICANT CHANGE UP (ref 27–34)
MCHC RBC-ENTMCNC: 31.4 GM/DL — LOW (ref 32–36)
MCV RBC AUTO: 93.6 FL — SIGNIFICANT CHANGE UP (ref 80–100)
MONOCYTES # BLD AUTO: 0.53 K/UL — SIGNIFICANT CHANGE UP (ref 0–0.9)
MONOCYTES NFR BLD AUTO: 11.2 % — SIGNIFICANT CHANGE UP (ref 2–14)
NEUTROPHILS # BLD AUTO: 3.19 K/UL — SIGNIFICANT CHANGE UP (ref 1.8–7.4)
NEUTROPHILS NFR BLD AUTO: 67.4 % — SIGNIFICANT CHANGE UP (ref 43–77)
NRBC # BLD: 0 /100 WBCS — SIGNIFICANT CHANGE UP (ref 0–0)
NT-PROBNP SERPL-SCNC: 566 PG/ML — HIGH (ref 0–300)
PCO2 BLDV: 50 MMHG — SIGNIFICANT CHANGE UP (ref 42–55)
PH BLDV: 7.37 — SIGNIFICANT CHANGE UP (ref 7.32–7.43)
PLATELET # BLD AUTO: 234 K/UL — SIGNIFICANT CHANGE UP (ref 150–400)
PO2 BLDV: 35 MMHG — SIGNIFICANT CHANGE UP (ref 25–45)
POTASSIUM BLDV-SCNC: 4.9 MMOL/L — SIGNIFICANT CHANGE UP (ref 3.5–5.1)
POTASSIUM SERPL-MCNC: 4.9 MMOL/L — SIGNIFICANT CHANGE UP (ref 3.5–5.3)
POTASSIUM SERPL-SCNC: 4.9 MMOL/L — SIGNIFICANT CHANGE UP (ref 3.5–5.3)
PROT SERPL-MCNC: 7.3 G/DL — SIGNIFICANT CHANGE UP (ref 6–8.3)
RAPID RVP RESULT: SIGNIFICANT CHANGE UP
RBC # BLD: 3.44 M/UL — LOW (ref 4.2–5.8)
RBC # FLD: 17.2 % — HIGH (ref 10.3–14.5)
RH IG SCN BLD-IMP: POSITIVE — SIGNIFICANT CHANGE UP
SAO2 % BLDV: 54.7 % — LOW (ref 67–88)
SARS-COV-2 RNA SPEC QL NAA+PROBE: SIGNIFICANT CHANGE UP
SODIUM SERPL-SCNC: 141 MMOL/L — SIGNIFICANT CHANGE UP (ref 135–145)
TROPONIN T, HIGH SENSITIVITY RESULT: 17 NG/L — SIGNIFICANT CHANGE UP (ref 0–51)
TROPONIN T, HIGH SENSITIVITY RESULT: 21 NG/L — SIGNIFICANT CHANGE UP (ref 0–51)
WBC # BLD: 4.74 K/UL — SIGNIFICANT CHANGE UP (ref 3.8–10.5)
WBC # FLD AUTO: 4.74 K/UL — SIGNIFICANT CHANGE UP (ref 3.8–10.5)

## 2021-09-06 PROCEDURE — 99291 CRITICAL CARE FIRST HOUR: CPT | Mod: CS

## 2021-09-06 PROCEDURE — 93010 ELECTROCARDIOGRAM REPORT: CPT

## 2021-09-06 PROCEDURE — 71045 X-RAY EXAM CHEST 1 VIEW: CPT | Mod: 26

## 2021-09-06 RX ORDER — FUROSEMIDE 40 MG
40 TABLET ORAL ONCE
Refills: 0 | Status: COMPLETED | OUTPATIENT
Start: 2021-09-06 | End: 2021-09-06

## 2021-09-06 RX ORDER — IPRATROPIUM/ALBUTEROL SULFATE 18-103MCG
3 AEROSOL WITH ADAPTER (GRAM) INHALATION ONCE
Refills: 0 | Status: COMPLETED | OUTPATIENT
Start: 2021-09-06 | End: 2021-09-06

## 2021-09-06 RX ADMIN — Medication 3 MILLILITER(S): at 20:15

## 2021-09-06 RX ADMIN — Medication 3 MILLILITER(S): at 20:38

## 2021-09-06 RX ADMIN — Medication 40 MILLIGRAM(S): at 21:33

## 2021-09-06 RX ADMIN — Medication 3 MILLILITER(S): at 20:00

## 2021-09-06 NOTE — CONSULT NOTE ADULT - ATTENDING COMMENTS
87M Hx DM, Parkinson's disease, CAD Stent, s/p CABG, HFpEF, CKD III, and BPH, Hypospadias p/w ED Headache and SOB x 6 Months worsening last week now started on on BiPAP with symptomatic improvement pending CT Chest and Covid Test.   - Hemodynamically stable on minimal BiPAP 10/5 40% setting with SpO2 100%   - Awake and converse full sentences without difficulty   - GOC discussed by family confirmed DNI Status   - Not ICU Candidate     Patient seen and examined with ICU Resident/Fellow at bedside after lab data, medical records and radiology reports reviewed. I have read and agreeable in general with resident's Documented Note, Assessment and Management Plan which reflected my opinions from bedside round and discussion.

## 2021-09-06 NOTE — ED ADULT NURSE NOTE - NSIMPLEMENTINTERV_GEN_ALL_ED
Implemented All Fall with Harm Risk Interventions:  [place] to call system. Call bell, personal items and telephone within reach. Instruct patient to call for assistance. Room bathroom lighting operational. Non-slip footwear when patient is off stretcher. Physically safe environment: no spills, clutter or unnecessary equipment. Stretcher in lowest position, wheels locked, appropriate side rails in place. Provide visual cue, wrist band, yellow gown, etc. Monitor gait and stability. Monitor for mental status changes and reorient to person, place, and time. Review medications for side effects contributing to fall risk. Reinforce activity limits and safety measures with patient and family. Provide visual clues: red socks.

## 2021-09-06 NOTE — ED ADULT NURSE NOTE - OBJECTIVE STATEMENT
here with sob; pt is tachypneic; able to state name; sob with speaking; poor historian; on oxygen 2 liters via nc; pt also c/o ha and "prostate problem"; placed on cardiac monitor and continuous pulse ox; MD at bedside.

## 2021-09-06 NOTE — ED PROVIDER NOTE - NSICDXPASTMEDICALHX_GEN_ALL_CORE_FT
PAST MEDICAL HISTORY:  Benign prostatic hyperplasia     CAD (coronary artery disease)     CAD (coronary artery disease)     Diabetes mellitus     DM (diabetes mellitus)     Dyslipidemia     HLD (hyperlipidemia)     HTN (hypertension)     Hypertension     Hypospadias     Osteoporosis     Parkinson disease     Syncope     Urinary frequency

## 2021-09-06 NOTE — ED PROVIDER NOTE - PROGRESS NOTE DETAILS
Attending MD Chen: MICU bedside. Vanessa Craig MD (PGY2) -  Spoke to daughter who is the HCP-faxing paper work to the ED right now. Patient is DNI per daughter. Patient is currently being started on bipap for increased WOB and tachypnea. Mentating well. Vanessa Craig MD (PGY2) -  Pt admitted to hospitalist. Vanessa Craig MD (PGY2) -  Patient was taken to CT scan. RR 17, 100% on NRB. Spoke to hospitalist, okay with patient staying off of bipap as he has demonstrated clinical improvement and patient is requesting to be off of it.

## 2021-09-06 NOTE — ED PROVIDER NOTE - PHYSICAL EXAMINATION
G: NAD, cooperative with exam, speaking in full and complete sentences, AOx4  H: NCAT  E: EOMI, no conjunctival pallor   M: Mucous membranes moist   R: CTABL, increased WOB, tachypneic to 70s  C: Nl S1/S2, no mrg  A: Soft, NT/ND, no rebound/guarding   MSK: no calf tenderness, no LE edema

## 2021-09-06 NOTE — ED PROVIDER NOTE - CLINICAL SUMMARY MEDICAL DECISION MAKING FREE TEXT BOX
86 year old male with PMH Parkinsons disease, DMII, HTN, CAD s/p CABG, BPH who presents to the ED with increased shortness of breath, headache.  Patient tachypneic to 70s, acute kidney disesae (Cr 1.46, baseline 1.02). Pt is DNI.  Plan: bipap, basic blood work, bnp, trop, cxr, cta chest r/o pe, reassess 86 year old male with PMH Parkinsons disease, DMII, HTN, CAD s/p CABG, BPH who presents to the ED with increased shortness of breath, headache.  Patient tachypneic to 70s, acute kidney disease (Cr 1.46, baseline 1.02). Pt is DNI.  Plan: bipap, basic blood work, bnp, trop, cxr, cta chest r/o pe, reassess

## 2021-09-06 NOTE — ED PROVIDER NOTE - OBJECTIVE STATEMENT
86 year old male with PMH Parkinsons disease, DMII, HTN, CAD s/p CABG, BPH who presents to the ED with increased shortness of breath, headache. Ongoing for the past year, worsened over the past week. 86 year old male with past medical history of Parkinson's disease, diabetes mellitus, hypertension, hyperlipidemia, CAD s/p CABG, and BPH who presents to the ED with increased shortness of breath and headache. SOB ongoing for the past year, worsened over the past week. Pt states that headache is also chronic in nature, worsened over this past week, has taken Tylenol with minimal relief. R sided, denies any N/V/D. Intermittent cough is endorsed, though denies F/C/NS/sick contacts. No abd pain, dysuria or hematuria.

## 2021-09-06 NOTE — ED PROVIDER NOTE - ATTENDING CONTRIBUTION TO CARE
Attending MD Chen: I personally have seen and examined this patient.  Resident note reviewed and agree on plan of care and except where noted.  See below for details.     seen in [facility] 6    87M with PMH/PSH including Parkinson's, DM, HTN, HLD, CAD s/p CABG, BPH presents to the ED with shortness of breath.  Reports present for a year now worse in last week.  Reports chronic headache, worse over past week, minimal improvement with Tylenol. Reports occasional cough, denies chest pain.  Denies sick contacts, recent travel, fevers, chills.  Denies abdominal pain, nausea, vomiting, diarrhea, bloody or black stools. Denies dysuria, hematuria.  HPI/ROS limited secondary to marked dyspnea.      Exam:   General: +respiratory distress, answering questions in short sentences/few words, AAOx3  HENT: head NCAT, airway patent   Eyes: no conjunctival injection, anicteric  Lungs: lungs CTAB with marked increased work of breathing, RR > 60, short shallow breaths  Cardiac: +S1S2, no m/r/g  GI: abdomen soft with +BS, NT, ND  : no CVAT  MSK: ranging neck freely, no LE edema  Neuro: moving all extremities spontaneously, sensory grossly intact, no gross neuro deficits  Psych: normal mood and affect     A/P: 87M with shortness of breath, DDx includes PE, COPDex, HF, unclear etiology, not stable to go to CTA at this time, will place on BiPAP with in line nebs,, cardiac monitor, will also consider infectious process although history and clinical picture less consistent with that, will obtain EKG, CXR, labs, will need admission

## 2021-09-06 NOTE — ED PROVIDER NOTE - NS ED ROS FT
Gen: No F/C/NS  Head: No falls/head trauma  Eyes: No changes in vision   Resp: +cough +trouble breathing  Cardiovascular: No chest pain or palpitation  Gastroenteric: No N/V/D  :  No change in urine output, dysuria or hematuria   MS: No joint or muscle pain  Neuro: No headache   Skin: No new rash

## 2021-09-07 DIAGNOSIS — R06.03 ACUTE RESPIRATORY DISTRESS: ICD-10-CM

## 2021-09-07 DIAGNOSIS — Z29.9 ENCOUNTER FOR PROPHYLACTIC MEASURES, UNSPECIFIED: ICD-10-CM

## 2021-09-07 DIAGNOSIS — Z79.899 OTHER LONG TERM (CURRENT) DRUG THERAPY: ICD-10-CM

## 2021-09-07 DIAGNOSIS — I25.10 ATHEROSCLEROTIC HEART DISEASE OF NATIVE CORONARY ARTERY WITHOUT ANGINA PECTORIS: ICD-10-CM

## 2021-09-07 DIAGNOSIS — R51.9 HEADACHE, UNSPECIFIED: ICD-10-CM

## 2021-09-07 DIAGNOSIS — R09.89 OTHER SPECIFIED SYMPTOMS AND SIGNS INVOLVING THE CIRCULATORY AND RESPIRATORY SYSTEMS: ICD-10-CM

## 2021-09-07 DIAGNOSIS — E11.9 TYPE 2 DIABETES MELLITUS WITHOUT COMPLICATIONS: ICD-10-CM

## 2021-09-07 DIAGNOSIS — G20 PARKINSON'S DISEASE: ICD-10-CM

## 2021-09-07 DIAGNOSIS — I10 ESSENTIAL (PRIMARY) HYPERTENSION: ICD-10-CM

## 2021-09-07 PROBLEM — Q54.9 HYPOSPADIAS, UNSPECIFIED: Chronic | Status: ACTIVE | Noted: 2021-03-17

## 2021-09-07 LAB
GLUCOSE BLDC GLUCOMTR-MCNC: 100 MG/DL — HIGH (ref 70–99)
GLUCOSE BLDC GLUCOMTR-MCNC: 112 MG/DL — HIGH (ref 70–99)
GLUCOSE BLDC GLUCOMTR-MCNC: 123 MG/DL — HIGH (ref 70–99)
GLUCOSE BLDC GLUCOMTR-MCNC: 196 MG/DL — HIGH (ref 70–99)

## 2021-09-07 PROCEDURE — 99233 SBSQ HOSP IP/OBS HIGH 50: CPT

## 2021-09-07 PROCEDURE — 70450 CT HEAD/BRAIN W/O DYE: CPT | Mod: 26

## 2021-09-07 PROCEDURE — 71275 CT ANGIOGRAPHY CHEST: CPT | Mod: 26

## 2021-09-07 PROCEDURE — 99223 1ST HOSP IP/OBS HIGH 75: CPT

## 2021-09-07 RX ORDER — IPRATROPIUM/ALBUTEROL SULFATE 18-103MCG
3 AEROSOL WITH ADAPTER (GRAM) INHALATION EVERY 6 HOURS
Refills: 0 | Status: DISCONTINUED | OUTPATIENT
Start: 2021-09-07 | End: 2021-09-09

## 2021-09-07 RX ORDER — ROSUVASTATIN CALCIUM 5 MG/1
1 TABLET ORAL
Qty: 0 | Refills: 0 | DISCHARGE

## 2021-09-07 RX ORDER — SODIUM CHLORIDE 9 MG/ML
1000 INJECTION, SOLUTION INTRAVENOUS
Refills: 0 | Status: DISCONTINUED | OUTPATIENT
Start: 2021-09-07 | End: 2021-09-09

## 2021-09-07 RX ORDER — METFORMIN HYDROCHLORIDE 850 MG/1
1 TABLET ORAL
Qty: 0 | Refills: 0 | DISCHARGE

## 2021-09-07 RX ORDER — DEXTROSE 50 % IN WATER 50 %
15 SYRINGE (ML) INTRAVENOUS ONCE
Refills: 0 | Status: DISCONTINUED | OUTPATIENT
Start: 2021-09-07 | End: 2021-09-09

## 2021-09-07 RX ORDER — SERTRALINE 25 MG/1
25 TABLET, FILM COATED ORAL DAILY
Refills: 0 | Status: DISCONTINUED | OUTPATIENT
Start: 2021-09-07 | End: 2021-09-09

## 2021-09-07 RX ORDER — ONDANSETRON 8 MG/1
4 TABLET, FILM COATED ORAL EVERY 8 HOURS
Refills: 0 | Status: DISCONTINUED | OUTPATIENT
Start: 2021-09-07 | End: 2021-09-09

## 2021-09-07 RX ORDER — POTASSIUM CHLORIDE 20 MEQ
1 PACKET (EA) ORAL
Qty: 0 | Refills: 0 | DISCHARGE

## 2021-09-07 RX ORDER — DEXTROSE 50 % IN WATER 50 %
25 SYRINGE (ML) INTRAVENOUS ONCE
Refills: 0 | Status: DISCONTINUED | OUTPATIENT
Start: 2021-09-07 | End: 2021-09-09

## 2021-09-07 RX ORDER — SITAGLIPTIN 50 MG/1
1 TABLET, FILM COATED ORAL
Qty: 0 | Refills: 0 | DISCHARGE

## 2021-09-07 RX ORDER — AZITHROMYCIN 500 MG/1
500 TABLET, FILM COATED ORAL DAILY
Refills: 0 | Status: DISCONTINUED | OUTPATIENT
Start: 2021-09-07 | End: 2021-09-09

## 2021-09-07 RX ORDER — INSULIN LISPRO 100/ML
VIAL (ML) SUBCUTANEOUS AT BEDTIME
Refills: 0 | Status: DISCONTINUED | OUTPATIENT
Start: 2021-09-07 | End: 2021-09-09

## 2021-09-07 RX ORDER — FLUTICASONE PROPIONATE 50 MCG
1 SPRAY, SUSPENSION NASAL
Refills: 0 | Status: DISCONTINUED | OUTPATIENT
Start: 2021-09-07 | End: 2021-09-09

## 2021-09-07 RX ORDER — ROPINIROLE 8 MG/1
0.5 TABLET, FILM COATED, EXTENDED RELEASE ORAL THREE TIMES A DAY
Refills: 0 | Status: DISCONTINUED | OUTPATIENT
Start: 2021-09-07 | End: 2021-09-09

## 2021-09-07 RX ORDER — ALBUTEROL 90 UG/1
2 AEROSOL, METERED ORAL
Qty: 0 | Refills: 0 | DISCHARGE

## 2021-09-07 RX ORDER — TAMSULOSIN HYDROCHLORIDE 0.4 MG/1
0.4 CAPSULE ORAL AT BEDTIME
Refills: 0 | Status: DISCONTINUED | OUTPATIENT
Start: 2021-09-07 | End: 2021-09-09

## 2021-09-07 RX ORDER — GLUCAGON INJECTION, SOLUTION 0.5 MG/.1ML
1 INJECTION, SOLUTION SUBCUTANEOUS ONCE
Refills: 0 | Status: DISCONTINUED | OUTPATIENT
Start: 2021-09-07 | End: 2021-09-09

## 2021-09-07 RX ORDER — ACETAMINOPHEN 500 MG
1000 TABLET ORAL ONCE
Refills: 0 | Status: COMPLETED | OUTPATIENT
Start: 2021-09-07 | End: 2021-09-07

## 2021-09-07 RX ORDER — SENNA PLUS 8.6 MG/1
1 TABLET ORAL
Qty: 0 | Refills: 0 | DISCHARGE

## 2021-09-07 RX ORDER — BUDESONIDE AND FORMOTEROL FUMARATE DIHYDRATE 160; 4.5 UG/1; UG/1
2 AEROSOL RESPIRATORY (INHALATION)
Refills: 0 | Status: DISCONTINUED | OUTPATIENT
Start: 2021-09-07 | End: 2021-09-09

## 2021-09-07 RX ORDER — ROPINIROLE 8 MG/1
1 TABLET, FILM COATED, EXTENDED RELEASE ORAL
Qty: 0 | Refills: 0 | DISCHARGE

## 2021-09-07 RX ORDER — INSULIN LISPRO 100/ML
VIAL (ML) SUBCUTANEOUS
Refills: 0 | Status: DISCONTINUED | OUTPATIENT
Start: 2021-09-07 | End: 2021-09-09

## 2021-09-07 RX ORDER — CARBIDOPA AND LEVODOPA 25; 100 MG/1; MG/1
1 TABLET ORAL THREE TIMES A DAY
Refills: 0 | Status: DISCONTINUED | OUTPATIENT
Start: 2021-09-07 | End: 2021-09-09

## 2021-09-07 RX ORDER — DEXTROSE 50 % IN WATER 50 %
12.5 SYRINGE (ML) INTRAVENOUS ONCE
Refills: 0 | Status: DISCONTINUED | OUTPATIENT
Start: 2021-09-07 | End: 2021-09-09

## 2021-09-07 RX ORDER — DOCUSATE SODIUM 100 MG
1 CAPSULE ORAL
Qty: 0 | Refills: 0 | DISCHARGE

## 2021-09-07 RX ORDER — ATORVASTATIN CALCIUM 80 MG/1
80 TABLET, FILM COATED ORAL AT BEDTIME
Refills: 0 | Status: DISCONTINUED | OUTPATIENT
Start: 2021-09-07 | End: 2021-09-09

## 2021-09-07 RX ORDER — PANTOPRAZOLE SODIUM 20 MG/1
40 TABLET, DELAYED RELEASE ORAL
Refills: 0 | Status: DISCONTINUED | OUTPATIENT
Start: 2021-09-07 | End: 2021-09-09

## 2021-09-07 RX ORDER — ACETAMINOPHEN 500 MG
650 TABLET ORAL EVERY 6 HOURS
Refills: 0 | Status: DISCONTINUED | OUTPATIENT
Start: 2021-09-07 | End: 2021-09-09

## 2021-09-07 RX ORDER — FUROSEMIDE 40 MG
1 TABLET ORAL
Qty: 0 | Refills: 0 | DISCHARGE

## 2021-09-07 RX ORDER — FINASTERIDE 5 MG/1
5 TABLET, FILM COATED ORAL DAILY
Refills: 0 | Status: DISCONTINUED | OUTPATIENT
Start: 2021-09-07 | End: 2021-09-09

## 2021-09-07 RX ADMIN — ATORVASTATIN CALCIUM 80 MILLIGRAM(S): 80 TABLET, FILM COATED ORAL at 21:42

## 2021-09-07 RX ADMIN — Medication 100 MILLIGRAM(S): at 14:21

## 2021-09-07 RX ADMIN — Medication 20 MILLIGRAM(S): at 17:51

## 2021-09-07 RX ADMIN — TAMSULOSIN HYDROCHLORIDE 0.4 MILLIGRAM(S): 0.4 CAPSULE ORAL at 21:10

## 2021-09-07 RX ADMIN — CARBIDOPA AND LEVODOPA 1 TABLET(S): 25; 100 TABLET ORAL at 14:22

## 2021-09-07 RX ADMIN — CARBIDOPA AND LEVODOPA 1 TABLET(S): 25; 100 TABLET ORAL at 06:15

## 2021-09-07 RX ADMIN — BUDESONIDE AND FORMOTEROL FUMARATE DIHYDRATE 2 PUFF(S): 160; 4.5 AEROSOL RESPIRATORY (INHALATION) at 06:25

## 2021-09-07 RX ADMIN — Medication 1 SPRAY(S): at 18:10

## 2021-09-07 RX ADMIN — ROPINIROLE 0.5 MILLIGRAM(S): 8 TABLET, FILM COATED, EXTENDED RELEASE ORAL at 06:15

## 2021-09-07 RX ADMIN — CARBIDOPA AND LEVODOPA 1 TABLET(S): 25; 100 TABLET ORAL at 21:10

## 2021-09-07 RX ADMIN — ROPINIROLE 0.5 MILLIGRAM(S): 8 TABLET, FILM COATED, EXTENDED RELEASE ORAL at 14:21

## 2021-09-07 RX ADMIN — BUDESONIDE AND FORMOTEROL FUMARATE DIHYDRATE 2 PUFF(S): 160; 4.5 AEROSOL RESPIRATORY (INHALATION) at 17:51

## 2021-09-07 RX ADMIN — Medication 20 MILLIGRAM(S): at 23:42

## 2021-09-07 RX ADMIN — Medication 650 MILLIGRAM(S): at 19:20

## 2021-09-07 RX ADMIN — AZITHROMYCIN 500 MILLIGRAM(S): 500 TABLET, FILM COATED ORAL at 17:50

## 2021-09-07 RX ADMIN — Medication 100 MILLIGRAM(S): at 21:10

## 2021-09-07 RX ADMIN — Medication 650 MILLIGRAM(S): at 06:17

## 2021-09-07 RX ADMIN — FINASTERIDE 5 MILLIGRAM(S): 5 TABLET, FILM COATED ORAL at 14:22

## 2021-09-07 RX ADMIN — PANTOPRAZOLE SODIUM 40 MILLIGRAM(S): 20 TABLET, DELAYED RELEASE ORAL at 06:15

## 2021-09-07 RX ADMIN — Medication 3 MILLILITER(S): at 14:20

## 2021-09-07 RX ADMIN — SERTRALINE 25 MILLIGRAM(S): 25 TABLET, FILM COATED ORAL at 14:21

## 2021-09-07 RX ADMIN — Medication 3 MILLILITER(S): at 21:10

## 2021-09-07 RX ADMIN — Medication 3 MILLILITER(S): at 06:15

## 2021-09-07 RX ADMIN — Medication 400 MILLIGRAM(S): at 02:27

## 2021-09-07 RX ADMIN — Medication 650 MILLIGRAM(S): at 18:48

## 2021-09-07 NOTE — ED ADULT NURSE REASSESSMENT NOTE - NS ED NURSE REASSESS COMMENT FT1
pt was taken to CT scan on 100% NRB mask and cardiac monitor with continuous puls ox accompanied by MD, RN and ED tech; CT scans completed and returned to gold unit without incident; pt is alert and oriented to name and place.

## 2021-09-07 NOTE — H&P ADULT - PROBLEM SELECTOR PLAN 2
pt with R sided headache x weeks, no associated blurry vision, weakness, sensory changes, slurred speech; no focal neuro deficits on exam, unclear etiology at this time   - check CT head to r/o infarct, bleed, mass  - pain control   - consider inpt vs outpt neurology consult

## 2021-09-07 NOTE — H&P ADULT - NSHPLABSRESULTS_GEN_ALL_CORE
Labs, imaging and EKG personally reviewed and interpreted by me.   labs notable for normal WBC, baseline Hb ~10, Cr 1.46 (baseline ~1), normal LFTs   CXR reviewed and interpreted personally - no consolidations of effusions noted  EKG NSR, no acute ischemic changes, .                          10.1   4.74  )-----------( 234      ( 06 Sep 2021 20:10 )             32.2     09-06    141  |  104  |  34<H>  ----------------------------<  120<H>  4.9   |  26  |  1.46<H>    Ca    10.4      06 Sep 2021 20:10    TPro  7.3  /  Alb  4.4  /  TBili  0.3  /  DBili  x   /  AST  19  /  ALT  6<L>  /  AlkPhos  64  09-06        < from: Xray Chest 1 View- PORTABLE-Urgent (Xray Chest 1 View- PORTABLE-Urgent .) (09.06.21 @ 20:40) >    ******PRELIMINARY REPORT******              INTERPRETATION:  Clear lungs.    < end of copied text >

## 2021-09-07 NOTE — H&P ADULT - HISTORY OF PRESENT ILLNESS
87M with PMH of Parkinson's disease, HTN, CAD s/p CABG, T2DM, BPH p/w SOB and headache. Pt is [place] and poor historian, collateral obtained from daughter Nitesh at [phone].     Pt states he feels intermittently SOB, lasting 1-2 hours each, sometimes associated with cough; denies any CP, fevers or chills, endorses intermittent LE swelling. Currently also endorsing severe R sided frontal headache radiating to back of his head and neck, no neck stiffness, no blurry vision or light sensitivity.     Per dtr, pt having episodes of SOB for close to a year, where pt becomes very tachypneic, with episodes lasting 1-2 hours. Has been getting worse lately - she notes minimal coughing but hears occasional wheezing; occurs close to everyday to every other day; has nebulizers at home but pt has told family they do not help. Today family brought pt in for R sided headache - pt has been having intermittent headaches for past few weeks with associated dizziness per daughter, they have been getting increasingly more painful over the weeks and was especially worse today; family has noted noted any weakness, slurred speech; pt has poor gait 2/2 PD, has not noted any changes or changes in his ambulation     Pt's PCP thought pts SOB may be 2/2 emphysema and was referred to a pulmonologist, who they have only seen twice so far. Pt attempted PFTs last month, but per dtr wasn't able to complete the testing; still awaiting results and has not been officially diagnosed with any pulmonary process.

## 2021-09-07 NOTE — CONSULT NOTE ADULT - SUBJECTIVE AND OBJECTIVE BOX
CARDIOLOGY COVERING FOR DR. SANTOS    DATE OF SERVICE: 09-07-21    HISTORY OF PRESENT ILLNESS: HPI:  Patient is a 88 y/o male with PMH of Parkinson's disease, HTN, CAD s/p CABGx3 (2017), T2DM, BPH who presented with SOB and headaches. Cardiology consulted for further evaluation. Pt states he feels intermittently SOB, lasting 1-2 hours each, sometimes associated with cough. Also endorsing severe R sided frontal headache radiating to back of his head and neck, no neck stiffness, no blurry vision or light sensitivity. Per dtr, pt having episodes of SOB for close to a year, where pt becomes very tachypneic, with episodes lasting 1-2 hours. Has been getting worse lately - she notes minimal coughing but hears occasional wheezing; occurs close to everyday to every other day; has nebulizers at home but pt has told family they do not help. Today family brought pt in for R sided headache - pt has been having intermittent headaches for past few weeks with associated dizziness per daughter, they have been getting increasingly more painful over the weeks and was especially worse today; family has not noted any weakness, slurred speech; pt has poor gait 2/2 PD, has not noted any changes or changes in his ambulation. Pt's PCP thought pts SOB may be 2/2 emphysema and was referred to a pulmonologist, who they have only seen twice so far. Pt attempted PFTs last month, but per dtr wasn't able to complete the testing; still awaiting results and has not been officially diagnosed with any pulmonary process. Denies chest pain, palpitations, or syncope.    PAST MEDICAL & SURGICAL HISTORY:  Diabetes mellitus    Dyslipidemia    CAD (coronary artery disease)    Hypertension    Osteoporosis    Urinary frequency    Syncope    Benign prostatic hyperplasia    Parkinson disease    HTN (hypertension)    HLD (hyperlipidemia)    DM (diabetes mellitus)    CAD (coronary artery disease)    Hypospadias    S/P coronary artery stent placement in 2003    History of total left knee replacement  2014    S/P CABG (coronary artery bypass graft)  2015    History of knee replacement procedure of left knee  2017            MEDICATIONS:  MEDICATIONS  (STANDING):  albuterol/ipratropium for Nebulization 3 milliLiter(s) Nebulizer every 6 hours  atorvastatin 80 milliGRAM(s) Oral at bedtime  azithromycin   Tablet 500 milliGRAM(s) Oral daily  benzonatate 100 milliGRAM(s) Oral every 8 hours  budesonide 160 MICROgram(s)/formoterol 4.5 MICROgram(s) Inhaler 2 Puff(s) Inhalation two times a day  carbidopa/levodopa  25/100 1 Tablet(s) Oral three times a day  dextrose 40% Gel 15 Gram(s) Oral once  dextrose 5%. 1000 milliLiter(s) (50 mL/Hr) IV Continuous <Continuous>  dextrose 5%. 1000 milliLiter(s) (100 mL/Hr) IV Continuous <Continuous>  dextrose 50% Injectable 25 Gram(s) IV Push once  dextrose 50% Injectable 12.5 Gram(s) IV Push once  dextrose 50% Injectable 25 Gram(s) IV Push once  finasteride 5 milliGRAM(s) Oral daily  fluticasone propionate 50 MICROgram(s)/spray Nasal Spray 1 Spray(s) Both Nostrils two times a day  glucagon  Injectable 1 milliGRAM(s) IntraMuscular once  insulin lispro (ADMELOG) corrective regimen sliding scale   SubCutaneous three times a day before meals  insulin lispro (ADMELOG) corrective regimen sliding scale   SubCutaneous at bedtime  methylPREDNISolone sodium succinate Injectable 20 milliGRAM(s) IV Push every 6 hours  pantoprazole    Tablet 40 milliGRAM(s) Oral before breakfast  rOPINIRole 0.5 milliGRAM(s) Oral three times a day  sertraline 25 milliGRAM(s) Oral daily  tamsulosin 0.4 milliGRAM(s) Oral at bedtime      Allergies    No Known Allergies    Intolerances        FAMILY HISTORY:  Family history of coronary artery disease    No pertinent family history in first degree relatives  colon cancer      Non-contributary for premature coronary disease or sudden cardiac death    SOCIAL HISTORY:    [x ] Non-smoker  [ ] Smoker  [ ] Alcohol    FLU VACCINE THIS YEAR STARTS IN AUGUST:  [ ] Yes    [ ] No    IF OVER 65 HAVE YOU EVER HAD A PNA VACCINE:  [ ] Yes    [ ] No       [ ] N/A      REVIEW OF SYSTEMS:  [ ]chest pain  [x  ]shortness of breath  [  ]palpitations  [  ]syncope  [ ]near syncope [ ]upper extremity weakness   [ ] lower extremity weakness  [  ]diplopia  [  ]altered mental status   [  ]fevers  [ ]chills [ ]nausea  [ ]vomitting  [  ]dysphagia    [ ]abdominal pain  [ ]melena  [ ]BRBPR    [  ]epistaxis  [  ]rash    [ ]lower extremity edema    +headache    [X] All others negative	  [ ] Unable to obtain      LABS:	 	    CARDIAC MARKERS:                              10.1   4.74  )-----------( 234      ( 06 Sep 2021 20:10 )             32.2     Hb Trend: 10.1<--    09-06    141  |  104  |  34<H>  ----------------------------<  120<H>  4.9   |  26  |  1.46<H>    Ca    10.4      06 Sep 2021 20:10    TPro  7.3  /  Alb  4.4  /  TBili  0.3  /  DBili  x   /  AST  19  /  ALT  6<L>  /  AlkPhos  64  09-06    Creatinine Trend: 1.46<--    Coags:      proBNP: Serum Pro-Brain Natriuretic Peptide: 566 pg/mL (09-06 @ 20:10)    Lipid Profile:   HgA1c:   TSH:         PHYSICAL EXAM:  T(C): 36.6 (09-07-21 @ 16:05), Max: 36.7 (09-06-21 @ 18:07)  HR: 70 (09-07-21 @ 16:05) (54 - 105)  BP: 145/65 (09-07-21 @ 16:05) (123/53 - 178/73)  RR: 20 (09-07-21 @ 16:05) (16 - 72)  SpO2: 99% (09-07-21 @ 16:05) (98% - 100%)  Wt(kg): --   BMI (kg/m2): 46.3 (09-06-21 @ 18:07)  I&O's Summary      Gen: Appears well in NAD  HEENT:  (-)icterus (-)pallor  CV: N S1 S2 1/6 JACK (+)2 Pulses B/l  Resp:  Clear to auscultation B/L, normal effort  GI: (+) BS Soft, NT, ND  Lymph:  (-)Edema, (-)obvious lymphadenopathy  Skin: Warm to touch, Normal turgor  Psych: Appropriate mood and affect      TELEMETRY:  	      ECG: NSR, LVH, no acute ST-T changes 	    RADIOLOGY:         CXR: Clear Lungs      < from: CT Angio Chest PE Protocol w/ IV Cont (09.07.21 @ 01:58) >  IMPRESSION:  No pulmonary embolism up to the level of the segmental branches.    Stable mild upper lobe predominant groundglass superimposed on air trapping.    < end of copied text >    ASSESSMENT/PLAN: Patient is a 88 y/o male with PMH of Parkinson's disease, HTN, CAD s/p CABGx3 (2017), T2DM, BPH who presented with SOB and headaches. Cardiology consulted for further evaluation.    - Stable indeterminate troponin with no chest pain or acute ischemic EKG changes - do not suspect ACS  - No evidence of clinical HF or anginal symptoms  - CTA chest negative for PE  - Pulm follow up noted  - Check TTE to r/o structural abnormalities    Eagle Trujillo PA-C  Pager: [phone]  
CHIEF COMPLAINT:     HPI:  86 year old male with past medical history of Parkinson's disease, diabetes mellitus, hypertension, hyperlipidemia, CAD s/p CABG, and BPH presented with SOB, acute on chronic.         Patient has SOB x months, but worsened in the last week.   ED course: Admission VS: 98F, , /53, RR 22. While in ED, RR increased to 60s-70s. Patient was placed on BiPAP, FiO2 40%, I/O 10/5, RR12. ABG pH 7.44, pCO2 27, pO2 105. Per discussion with ED team, patient always alert and oriented, and declared himself DNI, family agreed and bringing in paper work.         MICU consulted for respiratory distress.     FAMILY HISTORY:  Family history of coronary artery disease    No pertinent family history in first degree relatives  colon cancer        SOCIAL HISTORY:  Smoking: __ packs x ___ years  EtOH Use:  Marital Status:  Occupation:  Recent Travel:  Country of Birth:  Advance Directives:    Allergies    No Known Allergies    Intolerances        HOME MEDICATIONS:    REVIEW OF SYSTEMS:  General: No fever/chills  CV: no chest pain  Pulm: no SOB, patient reports uncomfortable with the BiPAP mask and would like it to be removed   Abd: no abdominal pain  Psych: + anxiety     OBJECTIVE:  ICU Vital Signs Last 24 Hrs  T(C): 36.7 (06 Sep 2021 19:40), Max: 36.7 (06 Sep 2021 18:07)  T(F): 98.1 (06 Sep 2021 19:40), Max: 98.1 (06 Sep 2021 19:40)  HR: 55 (06 Sep 2021 21:30) (54 - 105)  BP: 168/56 (06 Sep 2021 21:30) (123/53 - 178/73)  BP(mean): --  ABP: --  ABP(mean): --  RR: 56 (06 Sep 2021 21:30) (22 - 72)  SpO2: 100% (06 Sep 2021 21:30) (98% - 100%)        CAPILLARY BLOOD GLUCOSE      POCT Blood Glucose.: 126 mg/dL (06 Sep 2021 19:31)      PHYSICAL EXAM:  GENERAL: Patient sitting up, eyes open spontaneously, no acute distress   HEAD:  Atraumatic, Normocephalic  EYES: EOMI, PERRLA, conjunctiva and sclera clear  NECK: Supple  CHEST/LUNG: Tachypnea in 30s, no use of accessory muscle while not talking, able to speak in complete sentences, no wheezes/crackles   HEART: Tachycardiac, no murmur/gallop  ABDOMEN: Soft, Nontender, Nondistended; Bowel sounds present  EXTREMITIES:  No lower extremity edema   PSYCH: AAOx3  NEUROLOGY: alert, eyes open spontaneously , moves 4 extremities spontaneously       HOSPITAL MEDICATIONS:  MEDICATIONS  (STANDING):    MEDICATIONS  (PRN):      LABS:                        10.1   4.74  )-----------( 234      ( 06 Sep 2021 20:10 )             32.2     09-06    141  |  104  |  34<H>  ----------------------------<  120<H>  4.9   |  26  |  1.46<H>    Ca    10.4      06 Sep 2021 20:10    TPro  7.3  /  Alb  4.4  /  TBili  0.3  /  DBili  x   /  AST  19  /  ALT  6<L>  /  AlkPhos  64  09-06        Arterial Blood Gas:  09-06 @ 20:17  7.44/38/105/26/96.9/1.6  ABG lactate: --        MICROBIOLOGY:     RADIOLOGY:  < from: Xray Chest 1 View- PORTABLE-Urgent (Xray Chest 1 View- PORTABLE-Urgent .) (09.06.21 @ 20:40) >  INTERPRETATION:  Clear lungs.  < end of copied text >      EKG:
  09-07-21 @ 13:23    Patient is a 87y old  Male who presents with a chief complaint of SOB (07 Sep 2021 00:41)      HPI:  87M with PMH of Parkinson's disease, HTN, CAD s/p CABG, T2DM, BPH p/w SOB and headache. Pt is [place] and poor historian, collateral obtained from daughter Nitesh at [phone].     Pt states he feels intermittently SOB, lasting 1-2 hours each, sometimes associated with cough; denies any CP, fevers or chills, endorses intermittent LE swelling. Currently also endorsing severe R sided frontal headache radiating to back of his head and neck, no neck stiffness, no blurry vision or light sensitivity.     Per dtr, pt having episodes of SOB for close to a year, where pt becomes very tachypneic, with episodes lasting 1-2 hours. Has been getting worse lately - she notes minimal coughing but hears occasional wheezing; occurs close to everyday to every other day; has nebulizers at home but pt has told family they do not help. Today family brought pt in for R sided headache - pt has been having intermittent headaches for past few weeks with associated dizziness per daughter, they have been getting increasingly more painful over the weeks and was especially worse today; family has noted noted any weakness, slurred speech; pt has poor gait 2/2 PD, has not noted any changes or changes in his ambulation     Pt's PCP thought pts SOB may be 2/2 emphysema and was referred to a pulmonologist, who they have only seen twice so far. Pt attempted PFTs last month, but per dtr wasn't able to complete the testing; still awaiting results and has not been officially diagnosed with any pulmonary process.  (07 Sep 2021 00:41)    per daughter: he has SOB and breathing fast and got worse in last few weeks: He has rt sided head pain and he walks at home: he gets SOB on exertion:   He does not use oxygen at home:  the daughter took him to pulmonologist?; tok him once: pulm : he could not do pft: he gave him a pump: albuterol prn  : she isnot sure if he is taking inhalers:  He used to smoke: he quit 40 years ago:   he got Pfizer covid vaccine:   he was not having fever: his chr cough : he has emphysema  He never had tb:     he was diagnosed with copd by his pcp Fernando Tapia    ?FOLLOWING PRESENT  [x ] Hx of PE/DVT, [y ] Hx COPD, [x ] Hx of Asthma, [ y] Hx of Hospitalization, [x ]  Hx of BiPAP/CPAP use, [ x] Hx of PEYMAN    Allergies    No Known Allergies    Intolerances        PAST MEDICAL & SURGICAL HISTORY:  Diabetes mellitus    Dyslipidemia    CAD (coronary artery disease)    Hypertension    Osteoporosis    Urinary frequency    Syncope    Benign prostatic hyperplasia    Parkinson disease    HTN (hypertension)    HLD (hyperlipidemia)    DM (diabetes mellitus)    CAD (coronary artery disease)    Hypospadias    S/P coronary artery stent placement in 2003    History of total left knee replacement  2014    S/P CABG (coronary artery bypass graft)  2015    History of knee replacement procedure of left knee  2017        FAMILY HISTORY:  Family history of coronary artery disease    No pertinent family history in first degree relatives  colon cancer        Social History: [ ex smoker ] TOBACCO                  [x  ] ETOH                                 [ x ] IVDA/DRUGS    REVIEW OF SYSTEMS      General:	x    Skin/Breast:x  	  Ophthalmologic:x  	  ENMT:	x    Respiratory and Thorax:  cough, WORKMAN   	  Cardiovascular:	  x  Gastrointestinal:	x    Genitourinary:	  x  Musculoskeletal:	x    Neurological:	x    Psychiatric:	x    Hematology/Lymphatics:	x    Endocrine:	x    Allergic/Immunologic:	x    MEDICATIONS  (STANDING):  albuterol/ipratropium for Nebulization 3 milliLiter(s) Nebulizer every 6 hours  atorvastatin 80 milliGRAM(s) Oral at bedtime  budesonide 160 MICROgram(s)/formoterol 4.5 MICROgram(s) Inhaler 2 Puff(s) Inhalation two times a day  carbidopa/levodopa  25/100 1 Tablet(s) Oral three times a day  dextrose 40% Gel 15 Gram(s) Oral once  dextrose 5%. 1000 milliLiter(s) (50 mL/Hr) IV Continuous <Continuous>  dextrose 5%. 1000 milliLiter(s) (100 mL/Hr) IV Continuous <Continuous>  dextrose 50% Injectable 25 Gram(s) IV Push once  dextrose 50% Injectable 12.5 Gram(s) IV Push once  dextrose 50% Injectable 25 Gram(s) IV Push once  finasteride 5 milliGRAM(s) Oral daily  glucagon  Injectable 1 milliGRAM(s) IntraMuscular once  insulin lispro (ADMELOG) corrective regimen sliding scale   SubCutaneous three times a day before meals  insulin lispro (ADMELOG) corrective regimen sliding scale   SubCutaneous at bedtime  pantoprazole    Tablet 40 milliGRAM(s) Oral before breakfast  rOPINIRole 0.5 milliGRAM(s) Oral three times a day  sertraline 25 milliGRAM(s) Oral daily  tamsulosin 0.4 milliGRAM(s) Oral at bedtime    MEDICATIONS  (PRN):  acetaminophen   Tablet .. 650 milliGRAM(s) Oral every 6 hours PRN Temp greater or equal to 38.5C (101.3F), Mild Pain (1 - 3)  ondansetron Injectable 4 milliGRAM(s) IV Push every 8 hours PRN Nausea and/or Vomiting       Vital Signs Last 24 Hrs  T(C): 36.7 (07 Sep 2021 09:09), Max: 36.7 (06 Sep 2021 18:07)  T(F): 98.1 (07 Sep 2021 09:09), Max: 98.1 (06 Sep 2021 19:40)  HR: 55 (07 Sep 2021 09:09) (54 - 105)  BP: 127/65 (07 Sep 2021 09:09) (123/53 - 178/73)  BP(mean): --  RR: 23 (07 Sep 2021 09:09) (16 - 72)  SpO2: 99% (07 Sep 2021 09:09) (98% - 100%)Orthostatic VS          I&O's Summary      Physical Exam:   GENERAL: NAD, well-groomed, well-developed  HEENT: CRIS/   Atraumatic, Normocephalic  ENMT: No tonsillar erythema, exudates, or enlargement; Moist mucous membranes, Good dentition, No lesions  NECK: Supple, No JVD, Normal thyroid  CHEST/LUNG: poor air entry bilaterally:   CVS: Regular rate and rhythm; No murmurs, rubs, or gallops  GI: : Soft, Nontender, Nondistended; Bowel sounds present  NERVOUS SYSTEM:  Alert & Oriented X3  EXTREMITIES: - edema  LYMPH: No lymphadenopathy noted  SKIN: No rashes or lesions  ENDOCRINOLOGY: No Thyromegaly  PSYCH: Appropriate    Labs:  ABG - ( 06 Sep 2021 20:17 )  pH, Arterial: 7.44  pH, Blood: x     /  pCO2: 38    /  pO2: 105   / HCO3: 26    / Base Excess: 1.6   /  SaO2: 96.9            2.9<50<4>>35<<7.375>>2.9<<3><<4><<5<<359>>SARS-CoV-2: NotDetec (06 Sep 2021 20:22)                              10.1   4.74  )-----------( 234      ( 06 Sep 2021 20:10 )             32.2     09-06    141  |  104  |  34<H>  ----------------------------<  120<H>  4.9   |  26  |  1.46<H>    Ca    10.4      06 Sep 2021 20:10    TPro  7.3  /  Alb  4.4  /  TBili  0.3  /  DBili  x   /  AST  19  /  ALT  6<L>  /  AlkPhos  64  09-06    CAPILLARY BLOOD GLUCOSE      POCT Blood Glucose.: 123 mg/dL (07 Sep 2021 13:07)  POCT Blood Glucose.: 100 mg/dL (07 Sep 2021 08:58)  POCT Blood Glucose.: 126 mg/dL (06 Sep 2021 19:31)    LIVER FUNCTIONS - ( 06 Sep 2021 20:10 )  Alb: 4.4 g/dL / Pro: 7.3 g/dL / ALK PHOS: 64 U/L / ALT: 6 U/L / AST: 19 U/L / GGT: x               D DImer  Serum Pro-Brain Natriuretic Peptide: 566 pg/mL (09-06 @ 20:10)      Studies  Chest X-RAY  CT SCAN Chest   CT Abdomen  Venous Dopplers: LE:   Others      r< from: CT Angio Chest PE Protocol w/ IV Cont (09.07.21 @ 01:58) >    LUNGS AND AIRWAYS: Patent central airways.  Similar mild bilateral groundglass with upper lung preference bilaterally, superimposed on mosaic perfusion likely secondary to air trapping.  PLEURA: No pleural effusion.  MEDIASTINUM AND SHERI: No lymphadenopathy.  VESSELS: No main, left main, right main, lobar, or segmental pulmonary embolism. Evaluation of subsegmental pulmonary arteriesis limited secondary to respiratory motion. Aortic and coronary artery calcifications. CABG.  HEART: Cardiomegaly. No pericardial effusion.  CHEST WALL AND LOWER NECK: Within normal limits.  VISUALIZED UPPER ABDOMEN: Redemonstrated partially imaged right adrenal adenoma.  BONES: Median sternotomy. Degenerative changes.    IMPRESSION:  No pulmonary embolism up to the level of the segmental branches.    Stable mild upper lobe predominant groundglass superimposed on air trapping.            --- End of Report ---    < end of copied text >  < from: CT Chest No Cont (12.26.20 @ 10:34) >    INTERPRETATION:  CLINICAL INFORMATION: Fever, positive respiratory viral panel, evaluate for pneumonia.    COMPARISON: CT chest 1/27/2017. CT abdomen pelvis 12/24/2020.    PROCEDURE:  CT of the Chest was performed without intravenous contrast.  Sagittal and coronal reformats were performed.    FINDINGS: The quality of the images are degraded by respiratory motion.    LUNGS AND AIRWAYS: Patent central airways.  Mild groundglass and linear opacities within the upper lungs. No lobar consolidation.  PLEURA: No pleural effusion.  MEDIASTINUM AND SHERI: No lymphadenopathy.  VESSELS: Aortic and coronary artery calcifications. CABG.  HEART: Cardiomegaly. No pericardialeffusion.  CHEST WALL AND LOWER NECK: Within normal limits.  VISUALIZED UPPER ABDOMEN: Right adrenal adenoma    BONES: Median sternotomy. Degenerative changes.    IMPRESSION:    Mild linear and groundglass opacities within the upper lungs may represent atypical/viral infection or pneumonitis..    < end of copied text >

## 2021-09-07 NOTE — CONSULT NOTE ADULT - PROBLEM SELECTOR RECOMMENDATION 9
he came in with chr sob and chr cough: he was diagnosed with co[pd by his PCP: he takes albuterol prn : but daughter is not sure if he takes it regularly: he attempted to do full PFT once but was not successful: hei snot wheezing much but feels SOB: cta is with resp motion: there is no PE on ct : will start cough rx as well we copd rx:  He should also be checked for speech and swallow: His ABG is pretty good: start flonase

## 2021-09-07 NOTE — H&P ADULT - NSHPREVIEWOFSYSTEMS_GEN_ALL_CORE
REVIEW OF SYSTEMS:    CONSTITUTIONAL: No fevers, chills  EYES/ENT: No visual changes;  no throat pain   NECK: No pain or stiffness  RESPIRATORY: +cough, wheezing, +shortness of breath  CARDIOVASCULAR: No chest pain or palpitations  GASTROINTESTINAL: no nausea, vomiting, no abdominal pain, no BRBPR  GENITOURINARY: no polyuria, no dysuria  NEUROLOGICAL: no numbness, + headaches, no confusion   MUSCULOSKELETAL: no back pain, no focal weakness   SKIN: No itching, burning, rashes, or lesions   PSYCH: no anxiety, depression  HEME: no gum bleeding, no bruising

## 2021-09-07 NOTE — H&P ADULT - PROBLEM SELECTOR PLAN 6
pt's daughter unable to provide medication lists when called overnight - will ask pharmacy team to help complete med rec in AM

## 2021-09-07 NOTE — H&P ADULT - PROBLEM SELECTOR PLAN 4
check A1c  hold oral home medications  start low ISS and monitor FS ac and hs  confirm med rec in AM

## 2021-09-07 NOTE — CHART NOTE - NSCHARTNOTEFT_GEN_A_CORE
comfortable on the stretcher  d/w cardio/pulm re: further optimization  unexplained intermittent SOB - ?anxiety - daughter denies  DNR DNI  ?need further steroids/lasix    f/up cardio/pulm recs  possible dc in 1-2 days to home  d/w daughter the plan of care    plan of care discussed with floor NP    Jayden Lu MD, MHA, FACP, [facility]  Pager: [phone]  If no response or off-hours, page [phone]

## 2021-09-07 NOTE — H&P ADULT - NSHPPHYSICALEXAM_GEN_ALL_CORE
Vital Signs Last 24 Hrs  T(C): 36.7 (06 Sep 2021 19:40), Max: 36.7 (06 Sep 2021 18:07)  T(F): 98.1 (06 Sep 2021 19:40), Max: 98.1 (06 Sep 2021 19:40)  HR: 56 (06 Sep 2021 22:30) (54 - 105)  BP: 158/51 (06 Sep 2021 22:30) (123/53 - 178/73)  BP(mean): --  RR: 20 (06 Sep 2021 22:30) (20 - 72)  SpO2: 100% (06 Sep 2021 22:30) (98% - 100%)    PHYSICAL EXAM:  GENERAL: NAD, well-developed  HEAD:  Atraumatic, normocephalic  EYES: EOMI, conjunctiva and sclera clear  NECK: Supple, no JVD  CHEST/LUNG: bibasilar crackles, no wheezing, +mild tachypnea   HEART: Regular rate and rhythm; + murmurs  ABDOMEN: Soft, nontender, nondistended; bowel sounds present  EXTREMITIES:  2+ Peripheral Pulses, trace b/l LE edema  PSYCH: calm affect, not anxious  NEUROLOGY: non-focal, AAOx3  SKIN: No rashes or lesions  MUSCULOSKELETAL: no joint swelling or tenderness

## 2021-09-07 NOTE — H&P ADULT - ASSESSMENT
87M with PMH of Parkinson's disease, CAD s/p CABG, T2DM, BPH p/w SOB and headache, noted to be severely tachypneic in ED requiring BiPAP, admitted for further work-up.

## 2021-09-07 NOTE — CONSULT NOTE ADULT - ASSESSMENT
86 year old male with past medical history of Parkinson's disease, diabetes mellitus, hypertension, hyperlipidemia, CAD s/p CABG, and BPH presented with SOB, acute on chronic.     MICU consulted for respiratory distress.     Impressions:   - patient respiratory status improved with BiPAP. Now able to speak in complete sentences, alert, oriented x3, RR 30s, no use of accessory muscles while at rest. ABG reviewed, pH 7.44, pCO2 27, pO2 105. CXR reviewed, no focal consolidations/pneumothorax. RVP negative. COVID negative   - of note ,Per discussion with ED team, patient always alert and oriented, and declared himself DNI, family agreed and bringing in paper work.       Recommendations:   - f/u CT chest angio   - c/w BiPAP  - admit to medicine, CTM  - patient's respiratory status improved significantly with BiPAP, not a MICU candidate currently. Also DNI.   - please reconsult as necessary. 
Patient seen and examined, agree with above assessment and plan as transcribed above.    - check TTE  - Suspect primary pulmonary process    Britton Thayer MD, [facility]  BEEPER [phone]  
87M with PMH of Parkinson's disease, CAD s/p CABG, T2DM, BPH p/w SOB and headache, noted to be severely tachypneic in ED requiring BiPAP, admitted for further work-up.

## 2021-09-07 NOTE — H&P ADULT - PROBLEM SELECTOR PLAN 1
pt with acute on chronic SOB x 1 year, slowly progressing per family; noted to be severely tachypneic in ED and placed on BiPAP for increased WOB; no noted hypoxia and ABF showing no hypercarbia. Unclear etiology - ddx includes heart failure, PE, COPD, restrictive/obstructive lung disease in setting of PD, ILD.   - currently on 10/5 and 40% FiO2, appears comfortable - wean off BiPAP as tolerated, supplemental O2 as needed    - check TTE to eval for HF, valvular disease   - check CTA to r/o PE and any other pulm process   - s/p lasix 40 IV in ED, looks euvolemic at this time, defer further lasix for now   - pulmonary consult (to be called by dayteam)   - would benefit from full pulmonary function testing as outpt   - c/w nebulizers, Symbicort for now

## 2021-09-08 ENCOUNTER — TRANSCRIPTION ENCOUNTER (OUTPATIENT)
Age: 86
End: 2021-09-08

## 2021-09-08 DIAGNOSIS — Z87.09 PERSONAL HISTORY OF OTHER DISEASES OF THE RESPIRATORY SYSTEM: ICD-10-CM

## 2021-09-08 DIAGNOSIS — R06.02 SHORTNESS OF BREATH: ICD-10-CM

## 2021-09-08 LAB
A1C WITH ESTIMATED AVERAGE GLUCOSE RESULT: 6.2 % — HIGH (ref 4–5.6)
ANION GAP SERPL CALC-SCNC: 14 MMOL/L — SIGNIFICANT CHANGE UP (ref 5–17)
BUN SERPL-MCNC: 38 MG/DL — HIGH (ref 7–23)
CALCIUM SERPL-MCNC: 10.3 MG/DL — SIGNIFICANT CHANGE UP (ref 8.4–10.5)
CHLORIDE SERPL-SCNC: 100 MMOL/L — SIGNIFICANT CHANGE UP (ref 96–108)
CO2 SERPL-SCNC: 22 MMOL/L — SIGNIFICANT CHANGE UP (ref 22–31)
COVID-19 SPIKE DOMAIN AB INTERP: POSITIVE
COVID-19 SPIKE DOMAIN ANTIBODY RESULT: 149 U/ML — HIGH
CREAT SERPL-MCNC: 1.24 MG/DL — SIGNIFICANT CHANGE UP (ref 0.5–1.3)
ESTIMATED AVERAGE GLUCOSE: 131 MG/DL — HIGH (ref 68–114)
GLUCOSE BLDC GLUCOMTR-MCNC: 159 MG/DL — HIGH (ref 70–99)
GLUCOSE BLDC GLUCOMTR-MCNC: 174 MG/DL — HIGH (ref 70–99)
GLUCOSE BLDC GLUCOMTR-MCNC: 199 MG/DL — HIGH (ref 70–99)
GLUCOSE SERPL-MCNC: 155 MG/DL — HIGH (ref 70–99)
POTASSIUM SERPL-MCNC: 4.6 MMOL/L — SIGNIFICANT CHANGE UP (ref 3.5–5.3)
POTASSIUM SERPL-SCNC: 4.6 MMOL/L — SIGNIFICANT CHANGE UP (ref 3.5–5.3)
SARS-COV-2 IGG+IGM SERPL QL IA: 149 U/ML — HIGH
SARS-COV-2 IGG+IGM SERPL QL IA: POSITIVE
SODIUM SERPL-SCNC: 136 MMOL/L — SIGNIFICANT CHANGE UP (ref 135–145)

## 2021-09-08 PROCEDURE — 99232 SBSQ HOSP IP/OBS MODERATE 35: CPT

## 2021-09-08 PROCEDURE — 93306 TTE W/DOPPLER COMPLETE: CPT | Mod: 26

## 2021-09-08 RX ORDER — ENOXAPARIN SODIUM 100 MG/ML
40 INJECTION SUBCUTANEOUS DAILY
Refills: 0 | Status: DISCONTINUED | OUTPATIENT
Start: 2021-09-08 | End: 2021-09-09

## 2021-09-08 RX ORDER — SENNA PLUS 8.6 MG/1
2 TABLET ORAL AT BEDTIME
Refills: 0 | Status: DISCONTINUED | OUTPATIENT
Start: 2021-09-08 | End: 2021-09-09

## 2021-09-08 RX ORDER — POLYETHYLENE GLYCOL 3350 17 G/17G
17 POWDER, FOR SOLUTION ORAL DAILY
Refills: 0 | Status: DISCONTINUED | OUTPATIENT
Start: 2021-09-08 | End: 2021-09-09

## 2021-09-08 RX ADMIN — Medication 1 SPRAY(S): at 18:23

## 2021-09-08 RX ADMIN — FINASTERIDE 5 MILLIGRAM(S): 5 TABLET, FILM COATED ORAL at 12:10

## 2021-09-08 RX ADMIN — CARBIDOPA AND LEVODOPA 1 TABLET(S): 25; 100 TABLET ORAL at 05:08

## 2021-09-08 RX ADMIN — ROPINIROLE 0.5 MILLIGRAM(S): 8 TABLET, FILM COATED, EXTENDED RELEASE ORAL at 22:06

## 2021-09-08 RX ADMIN — ROPINIROLE 0.5 MILLIGRAM(S): 8 TABLET, FILM COATED, EXTENDED RELEASE ORAL at 05:07

## 2021-09-08 RX ADMIN — BUDESONIDE AND FORMOTEROL FUMARATE DIHYDRATE 2 PUFF(S): 160; 4.5 AEROSOL RESPIRATORY (INHALATION) at 18:23

## 2021-09-08 RX ADMIN — PANTOPRAZOLE SODIUM 40 MILLIGRAM(S): 20 TABLET, DELAYED RELEASE ORAL at 05:12

## 2021-09-08 RX ADMIN — CARBIDOPA AND LEVODOPA 1 TABLET(S): 25; 100 TABLET ORAL at 22:07

## 2021-09-08 RX ADMIN — ATORVASTATIN CALCIUM 80 MILLIGRAM(S): 80 TABLET, FILM COATED ORAL at 22:06

## 2021-09-08 RX ADMIN — SERTRALINE 25 MILLIGRAM(S): 25 TABLET, FILM COATED ORAL at 12:10

## 2021-09-08 RX ADMIN — Medication 100 MILLIGRAM(S): at 13:56

## 2021-09-08 RX ADMIN — Medication 1: at 12:57

## 2021-09-08 RX ADMIN — Medication 20 MILLIGRAM(S): at 18:22

## 2021-09-08 RX ADMIN — SENNA PLUS 2 TABLET(S): 8.6 TABLET ORAL at 22:05

## 2021-09-08 RX ADMIN — Medication 650 MILLIGRAM(S): at 10:57

## 2021-09-08 RX ADMIN — Medication 3 MILLILITER(S): at 08:29

## 2021-09-08 RX ADMIN — ENOXAPARIN SODIUM 40 MILLIGRAM(S): 100 INJECTION SUBCUTANEOUS at 12:11

## 2021-09-08 RX ADMIN — Medication 20 MILLIGRAM(S): at 23:38

## 2021-09-08 RX ADMIN — POLYETHYLENE GLYCOL 3350 17 GRAM(S): 17 POWDER, FOR SOLUTION ORAL at 12:11

## 2021-09-08 RX ADMIN — BUDESONIDE AND FORMOTEROL FUMARATE DIHYDRATE 2 PUFF(S): 160; 4.5 AEROSOL RESPIRATORY (INHALATION) at 05:11

## 2021-09-08 RX ADMIN — Medication 650 MILLIGRAM(S): at 10:47

## 2021-09-08 RX ADMIN — Medication 3 MILLILITER(S): at 13:56

## 2021-09-08 RX ADMIN — AZITHROMYCIN 500 MILLIGRAM(S): 500 TABLET, FILM COATED ORAL at 12:10

## 2021-09-08 RX ADMIN — Medication 100 MILLIGRAM(S): at 05:08

## 2021-09-08 RX ADMIN — Medication 100 MILLIGRAM(S): at 22:05

## 2021-09-08 RX ADMIN — Medication 1: at 09:01

## 2021-09-08 RX ADMIN — ROPINIROLE 0.5 MILLIGRAM(S): 8 TABLET, FILM COATED, EXTENDED RELEASE ORAL at 13:53

## 2021-09-08 RX ADMIN — CARBIDOPA AND LEVODOPA 1 TABLET(S): 25; 100 TABLET ORAL at 13:56

## 2021-09-08 RX ADMIN — TAMSULOSIN HYDROCHLORIDE 0.4 MILLIGRAM(S): 0.4 CAPSULE ORAL at 22:06

## 2021-09-08 RX ADMIN — Medication 20 MILLIGRAM(S): at 05:11

## 2021-09-08 RX ADMIN — Medication 1: at 18:00

## 2021-09-08 RX ADMIN — Medication 3 MILLILITER(S): at 22:07

## 2021-09-08 RX ADMIN — Medication 1 SPRAY(S): at 05:07

## 2021-09-08 RX ADMIN — Medication 20 MILLIGRAM(S): at 12:09

## 2021-09-08 NOTE — DISCHARGE NOTE PROVIDER - NSDCMRMEDTOKEN_GEN_ALL_CORE_FT
Aspirin Enteric Coated 81 mg oral delayed release tablet: 1 tab(s) orally once a day  atorvastatin 20 mg oral tablet: 1 tab(s) orally once a day  NOTE: DAUGHTER MENTIONED CRESTOR 20MG BUT AS PER PHARMACIST D/C&#x27;ED IN MARCH AND PT HAS BEEN FILLING LIPITOR 20MG.  carbidopa-levodopa 25 mg-100 mg oral tablet: 1 tab(s) orally 3 times a day  docusate sodium 100 mg oral capsule: 1 cap(s) orally 2 times a day  finasteride 5 mg oral tablet: 1 tab(s) orally once a day  Januvia 100 mg oral tablet: 1 tab(s) orally once a day  Lasix 20 mg oral tablet: 1 tab(s) orally once a day, As Needed  multivitamin: 1 tab(s) orally once a day  Potassium Chloride (Eqv-Klor-Con 10) 10 mEq oral tablet, extended release: 1 tab(s) orally once a day  Senna 8.6 mg oral tablet: 1 tab(s) orally once a day (at bedtime), As Needed  Symbicort 160 mcg-4.5 mcg/inh inhalation aerosol: 2 puff(s) inhaled 2 times a day  tamsulosin 0.4 mg oral capsule: 1 cap(s) orally once a day  Tylenol 325 mg oral tablet:    atorvastatin 80 mg oral tablet: 1 tab(s) orally once a day (at bedtime)  carbidopa-levodopa 25 mg-100 mg oral tablet: 1 tab(s) orally 3 times a day  docusate sodium 100 mg oral capsule: 1 cap(s) orally 2 times a day  finasteride 5 mg oral tablet: 1 tab(s) orally once a day  fluticasone 50 mcg/inh nasal spray: 1 spray(s) nasal 2 times a day  Januvia 100 mg oral tablet: 1 tab(s) orally once a day  Lasix 20 mg oral tablet: 1 tab(s) orally once a day, As Needed  multivitamin: 1 tab(s) orally once a day  pantoprazole 40 mg oral delayed release tablet: 1 tab(s) orally once a day (before a meal)  Potassium Chloride (Eqv-Klor-Con 10) 10 mEq oral tablet, extended release: 1 tab(s) orally once a day  predniSONE 20 mg oral tablet: 1 tab(s) orally 2 times a day  Senna 8.6 mg oral tablet: 1 tab(s) orally once a day (at bedtime), As Needed  sertraline 25 mg oral tablet: 1 tab(s) orally once a day  Symbicort 160 mcg-4.5 mcg/inh inhalation aerosol: 2 puff(s) inhaled 2 times a day  tamsulosin 0.4 mg oral capsule: 1 cap(s) orally once a day (at bedtime)  Tylenol 325 mg oral tablet:    atorvastatin 80 mg oral tablet: 1 tab(s) orally once a day (at bedtime)  benzonatate 100 mg oral capsule: 1 cap(s) orally every 8 hours  carbidopa-levodopa 25 mg-100 mg oral tablet: 1 tab(s) orally 3 times a day  docusate sodium 100 mg oral capsule: 1 cap(s) orally 2 times a day  finasteride 5 mg oral tablet: 1 tab(s) orally once a day  fluticasone 50 mcg/inh nasal spray: 1 spray(s) nasal 2 times a day  Januvia 100 mg oral tablet: 1 tab(s) orally once a day  Lasix 20 mg oral tablet: 1 tab(s) orally once a day, As Needed  multivitamin: 1 tab(s) orally once a day  pantoprazole 40 mg oral delayed release tablet: 1 tab(s) orally once a day (before a meal)  Potassium Chloride (Eqv-Klor-Con 10) 10 mEq oral tablet, extended release: 1 tab(s) orally once a day  predniSONE 20 mg oral tablet: 1 tab(s) orally 2 times a day  rOPINIRole 0.5 mg oral tablet: 1 tab(s) orally 3 times a day  Senna 8.6 mg oral tablet: 1 tab(s) orally once a day (at bedtime), As Needed  sertraline 25 mg oral tablet: 1 tab(s) orally once a day  Symbicort 160 mcg-4.5 mcg/inh inhalation aerosol: 2 puff(s) inhaled 2 times a day  tamsulosin 0.4 mg oral capsule: 1 cap(s) orally once a day (at bedtime)  Tylenol 325 mg oral tablet:

## 2021-09-08 NOTE — PROGRESS NOTE ADULT - PROBLEM SELECTOR PLAN 2
pt with R sided headache x weeks, no associated blurry vision, weakness, sensory changes, slurred speech; no focal neuro deficits on exam, unclear etiology at this time   - check CT head to r/o infarct, bleed, mass - negative  - pain control

## 2021-09-08 NOTE — SWALLOW BEDSIDE ASSESSMENT ADULT - SLP PERTINENT HISTORY OF CURRENT PROBLEM
Pt states he feels intermittently SOB, lasting 1-2 hours each, sometimes associated with cough and wheezin; denies any CP, fevers, or chills, endorses intermittent LE swelling. Currently also endorsing severe R sided frontal headache radiating to back of his head and neck, no neck stiffness, no blurry vision, or light sensitivity. Pt's PCP thought pts SOB may be 2/2 emphysema and was referred to a pulmonologist, who they have only seen twice so far. Pt attempted PFTs last month, but per dtr wasn't able to complete the testing; still awaiting results and has not been officially diagnosed with any pulmonary process.

## 2021-09-08 NOTE — DISCHARGE NOTE PROVIDER - NSDCCPCAREPLAN_GEN_ALL_CORE_FT
PRINCIPAL DISCHARGE DIAGNOSIS  Diagnosis: Difficulty breathing  Assessment and Plan of Treatment: Continue present medication regimen.   Follow up with PMD next week.      SECONDARY DISCHARGE DIAGNOSES  Diagnosis: Headache  Assessment and Plan of Treatment: Resolved.     PRINCIPAL DISCHARGE DIAGNOSIS  Diagnosis: Difficulty breathing  Assessment and Plan of Treatment: Stable. Please take your antibiotics and steriods as ordered.   Follow up with PMD next week.      SECONDARY DISCHARGE DIAGNOSES  Diagnosis: Type 2 diabetes mellitus  Assessment and Plan of Treatment: HgA1C this admission 6.2.  Make sure you get your HgA1c checked every three months.  If you take oral diabetes medications, check your blood glucose two times a day.  If you take insulin, check your blood glucose before meals and at bedtime.  It's important not to skip any meals.  Keep a log of your blood glucose results and always take it with you to your doctor appointments.  Keep a list of your current medications including injectables and over the counter medications and bring this medication list with you to all your doctor appointments.  If you have not seen your opthalmologist this year call for appointment.  Check your feet daily for redness, sores, or openings. Do not self treat. If no improvement in two days call your primary care physician for an appointment.  Low blood sugar (hypoglycemia) is a blood sugar below 70mg/dl. Check your blood sugar if you feel signs/symptoms of hypoglycemia. If your blood sugar is below 70 take 15 grams of carbohydrates (ex 4 oz of apple juice, 3-4 glucosr tablets, or 4-6 oz of regular soda) wait 15 minutes and repeat blood sugar to make sure it comes up above 70.  If your blood sugar is above 70 and you are due for a meal, have a meal.  If you are not due for a meal have a snack.  This snack helps keeps your blood sugar at a safe range.      Diagnosis: Parkinsons disease  Assessment and Plan of Treatment: Please take your medications as ordered.  Follow up with your Neurologist as an outpatient.    Diagnosis: CAD (coronary artery disease)  Assessment and Plan of Treatment: Coronary artery disease is a condition where the arteries the supply the heart muscle get clogges with fatty deposits & puts you at risk for a heart attack  Call your doctor if you have any new pain, pressure, or discomfort in the center of your chest, pain, tingling or discomfort in arms, back, neck, jaw, or stomach, shortness of breath, nausea, vomiting, burping or heartburn, sweating, cold and clammy skin, racing or abnormal heartbeat for more than 10 minutes or if they keep coming & going.  Call 911 and do not tr to get to hospital by care  You can help yourself with lefestyle changes (quitting smoking if you smoke), eat lots of fruits & vegetables & low fat dairy products, not a lot of meat & fatty foods, walk or some form of physical activity most days of the week, lose weight if you are overweight  Take your cardiac medication as prescribed to lower cholesterol, to lower blood pressure, aspirin to prevent blood clots, and diabetes control  Make sure to keep appointments with doctor for cardiac follow up care      Diagnosis: Headache  Assessment and Plan of Treatment: Resolved.

## 2021-09-08 NOTE — SWALLOW BEDSIDE ASSESSMENT ADULT - MODE OF PRESENTATION
Pt mom Erika Alatorre called regarding her daughter  Ifeanyi Walls has been home from school for a few days now because her eyes and face has been very swollen and itchy  They did go see the allergist and they told her they dont believe its allergy related and referred her to an ophthalmologist possibly in [facility] due to the rareness of wha'ts going on  Erika Alatorre said the Allergist would like to speak with you and left the number [phone] and to ask for jefe  She would also like to speak with you regarding what's going on because she's worried  cup/spoon/straw/self fed

## 2021-09-08 NOTE — DISCHARGE NOTE PROVIDER - NSDCFUADDAPPT_GEN_ALL_CORE_FT
Follow up with Pulmonologist for outpatient Pulmonary Function Test.  Follow up with Neurologist for Parkinson Management.   Follow up with your PMD, and Cardiologist as an outpatient.

## 2021-09-08 NOTE — PHYSICAL THERAPY INITIAL EVALUATION ADULT - GENERAL OBSERVATIONS, REHAB EVAL
Pt received semi supine in bed (+) tele (+) 2L O2 NC (+) very [place]. Pt is Farsi speaking,  services used, however pt able to communicate in English.

## 2021-09-08 NOTE — PHYSICAL THERAPY INITIAL EVALUATION ADULT - ADDITIONAL COMMENTS
Pt lives in  with daughter and her family with 5 TEE (+) handrail, 1 FOS to bedroom. Pt descends steps Ind, required min A from family to ascend stairs. He ambulates Ind in home with SC, never ambulates outdoors without family. Pt dresses and bathes self, however some days daughter assists with ADLs. Pt owns walker, cane, commode.

## 2021-09-08 NOTE — PHYSICAL THERAPY INITIAL EVALUATION ADULT - PLANNED THERAPY INTERVENTIONS, PT EVAL
GOAL: Pt will negotiate 5 steps with CG A within 2 weeks./balance training/bed mobility training/gait training/transfer training

## 2021-09-08 NOTE — DISCHARGE NOTE PROVIDER - PROVIDER TOKENS
FREE:[LAST:[Dr. Fernando Tapia],FIRST:[PMD],PHONE:[phone]],FAX:[(   )    -],FOLLOWUP:[1 week],ESTABLISHEDPATIENT:[T]] PROVIDER:[TOKEN:[[id number]]],FREE:[LAST:[Dr. Fernando Tapia],FIRST:[PMD],PHONE:[phone]],FAX:[(   )    -],FOLLOWUP:[1 week],ESTABLISHEDPATIENT:[T]],PROVIDER:[TOKEN:[[id number]]] PROVIDER:[TOKEN:[[id number]],FOLLOWUP:[1 week]],PROVIDER:[TOKEN:[[id number]],FOLLOWUP:[2 weeks]],FREE:[LAST:[Dr. Fernando Tapia],FIRST:[PMD],PHONE:[phone]],FAX:[(   )    -],FOLLOWUP:[1 week],ESTABLISHEDPATIENT:[T]]

## 2021-09-08 NOTE — PROGRESS NOTE ADULT - PROBLEM SELECTOR PLAN 2
COPD/emphysema per hx - diagnosed by PCP  -reportedly unable to tolerate PFTs in past  -Symbicort 160/4.5 mcg 2 puffs BID  -Duoneb q6h  -Tessalon Perles 100 mg q8h for cough  -Flonase BID, Zithromax 500 mg PO x3days  -No wheezing on exam  -C/w Solumedrol 20 mg IVP q6h for now, will likely transition to PO tomorrow  -PFTs outpt if able

## 2021-09-08 NOTE — DISCHARGE NOTE PROVIDER - CARE PROVIDER_API CALL
Dr. Fernando Tapia, PMD  Phone: [phone]  Fax: (   )    -  Established Patient  Follow Up Time: 1 week   Dagoberto Wilburn; MBBS)  Cardiovascular Disease; Internal Medicine  [address] [place] [address], [address] [facility], [address] [place] [address]  Phone: [phone]  Fax: [phone]  Follow Up Time:     Dr. Fernando Tapia, PMNEELAM  Phone: [phone]  Fax: (   )    -  Established Patient  Follow Up Time: 1 week    Anthony Causey)  Critical Care Medicine; Internal Medicine; Pulmonary Disease  [address] [place] [address]  Phone: [phone]  Fax: [phone]  Follow Up Time:    Dagoberto Wilburn; MBBS)  Cardiovascular Disease; Internal Medicine  [address] [place] [address], [address] [facility], [address]th [place] [address]  Phone: [phone]  Fax: [phone]  Follow Up Time: 1 week    Anthony Causey)  Critical Care Medicine; Internal Medicine; Pulmonary Disease  [address] [place] [address]  Phone: [phone]  Fax: [phone]  Follow Up Time: 2 weeks    Dr. Fernando Tapia, PMNEELAM  Phone: [phone]  Fax: (   )    -  Established Patient  Follow Up Time: 1 week

## 2021-09-08 NOTE — DISCHARGE NOTE PROVIDER - HOSPITAL COURSE
To be done. To be done. 87M with PMH of Parkinson's disease, CAD s/p CABG, T2DM, BPH p/w SOB and headache, noted to be severely tachypneic in ED requiring BiPAP, admitted for further work-up.     ·  Problem: Acute respiratory distress.   ·  Plan: pt with acute on chronic SOB x 1 year, slowly progressing per family; noted to be severely tachypneic in ED and placed on BiPAP for increased WOB; no noted hypoxia and ABF showing no hypercarbia. Unclear etiology - ddx includes heart failure, PE, COPD, restrictive/obstructive lung disease in setting of PD, ILD.   - currently on 10/5 and 40% FiO2, appears comfortable - wean off BiPAP as tolerated, supplemental O2 as needed    - check TTE to eval for HF, valvular disease - pending  - check CTA to r/o PE and any other pulm process - negative  - s/p lasix 40 IV in ED, looks euvolemic at this time, defer further lasix for now   - pulmonary consulted - po steroids tomorrow  - would benefit from full pulmonary function testing as outpt   - c/w nebulizers, Symbicort for now.    ·  Problem: Headache.   ·  Plan: pt with R sided headache x weeks, no associated blurry vision, weakness, sensory changes, slurred speech; no focal neuro deficits on exam, unclear etiology at this time   - check CT head to r/o infarct, bleed, mass - negative  - pain control.      ·  Problem: Parkinsons disease.   ·  Plan: resume sinemet and Ropinirole per prior med list   outpt neuro f/up.    ·  Problem: Type 2 diabetes mellitus.   ·  Plan: adjust insulin dose daily as appropriate based on glucose levels.    ·  Problem: CAD (coronary artery disease).   ·  Plan: CAD s/p CABG  c/w statin.    Pt stable dc home with outpatient follow up with PMD.

## 2021-09-08 NOTE — PHYSICAL THERAPY INITIAL EVALUATION ADULT - PERTINENT HX OF CURRENT PROBLEM, REHAB EVAL
87M with PMH of Parkinson's disease, CAD s/p CABG, T2DM, BPH p/w SOB and headache, noted to be severely tachypneic in ED requiring BiPAP, admitted for further work-up. CT chest: Stable mild upper lobe predominant groundglass superimposed on air trapping. 87M with PMH of Parkinson's disease, CAD s/p CABG, T2DM, BPH p/w SOB and headache, noted to be severely tachypneic in ED requiring BiPAP, admitted for further work-up. CT chest: Stable mild upper lobe predominant groundglass superimposed on air trapping. CTH (-)

## 2021-09-08 NOTE — SWALLOW BEDSIDE ASSESSMENT ADULT - COMMENTS
Hospital Course: ED course: VS: 98F, , /53, RR 22. While in ED, RR increased to 60s-70s. Patient was placed on BiPAP, FiO2 40%, I/O 10/5, RR12. ABG pH 7.44, pCO2 27, pO2 105. Per discussion with ED team, patient always alert and oriented, and declared himself DNI, family agreed. MICU consulted for respiratory distress. Pt's respiratory status improved significantly with BiPAP, not a MICU candidate currently. Also DNI.  Pulm- he was diagnosed with co[pd by his PCP: he takes albuterol prn. CTA is with resp motion. There is no PE on CT. Start cough rx and copd rx. He should also be checked for speech and swallow. His ABG is pretty good. Start flonase.    9/7 CT Chest Angio- Impression: No pulmonary embolism up to the level of the segmental branches. Stable mild upper lobe predominant ground glass superimposed on air trapping.    9/7 CT Head- Impression: No acute intracranial hemorrhage, large cortical infarct, or mass effect, given the extent of artifact. If clinically indicated, short-term follow-up or MRI may be obtained for further evaluation.      *Not previously known to this service.

## 2021-09-08 NOTE — SWALLOW BEDSIDE ASSESSMENT ADULT - SLP GENERAL OBSERVATIONS
Pt encountered awake and alert, upright in bed on 2l/min via NC. +Farsi speaking- pacific  utilized [employer] ID #[id number]. Pt significantly [place]. Stated to speak to him in English, although still had difficulty verbally communicating. Pt benefitted from writing to express wants/needs. A&Ox3. Followed directives. Denied pain. Vocal quality judged hoarse, speech intelligible.

## 2021-09-08 NOTE — SWALLOW BEDSIDE ASSESSMENT ADULT - H & P REVIEW
86-year-old male with PMH of Parkinson's disease, DM, hypertension, hyperlipidemia, CAD s/p CABG, and BPH presented with SOB, acute on chronic.

## 2021-09-08 NOTE — SWALLOW BEDSIDE ASSESSMENT ADULT - SWALLOW EVAL: DIAGNOSIS
Pt seen for clinical bedside swallow evaluation s/p admission for respiratory distress. Today, Pt presented with 1) Adequate oral and pharyngeal swallow function for regular textures and thin liquids. There were no overt, clinical s/s of aspiration/penetration. Unable to r/o silent aspiration at bedside. Given hx of PD, can consider modified barium swallow study if concerned for aspiration pneumonia.

## 2021-09-09 ENCOUNTER — TRANSCRIPTION ENCOUNTER (OUTPATIENT)
Age: 86
End: 2021-09-09

## 2021-09-09 VITALS — OXYGEN SATURATION: 98 % | HEART RATE: 86 BPM

## 2021-09-09 LAB
ALBUMIN SERPL ELPH-MCNC: 4 G/DL — SIGNIFICANT CHANGE UP (ref 3.3–5)
ALP SERPL-CCNC: 63 U/L — SIGNIFICANT CHANGE UP (ref 40–120)
ALT FLD-CCNC: 5 U/L — LOW (ref 10–45)
ANION GAP SERPL CALC-SCNC: 14 MMOL/L — SIGNIFICANT CHANGE UP (ref 5–17)
AST SERPL-CCNC: 13 U/L — SIGNIFICANT CHANGE UP (ref 10–40)
BASE EXCESS BLDV CALC-SCNC: 0.8 MMOL/L — SIGNIFICANT CHANGE UP (ref -2–2)
BASOPHILS # BLD AUTO: 0.01 K/UL — SIGNIFICANT CHANGE UP (ref 0–0.2)
BASOPHILS NFR BLD AUTO: 0.1 % — SIGNIFICANT CHANGE UP (ref 0–2)
BILIRUB SERPL-MCNC: 0.2 MG/DL — SIGNIFICANT CHANGE UP (ref 0.2–1.2)
BUN SERPL-MCNC: 42 MG/DL — HIGH (ref 7–23)
CALCIUM SERPL-MCNC: 10.4 MG/DL — SIGNIFICANT CHANGE UP (ref 8.4–10.5)
CHLORIDE SERPL-SCNC: 101 MMOL/L — SIGNIFICANT CHANGE UP (ref 96–108)
CO2 BLDV-SCNC: 28 MMOL/L — HIGH (ref 22–26)
CO2 SERPL-SCNC: 21 MMOL/L — LOW (ref 22–31)
CREAT SERPL-MCNC: 1.05 MG/DL — SIGNIFICANT CHANGE UP (ref 0.5–1.3)
EOSINOPHIL # BLD AUTO: 0 K/UL — SIGNIFICANT CHANGE UP (ref 0–0.5)
EOSINOPHIL NFR BLD AUTO: 0 % — SIGNIFICANT CHANGE UP (ref 0–6)
GAS PNL BLDV: SIGNIFICANT CHANGE UP
GLUCOSE BLDC GLUCOMTR-MCNC: 173 MG/DL — HIGH (ref 70–99)
GLUCOSE SERPL-MCNC: 177 MG/DL — HIGH (ref 70–99)
HCO3 BLDV-SCNC: 27 MMOL/L — SIGNIFICANT CHANGE UP (ref 22–29)
HCT VFR BLD CALC: 32.8 % — LOW (ref 39–50)
HGB BLD-MCNC: 10.3 G/DL — LOW (ref 13–17)
HOROWITZ INDEX BLDV+IHG-RTO: SIGNIFICANT CHANGE UP
IMM GRANULOCYTES NFR BLD AUTO: 0.8 % — SIGNIFICANT CHANGE UP (ref 0–1.5)
LYMPHOCYTES # BLD AUTO: 0.37 K/UL — LOW (ref 1–3.3)
LYMPHOCYTES # BLD AUTO: 2.5 % — LOW (ref 13–44)
MCHC RBC-ENTMCNC: 29.1 PG — SIGNIFICANT CHANGE UP (ref 27–34)
MCHC RBC-ENTMCNC: 31.4 GM/DL — LOW (ref 32–36)
MCV RBC AUTO: 92.7 FL — SIGNIFICANT CHANGE UP (ref 80–100)
MONOCYTES # BLD AUTO: 0.26 K/UL — SIGNIFICANT CHANGE UP (ref 0–0.9)
MONOCYTES NFR BLD AUTO: 1.8 % — LOW (ref 2–14)
NEUTROPHILS # BLD AUTO: 13.79 K/UL — HIGH (ref 1.8–7.4)
NEUTROPHILS NFR BLD AUTO: 94.8 % — HIGH (ref 43–77)
NRBC # BLD: 0 /100 WBCS — SIGNIFICANT CHANGE UP (ref 0–0)
PCO2 BLDV: 46 MMHG — SIGNIFICANT CHANGE UP (ref 42–55)
PH BLDV: 7.37 — SIGNIFICANT CHANGE UP (ref 7.32–7.43)
PLATELET # BLD AUTO: 243 K/UL — SIGNIFICANT CHANGE UP (ref 150–400)
PO2 BLDV: 74 MMHG — HIGH (ref 25–45)
POTASSIUM SERPL-MCNC: 4.5 MMOL/L — SIGNIFICANT CHANGE UP (ref 3.5–5.3)
POTASSIUM SERPL-SCNC: 4.5 MMOL/L — SIGNIFICANT CHANGE UP (ref 3.5–5.3)
PROT SERPL-MCNC: 6.9 G/DL — SIGNIFICANT CHANGE UP (ref 6–8.3)
RBC # BLD: 3.54 M/UL — LOW (ref 4.2–5.8)
RBC # FLD: 17.3 % — HIGH (ref 10.3–14.5)
SAO2 % BLDV: 93.9 % — HIGH (ref 67–88)
SODIUM SERPL-SCNC: 136 MMOL/L — SIGNIFICANT CHANGE UP (ref 135–145)
WBC # BLD: 14.55 K/UL — HIGH (ref 3.8–10.5)
WBC # FLD AUTO: 14.55 K/UL — HIGH (ref 3.8–10.5)

## 2021-09-09 PROCEDURE — 94660 CPAP INITIATION&MGMT: CPT

## 2021-09-09 PROCEDURE — 80053 COMPREHEN METABOLIC PANEL: CPT

## 2021-09-09 PROCEDURE — 82330 ASSAY OF CALCIUM: CPT

## 2021-09-09 PROCEDURE — 84295 ASSAY OF SERUM SODIUM: CPT

## 2021-09-09 PROCEDURE — 99239 HOSP IP/OBS DSCHRG MGMT >30: CPT

## 2021-09-09 PROCEDURE — 83036 HEMOGLOBIN GLYCOSYLATED A1C: CPT

## 2021-09-09 PROCEDURE — 0225U NFCT DS DNA&RNA 21 SARSCOV2: CPT

## 2021-09-09 PROCEDURE — 70450 CT HEAD/BRAIN W/O DYE: CPT | Mod: MA

## 2021-09-09 PROCEDURE — 93306 TTE W/DOPPLER COMPLETE: CPT

## 2021-09-09 PROCEDURE — 86850 RBC ANTIBODY SCREEN: CPT

## 2021-09-09 PROCEDURE — 86901 BLOOD TYPING SEROLOGIC RH(D): CPT

## 2021-09-09 PROCEDURE — 94640 AIRWAY INHALATION TREATMENT: CPT

## 2021-09-09 PROCEDURE — 92610 EVALUATE SWALLOWING FUNCTION: CPT

## 2021-09-09 PROCEDURE — 80048 BASIC METABOLIC PNL TOTAL CA: CPT

## 2021-09-09 PROCEDURE — 85025 COMPLETE CBC W/AUTO DIFF WBC: CPT

## 2021-09-09 PROCEDURE — 97116 GAIT TRAINING THERAPY: CPT

## 2021-09-09 PROCEDURE — 82803 BLOOD GASES ANY COMBINATION: CPT

## 2021-09-09 PROCEDURE — 83605 ASSAY OF LACTIC ACID: CPT

## 2021-09-09 PROCEDURE — 85014 HEMATOCRIT: CPT

## 2021-09-09 PROCEDURE — 83880 ASSAY OF NATRIURETIC PEPTIDE: CPT

## 2021-09-09 PROCEDURE — 86900 BLOOD TYPING SEROLOGIC ABO: CPT

## 2021-09-09 PROCEDURE — 99285 EMERGENCY DEPT VISIT HI MDM: CPT

## 2021-09-09 PROCEDURE — 86769 SARS-COV-2 COVID-19 ANTIBODY: CPT

## 2021-09-09 PROCEDURE — 96374 THER/PROPH/DIAG INJ IV PUSH: CPT

## 2021-09-09 PROCEDURE — 71045 X-RAY EXAM CHEST 1 VIEW: CPT

## 2021-09-09 PROCEDURE — 85018 HEMOGLOBIN: CPT

## 2021-09-09 PROCEDURE — 82947 ASSAY GLUCOSE BLOOD QUANT: CPT

## 2021-09-09 PROCEDURE — 93005 ELECTROCARDIOGRAM TRACING: CPT

## 2021-09-09 PROCEDURE — 82962 GLUCOSE BLOOD TEST: CPT

## 2021-09-09 PROCEDURE — 97162 PT EVAL MOD COMPLEX 30 MIN: CPT

## 2021-09-09 PROCEDURE — 82435 ASSAY OF BLOOD CHLORIDE: CPT

## 2021-09-09 PROCEDURE — 84484 ASSAY OF TROPONIN QUANT: CPT

## 2021-09-09 PROCEDURE — 71275 CT ANGIOGRAPHY CHEST: CPT | Mod: MA

## 2021-09-09 PROCEDURE — 84132 ASSAY OF SERUM POTASSIUM: CPT

## 2021-09-09 PROCEDURE — 97110 THERAPEUTIC EXERCISES: CPT

## 2021-09-09 RX ORDER — TAMSULOSIN HYDROCHLORIDE 0.4 MG/1
1 CAPSULE ORAL
Qty: 0 | Refills: 0 | DISCHARGE

## 2021-09-09 RX ORDER — BUDESONIDE AND FORMOTEROL FUMARATE DIHYDRATE 160; 4.5 UG/1; UG/1
2 AEROSOL RESPIRATORY (INHALATION)
Qty: 0 | Refills: 0 | DISCHARGE

## 2021-09-09 RX ORDER — ATORVASTATIN CALCIUM 80 MG/1
1 TABLET, FILM COATED ORAL
Qty: 0 | Refills: 0 | DISCHARGE

## 2021-09-09 RX ORDER — ROPINIROLE 8 MG/1
1 TABLET, FILM COATED, EXTENDED RELEASE ORAL
Qty: 90 | Refills: 0
Start: 2021-09-09 | End: 2021-10-08

## 2021-09-09 RX ORDER — PANTOPRAZOLE SODIUM 20 MG/1
1 TABLET, DELAYED RELEASE ORAL
Qty: 30 | Refills: 0
Start: 2021-09-09 | End: 2021-10-08

## 2021-09-09 RX ORDER — FINASTERIDE 5 MG/1
1 TABLET, FILM COATED ORAL
Qty: 0 | Refills: 0 | DISCHARGE

## 2021-09-09 RX ORDER — SERTRALINE 25 MG/1
1 TABLET, FILM COATED ORAL
Qty: 30 | Refills: 0
Start: 2021-09-09

## 2021-09-09 RX ORDER — FLUTICASONE PROPIONATE 50 MCG
1 SPRAY, SUSPENSION NASAL
Qty: 60 | Refills: 0
Start: 2021-09-09 | End: 2021-10-08

## 2021-09-09 RX ORDER — CARBIDOPA AND LEVODOPA 25; 100 MG/1; MG/1
1 TABLET ORAL
Qty: 0 | Refills: 0 | DISCHARGE
Start: 2021-09-09

## 2021-09-09 RX ORDER — INFLUENZA VIRUS VACCINE 15; 15; 15; 15 UG/.5ML; UG/.5ML; UG/.5ML; UG/.5ML
0.5 SUSPENSION INTRAMUSCULAR ONCE
Refills: 0 | Status: DISCONTINUED | OUTPATIENT
Start: 2021-09-09 | End: 2021-09-09

## 2021-09-09 RX ORDER — ATORVASTATIN CALCIUM 80 MG/1
1 TABLET, FILM COATED ORAL
Qty: 30 | Refills: 0
Start: 2021-09-09 | End: 2021-10-08

## 2021-09-09 RX ORDER — TAMSULOSIN HYDROCHLORIDE 0.4 MG/1
1 CAPSULE ORAL
Qty: 0 | Refills: 0 | DISCHARGE
Start: 2021-09-09

## 2021-09-09 RX ORDER — FINASTERIDE 5 MG/1
1 TABLET, FILM COATED ORAL
Qty: 0 | Refills: 0 | DISCHARGE
Start: 2021-09-09

## 2021-09-09 RX ORDER — ASPIRIN/CALCIUM CARB/MAGNESIUM 324 MG
1 TABLET ORAL
Qty: 0 | Refills: 0 | DISCHARGE

## 2021-09-09 RX ADMIN — AZITHROMYCIN 500 MILLIGRAM(S): 500 TABLET, FILM COATED ORAL at 12:29

## 2021-09-09 RX ADMIN — SERTRALINE 25 MILLIGRAM(S): 25 TABLET, FILM COATED ORAL at 12:29

## 2021-09-09 RX ADMIN — BUDESONIDE AND FORMOTEROL FUMARATE DIHYDRATE 2 PUFF(S): 160; 4.5 AEROSOL RESPIRATORY (INHALATION) at 05:34

## 2021-09-09 RX ADMIN — Medication 100 MILLIGRAM(S): at 05:34

## 2021-09-09 RX ADMIN — Medication 3 MILLILITER(S): at 05:34

## 2021-09-09 RX ADMIN — Medication 3 MILLILITER(S): at 09:17

## 2021-09-09 RX ADMIN — Medication 20 MILLIGRAM(S): at 05:34

## 2021-09-09 RX ADMIN — CARBIDOPA AND LEVODOPA 1 TABLET(S): 25; 100 TABLET ORAL at 05:34

## 2021-09-09 RX ADMIN — Medication 1: at 09:17

## 2021-09-09 RX ADMIN — Medication 1 SPRAY(S): at 05:34

## 2021-09-09 RX ADMIN — FINASTERIDE 5 MILLIGRAM(S): 5 TABLET, FILM COATED ORAL at 12:29

## 2021-09-09 RX ADMIN — ROPINIROLE 0.5 MILLIGRAM(S): 8 TABLET, FILM COATED, EXTENDED RELEASE ORAL at 05:35

## 2021-09-09 RX ADMIN — PANTOPRAZOLE SODIUM 40 MILLIGRAM(S): 20 TABLET, DELAYED RELEASE ORAL at 05:35

## 2021-09-09 NOTE — PROGRESS NOTE ADULT - PROBLEM SELECTOR PLAN 1
Presented with SOB (chronic x a few months) but worsening in the past week  -Also with chronic cough  -Likely 2nd to exacerbation of underlying COPD per hx  -Briefly placed on Bipap in ED with improvement in SOB   -ABGs/VBGs reviewed - normal ph, no Co2 retention  -SOB improving  -VBG this AM reviewed - can monitor off NIV  -CTA with no PE, stable mild upper lobe GGO also seen on CT chest in 2020  -RVP negative  -Keep sats >90% with supplemental o2 as needed (RA)  -TTE noted
pt with acute on chronic SOB x 1 year, slowly progressing per family; noted to be severely tachypneic in ED and placed on BiPAP for increased WOB; no noted hypoxia and ABF showing no hypercarbia. Unclear etiology - ddx includes heart failure, PE, COPD, restrictive/obstructive lung disease in setting of PD, ILD.   - currently on 10/5 and 40% FiO2, appears comfortable - wean off BiPAP as tolerated, supplemental O2 as needed    - check TTE to eval for HF, valvular disease - pending  - check CTA to r/o PE and any other pulm process - negative  - s/p lasix 40 IV in ED, looks euvolemic at this time, defer further lasix for now   - pulmonary consulted - po steroids tomorrow  - would benefit from full pulmonary function testing as outpt   - c/w nebulizers, Symbicort for now
Presented with SOB (chronic x a few months) but worsening in the past week  -Also with chronic cough  -Likely 2nd to exacerbation of underlying COPD per hx  -Briefly placed on Bipap in ED with improvement in SOB   -ABGs/VBGs reviewed - normal ph, no Co2 retention  -Monitor off NIV at this time, can check VBG in AM  -CTA with no PE, stable mild upper lobe GGO also seen on CT chest in 2020  -RVP negative  -Keep sats >90% with supplemental o2 as needed (currently 2LNC)  -F/u TTE
resolved  treated for COPD exac with improvement  1 dose lasix in ER  outpt cardio/pulm f/up

## 2021-09-09 NOTE — PROGRESS NOTE ADULT - PROBLEM SELECTOR PLAN 3
resume sinemet and Ropinirole per prior med list   outpt neuro f/up
per primary team
resume sinemet and Ropinirole per prior med list   outpt neuro f/up
per primary team

## 2021-09-09 NOTE — PROGRESS NOTE ADULT - PROBLEM SELECTOR PLAN 4
cards recs noted
cards recs noted
adjust insulin dose daily as appropriate based on glucose levels.
adjust insulin dose daily as appropriate based on glucose levels.

## 2021-09-09 NOTE — PROGRESS NOTE ADULT - PROBLEM SELECTOR PLAN 2
COPD/emphysema per hx - diagnosed by PCP  -reportedly unable to tolerate PFTs in past  -Symbicort 160/4.5 mcg 2 puffs BID  -Duoneb q6h  -Tessalon Perles 100 mg q8h for cough  -Flonase BID, Zithromax 500 mg PO x3days  -No wheezing on exam  -Will stop IV Solumedrol and start Prednisone 20 mg PO BID x5 days today   -Eventual outpt PFTs if able COPD/emphysema per hx - diagnosed by PCP  -reportedly unable to tolerate PFTs in past  -Symbicort 160/4.5 mcg 2 puffs BID  -Duoneb q6h  -Tessalon Perles 100 mg q8h for cough  -Flonase BID, Zithromax 500 mg PO x3days  -No wheezing on exam  -Will stop IV Solumedrol and start Prednisone 20 mg PO BID x5 days today  -F/u in office  -Eventual outpt PFTs if able

## 2021-09-09 NOTE — PROGRESS NOTE ADULT - PROBLEM SELECTOR PROBLEM 4
CAD (coronary artery disease)
Type 2 diabetes mellitus
CAD (coronary artery disease)
Type 2 diabetes mellitus

## 2021-09-09 NOTE — PROGRESS NOTE ADULT - NSPROGADDITIONALINFOA_GEN_ALL_CORE
plan of care discussed with floor NP and daughter.  likely dc home tomorrow pending PT/swallow eval, transition to po steroids and TTE
he says his coughing is much better: he still feels headaches: says he was evaluated for these headaches at [facility] many years ago but they could not find any reason: neurology follow
daughter is at bedside: he says his breathingis ok and is coughing is much better: still with some headache: prednisone switched to po : cont anti tiussives
overall improved  outpt f/up  spent 40 mins

## 2021-09-09 NOTE — PROGRESS NOTE ADULT - SUBJECTIVE AND OBJECTIVE BOX
CARDIOLOGY COVERING FOR DANIELLE    tele: NSR, no events    he reports dyspnea is improved, he denies palpitations, syncope, nor angina, ROS otherwise -    DATE OF SERVICE - 09-08-21     Review of Systems:   Constitutional: [ ] fevers, [ ] chills.   Skin: [ ] dry skin. [ ] rashes.  Psychiatric: [ ] depression, [ ] anxiety.   Gastrointestinal: [ ] BRBPR, [ ] melena.   Neurological: [ ] confusion. [ ] seizures. [ ] shuffling gait.   Ears,Nose,Mouth and Throat: [ ] ear pain [ ] sore throat.   Eyes: [ ] diplopia.   Respiratory: [ ] hemoptysis. [ ] shortness of breath  Cardiovascular: See HPI above  Hematologic/Lymphatic: [ ] anemia. [ ] painful nodes. [ ] prolonged bleeding.   Genitourinary: [ ] hematuria. [ ] flank pain.   Endocrine: [ ] significant change in weight. [ ] intolerance to heat and cold.     Review of systems [ x] otherwise negative, [ ] otherwise unable to obtain    FH: no family history of sudden cardiac death in first degree relatives    SH: [ ] tobacco, [ ] alcohol, [ ] drugs    acetaminophen   Tablet .. 650 milliGRAM(s) Oral every 6 hours PRN  albuterol/ipratropium for Nebulization 3 milliLiter(s) Nebulizer every 6 hours  atorvastatin 80 milliGRAM(s) Oral at bedtime  azithromycin   Tablet 500 milliGRAM(s) Oral daily  benzonatate 100 milliGRAM(s) Oral every 8 hours  budesonide 160 MICROgram(s)/formoterol 4.5 MICROgram(s) Inhaler 2 Puff(s) Inhalation two times a day  carbidopa/levodopa  25/100 1 Tablet(s) Oral three times a day  dextrose 40% Gel 15 Gram(s) Oral once  dextrose 5%. 1000 milliLiter(s) IV Continuous <Continuous>  dextrose 5%. 1000 milliLiter(s) IV Continuous <Continuous>  dextrose 50% Injectable 25 Gram(s) IV Push once  dextrose 50% Injectable 12.5 Gram(s) IV Push once  dextrose 50% Injectable 25 Gram(s) IV Push once  enoxaparin Injectable 40 milliGRAM(s) SubCutaneous daily  finasteride 5 milliGRAM(s) Oral daily  fluticasone propionate 50 MICROgram(s)/spray Nasal Spray 1 Spray(s) Both Nostrils two times a day  glucagon  Injectable 1 milliGRAM(s) IntraMuscular once  insulin lispro (ADMELOG) corrective regimen sliding scale   SubCutaneous three times a day before meals  insulin lispro (ADMELOG) corrective regimen sliding scale   SubCutaneous at bedtime  methylPREDNISolone sodium succinate Injectable 20 milliGRAM(s) IV Push every 6 hours  ondansetron Injectable 4 milliGRAM(s) IV Push every 8 hours PRN  pantoprazole    Tablet 40 milliGRAM(s) Oral before breakfast  polyethylene glycol 3350 17 Gram(s) Oral daily  rOPINIRole 0.5 milliGRAM(s) Oral three times a day  senna 2 Tablet(s) Oral at bedtime  sertraline 25 milliGRAM(s) Oral daily  tamsulosin 0.4 milliGRAM(s) Oral at bedtime                            10.1   4.74  )-----------( 234      ( 06 Sep 2021 20:10 )             32.2       09-06    141  |  104  |  34<H>  ----------------------------<  120<H>  4.9   |  26  |  1.46<H>    Ca    10.4      06 Sep 2021 20:10    TPro  7.3  /  Alb  4.4  /  TBili  0.3  /  DBili  x   /  AST  19  /  ALT  6<L>  /  AlkPhos  64  09-06            T(C): 36.4 (09-08-21 @ 04:53), Max: 36.6 (09-07-21 @ 16:05)  HR: 68 (09-08-21 @ 09:45) (68 - 75)  BP: 138/68 (09-08-21 @ 09:45) (138/68 - 187/69)  RR: 18 (09-08-21 @ 04:53) (18 - 20)  SpO2: 99% (09-08-21 @ 09:45) (97% - 99%)  Wt(kg): --    I&O's Summary    07 Sep 2021 07:01  -  08 Sep 2021 07:00  --------------------------------------------------------  IN: 240 mL / OUT: 250 mL / NET: -10 mL    08 Sep 2021 07:01  -  08 Sep 2021 11:11  --------------------------------------------------------  IN: 360 mL / OUT: 0 mL / NET: 360 mL    Head: Normocephalic and atraumatic.   Neck: No JVD. No bruits. Supple. Does not appear to be enlarged.   Cardiovascular: + S1,S2 ; RRR Soft systolic murmur at the left lower sternal border. No rubs noted.    Lungs: CTA b/l. No rhonchi, rales or wheezes.   Abdomen: + BS, soft. Non tender. Non distended. No rebound. No guarding.   Extremities: No clubbing/cyanosis/edema.   Neurologic: Moves all four extremities. Full range of motion.   Skin: Warm and moist. The patient's skin has normal elasticity and good skin turgor.   Psychiatric: Appropriate mood and affect.  Musculoskeletal: Normal range of motion, normal strength      CTPA: no obvious PE  echo pending    A/P) 88 y/o male PMH Parkinsons, HTN, CAD s/p CABG x 3 (2017), DM, BPH who presented with SOB.    - Stable indeterminate troponin with no chest pain or acute ischemic EKG changes - do not suspect ACS  - No evidence of clinical HF or anginal symptoms by exam  - CTA chest negative for PE  - Pulm follow up noted  - Check TTE to r/o structural abnormalities  - Dr Ruiz to resume cardiac care tomorrow
Date of Service: 09-08-21 @ 10:02    Patient is a 87y old  Male who presents with a chief complaint of SOB (07 Sep 2021 16:57)    Any change in ROS:   O2 sats high 90s on 2LNC  Denies CP, SOB    MEDICATIONS  (STANDING):  albuterol/ipratropium for Nebulization 3 milliLiter(s) Nebulizer every 6 hours  atorvastatin 80 milliGRAM(s) Oral at bedtime  azithromycin   Tablet 500 milliGRAM(s) Oral daily  benzonatate 100 milliGRAM(s) Oral every 8 hours  budesonide 160 MICROgram(s)/formoterol 4.5 MICROgram(s) Inhaler 2 Puff(s) Inhalation two times a day  carbidopa/levodopa  25/100 1 Tablet(s) Oral three times a day  dextrose 40% Gel 15 Gram(s) Oral once  dextrose 5%. 1000 milliLiter(s) (50 mL/Hr) IV Continuous <Continuous>  dextrose 5%. 1000 milliLiter(s) (100 mL/Hr) IV Continuous <Continuous>  dextrose 50% Injectable 25 Gram(s) IV Push once  dextrose 50% Injectable 12.5 Gram(s) IV Push once  dextrose 50% Injectable 25 Gram(s) IV Push once  finasteride 5 milliGRAM(s) Oral daily  fluticasone propionate 50 MICROgram(s)/spray Nasal Spray 1 Spray(s) Both Nostrils two times a day  glucagon  Injectable 1 milliGRAM(s) IntraMuscular once  insulin lispro (ADMELOG) corrective regimen sliding scale   SubCutaneous three times a day before meals  insulin lispro (ADMELOG) corrective regimen sliding scale   SubCutaneous at bedtime  methylPREDNISolone sodium succinate Injectable 20 milliGRAM(s) IV Push every 6 hours  pantoprazole    Tablet 40 milliGRAM(s) Oral before breakfast  rOPINIRole 0.5 milliGRAM(s) Oral three times a day  sertraline 25 milliGRAM(s) Oral daily  tamsulosin 0.4 milliGRAM(s) Oral at bedtime    MEDICATIONS  (PRN):  acetaminophen   Tablet .. 650 milliGRAM(s) Oral every 6 hours PRN Temp greater or equal to 38.5C (101.3F), Mild Pain (1 - 3)  ondansetron Injectable 4 milliGRAM(s) IV Push every 8 hours PRN Nausea and/or Vomiting    Vital Signs Last 24 Hrs  T(C): 36.4 (08 Sep 2021 04:53), Max: 36.6 (07 Sep 2021 16:05)  T(F): 97.5 (08 Sep 2021 04:53), Max: 97.8 (07 Sep 2021 16:05)  HR: 68 (08 Sep 2021 09:45) (68 - 75)  BP: 138/68 (08 Sep 2021 09:45) (138/68 - 187/69)  BP(mean): --  RR: 18 (08 Sep 2021 04:53) (18 - 20)  SpO2: 99% (08 Sep 2021 09:45) (97% - 99%)    I&O's Summary    07 Sep 2021 07:01  -  08 Sep 2021 07:00  --------------------------------------------------------  IN: 240 mL / OUT: 250 mL / NET: -10 mL    Physical Exam:   GENERAL: NAD  HEENT: CRIS  ENMT: No tonsillar erythema, exudates, or enlargement  NECK: Supple, No JVD  CHEST/LUNG: Clear to auscultation b/l  CVS: Regular rate and rhythm  GI: : Soft, Nontender, Nondistended  NERVOUS SYSTEM:  Alert & Oriented X2   EXTREMITIES:  2+ Peripheral Pulses, No clubbing, cyanosis, or edema  SKIN: No rashes or lesions  PSYCH: Appropriate    Labs:  ABG - ( 06 Sep 2021 20:17 )  pH, Arterial: 7.44  pH, Blood: x     /  pCO2: 38    /  pO2: 105   / HCO3: 26    / Base Excess: 1.6   /  SaO2: 96.9        29                    10.1   4.74  )-----------( 234      ( 06 Sep 2021 20:10 )             32.2     09-06    141  |  104  |  34<H>  ----------------------------<  120<H>  4.9   |  26  |  1.46<H>    Ca    10.4      06 Sep 2021 20:10    TPro  7.3  /  Alb  4.4  /  TBili  0.3  /  DBili  x   /  AST  19  /  ALT  6<L>  /  AlkPhos  64  09-06    CAPILLARY BLOOD GLUCOSE  POCT Blood Glucose.: 159 mg/dL (08 Sep 2021 08:43)  POCT Blood Glucose.: 196 mg/dL (07 Sep 2021 21:14)  POCT Blood Glucose.: 112 mg/dL (07 Sep 2021 17:08)  POCT Blood Glucose.: 123 mg/dL (07 Sep 2021 13:07)    LIVER FUNCTIONS - ( 06 Sep 2021 20:10 )  Alb: 4.4 g/dL / Pro: 7.3 g/dL / ALK PHOS: 64 U/L / ALT: 6 U/L / AST: 19 U/L / GGT: x           Serum Pro-Brain Natriuretic Peptide: 566 pg/mL (09-06 @ 20:10)    Studies    CT SCAN Chest   < from: CT Angio Chest PE Protocol w/ IV Cont (09.07.21 @ 01:58) >  FINDINGS:    Motion degraded study.    LUNGS AND AIRWAYS: Patent central airways.  Similar mild bilateral groundglass with upper lung preference bilaterally, superimposed on mosaic perfusion likely secondary to air trapping.  PLEURA: No pleural effusion.  MEDIASTINUM AND SHERI: No lymphadenopathy.  VESSELS: No main, left main, right main, lobar, or segmental pulmonary embolism. Evaluation of subsegmental pulmonary arteriesis limited secondary to respiratory motion. Aortic and coronary artery calcifications. CABG.  HEART: Cardiomegaly. No pericardial effusion.  CHEST WALL AND LOWER NECK: Within normal limits.  VISUALIZED UPPER ABDOMEN: Redemonstrated partially imaged right adrenal adenoma.  BONES: Median sternotomy. Degenerative changes.    IMPRESSION:  No pulmonary embolism up to the level of the segmental branches.    Stable mild upper lobe predominant groundglass superimposed on air trapping.    < end of copied text >                  
Patient is a 87y old  Male who presents with a chief complaint of SOB (09 Sep 2021 10:23)      INTERVAL HISTORY: feels ok , wife at bedside   REVIEW OF SYSTEMS:   CONSTITUTIONAL: No weakness  EYES/ENT: No visual changes; No throat pain  Neck: No pain or stiffness  Respiratory: No cough, wheezing, No shortness of breath  CARDIOVASCULAR: no chest pain or palpitations  GASTROINTESTINAL: No abdominal pain, no nausea, vomiting or hematemesis  GENITOURINARY: No dysuria, frequency or hematuria  NEUROLOGICAL: No stroke like symptoms  SKIN: No rashes      MEDICATIONS:  tamsulosin 0.4 milliGRAM(s) Oral at bedtime    PHYSICAL EXAM:  T(C): 36.3 (09-09-21 @ 12:13), Max: 36.4 (09-09-21 @ 04:33)  HR: 86 (09-09-21 @ 12:29) (76 - 86)  BP: 138/65 (09-09-21 @ 12:13) (138/65 - 174/70)  RR: 18 (09-09-21 @ 12:13) (18 - 18)  SpO2: 98% (09-09-21 @ 12:29) (98% - 99%)  Wt(kg): --  I&O's Summary    08 Sep 2021 07:01  -  09 Sep 2021 07:00  --------------------------------------------------------  IN: 1040 mL / OUT: 100 mL / NET: 940 mL    Appearance: In no distress	  HEENT:    PERRL, EOMI	  Cardiovascular:  S1 S2, No JVD  Respiratory: Lungs clear to auscultation	  Gastrointestinal:  Soft, Non-tender, + BS	  Vascularature:  No edema of LE  Psychiatric: Appropriate affect   Neuro: no acute focal deficits                        10.3   14.55 )-----------( 243      ( 09 Sep 2021 06:38 )             32.8     09-09    136  |  101  |  42<H>  ----------------------------<  177<H>  4.5   |  21<L>  |  1.05    Ca    10.4      09 Sep 2021 06:38    TPro  6.9  /  Alb  4.0  /  TBili  0.2  /  DBili  x   /  AST  13  /  ALT  5<L>  /  AlkPhos  63  09-0    Labs personally reviewed    ASSESSMENT/PLAN: 	    A/P) 88 y/o male PMH Parkinsons, HTN, CAD s/p CABG x 3 (2017), DM, BPH who presented with SOB.    - Stable indeterminate troponin with no chest pain or acute ischemic EKG changes - do not suspect ACS  - No evidence of clinical HF or anginal symptoms by exam  - CTA chest negative for PE  - Pulm follow up noted  -TTE unremarkable   - PT cleared patient for DC         Starla Sutherland ANPMaximoC  Oral Ruiz DO [facility]  Cardiovascular Medicine  [address] [place] [address], [address]  Office: [phone]  Cell: [phone]
Date of Service: 09-09-21 @ 10:24    Patient is a 87y old  Male who presents with a chief complaint of SOB (08 Sep 2021 17:24)    Any change in ROS:   Feeling better today  Cough improving  O2 sats 96% on RA  Denies CP, SOB    MEDICATIONS  (STANDING):  albuterol/ipratropium for Nebulization 3 milliLiter(s) Nebulizer every 6 hours  atorvastatin 80 milliGRAM(s) Oral at bedtime  azithromycin   Tablet 500 milliGRAM(s) Oral daily  benzonatate 100 milliGRAM(s) Oral every 8 hours  budesonide 160 MICROgram(s)/formoterol 4.5 MICROgram(s) Inhaler 2 Puff(s) Inhalation two times a day  carbidopa/levodopa  25/100 1 Tablet(s) Oral three times a day  dextrose 40% Gel 15 Gram(s) Oral once  dextrose 5%. 1000 milliLiter(s) (50 mL/Hr) IV Continuous <Continuous>  dextrose 5%. 1000 milliLiter(s) (100 mL/Hr) IV Continuous <Continuous>  dextrose 50% Injectable 25 Gram(s) IV Push once  dextrose 50% Injectable 12.5 Gram(s) IV Push once  dextrose 50% Injectable 25 Gram(s) IV Push once  enoxaparin Injectable 40 milliGRAM(s) SubCutaneous daily  finasteride 5 milliGRAM(s) Oral daily  fluticasone propionate 50 MICROgram(s)/spray Nasal Spray 1 Spray(s) Both Nostrils two times a day  glucagon  Injectable 1 milliGRAM(s) IntraMuscular once  influenza   Vaccine 0.5 milliLiter(s) IntraMuscular once  insulin lispro (ADMELOG) corrective regimen sliding scale   SubCutaneous three times a day before meals  insulin lispro (ADMELOG) corrective regimen sliding scale   SubCutaneous at bedtime  pantoprazole    Tablet 40 milliGRAM(s) Oral before breakfast  polyethylene glycol 3350 17 Gram(s) Oral daily  predniSONE   Tablet 20 milliGRAM(s) Oral two times a day  rOPINIRole 0.5 milliGRAM(s) Oral three times a day  senna 2 Tablet(s) Oral at bedtime  sertraline 25 milliGRAM(s) Oral daily  tamsulosin 0.4 milliGRAM(s) Oral at bedtime    MEDICATIONS  (PRN):  acetaminophen   Tablet .. 650 milliGRAM(s) Oral every 6 hours PRN Temp greater or equal to 38.5C (101.3F), Mild Pain (1 - 3)  ondansetron Injectable 4 milliGRAM(s) IV Push every 8 hours PRN Nausea and/or Vomiting    Vital Signs Last 24 Hrs  T(C): 36.4 (09 Sep 2021 04:33), Max: 36.7 (08 Sep 2021 12:31)  T(F): 97.5 (09 Sep 2021 04:33), Max: 98.1 (08 Sep 2021 12:31)  HR: 76 (09 Sep 2021 04:33) (72 - 79)  BP: 152/68 (09 Sep 2021 04:33) (147/69 - 174/70)  BP(mean): --  RR: 18 (09 Sep 2021 04:33) (18 - 18)  SpO2: 98% (09 Sep 2021 04:33) (98% - 98%)    I&O's Summary    08 Sep 2021 07:01  -  09 Sep 2021 07:00  --------------------------------------------------------  IN: 1040 mL / OUT: 100 mL / NET: 940 mL    Physical Exam:   GENERAL: NAD  HEENT: CRIS  ENMT: No tonsillar erythema, exudates, or enlargement  NECK: Supple, No JVD  CHEST/LUNG: Clear to auscultation b/l   CVS: Regular rate and rhythm  GI: : Soft, Nontender, Nondistended  NERVOUS SYSTEM:  Alert & Oriented X2  EXTREMITIES:  2+ Peripheral Pulses, No clubbing, cyanosis, or edema  SKIN: No rashes or lesions  PSYCH: Appropriate    Labs:  UNK, 29                 10.3   14.55 )-----------( 243      ( 09 Sep 2021 06:38 )             32.8                         10.1   4.74  )-----------( 234      ( 06 Sep 2021 20:10 )             32.2     09-09    136  |  101  |  42<H>  ----------------------------<  177<H>  4.5   |  21<L>  |  1.05  09-08    136  |  100  |  38<H>  ----------------------------<  155<H>  4.6   |  22  |  1.24  09-06    141  |  104  |  34<H>  ----------------------------<  120<H>  4.9   |  26  |  1.46<H>    Ca    10.4      09 Sep 2021 06:38  Ca    10.3      08 Sep 2021 17:35    TPro  6.9  /  Alb  4.0  /  TBili  0.2  /  DBili  x   /  AST  13  /  ALT  5<L>  /  AlkPhos  63  09-09  TPro  7.3  /  Alb  4.4  /  TBili  0.3  /  DBili  x   /  AST  19  /  ALT  6<L>  /  AlkPhos  64  09-06    CAPILLARY BLOOD GLUCOSE  POCT Blood Glucose.: 173 mg/dL (09 Sep 2021 09:12)  POCT Blood Glucose.: 174 mg/dL (08 Sep 2021 22:08)  POCT Blood Glucose.: 199 mg/dL (08 Sep 2021 12:40)      LIVER FUNCTIONS - ( 09 Sep 2021 06:38 )  Alb: 4.0 g/dL / Pro: 6.9 g/dL / ALK PHOS: 63 U/L / ALT: 5 U/L / AST: 13 U/L / GGT: x               Serum Pro-Brain Natriuretic Peptide: 566 pg/mL (09-06 @ 20:10)    Studies  Chest X-RAY < from: Xray Chest 1 View- PORTABLE-Urgent (Xray Chest 1 View- PORTABLE-Urgent .) (09.06.21 @ 20:40) >  FINDINGS:    The lungs are clear.  No pleural effusion or pneumothorax.  Heart size is within normal limits.  No acute abnormality within visible osseous structures.      IMPRESSION:    Clear lungs.    < end of copied text >    c< from: CT Angio Chest PE Protocol w/ IV Cont (09.07.21 @ 01:58) >    FINDINGS:    Motion degraded study.    LUNGS AND AIRWAYS: Patent central airways.  Similar mild bilateral groundglass with upper lung preference bilaterally, superimposed on mosaic perfusion likely secondary to air trapping.  PLEURA: No pleural effusion.  MEDIASTINUM AND SHERI: No lymphadenopathy.  VESSELS: No main, left main, right main, lobar, or segmental pulmonary embolism. Evaluation of subsegmental pulmonary arteriesis limited secondary to respiratory motion. Aortic and coronary artery calcifications. CABG.  HEART: Cardiomegaly. No pericardial effusion.  CHEST WALL AND LOWER NECK: Within normal limits.  VISUALIZED UPPER ABDOMEN: Redemonstrated partially imaged right adrenal adenoma.  BONES: Median sternotomy. Degenerative changes.    IMPRESSION:  No pulmonary embolism up to the level of the segmental branches.    Stable mild upper lobe predominant groundglass superimposed on air trapping.    TTE:  Dimensions:    Normal Values:  LA:     4.1    2.0 - 4.0 cm  Ao:     3.4    2.0 - 3.8 cm  SEPTUM: 1.1    0.6 - 1.2 cm  PWT:    0.9    0.6 - 1.1 cm  LVIDd:  4.2    3.0 - 5.6 cm  LVIDs:  2.3    1.8 - 4.0 cm  Derived variables:  LVMI: 82 g/m2  RWT: 0.42  Fractional short: 45 %  EF (Visual Estimate): 65-70 %  Doppler Peak Velocity (m/sec): AoV=2.7  ------------------------------------------------------------------------  Observations:  Mitral Valve: Mitral annular calcification, otherwise  normal mitral valve. Mild-moderate mitral regurgitation.  Aortic Valve/Aorta: Calcified trileaflet aortic valve with  decreased opening. Peak transaortic valve gradient equals  31 mm Hg, mean transaortic valve gradient equals 17 mm Hg,  estimated aortic valve area equals 1.3 sqcm (by continuity  equation), aortic valve velocity time integral equals 60  cm, consistent with moderate aortic stenosis. Mild aortic  regurgitation.  Peak left ventricular outflow tract  gradient equals 8 mm Hg, mean gradient is equal to 4 mm Hg,  LVOT velocity time integral equals 31 cm.  Aortic Root: 3.4 cm.  LVOT diameter: 1.8 cm.  Left Atrium: Severely dilated left atrium.  LA volume index  = 57 cc/m2.  Left Ventricle: Normal left ventricular systolic function.  No segmental wall motion abnormalities. Normal left  ventricular internal dimensions and wall thicknesses.  Normal diastolic function  Right Heart: Normal right atrium. Normal right ventricular  size and function. Normal tricuspid valve. Moderate  tricuspid regurgitation. Normal pulmonic valve. Mild  pulmonic regurgitation.  Pericardium/Pleura: Normal pericardium with no pericardial  effusion.  Hemodynamic: Estimated right atrial pressure is 8 mm Hg.  Estimated right ventricular systolic pressure equals 42 mm  Hg, assuming right atrial pressure equals 8 mm Hg,  consistent with mild pulmonary hypertension. Color Doppler  demonstrates no evidence of a patent foramen ovale.  ------------------------------------------------------------------------  Conclusions:  1. Mitral annular calcification, otherwise normal mitral  valve. Mild-moderate mitral regurgitation.  2. Calcified trileaflet aortic valve with decreased  opening. Mild aortic regurgitation.  3. Normal left ventricular systolic function. No segmental  wall motion abnormalities.  4. Normal right ventricular size and function.  5. Estimated pulmonary artery systolic pressure equals 42  mm Hg, assuming right atrial pressure equals 8 mm Hg,  consistent with mild pulmonary pressures.  *** Compared with echocardiogram of 2/3/2017, no  significant changes noted.  ------------------------------------------------------------------------    < end of copied text >                
Patient is a 87y old  Male who presents with a chief complaint of SOB (08 Sep 2021 11:10)      INTERVAL History of Present Illness/OVERNIGHT EVENTS: reports mild headache  last BM 2 days ago    MEDICATIONS  (STANDING):  albuterol/ipratropium for Nebulization 3 milliLiter(s) Nebulizer every 6 hours  atorvastatin 80 milliGRAM(s) Oral at bedtime  azithromycin   Tablet 500 milliGRAM(s) Oral daily  benzonatate 100 milliGRAM(s) Oral every 8 hours  budesonide 160 MICROgram(s)/formoterol 4.5 MICROgram(s) Inhaler 2 Puff(s) Inhalation two times a day  carbidopa/levodopa  25/100 1 Tablet(s) Oral three times a day  dextrose 40% Gel 15 Gram(s) Oral once  dextrose 5%. 1000 milliLiter(s) (50 mL/Hr) IV Continuous <Continuous>  dextrose 5%. 1000 milliLiter(s) (100 mL/Hr) IV Continuous <Continuous>  dextrose 50% Injectable 25 Gram(s) IV Push once  dextrose 50% Injectable 12.5 Gram(s) IV Push once  dextrose 50% Injectable 25 Gram(s) IV Push once  enoxaparin Injectable 40 milliGRAM(s) SubCutaneous daily  finasteride 5 milliGRAM(s) Oral daily  fluticasone propionate 50 MICROgram(s)/spray Nasal Spray 1 Spray(s) Both Nostrils two times a day  glucagon  Injectable 1 milliGRAM(s) IntraMuscular once  insulin lispro (ADMELOG) corrective regimen sliding scale   SubCutaneous three times a day before meals  insulin lispro (ADMELOG) corrective regimen sliding scale   SubCutaneous at bedtime  methylPREDNISolone sodium succinate Injectable 20 milliGRAM(s) IV Push every 6 hours  pantoprazole    Tablet 40 milliGRAM(s) Oral before breakfast  polyethylene glycol 3350 17 Gram(s) Oral daily  rOPINIRole 0.5 milliGRAM(s) Oral three times a day  senna 2 Tablet(s) Oral at bedtime  sertraline 25 milliGRAM(s) Oral daily  tamsulosin 0.4 milliGRAM(s) Oral at bedtime    MEDICATIONS  (PRN):  acetaminophen   Tablet .. 650 milliGRAM(s) Oral every 6 hours PRN Temp greater or equal to 38.5C (101.3F), Mild Pain (1 - 3)  ondansetron Injectable 4 milliGRAM(s) IV Push every 8 hours PRN Nausea and/or Vomiting      Allergies    No Known Allergies    Intolerances        REVIEW OF SYSTEMS:  Negative unless otherwise specified above.    Vital Signs Last 24 Hrs  T(C): 36.4 (08 Sep 2021 04:53), Max: 36.6 (07 Sep 2021 16:05)  T(F): 97.5 (08 Sep 2021 04:53), Max: 97.8 (07 Sep 2021 16:05)  HR: 68 (08 Sep 2021 09:45) (68 - 75)  BP: 138/68 (08 Sep 2021 09:45) (138/68 - 187/69)  BP(mean): --  RR: 18 (08 Sep 2021 04:53) (18 - 20)  SpO2: 99% (08 Sep 2021 09:45) (97% - 99%)        PHYSICAL EXAM:  GENERAL: No apparent distress, appears stated age  HEAD:  Atraumatic, Normocephalic  EYES: Conjunctiva and sclera clear, no discharge  ENMT: Moist mucous membranes, no nasal discharge  NECK: Supple, no JVD  CHEST/LUNG: Air entry bilaterally, no obvious wheeze, poor effort  HEART: Regular rhythm, no rubs or gallops  ABDOMEN: Soft, Nontender, Nondistended; Bowel sounds present  EXTREMITIES:  No clubbing, cyanosis or edema  SKIN: No rash, no new discoloration  NERVOUS SYSTEM:  Alert & Oriented; Bilateral Lower extremity mobile, sensation to light touch intact      LABS:      Ca    10.4       06 Sep 2021 20:10          CAPILLARY BLOOD GLUCOSE      POCT Blood Glucose.: 159 mg/dL (08 Sep 2021 08:43)  POCT Blood Glucose.: 196 mg/dL (07 Sep 2021 21:14)  POCT Blood Glucose.: 112 mg/dL (07 Sep 2021 17:08)  POCT Blood Glucose.: 123 mg/dL (07 Sep 2021 13:07)      RADIOLOGY & ADDITIONAL TESTS:      Images reviewed personally    Consultant Notes Reviewed and Care Discussed with relevant Consultants.
Patient is a 87y old  Male who presents with a chief complaint of SOB (09 Sep 2021 13:11)      INTERVAL History of Present Illness/OVERNIGHT EVENTS: feels better  eager to go home    MEDICATIONS  (STANDING):  albuterol/ipratropium for Nebulization 3 milliLiter(s) Nebulizer every 6 hours  atorvastatin 80 milliGRAM(s) Oral at bedtime  azithromycin   Tablet 500 milliGRAM(s) Oral daily  benzonatate 100 milliGRAM(s) Oral every 8 hours  budesonide 160 MICROgram(s)/formoterol 4.5 MICROgram(s) Inhaler 2 Puff(s) Inhalation two times a day  carbidopa/levodopa  25/100 1 Tablet(s) Oral three times a day  dextrose 40% Gel 15 Gram(s) Oral once  dextrose 5%. 1000 milliLiter(s) (50 mL/Hr) IV Continuous <Continuous>  dextrose 5%. 1000 milliLiter(s) (100 mL/Hr) IV Continuous <Continuous>  dextrose 50% Injectable 25 Gram(s) IV Push once  dextrose 50% Injectable 12.5 Gram(s) IV Push once  dextrose 50% Injectable 25 Gram(s) IV Push once  enoxaparin Injectable 40 milliGRAM(s) SubCutaneous daily  finasteride 5 milliGRAM(s) Oral daily  fluticasone propionate 50 MICROgram(s)/spray Nasal Spray 1 Spray(s) Both Nostrils two times a day  glucagon  Injectable 1 milliGRAM(s) IntraMuscular once  influenza   Vaccine 0.5 milliLiter(s) IntraMuscular once  insulin lispro (ADMELOG) corrective regimen sliding scale   SubCutaneous three times a day before meals  insulin lispro (ADMELOG) corrective regimen sliding scale   SubCutaneous at bedtime  pantoprazole    Tablet 40 milliGRAM(s) Oral before breakfast  polyethylene glycol 3350 17 Gram(s) Oral daily  predniSONE   Tablet 20 milliGRAM(s) Oral two times a day  rOPINIRole 0.5 milliGRAM(s) Oral three times a day  senna 2 Tablet(s) Oral at bedtime  sertraline 25 milliGRAM(s) Oral daily  tamsulosin 0.4 milliGRAM(s) Oral at bedtime    MEDICATIONS  (PRN):  acetaminophen   Tablet .. 650 milliGRAM(s) Oral every 6 hours PRN Temp greater or equal to 38.5C (101.3F), Mild Pain (1 - 3)  ondansetron Injectable 4 milliGRAM(s) IV Push every 8 hours PRN Nausea and/or Vomiting      Allergies    No Known Allergies    Intolerances        REVIEW OF SYSTEMS:  Negative unless otherwise specified above.    Vital Signs Last 24 Hrs  T(C): 36.3 (09 Sep 2021 12:13), Max: 36.4 (09 Sep 2021 04:33)  T(F): 97.4 (09 Sep 2021 12:13), Max: 97.5 (09 Sep 2021 04:33)  HR: 86 (09 Sep 2021 12:29) (76 - 86)  BP: 138/65 (09 Sep 2021 12:13) (138/65 - 174/70)  BP(mean): --  RR: 18 (09 Sep 2021 12:13) (18 - 18)  SpO2: 98% (09 Sep 2021 12:29) (98% - 99%)        PHYSICAL EXAM:  GENERAL: No apparent distress, appears stated age  HEAD:  Atraumatic, Normocephalic  EYES: Conjunctiva and sclera clear, no discharge  ENMT: Moist mucous membranes, no nasal discharge  NECK: Supple, no JVD  CHEST/LUNG: Clear to auscultation bilaterally, no wheeze or rales  HEART: Regular rhythm, no rubs or gallops  ABDOMEN: Soft, Nontender, Nondistended; Bowel sounds present  EXTREMITIES:  No clubbing, cyanosis or edema  SKIN: No rash, no new discoloration  NERVOUS SYSTEM:  Alert & Oriented; Bilateral Lower extremity mobile, sensation to light touch intact      LABS:                        10.3   14.55 )-----------( 243      ( 09 Sep 2021 06:38 )             32.8     09 Sep 2021 06:38    136    |  101    |  42     ----------------------------<  177    4.5     |  21     |  1.05     Ca    10.4       09 Sep 2021 06:38    TPro  6.9    /  Alb  4.0    /  TBili  0.2    /  DBili  x      /  AST  13     /  ALT  5      /  AlkPhos  63     09 Sep 2021 06:38        CAPILLARY BLOOD GLUCOSE      POCT Blood Glucose.: 173 mg/dL (09 Sep 2021 09:12)  POCT Blood Glucose.: 174 mg/dL (08 Sep 2021 22:08)      RADIOLOGY & ADDITIONAL TESTS:      Images reviewed personally    Consultant Notes Reviewed and Care Discussed with relevant Consultants.

## 2021-09-09 NOTE — DISCHARGE NOTE NURSING/CASE MANAGEMENT/SOCIAL WORK - PATIENT PORTAL LINK FT
You can access the FollowMyHealth Patient Portal offered by [facility] by registering at the following website: [email or web address]. By joining [facility]’s FollowMyHealth portal, you will also be able to view your health information using other applications (apps) compatible with our system.

## 2021-09-09 NOTE — PROGRESS NOTE ADULT - PROBLEM SELECTOR PLAN 2
pt with R sided headache x weeks, no associated blurry vision, weakness, sensory changes, slurred speech; no focal neuro deficits on exam, unclear etiology at this time   - check CT head to r/o infarct, bleed, mass - negative  - resolved

## 2022-03-11 ENCOUNTER — INPATIENT (INPATIENT)
Facility: HOSPITAL | Age: 87
LOS: 3 days | Discharge: HOME HEALTH | DRG: 103 | End: 2022-03-15
Attending: INTERNAL MEDICINE | Admitting: INTERNAL MEDICINE
Payer: MEDICARE

## 2022-03-11 VITALS
TEMPERATURE: 98 F | SYSTOLIC BLOOD PRESSURE: 143 MMHG | OXYGEN SATURATION: 96 % | DIASTOLIC BLOOD PRESSURE: 57 MMHG | HEART RATE: 60 BPM | RESPIRATION RATE: 18 BRPM

## 2022-03-11 DIAGNOSIS — Z95.1 PRESENCE OF AORTOCORONARY BYPASS GRAFT: Chronic | ICD-10-CM

## 2022-03-11 DIAGNOSIS — Z96.652 PRESENCE OF LEFT ARTIFICIAL KNEE JOINT: Chronic | ICD-10-CM

## 2022-03-11 DIAGNOSIS — R51.9 HEADACHE, UNSPECIFIED: ICD-10-CM

## 2022-03-11 LAB
ALBUMIN SERPL ELPH-MCNC: 3.9 G/DL — SIGNIFICANT CHANGE UP (ref 3.3–5)
ALP SERPL-CCNC: 64 U/L — SIGNIFICANT CHANGE UP (ref 40–120)
ALT FLD-CCNC: <5 U/L — LOW (ref 10–45)
ANION GAP SERPL CALC-SCNC: 12 MMOL/L — SIGNIFICANT CHANGE UP (ref 5–17)
AST SERPL-CCNC: 14 U/L — SIGNIFICANT CHANGE UP (ref 10–40)
BASE EXCESS BLDV CALC-SCNC: 0.9 MMOL/L — SIGNIFICANT CHANGE UP (ref -2–2)
BASOPHILS # BLD AUTO: 0.04 K/UL — SIGNIFICANT CHANGE UP (ref 0–0.2)
BASOPHILS NFR BLD AUTO: 0.6 % — SIGNIFICANT CHANGE UP (ref 0–2)
BILIRUB SERPL-MCNC: 0.3 MG/DL — SIGNIFICANT CHANGE UP (ref 0.2–1.2)
BUN SERPL-MCNC: 27 MG/DL — HIGH (ref 7–23)
CA-I SERPL-SCNC: 1.33 MMOL/L — SIGNIFICANT CHANGE UP (ref 1.15–1.33)
CALCIUM SERPL-MCNC: 9.5 MG/DL — SIGNIFICANT CHANGE UP (ref 8.4–10.5)
CHLORIDE BLDV-SCNC: 106 MMOL/L — SIGNIFICANT CHANGE UP (ref 96–108)
CHLORIDE SERPL-SCNC: 106 MMOL/L — SIGNIFICANT CHANGE UP (ref 96–108)
CO2 BLDV-SCNC: 29 MMOL/L — HIGH (ref 22–26)
CO2 SERPL-SCNC: 22 MMOL/L — SIGNIFICANT CHANGE UP (ref 22–31)
CREAT SERPL-MCNC: 0.78 MG/DL — SIGNIFICANT CHANGE UP (ref 0.5–1.3)
EGFR: 86 ML/MIN/1.73M2 — SIGNIFICANT CHANGE UP
EOSINOPHIL # BLD AUTO: 0.17 K/UL — SIGNIFICANT CHANGE UP (ref 0–0.5)
EOSINOPHIL NFR BLD AUTO: 2.7 % — SIGNIFICANT CHANGE UP (ref 0–6)
FLUAV AG NPH QL: SIGNIFICANT CHANGE UP
FLUBV AG NPH QL: SIGNIFICANT CHANGE UP
GAS PNL BLDV: 137 MMOL/L — SIGNIFICANT CHANGE UP (ref 136–145)
GAS PNL BLDV: SIGNIFICANT CHANGE UP
GLUCOSE BLDC GLUCOMTR-MCNC: 164 MG/DL — HIGH (ref 70–99)
GLUCOSE BLDV-MCNC: 152 MG/DL — HIGH (ref 70–99)
GLUCOSE SERPL-MCNC: 152 MG/DL — HIGH (ref 70–99)
HCO3 BLDV-SCNC: 28 MMOL/L — SIGNIFICANT CHANGE UP (ref 22–29)
HCT VFR BLD CALC: 35.2 % — LOW (ref 39–50)
HCT VFR BLDA CALC: 38 % — LOW (ref 39–51)
HGB BLD CALC-MCNC: 12.7 G/DL — SIGNIFICANT CHANGE UP (ref 12.6–17.4)
HGB BLD-MCNC: 11 G/DL — LOW (ref 13–17)
IMM GRANULOCYTES NFR BLD AUTO: 0.3 % — SIGNIFICANT CHANGE UP (ref 0–1.5)
LACTATE BLDV-MCNC: 1.5 MMOL/L — SIGNIFICANT CHANGE UP (ref 0.7–2)
LYMPHOCYTES # BLD AUTO: 0.78 K/UL — LOW (ref 1–3.3)
LYMPHOCYTES # BLD AUTO: 12.3 % — LOW (ref 13–44)
MCHC RBC-ENTMCNC: 30.4 PG — SIGNIFICANT CHANGE UP (ref 27–34)
MCHC RBC-ENTMCNC: 31.3 GM/DL — LOW (ref 32–36)
MCV RBC AUTO: 97.2 FL — SIGNIFICANT CHANGE UP (ref 80–100)
MONOCYTES # BLD AUTO: 0.56 K/UL — SIGNIFICANT CHANGE UP (ref 0–0.9)
MONOCYTES NFR BLD AUTO: 8.8 % — SIGNIFICANT CHANGE UP (ref 2–14)
NEUTROPHILS # BLD AUTO: 4.79 K/UL — SIGNIFICANT CHANGE UP (ref 1.8–7.4)
NEUTROPHILS NFR BLD AUTO: 75.3 % — SIGNIFICANT CHANGE UP (ref 43–77)
NRBC # BLD: 0 /100 WBCS — SIGNIFICANT CHANGE UP (ref 0–0)
NT-PROBNP SERPL-SCNC: 352 PG/ML — HIGH (ref 0–300)
PCO2 BLDV: 51 MMHG — SIGNIFICANT CHANGE UP (ref 42–55)
PH BLDV: 7.34 — SIGNIFICANT CHANGE UP (ref 7.32–7.43)
PLATELET # BLD AUTO: 211 K/UL — SIGNIFICANT CHANGE UP (ref 150–400)
PO2 BLDV: 45 MMHG — SIGNIFICANT CHANGE UP (ref 25–45)
POTASSIUM BLDV-SCNC: 4.2 MMOL/L — SIGNIFICANT CHANGE UP (ref 3.5–5.1)
POTASSIUM SERPL-MCNC: 4.3 MMOL/L — SIGNIFICANT CHANGE UP (ref 3.5–5.3)
POTASSIUM SERPL-SCNC: 4.3 MMOL/L — SIGNIFICANT CHANGE UP (ref 3.5–5.3)
PROT SERPL-MCNC: 6.4 G/DL — SIGNIFICANT CHANGE UP (ref 6–8.3)
RBC # BLD: 3.62 M/UL — LOW (ref 4.2–5.8)
RBC # FLD: 13.3 % — SIGNIFICANT CHANGE UP (ref 10.3–14.5)
RSV RNA NPH QL NAA+NON-PROBE: SIGNIFICANT CHANGE UP
SAO2 % BLDV: 77.1 % — SIGNIFICANT CHANGE UP (ref 67–88)
SARS-COV-2 RNA SPEC QL NAA+PROBE: SIGNIFICANT CHANGE UP
SODIUM SERPL-SCNC: 140 MMOL/L — SIGNIFICANT CHANGE UP (ref 135–145)
TROPONIN T, HIGH SENSITIVITY RESULT: 17 NG/L — SIGNIFICANT CHANGE UP (ref 0–51)
TROPONIN T, HIGH SENSITIVITY RESULT: 18 NG/L — SIGNIFICANT CHANGE UP (ref 0–51)
WBC # BLD: 6.36 K/UL — SIGNIFICANT CHANGE UP (ref 3.8–10.5)
WBC # FLD AUTO: 6.36 K/UL — SIGNIFICANT CHANGE UP (ref 3.8–10.5)

## 2022-03-11 PROCEDURE — 71275 CT ANGIOGRAPHY CHEST: CPT | Mod: 26

## 2022-03-11 PROCEDURE — 71045 X-RAY EXAM CHEST 1 VIEW: CPT | Mod: 26

## 2022-03-11 PROCEDURE — 99285 EMERGENCY DEPT VISIT HI MDM: CPT | Mod: GC

## 2022-03-11 PROCEDURE — 70450 CT HEAD/BRAIN W/O DYE: CPT | Mod: 26,MA

## 2022-03-11 PROCEDURE — 93010 ELECTROCARDIOGRAM REPORT: CPT

## 2022-03-11 RX ORDER — BUPIVACAINE HCL/PF 7.5 MG/ML
5 VIAL (ML) INJECTION ONCE
Refills: 0 | Status: DISCONTINUED | OUTPATIENT
Start: 2022-03-11 | End: 2022-03-15

## 2022-03-11 RX ORDER — KETOROLAC TROMETHAMINE 30 MG/ML
15 SYRINGE (ML) INJECTION ONCE
Refills: 0 | Status: DISCONTINUED | OUTPATIENT
Start: 2022-03-11 | End: 2022-03-11

## 2022-03-11 RX ORDER — SODIUM CHLORIDE 9 MG/ML
500 INJECTION INTRAMUSCULAR; INTRAVENOUS; SUBCUTANEOUS ONCE
Refills: 0 | Status: DISCONTINUED | OUTPATIENT
Start: 2022-03-11 | End: 2022-03-11

## 2022-03-11 RX ORDER — IPRATROPIUM/ALBUTEROL SULFATE 18-103MCG
3 AEROSOL WITH ADAPTER (GRAM) INHALATION ONCE
Refills: 0 | Status: DISCONTINUED | OUTPATIENT
Start: 2022-03-11 | End: 2022-03-11

## 2022-03-11 RX ORDER — ACETAMINOPHEN 500 MG
1000 TABLET ORAL ONCE
Refills: 0 | Status: COMPLETED | OUTPATIENT
Start: 2022-03-11 | End: 2022-03-11

## 2022-03-11 RX ORDER — IPRATROPIUM/ALBUTEROL SULFATE 18-103MCG
3 AEROSOL WITH ADAPTER (GRAM) INHALATION ONCE
Refills: 0 | Status: COMPLETED | OUTPATIENT
Start: 2022-03-11 | End: 2022-03-11

## 2022-03-11 RX ORDER — CARBIDOPA AND LEVODOPA 25; 100 MG/1; MG/1
1 TABLET ORAL ONCE
Refills: 0 | Status: COMPLETED | OUTPATIENT
Start: 2022-03-11 | End: 2022-03-11

## 2022-03-11 RX ADMIN — Medication 1000 MILLIGRAM(S): at 13:10

## 2022-03-11 RX ADMIN — CARBIDOPA AND LEVODOPA 1 TABLET(S): 25; 100 TABLET ORAL at 15:42

## 2022-03-11 RX ADMIN — Medication 1000 MILLIGRAM(S): at 14:14

## 2022-03-11 RX ADMIN — Medication 400 MILLIGRAM(S): at 21:47

## 2022-03-11 RX ADMIN — Medication 3 MILLILITER(S): at 16:10

## 2022-03-11 RX ADMIN — Medication 15 MILLIGRAM(S): at 16:10

## 2022-03-11 RX ADMIN — Medication 400 MILLIGRAM(S): at 12:52

## 2022-03-11 NOTE — H&P ADULT - NSICDXFAMILYHX_GEN_ALL_CORE_FT
FAMILY HISTORY:  Family history of coronary artery disease  No pertinent family history in first degree relatives, colon cancer    
No cyanosis, clubbing or edema

## 2022-03-11 NOTE — ED ADULT NURSE NOTE - NSIMPLEMENTINTERV_GEN_ALL_ED
Implemented All Fall with Harm Risk Interventions:  Carlotta to call system. Call bell, personal items and telephone within reach. Instruct patient to call for assistance. Room bathroom lighting operational. Non-slip footwear when patient is off stretcher. Physically safe environment: no spills, clutter or unnecessary equipment. Stretcher in lowest position, wheels locked, appropriate side rails in place. Provide visual cue, wrist band, yellow gown, etc. Monitor gait and stability. Monitor for mental status changes and reorient to person, place, and time. Review medications for side effects contributing to fall risk. Reinforce activity limits and safety measures with patient and family. Provide visual clues: red socks.

## 2022-03-11 NOTE — ED PROVIDER NOTE - CLINICAL SUMMARY MEDICAL DECISION MAKING FREE TEXT BOX
87M PMH HTN, HLD, CAD s/p CABG, chronic headaches, parkinsons p/w R occipital headache. VSS in ED. ECG unchanged from prior. No neuro deficits on exam, no temporal TTP, lungs ctab w/o any resp distress, abd NT, no leg swelling  Likely occipital neuralgia, however will consider intracranial bleed as well. Low concern for GCA/trigeminal neuralgia  Plan: cardiac labs, vbg, pain control, CT head, cxr, reassess symptoms

## 2022-03-11 NOTE — ED ADULT TRIAGE NOTE - CHIEF COMPLAINT QUOTE
frequent right sided headaches x several months now getting worse, denies any relief with medication

## 2022-03-11 NOTE — ED PROVIDER NOTE - NS ED ROS FT
CONST: no fevers, no chills  EYES: no vision changes  ENT: no sore throat, no ear pain, no change in hearing  CV: no chest pain, no leg swelling  RESP: no shortness of breath, no cough  ABD: no abdominal pain, no nausea, no vomiting, no diarrhea  : no dysuria, no flank pain, no hematuria  MSK: no back pain, no extremity pain  NEURO: +headache   SKIN:  no rash

## 2022-03-11 NOTE — ED PROVIDER NOTE - OBJECTIVE STATEMENT
87M PMH HTN, HLD, CAD s/p CABG, chronic headaches, parkinsons p/w intermittent R occipital headaches radiating to R trapezius for last few months, lasting 2-3 hrs at a time, no clear provocative/palliative factors. Was prescribed gabapentin and naproxen by a neurologist who is now retired. Daughter states pt intermittently has SOB at home; takes symbicort for emphysema. Pt currently only complaining of headache. No vision changes, f/c, chest pain, neck stiffness, abd pain, leg swelling, falls, A/C use    Spoke to daughter who confirmed story. Daughter mansoor osei is pt's HCP. Pt is DNR/DNI. Molst form completed 87M PMH HTN, HLD, CAD s/p CABG, chronic headaches, parkinsons p/w intermittent R occipital headaches radiating to R trapezius for last few months, lasting 2-3 hrs at a time, no clear provocative/palliative factors. Was prescribed gabapentin and naproxen by a neurologist who is now retired. Daughter states pt intermittently has SOB at home; takes symbicort for emphysema. Pt currently only complaining of headache. No vision changes, f/c, chest pain, neck stiffness, abd pain, leg swelling, falls, A/C use    Spoke to daughter who confirmed story. Daughter mansoor osei is pt's HCP. Pt is DNR/DNI

## 2022-03-11 NOTE — ED PROVIDER NOTE - ATTENDING CONTRIBUTION TO CARE
Attending MD Trejo:  I personally have seen and examined this patient. I have performed a substantive portion of the visit including all aspects of the medical decision making.  Resident note reviewed and agree on plan of care and except where noted.  See HPI, PE, and MDM for details.            87M with Parkinson's, COPD, CAD presenting for evaluation of acute on chronic right posterior head pain radiating to right frontal region, similar pain 9/2021 evaluated with CT head that was unrevealing at that time. Patient's neurologic examination non-focal. Patient describes intermittent shooting pain. I suspect possible occipital neuralgia given this history, however given advanced age, will repeat neuro-imaging to rule out SDH, mass or edema. Patient is baseline short of breath, however family member concerned that patient may be more short of breath than usual (patient states his breathing is "OK") to me. Clear lungs, not tachypneic. Will check labs, screening ECG and CXR, reassess         *The above represents an initial assessment/impression. Please refer to progress notes for potential changes in patient clinical course*

## 2022-03-11 NOTE — ED ADULT NURSE NOTE - OBJECTIVE STATEMENT
Pt presents to the ED c/o of right sided headache that has been on and off for over 6 months. PMH HTN, DM, HLD, BPH, CAD, Parkinson. Pt is AOx3. Pupils are equal and reactive to light, symmetrical smile. Breathing unlabored symmetrical rise and fall of the chest. Abdomen is soft and nondistended. Moving all extremities. Bilateral upper and lower extremities equal strength. Strong peripheral pulses. Skin is warm and dry to touch. On stretcher, at lowest position.

## 2022-03-11 NOTE — H&P ADULT - HISTORY OF PRESENT ILLNESS
87M PMH HTN, HLD, CAD s/p CABG, chronic headaches, parkinsons p/w intermittent R occipital headaches radiating to R trapezius for last few months, lasting 2-3 hrs at a time, no clear provocative/palliative factors. Was prescribed gabapentin and naproxen by a neurologist who is now retired. Daughter states pt intermittently has SOB at home; takes symbicort for emphysema. Pt currently only complaining of headache. No vision changes, f/c, chest pain, neck stiffness, abd pain, leg swelling, falls, A/PC use    ED CTA PE protocol No PE

## 2022-03-11 NOTE — ED PROVIDER NOTE - PROGRESS NOTE DETAILS
Nilesh ANGULO (PGY-2)  upon reassessment, pt noted to have severe headache and tacyhpneic to 40's. will order rpt ecg, place pt on monitor, rpt trop, give duoneb, reassess symptoms Attending MD Trejo: patient will require admission given now tachypneic, could be pain related, however with cardiac and pulm history will admit for further investigation. Lungs largely clear on auscultation however patient quite tachypneic so limited pulm exam. Repeat ECG unchanged, first trop 17, will continue to cycle. Nliesh ANGULO (PGY-2)  pt no longer in resp distress, seen resting in bed. appears more ocmfortable but endorsing sujective headache still. spoke to daughter and updated about plan to manage pain and eval SOB. spoke to dr. michael who will accept admission. requesting CTA chest to eval for PE. will place order and admit pt Nilesh ANGULO (PGY-2)  pt no longer in resp distress, seen resting in bed. appears more comfortable but endorsing subjective headache still. spoke to daughter and updated about plan to manage pain and eval SOB. spoke to dr. michael who will accept admission. requesting CTA chest to eval for PE. will place order and admit pt Nilesh ANGULO (PGY-2)  pt no longer in resp distress (after receiving 1 duoneb--, seen resting in bed. appears more comfortable but endorsing subjective headache still. spoke to daughter and updated about plan to manage pain and eval SOB. spoke to dr. michael who will accept admission. requesting CTA chest to eval for PE. will place order and admit pt

## 2022-03-12 LAB
ANION GAP SERPL CALC-SCNC: 9 MMOL/L — SIGNIFICANT CHANGE UP (ref 5–17)
BUN SERPL-MCNC: 31 MG/DL — HIGH (ref 7–23)
CALCIUM SERPL-MCNC: 9 MG/DL — SIGNIFICANT CHANGE UP (ref 8.4–10.5)
CHLORIDE SERPL-SCNC: 106 MMOL/L — SIGNIFICANT CHANGE UP (ref 96–108)
CO2 SERPL-SCNC: 23 MMOL/L — SIGNIFICANT CHANGE UP (ref 22–31)
CREAT SERPL-MCNC: 1.05 MG/DL — SIGNIFICANT CHANGE UP (ref 0.5–1.3)
EGFR: 69 ML/MIN/1.73M2 — SIGNIFICANT CHANGE UP
GLUCOSE BLDC GLUCOMTR-MCNC: 100 MG/DL — HIGH (ref 70–99)
GLUCOSE BLDC GLUCOMTR-MCNC: 103 MG/DL — HIGH (ref 70–99)
GLUCOSE BLDC GLUCOMTR-MCNC: 222 MG/DL — HIGH (ref 70–99)
GLUCOSE BLDC GLUCOMTR-MCNC: 94 MG/DL — SIGNIFICANT CHANGE UP (ref 70–99)
GLUCOSE SERPL-MCNC: 203 MG/DL — HIGH (ref 70–99)
HCT VFR BLD CALC: 33.6 % — LOW (ref 39–50)
HGB BLD-MCNC: 10.8 G/DL — LOW (ref 13–17)
MAGNESIUM SERPL-MCNC: 1.9 MG/DL — SIGNIFICANT CHANGE UP (ref 1.6–2.6)
MCHC RBC-ENTMCNC: 31.1 PG — SIGNIFICANT CHANGE UP (ref 27–34)
MCHC RBC-ENTMCNC: 32.1 GM/DL — SIGNIFICANT CHANGE UP (ref 32–36)
MCV RBC AUTO: 96.8 FL — SIGNIFICANT CHANGE UP (ref 80–100)
NRBC # BLD: 0 /100 WBCS — SIGNIFICANT CHANGE UP (ref 0–0)
PLATELET # BLD AUTO: 218 K/UL — SIGNIFICANT CHANGE UP (ref 150–400)
POTASSIUM SERPL-MCNC: 4.4 MMOL/L — SIGNIFICANT CHANGE UP (ref 3.5–5.3)
POTASSIUM SERPL-SCNC: 4.4 MMOL/L — SIGNIFICANT CHANGE UP (ref 3.5–5.3)
RBC # BLD: 3.47 M/UL — LOW (ref 4.2–5.8)
RBC # FLD: 13.3 % — SIGNIFICANT CHANGE UP (ref 10.3–14.5)
SODIUM SERPL-SCNC: 138 MMOL/L — SIGNIFICANT CHANGE UP (ref 135–145)
WBC # BLD: 7.96 K/UL — SIGNIFICANT CHANGE UP (ref 3.8–10.5)
WBC # FLD AUTO: 7.96 K/UL — SIGNIFICANT CHANGE UP (ref 3.8–10.5)

## 2022-03-12 PROCEDURE — 70551 MRI BRAIN STEM W/O DYE: CPT | Mod: 26

## 2022-03-12 RX ORDER — GLUCAGON INJECTION, SOLUTION 0.5 MG/.1ML
1 INJECTION, SOLUTION SUBCUTANEOUS ONCE
Refills: 0 | Status: DISCONTINUED | OUTPATIENT
Start: 2022-03-12 | End: 2022-03-15

## 2022-03-12 RX ORDER — ACETAMINOPHEN 500 MG
1000 TABLET ORAL ONCE
Refills: 0 | Status: COMPLETED | OUTPATIENT
Start: 2022-03-12 | End: 2022-03-12

## 2022-03-12 RX ORDER — KETOROLAC TROMETHAMINE 30 MG/ML
15 SYRINGE (ML) INJECTION ONCE
Refills: 0 | Status: DISCONTINUED | OUTPATIENT
Start: 2022-03-12 | End: 2022-03-12

## 2022-03-12 RX ORDER — DEXTROSE 50 % IN WATER 50 %
25 SYRINGE (ML) INTRAVENOUS ONCE
Refills: 0 | Status: DISCONTINUED | OUTPATIENT
Start: 2022-03-12 | End: 2022-03-15

## 2022-03-12 RX ORDER — INSULIN LISPRO 100/ML
VIAL (ML) SUBCUTANEOUS
Refills: 0 | Status: DISCONTINUED | OUTPATIENT
Start: 2022-03-12 | End: 2022-03-15

## 2022-03-12 RX ORDER — BUDESONIDE AND FORMOTEROL FUMARATE DIHYDRATE 160; 4.5 UG/1; UG/1
2 AEROSOL RESPIRATORY (INHALATION)
Refills: 0 | Status: DISCONTINUED | OUTPATIENT
Start: 2022-03-12 | End: 2022-03-15

## 2022-03-12 RX ORDER — SERTRALINE 25 MG/1
25 TABLET, FILM COATED ORAL DAILY
Refills: 0 | Status: DISCONTINUED | OUTPATIENT
Start: 2022-03-12 | End: 2022-03-15

## 2022-03-12 RX ORDER — INFLUENZA VIRUS VACCINE 15; 15; 15; 15 UG/.5ML; UG/.5ML; UG/.5ML; UG/.5ML
0.7 SUSPENSION INTRAMUSCULAR ONCE
Refills: 0 | Status: DISCONTINUED | OUTPATIENT
Start: 2022-03-12 | End: 2022-03-15

## 2022-03-12 RX ORDER — LISINOPRIL 2.5 MG/1
10 TABLET ORAL DAILY
Refills: 0 | Status: DISCONTINUED | OUTPATIENT
Start: 2022-03-12 | End: 2022-03-15

## 2022-03-12 RX ORDER — SODIUM CHLORIDE 9 MG/ML
1000 INJECTION, SOLUTION INTRAVENOUS
Refills: 0 | Status: DISCONTINUED | OUTPATIENT
Start: 2022-03-12 | End: 2022-03-15

## 2022-03-12 RX ORDER — ATORVASTATIN CALCIUM 80 MG/1
80 TABLET, FILM COATED ORAL AT BEDTIME
Refills: 0 | Status: DISCONTINUED | OUTPATIENT
Start: 2022-03-12 | End: 2022-03-15

## 2022-03-12 RX ORDER — TAMSULOSIN HYDROCHLORIDE 0.4 MG/1
0.4 CAPSULE ORAL AT BEDTIME
Refills: 0 | Status: DISCONTINUED | OUTPATIENT
Start: 2022-03-12 | End: 2022-03-15

## 2022-03-12 RX ORDER — DEXTROSE 50 % IN WATER 50 %
12.5 SYRINGE (ML) INTRAVENOUS ONCE
Refills: 0 | Status: DISCONTINUED | OUTPATIENT
Start: 2022-03-12 | End: 2022-03-15

## 2022-03-12 RX ORDER — CARBIDOPA AND LEVODOPA 25; 100 MG/1; MG/1
1 TABLET ORAL THREE TIMES A DAY
Refills: 0 | Status: DISCONTINUED | OUTPATIENT
Start: 2022-03-12 | End: 2022-03-15

## 2022-03-12 RX ORDER — FINASTERIDE 5 MG/1
5 TABLET, FILM COATED ORAL DAILY
Refills: 0 | Status: DISCONTINUED | OUTPATIENT
Start: 2022-03-12 | End: 2022-03-15

## 2022-03-12 RX ORDER — HYDRALAZINE HCL 50 MG
5 TABLET ORAL ONCE
Refills: 0 | Status: COMPLETED | OUTPATIENT
Start: 2022-03-12 | End: 2022-03-12

## 2022-03-12 RX ORDER — ACETAMINOPHEN 500 MG
650 TABLET ORAL ONCE
Refills: 0 | Status: COMPLETED | OUTPATIENT
Start: 2022-03-12 | End: 2022-03-12

## 2022-03-12 RX ORDER — PANTOPRAZOLE SODIUM 20 MG/1
40 TABLET, DELAYED RELEASE ORAL
Refills: 0 | Status: DISCONTINUED | OUTPATIENT
Start: 2022-03-12 | End: 2022-03-15

## 2022-03-12 RX ORDER — DEXTROSE 50 % IN WATER 50 %
15 SYRINGE (ML) INTRAVENOUS ONCE
Refills: 0 | Status: DISCONTINUED | OUTPATIENT
Start: 2022-03-12 | End: 2022-03-15

## 2022-03-12 RX ADMIN — ATORVASTATIN CALCIUM 80 MILLIGRAM(S): 80 TABLET, FILM COATED ORAL at 21:40

## 2022-03-12 RX ADMIN — FINASTERIDE 5 MILLIGRAM(S): 5 TABLET, FILM COATED ORAL at 11:14

## 2022-03-12 RX ADMIN — CARBIDOPA AND LEVODOPA 1 TABLET(S): 25; 100 TABLET ORAL at 13:14

## 2022-03-12 RX ADMIN — Medication 5 MILLIGRAM(S): at 21:40

## 2022-03-12 RX ADMIN — SERTRALINE 25 MILLIGRAM(S): 25 TABLET, FILM COATED ORAL at 11:13

## 2022-03-12 RX ADMIN — Medication 400 MILLIGRAM(S): at 15:26

## 2022-03-12 RX ADMIN — CARBIDOPA AND LEVODOPA 1 TABLET(S): 25; 100 TABLET ORAL at 21:40

## 2022-03-12 RX ADMIN — Medication 15 MILLIGRAM(S): at 00:59

## 2022-03-12 RX ADMIN — LISINOPRIL 10 MILLIGRAM(S): 2.5 TABLET ORAL at 11:14

## 2022-03-12 RX ADMIN — Medication 650 MILLIGRAM(S): at 15:00

## 2022-03-12 RX ADMIN — Medication 1000 MILLIGRAM(S): at 16:30

## 2022-03-12 RX ADMIN — Medication 650 MILLIGRAM(S): at 13:40

## 2022-03-12 RX ADMIN — TAMSULOSIN HYDROCHLORIDE 0.4 MILLIGRAM(S): 0.4 CAPSULE ORAL at 21:40

## 2022-03-12 RX ADMIN — BUDESONIDE AND FORMOTEROL FUMARATE DIHYDRATE 2 PUFF(S): 160; 4.5 AEROSOL RESPIRATORY (INHALATION) at 21:39

## 2022-03-12 RX ADMIN — BUDESONIDE AND FORMOTEROL FUMARATE DIHYDRATE 2 PUFF(S): 160; 4.5 AEROSOL RESPIRATORY (INHALATION) at 10:10

## 2022-03-12 RX ADMIN — Medication 5 MILLIGRAM(S): at 11:12

## 2022-03-12 RX ADMIN — Medication 4: at 16:51

## 2022-03-12 RX ADMIN — PANTOPRAZOLE SODIUM 40 MILLIGRAM(S): 20 TABLET, DELAYED RELEASE ORAL at 10:10

## 2022-03-12 NOTE — CHART NOTE - NSCHARTNOTEFT_GEN_A_CORE
Notified by RN that patient had 2 seconds of PAT on tele. Patient's baseline rhythm is NSR. Patient seen and assessed by me. Patient endorses persistent headache - denies chest pain, palpitations, shortness of breath, dyspnea, fever.    Vital Signs Last 24 Hrs  T(C): 36.8 (12 Mar 2022 13:00), Max: 36.9 (11 Mar 2022 19:22)  T(F): 98.2 (12 Mar 2022 13:00), Max: 98.4 (11 Mar 2022 19:22)  HR: 71 (12 Mar 2022 14:57) (55 - 79)  BP: 143/57 (12 Mar 2022 14:57) (124/56 - 203/71)  BP(mean): --  RR: 18 (12 Mar 2022 14:57) (16 - 30)  SpO2: 96% (12 Mar 2022 14:57) (95% - 99%)     Physical Exam:  CONSTITUTIONAL: NAD, well-developed  RESPIRATORY: Normal respiratory effort; lungs are clear to auscultation bilaterally  CARDIOVASCULAR: Regular rate and rhythm, normal S1 and S2, no murmur/rub/gallop; No lower extremity edema; Peripheral pulses are 2+ bilaterally  ABDOMEN: Nontender to palpation, normoactive bowel sounds, no rebound/guarding; No hepatosplenomegaly  PSYCH: A+O to person, place, and time; phasic       Assessment/Plan:   87M PMH HTN, HLD, CAD s/p CABG, chronic headaches, parkinson's p/w intermittent R occipital headaches radiating to R trapezius for last few months, lasting 2-3 hrs at a time, no clear provocative/palliative factors. Now noted with 2 sec of PAT on tele     # PAT  - Patient hemodynamically stable and alert and oriented  - Given persistent headache and neg CT scan - will further eval with MRI   - BMP stat - will replete electrolytes as necessary.   - c/w tele   - F/u cardiology recs  - Monitor for signs of hemodynamic instability.   - Discussed with attending Dr. Sanchez - agrees with plan above       Colleen Mason PA-C  Dept of Medicine   [phone]

## 2022-03-12 NOTE — CHART NOTE - NSCHARTNOTEFT_GEN_A_CORE
Informed by nurse patient with manual /70. Saw and evaluated patient at bedside. Patient A&Ox4 - endorses headache, denies lightheadedness, dizziness, chest pain, palpitations, SOB, N/V/D, blurred vision.                           11.0   6.36  )-----------( 211      ( 11 Mar 2022 13:10 )             35.2   03-11    140  |  106  |  27<H>  ----------------------------<  152<H>  4.3   |  22  |  0.78    Ca    9.5      11 Mar 2022 13:10    TPro  6.4  /  Alb  3.9  /  TBili  0.3  /  DBili  x   /  AST  14  /  ALT  <5<L>  /  AlkPhos  64  03-11    CT BRAIN PROCEDURE DATE:  03/11/2022    COMPARISON: CT head 9/7/2021  FINDINGS:  There is mild diffuse parenchymal volume loss. There are areas of low   attenuation in the periventricular white matter likely related to mild   chronic microvascular ischemic changes.  There is no acute intracranial hemorrhage, parenchymal mass, mass effect   or midline shift. There is no acute territorial infarct. There is no   hydrocephalus. Partial empty sella  The cranium is intact.  Mucosal thickening paranasal sinuses    IMPRESSION:  No acute intracranial hemorrhage or acute territorial infarct.  If   symptoms persist, follow-up MRI exam recommended.      Physical Exam:  CONSTITUTIONAL: NAD, well-developed  RESPIRATORY: Normal respiratory effort; lungs are clear to auscultation bilaterally  CARDIOVASCULAR: Regular rate and rhythm, normal S1 and S2, no murmur/rub/gallop; No lower extremity edema; Peripheral pulses are 2+ bilaterally  ABDOMEN: Nontender to palpation, normoactive bowel sounds, no rebound/guarding; No hepatosplenomegaly  PSYCH: A+O to person, place, and time; phasic     Assessment:  87M PMH HTN, HLD, CAD s/p CABG, chronic headaches, parkinson's p/w intermittent R occipital headaches radiating to R trapezius for last few months, lasting 2-3 hrs at a time, no clear provocative/palliative factors. Now with hypertensive episode     Plan:  Start Lisinopril 10mg qd  Give IV Hydralazine 5mg STAT  May initiate DASH diet  Check TTE  Cardiology consulted by attending   Continue to monitor on tele.  Discussed with attending Dr. Sanchez - agrees with plan above.    ADDENDUM:  Repeat BP stable:  `Vital Signs Last 24 Hrs  T(C): 36.8 (12 Mar 2022 13:00), Max: 36.9 (11 Mar 2022 19:22)  T(F): 98.2 (12 Mar 2022 13:00), Max: 98.4 (11 Mar 2022 19:22)  HR: 74 (12 Mar 2022 13:00) (55 - 79)  BP: 124/56 (12 Mar 2022 13:00) (124/56 - 203/71)  BP(mean): --  RR: 18 (12 Mar 2022 13:00) (16 - 30)  SpO2: 95% (12 Mar 2022 13:00) (95% - 99%)        Colleen HOFFMANN  Dept of Medicine  [place] [phone]

## 2022-03-12 NOTE — PATIENT PROFILE ADULT - FALL HARM RISK - RISK INTERVENTIONS

## 2022-03-13 LAB
A1C WITH ESTIMATED AVERAGE GLUCOSE RESULT: 6.4 % — HIGH (ref 4–5.6)
ANION GAP SERPL CALC-SCNC: 9 MMOL/L — SIGNIFICANT CHANGE UP (ref 5–17)
BUN SERPL-MCNC: 27 MG/DL — HIGH (ref 7–23)
CALCIUM SERPL-MCNC: 9.3 MG/DL — SIGNIFICANT CHANGE UP (ref 8.4–10.5)
CHLORIDE SERPL-SCNC: 107 MMOL/L — SIGNIFICANT CHANGE UP (ref 96–108)
CO2 SERPL-SCNC: 24 MMOL/L — SIGNIFICANT CHANGE UP (ref 22–31)
CREAT SERPL-MCNC: 1 MG/DL — SIGNIFICANT CHANGE UP (ref 0.5–1.3)
EGFR: 73 ML/MIN/1.73M2 — SIGNIFICANT CHANGE UP
ESTIMATED AVERAGE GLUCOSE: 137 MG/DL — HIGH (ref 68–114)
GLUCOSE BLDC GLUCOMTR-MCNC: 106 MG/DL — HIGH (ref 70–99)
GLUCOSE BLDC GLUCOMTR-MCNC: 141 MG/DL — HIGH (ref 70–99)
GLUCOSE BLDC GLUCOMTR-MCNC: 162 MG/DL — HIGH (ref 70–99)
GLUCOSE BLDC GLUCOMTR-MCNC: 179 MG/DL — HIGH (ref 70–99)
GLUCOSE SERPL-MCNC: 98 MG/DL — SIGNIFICANT CHANGE UP (ref 70–99)
PHOSPHATE SERPL-MCNC: 2.5 MG/DL — SIGNIFICANT CHANGE UP (ref 2.5–4.5)
POTASSIUM SERPL-MCNC: 4.1 MMOL/L — SIGNIFICANT CHANGE UP (ref 3.5–5.3)
POTASSIUM SERPL-SCNC: 4.1 MMOL/L — SIGNIFICANT CHANGE UP (ref 3.5–5.3)
SODIUM SERPL-SCNC: 140 MMOL/L — SIGNIFICANT CHANGE UP (ref 135–145)

## 2022-03-13 PROCEDURE — 99222 1ST HOSP IP/OBS MODERATE 55: CPT

## 2022-03-13 RX ORDER — ACETAMINOPHEN 500 MG
650 TABLET ORAL ONCE
Refills: 0 | Status: COMPLETED | OUTPATIENT
Start: 2022-03-13 | End: 2022-03-13

## 2022-03-13 RX ADMIN — BUDESONIDE AND FORMOTEROL FUMARATE DIHYDRATE 2 PUFF(S): 160; 4.5 AEROSOL RESPIRATORY (INHALATION) at 21:44

## 2022-03-13 RX ADMIN — Medication 650 MILLIGRAM(S): at 22:40

## 2022-03-13 RX ADMIN — CARBIDOPA AND LEVODOPA 1 TABLET(S): 25; 100 TABLET ORAL at 21:44

## 2022-03-13 RX ADMIN — Medication 2: at 11:49

## 2022-03-13 RX ADMIN — SERTRALINE 25 MILLIGRAM(S): 25 TABLET, FILM COATED ORAL at 12:16

## 2022-03-13 RX ADMIN — PANTOPRAZOLE SODIUM 40 MILLIGRAM(S): 20 TABLET, DELAYED RELEASE ORAL at 05:17

## 2022-03-13 RX ADMIN — ATORVASTATIN CALCIUM 80 MILLIGRAM(S): 80 TABLET, FILM COATED ORAL at 21:44

## 2022-03-13 RX ADMIN — FINASTERIDE 5 MILLIGRAM(S): 5 TABLET, FILM COATED ORAL at 12:16

## 2022-03-13 RX ADMIN — CARBIDOPA AND LEVODOPA 1 TABLET(S): 25; 100 TABLET ORAL at 12:15

## 2022-03-13 RX ADMIN — CARBIDOPA AND LEVODOPA 1 TABLET(S): 25; 100 TABLET ORAL at 05:17

## 2022-03-13 RX ADMIN — TAMSULOSIN HYDROCHLORIDE 0.4 MILLIGRAM(S): 0.4 CAPSULE ORAL at 21:44

## 2022-03-13 RX ADMIN — LISINOPRIL 10 MILLIGRAM(S): 2.5 TABLET ORAL at 05:17

## 2022-03-13 RX ADMIN — BUDESONIDE AND FORMOTEROL FUMARATE DIHYDRATE 2 PUFF(S): 160; 4.5 AEROSOL RESPIRATORY (INHALATION) at 12:16

## 2022-03-13 RX ADMIN — Medication 650 MILLIGRAM(S): at 22:08

## 2022-03-13 NOTE — SWALLOW BEDSIDE ASSESSMENT ADULT - SLP PERTINENT HISTORY OF CURRENT PROBLEM
87M PMH HTN, HLD, CAD s/p CABG, chronic headaches, Parkinsons Dz p/w intermittent R occipital headaches radiating to R trapezius for last few months, lasting 2-3 hrs at a time, no clear provocative/palliative factors. Was prescribed gabapentin and naproxen by a neurologist who is now retired. Daughter states pt intermittently has SOB at home; takes symbicort for emphysema. 3/13/22 Per MD: Pt currently only complaining of headache. No vision changes, f/c, chest pain, neck stiffness, abd pain, leg swelling, falls, A/C use.

## 2022-03-13 NOTE — PHYSICAL THERAPY INITIAL EVALUATION ADULT - PERTINENT HX OF CURRENT PROBLEM, REHAB EVAL
87M PMH HTN, HLD, CAD s/p CABG, chronic headaches, parkinsons p/w intermittent R occipital headaches radiating to R trapezius for last few months, lasting 2-3 hrs at a time, no clear provocative/palliative factors. Was prescribed gabapentin and naproxen by a neurologist who is now retired. Daughter states pt intermittently has SOB at home; takes symbicort for emphysema. Pt currently only complaining of headache.

## 2022-03-13 NOTE — CONSULT NOTE ADULT - ASSESSMENT
87M PMH HTN, HLD, CAD s/p CABG, chronic headaches, parkinsonism p/w intermittent headaches and dyspnea    - he has been having more dyspnea   - Appears compensated from HF POV.   - BNP is normal    - Pt has a hx of CABG.   - EKG is without any ischemic changes  - cont statin  - check echo  - may benefit from nuclear stress testing.     - initially was sig HTN   - now improved with lisinopril 10mg Qday. Would titrate up if possible.   - would check orthostatics given parkinsons to make sure no level of dysautonomia     - Monitor and replete electrolytes. Keep K>4.0 and Mg>2.0.   - Further cardiac workup will depend on clinical course.   - All other workup per primary team. Will followup.

## 2022-03-13 NOTE — PHYSICAL THERAPY INITIAL EVALUATION ADULT - ADDITIONAL COMMENTS
Pt lives with his DTR in a pvt house and steps to neg (pt unsure of number). Pt amb w/ RW PTA. Pt req assistance w/ ADL's.

## 2022-03-13 NOTE — PHYSICAL THERAPY INITIAL EVALUATION ADULT - LEVEL OF INDEPENDENCE: SIT/STAND, REHAB EVAL
Pt ref to attempt standing minimum assist (75% patients effort)/moderate assist (50% patients effort)

## 2022-03-13 NOTE — SWALLOW BEDSIDE ASSESSMENT ADULT - ASR SWALLOW ASPIRATION MONITOR
*If evident, report to MD immediately and reconsult this dept./change of breathing pattern/cough/gurgly voice/fever/pneumonia/throat clearing/upper respiratory infection

## 2022-03-13 NOTE — PHYSICAL THERAPY INITIAL EVALUATION ADULT - LEVEL OF INDEPENDENCE: STAND/SIT, REHAB EVAL
Pt refused to attempt standing minimum assist (75% patients effort)/moderate assist (50% patients effort)

## 2022-03-13 NOTE — SWALLOW BEDSIDE ASSESSMENT ADULT - SLP GENERAL OBSERVATIONS
Pt awake, alert and oriented to self,  and hospital with daughter at b/s.  Pt speaks English and Farsi; Pt is hard of hearing.  Pt easily engaged in conversation.  Dgtr denies s/s of dysphagia pta or during this admission.

## 2022-03-13 NOTE — CONSULT NOTE ADULT - SUBJECTIVE AND OBJECTIVE BOX
CHIEF COMPLAINT: Patient is a 87y old  Male who presents with a chief complaint of SOB x 1 week (12 Mar 2022 23:11)      HPI:  87M PMH HTN, HLD, CAD s/p CABG, chronic headaches, parkinsons p/w intermittent R occipital headaches radiating to R trapezius for last few months, lasting 2-3 hrs at a time, no clear provocative/palliative factors. Was prescribed gabapentin and naproxen by a neurologist who is now retired. Daughter states pt intermittently has SOB at home; takes symbicort for emphysema. Pt currently only complaining of headache. No vision changes, f/c, chest pain, neck stiffness, abd pain, leg swelling, falls, A/PC use    ED CTA PE protocol No PE  (11 Mar 2022 11:11)    Interval hx: states that his breathing is better but still with WORKMAN.  Denies any chest pain, palpitations, PND, orthopnea, near syncope, syncope, lower extremity edema, stroke like symptoms.     EKG: < from: 12 Lead ECG (03.11.22 @ 16:12) >  ORMAL SINUS RHYTHM  MODERATE VOLTAGE CRITERIA FOR LVH, MAY BE NORMAL VARIANT ( R in aVL , Lukasz product )  BORDERLINE ECG    < end of copied text >      REVIEW OF SYSTEMS:   All other review of systems are negative unless indicated above    PAST MEDICAL & SURGICAL HISTORY:  Diabetes mellitus    Dyslipidemia    CAD (coronary artery disease)    Hypertension    Osteoporosis    Urinary frequency    Syncope    Benign prostatic hyperplasia    Parkinson disease    HTN (hypertension)    HLD (hyperlipidemia)    DM (diabetes mellitus)    CAD (coronary artery disease)    Hypospadias    S/P coronary artery stent placement in 2003    History of total left knee replacement  2014    S/P CABG (coronary artery bypass graft)  2015    History of knee replacement procedure of left knee  2017        SOCIAL HISTORY:  No tobacco, ethanol, or drug abuse.    FAMILY HISTORY:  Family history of coronary artery disease    No pertinent family history in first degree relatives  colon cancer      No family history of acute MI or sudden cardiac death.    MEDICATIONS  (STANDING):  atorvastatin 80 milliGRAM(s) Oral at bedtime  budesonide 160 MICROgram(s)/formoterol 4.5 MICROgram(s) Inhaler 2 Puff(s) Inhalation two times a day  BUpivacaine 0.25% Injectable 5 milliLiter(s) Local Injection Once  carbidopa/levodopa  25/100 1 Tablet(s) Oral three times a day  dextrose 40% Gel 15 Gram(s) Oral once  dextrose 5%. 1000 milliLiter(s) (50 mL/Hr) IV Continuous <Continuous>  dextrose 5%. 1000 milliLiter(s) (100 mL/Hr) IV Continuous <Continuous>  dextrose 50% Injectable 25 Gram(s) IV Push once  dextrose 50% Injectable 12.5 Gram(s) IV Push once  dextrose 50% Injectable 25 Gram(s) IV Push once  finasteride 5 milliGRAM(s) Oral daily  glucagon  Injectable 1 milliGRAM(s) IntraMuscular once  influenza  Vaccine (HIGH DOSE) 0.7 milliLiter(s) IntraMuscular once  insulin lispro (ADMELOG) corrective regimen sliding scale   SubCutaneous three times a day before meals  lisinopril 10 milliGRAM(s) Oral daily  pantoprazole    Tablet 40 milliGRAM(s) Oral before breakfast  sertraline 25 milliGRAM(s) Oral daily  tamsulosin 0.4 milliGRAM(s) Oral at bedtime    MEDICATIONS  (PRN):      Allergies    No Known Allergies    Intolerances        Home meds:  Home Medications:  carbidopa-levodopa 25 mg-100 mg oral tablet: 1 tab(s) orally 3 times a day (09 Sep 2021 12:31)  docusate sodium 100 mg oral capsule: 1 cap(s) orally 2 times a day (07 Sep 2021 09:16)  finasteride 5 mg oral tablet: 1 tab(s) orally once a day (09 Sep 2021 12:31)  Januvia 100 mg oral tablet: 1 tab(s) orally once a day (07 Sep 2021 09:16)  Lasix 20 mg oral tablet: 1 tab(s) orally once a day, As Needed (07 Sep 2021 09:16)  multivitamin: 1 tab(s) orally once a day (07 Sep 2021 09:16)  Potassium Chloride (Eqv-Klor-Con 10) 10 mEq oral tablet, extended release: 1 tab(s) orally once a day (07 Sep 2021 09:16)  Senna 8.6 mg oral tablet: 1 tab(s) orally once a day (at bedtime), As Needed (07 Sep 2021 09:16)  Symbicort 160 mcg-4.5 mcg/inh inhalation aerosol: 2 puff(s) inhaled 2 times a day (09 Sep 2021 12:31)  tamsulosin 0.4 mg oral capsule: 1 cap(s) orally once a day (at bedtime) (09 Sep 2021 12:31)  Tylenol 325 mg oral tablet:  (07 Sep 2021 09:16)        VITAL SIGNS:   Vital Signs Last 24 Hrs  T(C): 36.4 (13 Mar 2022 10:47), Max: 36.9 (12 Mar 2022 21:34)  T(F): 97.5 (13 Mar 2022 10:47), Max: 98.4 (12 Mar 2022 21:34)  HR: 65 (13 Mar 2022 11:19) (60 - 74)  BP: 139/70 (13 Mar 2022 11:19) (124/56 - 175/70)  BP(mean): --  RR: 18 (13 Mar 2022 10:47) (18 - 18)  SpO2: 97% (13 Mar 2022 10:47) (95% - 98%)    I&O's Summary    12 Mar 2022 06:01  -  13 Mar 2022 07:00  --------------------------------------------------------  IN: 740 mL / OUT: 0 mL / NET: 740 mL    13 Mar 2022 07:01  -  13 Mar 2022 12:33  --------------------------------------------------------  IN: 240 mL / OUT: 0 mL / NET: 240 mL        On Exam:  TELE: SR  Constitutional: NAD, awake and alert, well-developed  HEENT: Moist Mucous Membranes, Anicteric  Pulmonary: Decreased breath sounds b/l. bibasilar crackles.   Cardiovascular: Regular, S1 and S2, 3/6 SM  Gastrointestinal: Bowel Sounds present, soft, nontender.   Lymph: No peripheral edema. No lymphadenopathy.  Skin: No visible rashes or ulcers.  Psych:  Mood & affect appropriate    LABS: All Labs Reviewed:                        10.8   7.96  )-----------( 218      ( 12 Mar 2022 16:01 )             33.6                         11.0   6.36  )-----------( 211      ( 11 Mar 2022 13:10 )             35.2     13 Mar 2022 05:57    140    |  107    |  27     ----------------------------<  98     4.1     |  24     |  1.00   12 Mar 2022 16:03    138    |  106    |  31     ----------------------------<  203    4.4     |  23     |  1.05   11 Mar 2022 13:10    140    |  106    |  27     ----------------------------<  152    4.3     |  22     |  0.78     Ca    9.3        13 Mar 2022 05:57  Ca    9.0        12 Mar 2022 16:03  Ca    9.5        11 Mar 2022 13:10  Phos  2.5       13 Mar 2022 05:57  Mg     1.9       12 Mar 2022 16:03    TPro  6.4    /  Alb  3.9    /  TBili  0.3    /  DBili  x      /  AST  14     /  ALT  <5     /  AlkPhos  64     11 Mar 2022 13:10          Blood Culture:   03-11 @ 13:10  Pro Bnp 352        RADIOLOGY:  < from: CT Angio Chest PE Protocol w/ IV Cont (03.11.22 @ 17:40) >    ACC: [id number] EXAM:  CT ANGIO CHEST PULM [facility]                          PROCEDURE DATE:  03/11/2022          INTERPRETATION:  CLINICAL INFORMATION: Shortness of breath. Tachypnea.    COMPARISON: CTA chest 9/7/2021.    CONTRAST/COMPLICATIONS:  IV Contrast: Omnipaque 350  75 cc administered   25 cc discarded  Oral Contrast: NONE  Complications: None reported at time of study completion    PROCEDURE:  CT Angiogram of the chest was obtained with intravenous contrast. Three   dimensional maximumintensity projection (MIP) images were generated.    FINDINGS:    PULMONARY ANGIOGRAM: No main, central, lobar, segmental or subsegmental   pulmonary embolus.    LYMPH NODES: Small volume subcentimeter mediastinal and hilar lymph nodes.    HEART/VASCULATURE: Cardiomegaly, particularly left atrium is enlarged.   Status post CABG. Aortic valvular and mitral annular calcifications.   Calcified and noncalcified plaques within the aorta and its branches.    AIRWAYS/LUNGS/PLEURA: Central airways are patent. Similar bilateral   groundglass opacities with subtle mosaic attenuation, likely related to   air trapping. Otherwise, no focal consolidative opacities. No pleural   effusions or pneumothorax.    UPPER ABDOMEN: A 2.0 cm right adrenal adenoma.    BONES/SOFT TISSUES: Degenerative changes. Median sternotomy wires are   intact.    IMPRESSION:    No pulmonary embolus.    --- End of Report ---          VEDA TAPIA MD; Resident Radiology  This document has been electronically signed.  MICKEY BRONSON; Attending Radiologist  This document has been electronically signed. Mar 12 2022 11:07AM    < end of copied text >

## 2022-03-13 NOTE — SWALLOW BEDSIDE ASSESSMENT ADULT - POSITIONING
per Pt preference/upright (45 degrees) Modified Advancement Flap Text: The defect edges were debeveled with a #15 scalpel blade.  Given the location of the defect, shape of the defect and the proximity to free margins a modified advancement flap was deemed most appropriate.  Using a sterile surgical marker, an appropriate advancement flap was drawn incorporating the defect and placing the expected incisions within the relaxed skin tension lines where possible.    The area thus outlined was incised deep to adipose tissue with a #15 scalpel blade.  The skin margins were undermined to an appropriate distance in all directions utilizing iris scissors.

## 2022-03-13 NOTE — SWALLOW BEDSIDE ASSESSMENT ADULT - ORAL PREPARATORY PHASE
Pt reported decreased desire to eat but accepted PO trials with encouragement.  Pt prefers home cooking per d/w dgtr./Within functional limits

## 2022-03-13 NOTE — SWALLOW BEDSIDE ASSESSMENT ADULT - SWALLOW EVAL: DIAGNOSIS
Pt presents with overtly functional oropharyngeal swallowing skills.  No signs or symptoms of laryngeal penetration/aspiration evident with any consistency presented.  Pharyngeal swallow judged to be timely with adequate laryngeal elevation. Daughter at b/s as Pt speaks English and Farsi; Pt is hard of hearing.

## 2022-03-14 LAB
GLUCOSE BLDC GLUCOMTR-MCNC: 118 MG/DL — HIGH (ref 70–99)
GLUCOSE BLDC GLUCOMTR-MCNC: 166 MG/DL — HIGH (ref 70–99)
GLUCOSE BLDC GLUCOMTR-MCNC: 246 MG/DL — HIGH (ref 70–99)
HCT VFR BLD CALC: 35.1 % — LOW (ref 39–50)
HGB BLD-MCNC: 11.2 G/DL — LOW (ref 13–17)
MCHC RBC-ENTMCNC: 31.1 PG — SIGNIFICANT CHANGE UP (ref 27–34)
MCHC RBC-ENTMCNC: 31.9 GM/DL — LOW (ref 32–36)
MCV RBC AUTO: 97.5 FL — SIGNIFICANT CHANGE UP (ref 80–100)
NRBC # BLD: 0 /100 WBCS — SIGNIFICANT CHANGE UP (ref 0–0)
PLATELET # BLD AUTO: 224 K/UL — SIGNIFICANT CHANGE UP (ref 150–400)
RBC # BLD: 3.6 M/UL — LOW (ref 4.2–5.8)
RBC # FLD: 13.3 % — SIGNIFICANT CHANGE UP (ref 10.3–14.5)
WBC # BLD: 6.62 K/UL — SIGNIFICANT CHANGE UP (ref 3.8–10.5)
WBC # FLD AUTO: 6.62 K/UL — SIGNIFICANT CHANGE UP (ref 3.8–10.5)

## 2022-03-14 PROCEDURE — 99232 SBSQ HOSP IP/OBS MODERATE 35: CPT

## 2022-03-14 PROCEDURE — 93306 TTE W/DOPPLER COMPLETE: CPT | Mod: 26

## 2022-03-14 RX ORDER — ACETAMINOPHEN 500 MG
650 TABLET ORAL ONCE
Refills: 0 | Status: COMPLETED | OUTPATIENT
Start: 2022-03-14 | End: 2022-03-14

## 2022-03-14 RX ADMIN — Medication 650 MILLIGRAM(S): at 18:55

## 2022-03-14 RX ADMIN — CARBIDOPA AND LEVODOPA 1 TABLET(S): 25; 100 TABLET ORAL at 21:44

## 2022-03-14 RX ADMIN — Medication 650 MILLIGRAM(S): at 04:25

## 2022-03-14 RX ADMIN — BUDESONIDE AND FORMOTEROL FUMARATE DIHYDRATE 2 PUFF(S): 160; 4.5 AEROSOL RESPIRATORY (INHALATION) at 21:44

## 2022-03-14 RX ADMIN — Medication 2: at 17:10

## 2022-03-14 RX ADMIN — SERTRALINE 25 MILLIGRAM(S): 25 TABLET, FILM COATED ORAL at 12:34

## 2022-03-14 RX ADMIN — LISINOPRIL 10 MILLIGRAM(S): 2.5 TABLET ORAL at 05:12

## 2022-03-14 RX ADMIN — BUDESONIDE AND FORMOTEROL FUMARATE DIHYDRATE 2 PUFF(S): 160; 4.5 AEROSOL RESPIRATORY (INHALATION) at 14:57

## 2022-03-14 RX ADMIN — FINASTERIDE 5 MILLIGRAM(S): 5 TABLET, FILM COATED ORAL at 12:34

## 2022-03-14 RX ADMIN — Medication 4: at 12:32

## 2022-03-14 RX ADMIN — CARBIDOPA AND LEVODOPA 1 TABLET(S): 25; 100 TABLET ORAL at 05:14

## 2022-03-14 RX ADMIN — Medication 650 MILLIGRAM(S): at 05:00

## 2022-03-14 RX ADMIN — Medication 650 MILLIGRAM(S): at 19:55

## 2022-03-14 RX ADMIN — PANTOPRAZOLE SODIUM 40 MILLIGRAM(S): 20 TABLET, DELAYED RELEASE ORAL at 05:12

## 2022-03-14 RX ADMIN — TAMSULOSIN HYDROCHLORIDE 0.4 MILLIGRAM(S): 0.4 CAPSULE ORAL at 21:44

## 2022-03-14 RX ADMIN — ATORVASTATIN CALCIUM 80 MILLIGRAM(S): 80 TABLET, FILM COATED ORAL at 21:44

## 2022-03-14 RX ADMIN — CARBIDOPA AND LEVODOPA 1 TABLET(S): 25; 100 TABLET ORAL at 15:41

## 2022-03-14 NOTE — PROVIDER CONTACT NOTE (OTHER) - RECOMMENDATIONS
Notify provider
pain and hypertensive medication
Hypertensive medication
notify acp, continue to monitor
Notify ACP continue to monitor
Notify provider

## 2022-03-14 NOTE — PROVIDER CONTACT NOTE (OTHER) - ACTION/TREATMENT ORDERED:
Provider made aware with order for IV Tylenol for headache. Patient to be continuosly monitored overnight.
acp aware, continue to monitor, will reach out to daughter to discuss as pt very hard of hearing
ACP aware continue to monitor hydralazine IVP ordered, lisinopril added daily recheck pressure in 20-30 minutes
Provider made aware with toradol IVP ordered. Patient to be continuously monitored overnight.
NP states history and physical along with orders to be done by hospitalist.
Provider made aware, ordered hydralazine IVP, recheck BP, continue to monitor pt
Provider notified, pt educated on importance of blood glucose management, pt verbalizes understanding and still refuses, will continue to monitor.

## 2022-03-14 NOTE — PROVIDER CONTACT NOTE (OTHER) - REASON
Pt BP elevated further complains of headache
DNR clarification
Patient hypertensive
Patient hypertensive
Patient without orders
Pt /70
Pt refusing fingerstick

## 2022-03-14 NOTE — PROVIDER CONTACT NOTE (OTHER) - ASSESSMENT
Pt just came back from MRI, denies any pain, SOB, dizziness, or discomfort. Vital signs: temp 98.4, HR 60, /70, O2 96% RA.
Patient with no complaints of dizziness, chest pain, or shortness of breath. Patient VS /70 HR 60 O2 saturation 97% on room air oral temperature 97.4F.
Patient hypertensive with blood pressure of 186/76 heart rate of 67 with oxygen saturation of 97% on room air.
Pt a&ox4, VSS, pt refusing fingerstick. Denies dizziness, lightheadedness, sweating.
no molst in chart electronic DNR order in    pt AO4,hard of hearing, farsi and english speaking. pt states he prefers english but difficulty understanding with masks on staff.
Patient vocalizes pain from headache, patient denies dizziness, chest pain or shortness of breath.
Pt BP elevated further complains of headache  manual pressure 196/70, electronic 203/71

## 2022-03-14 NOTE — PROVIDER CONTACT NOTE (OTHER) - SITUATION
Pt /70
Patient hypertensive with blood pressure of 182/70 with heart rate of 60 and oxygen saturation of 97% oral temperature 97.4F.
Pt refusing AM fingerstick
Pt BP elevated further complains of headache
Provider made aware patient has been admitted to floor with no current active floor orders at this time.
DNR clarification
Patient hypertensive with blood pressure of 186/76 heart rate of 67 with oxygen saturation of 97% on room air.

## 2022-03-14 NOTE — PROVIDER CONTACT NOTE (OTHER) - DATE AND TIME:
12-Mar-2022 00:44
12-Mar-2022 08:00
12-Mar-2022 21:35
12-Mar-2022 00:20
12-Mar-2022 10:31
14-Mar-2022 07:30
11-Mar-2022 20:52

## 2022-03-14 NOTE — PROVIDER CONTACT NOTE (OTHER) - BACKGROUND
Patient admitted for intractable headache with shortness of breath.
Patient admitted for intractable headache with shortness of breath.
Pt admitted for headache, PMH: HTN, HLD, DM, CARD. Since being admitted pt has been hypertensive.
Pt admitted for heachaches. PMH: SARY.
Patient admitted with intractable headache with shortness of breath.  Patient previously given IV tylenol

## 2022-03-15 ENCOUNTER — TRANSCRIPTION ENCOUNTER (OUTPATIENT)
Age: 87
End: 2022-03-15

## 2022-03-15 VITALS — DIASTOLIC BLOOD PRESSURE: 69 MMHG | SYSTOLIC BLOOD PRESSURE: 150 MMHG | HEART RATE: 67 BPM

## 2022-03-15 LAB
ANION GAP SERPL CALC-SCNC: 9 MMOL/L — SIGNIFICANT CHANGE UP (ref 5–17)
BUN SERPL-MCNC: 26 MG/DL — HIGH (ref 7–23)
CALCIUM SERPL-MCNC: 9.5 MG/DL — SIGNIFICANT CHANGE UP (ref 8.4–10.5)
CHLORIDE SERPL-SCNC: 105 MMOL/L — SIGNIFICANT CHANGE UP (ref 96–108)
CO2 SERPL-SCNC: 24 MMOL/L — SIGNIFICANT CHANGE UP (ref 22–31)
CREAT SERPL-MCNC: 1.03 MG/DL — SIGNIFICANT CHANGE UP (ref 0.5–1.3)
EGFR: 70 ML/MIN/1.73M2 — SIGNIFICANT CHANGE UP
GLUCOSE BLDC GLUCOMTR-MCNC: 105 MG/DL — HIGH (ref 70–99)
GLUCOSE SERPL-MCNC: 114 MG/DL — HIGH (ref 70–99)
HCT VFR BLD CALC: 35.1 % — LOW (ref 39–50)
HGB BLD-MCNC: 11 G/DL — LOW (ref 13–17)
MCHC RBC-ENTMCNC: 30.5 PG — SIGNIFICANT CHANGE UP (ref 27–34)
MCHC RBC-ENTMCNC: 31.3 GM/DL — LOW (ref 32–36)
MCV RBC AUTO: 97.2 FL — SIGNIFICANT CHANGE UP (ref 80–100)
NRBC # BLD: 0 /100 WBCS — SIGNIFICANT CHANGE UP (ref 0–0)
PLATELET # BLD AUTO: 210 K/UL — SIGNIFICANT CHANGE UP (ref 150–400)
POTASSIUM SERPL-MCNC: 4.6 MMOL/L — SIGNIFICANT CHANGE UP (ref 3.5–5.3)
POTASSIUM SERPL-SCNC: 4.6 MMOL/L — SIGNIFICANT CHANGE UP (ref 3.5–5.3)
RBC # BLD: 3.61 M/UL — LOW (ref 4.2–5.8)
RBC # FLD: 13.5 % — SIGNIFICANT CHANGE UP (ref 10.3–14.5)
SODIUM SERPL-SCNC: 138 MMOL/L — SIGNIFICANT CHANGE UP (ref 135–145)
WBC # BLD: 6.26 K/UL — SIGNIFICANT CHANGE UP (ref 3.8–10.5)
WBC # FLD AUTO: 6.26 K/UL — SIGNIFICANT CHANGE UP (ref 3.8–10.5)

## 2022-03-15 PROCEDURE — 96365 THER/PROPH/DIAG IV INF INIT: CPT

## 2022-03-15 PROCEDURE — 80053 COMPREHEN METABOLIC PANEL: CPT

## 2022-03-15 PROCEDURE — 83036 HEMOGLOBIN GLYCOSYLATED A1C: CPT

## 2022-03-15 PROCEDURE — 97530 THERAPEUTIC ACTIVITIES: CPT

## 2022-03-15 PROCEDURE — 99232 SBSQ HOSP IP/OBS MODERATE 35: CPT

## 2022-03-15 PROCEDURE — 84100 ASSAY OF PHOSPHORUS: CPT

## 2022-03-15 PROCEDURE — 85014 HEMATOCRIT: CPT

## 2022-03-15 PROCEDURE — 85025 COMPLETE CBC W/AUTO DIFF WBC: CPT

## 2022-03-15 PROCEDURE — 82435 ASSAY OF BLOOD CHLORIDE: CPT

## 2022-03-15 PROCEDURE — 70450 CT HEAD/BRAIN W/O DYE: CPT | Mod: MA

## 2022-03-15 PROCEDURE — 82947 ASSAY GLUCOSE BLOOD QUANT: CPT

## 2022-03-15 PROCEDURE — 85018 HEMOGLOBIN: CPT

## 2022-03-15 PROCEDURE — 93306 TTE W/DOPPLER COMPLETE: CPT

## 2022-03-15 PROCEDURE — 84484 ASSAY OF TROPONIN QUANT: CPT

## 2022-03-15 PROCEDURE — 83735 ASSAY OF MAGNESIUM: CPT

## 2022-03-15 PROCEDURE — 36415 COLL VENOUS BLD VENIPUNCTURE: CPT

## 2022-03-15 PROCEDURE — 85027 COMPLETE CBC AUTOMATED: CPT

## 2022-03-15 PROCEDURE — 71045 X-RAY EXAM CHEST 1 VIEW: CPT

## 2022-03-15 PROCEDURE — 84295 ASSAY OF SERUM SODIUM: CPT

## 2022-03-15 PROCEDURE — 82330 ASSAY OF CALCIUM: CPT

## 2022-03-15 PROCEDURE — 94640 AIRWAY INHALATION TREATMENT: CPT

## 2022-03-15 PROCEDURE — 96375 TX/PRO/DX INJ NEW DRUG ADDON: CPT

## 2022-03-15 PROCEDURE — 99285 EMERGENCY DEPT VISIT HI MDM: CPT | Mod: 25

## 2022-03-15 PROCEDURE — 97162 PT EVAL MOD COMPLEX 30 MIN: CPT

## 2022-03-15 PROCEDURE — 83605 ASSAY OF LACTIC ACID: CPT

## 2022-03-15 PROCEDURE — 87637 SARSCOV2&INF A&B&RSV AMP PRB: CPT

## 2022-03-15 PROCEDURE — 82803 BLOOD GASES ANY COMBINATION: CPT

## 2022-03-15 PROCEDURE — 83880 ASSAY OF NATRIURETIC PEPTIDE: CPT

## 2022-03-15 PROCEDURE — 71275 CT ANGIOGRAPHY CHEST: CPT | Mod: MA

## 2022-03-15 PROCEDURE — 84132 ASSAY OF SERUM POTASSIUM: CPT

## 2022-03-15 PROCEDURE — 70551 MRI BRAIN STEM W/O DYE: CPT

## 2022-03-15 PROCEDURE — 92610 EVALUATE SWALLOWING FUNCTION: CPT

## 2022-03-15 PROCEDURE — 82962 GLUCOSE BLOOD TEST: CPT

## 2022-03-15 PROCEDURE — 97116 GAIT TRAINING THERAPY: CPT

## 2022-03-15 PROCEDURE — 80048 BASIC METABOLIC PNL TOTAL CA: CPT

## 2022-03-15 PROCEDURE — 93005 ELECTROCARDIOGRAM TRACING: CPT

## 2022-03-15 RX ORDER — LISINOPRIL 2.5 MG/1
1 TABLET ORAL
Qty: 30 | Refills: 0
Start: 2022-03-15 | End: 2022-04-13

## 2022-03-15 RX ORDER — ACETAMINOPHEN 500 MG
650 TABLET ORAL ONCE
Refills: 0 | Status: COMPLETED | OUTPATIENT
Start: 2022-03-15 | End: 2022-03-15

## 2022-03-15 RX ADMIN — FINASTERIDE 5 MILLIGRAM(S): 5 TABLET, FILM COATED ORAL at 11:19

## 2022-03-15 RX ADMIN — CARBIDOPA AND LEVODOPA 1 TABLET(S): 25; 100 TABLET ORAL at 05:44

## 2022-03-15 RX ADMIN — Medication 650 MILLIGRAM(S): at 02:30

## 2022-03-15 RX ADMIN — SERTRALINE 25 MILLIGRAM(S): 25 TABLET, FILM COATED ORAL at 11:20

## 2022-03-15 RX ADMIN — LISINOPRIL 10 MILLIGRAM(S): 2.5 TABLET ORAL at 05:44

## 2022-03-15 RX ADMIN — CARBIDOPA AND LEVODOPA 1 TABLET(S): 25; 100 TABLET ORAL at 14:51

## 2022-03-15 RX ADMIN — PANTOPRAZOLE SODIUM 40 MILLIGRAM(S): 20 TABLET, DELAYED RELEASE ORAL at 05:44

## 2022-03-15 RX ADMIN — Medication 650 MILLIGRAM(S): at 03:09

## 2022-03-15 RX ADMIN — BUDESONIDE AND FORMOTEROL FUMARATE DIHYDRATE 2 PUFF(S): 160; 4.5 AEROSOL RESPIRATORY (INHALATION) at 11:18

## 2022-03-15 NOTE — DISCHARGE NOTE PROVIDER - HOSPITAL COURSE
87M PMH HTN, HLD, CAD s/p CABG, chronic headaches, Parkinson's p/w intermittent R occipital headaches radiating to R trapezius for last few months, lasting 2-3 hrs at a time, no clear provocative/palliative factors. Was prescribed gabapentin and naproxen by a neurologist who is now retired. Daughter states pt intermittently has SOB at home; takes Symbicort for emphysema. Pt currently only complaining of headache. No vision changes, f/c, chest pain, neck stiffness, abd pain, leg swelling, falls, A/C use      I . Chronic Headaches     CT head negative      MRI Brain - no acute finding       Tylenol 6 50 mg po prn   11.  Dyspnea-        CTA neg for PE         Continue Symbicort         Echo: ,EF 75%  Hyperdynamic left ventricular systolic                function. Normal left ventricular internal dimensions and                wall thicknesses. Moderate diastolic dysfunction (Stage       Cards eval  appreciated          Nuclear Stress Test outpatient     III  Uncontrolled Hypertension       Continue with Nifedepine, add Lisinopril          BP better Controlled     IV  Parkinson's disease    Continue with Sinemet     V. Diabetes type 2       continue Januvia 100 mg po daily    Patient dstable for discharge home with outpatient follow up his PMD and cardiology

## 2022-03-15 NOTE — DISCHARGE NOTE PROVIDER - CARE PROVIDER_API CALL
George Fajardo)  Internal Medicine  [address] [place] [address]  Phone: [phone]  Fax: [phone]  Follow Up Time: 1 week

## 2022-03-15 NOTE — DISCHARGE NOTE NURSING/CASE MANAGEMENT/SOCIAL WORK - NSDCPEFALRISK_GEN_ALL_CORE
For information on Fall & Injury Prevention, visit: [email or web address] OR  [email or web address] OR  https://www.cdc.gov/steadi/patient.html

## 2022-03-15 NOTE — DISCHARGE NOTE PROVIDER - NSDCMRMEDTOKEN_GEN_ALL_CORE_FT
atorvastatin 80 mg oral tablet: 1 tab(s) orally once a day (at bedtime)  benzonatate 100 mg oral capsule: 1 cap(s) orally every 8 hours  carbidopa-levodopa 25 mg-100 mg oral tablet: 1 tab(s) orally 3 times a day  docusate sodium 100 mg oral capsule: 1 cap(s) orally 2 times a day  finasteride 5 mg oral tablet: 1 tab(s) orally once a day  fluticasone 50 mcg/inh nasal spray: 1 spray(s) nasal 2 times a day  Januvia 100 mg oral tablet: 1 tab(s) orally once a day  Lasix 20 mg oral tablet: 1 tab(s) orally once a day, As Needed  lisinopril 10 mg oral tablet: 1 tab(s) orally once a day  multivitamin: 1 tab(s) orally once a day  pantoprazole 40 mg oral delayed release tablet: 1 tab(s) orally once a day (before a meal)  Potassium Chloride (Eqv-Klor-Con 10) 10 mEq oral tablet, extended release: 1 tab(s) orally once a day  rOPINIRole 0.5 mg oral tablet: 1 tab(s) orally 3 times a day  Senna 8.6 mg oral tablet: 1 tab(s) orally once a day (at bedtime), As Needed  sertraline 25 mg oral tablet: 1 tab(s) orally once a day  Symbicort 160 mcg-4.5 mcg/inh inhalation aerosol: 2 puff(s) inhaled 2 times a day  tamsulosin 0.4 mg oral capsule: 1 cap(s) orally once a day (at bedtime)  Tylenol 325 mg oral tablet:

## 2022-03-15 NOTE — PROGRESS NOTE ADULT - ASSESSMENT
87M PMH HTN, HLD, CAD s/p CABG, chronic headaches, parkinsonism p/w intermittent headaches and dyspnea    - Dyspnea has improved with inhalers     - Appears compensated from HF POV.   - BNP is normal    - Pt has a hx of CABG.   - EKG is without any ischemic changes  - cont statin  - check echo  - may benefit from nuclear stress testing but can be done as outpt as SOB improving.     - initially was sig HTN   - now improved with lisinopril 10mg Qday. Would titrate up to 20mg  if possible.   - would check orthostatics given parkinsons to make sure no level of dysautonomia     - Monitor and replete electrolytes. Keep K>4.0 and Mg>2.0.   - Further cardiac workup will depend on clinical course.   - All other workup per primary team. Will followup. 
87M PMH HTN, HLD, CAD s/p CABG, chronic headaches, parkinsonism p/w intermittent headaches and dyspnea    - Dyspnea has improved with inhalers     - Appears compensated from HF POV.   - BNP is normal  -echo hyperdynamic lv with mod AS    - Pt has a hx of CABG.   - EKG is without any ischemic changes  - cont statin   - may benefit from nuclear stress testing but can be done as outpt as SOB improving.     - initially was sig HTN   -bp has been labile, well controlled this am  - improved with lisinopril 10mg Qday. Would titrate up to 20mg  if needed.   - neg orthostatics yesterday    - Monitor and replete electrolytes. Keep K>4.0 and Mg>2.0.   - Further cardiac workup will depend on clinical course.   - All other workup per primary team. Will followup. 
87M PMH HTN, HLD, CAD s/p CABG, chronic headaches, parkinsons p/w intermittent R occipital headaches radiating to R trapezius for last few months, lasting 2-3 hrs at a time, no clear provocative/palliative factors. Was prescribed gabapentin and naproxen by a neurologist who is now retired. Daughter states pt intermittently has SOB at home; takes symbicort for emphysema. Pt currently only complaining of headache. No vision changes, f/c, chest pain, neck stiffness, abd pain, leg swelling, falls, A/C use      Dyspnea- CTA neg for PE    Echo, Cards eval  appreciated   Spoke to cards no intervention      Headache chronic Pain/ uncontrolled Hypertension - meds prn  CT head neg   MRI Brain no acute findings   Neuro eval     # HTN Continue with Nifedepine, add Hydralazine Lisnopril   Controlled     Parkinsons Continue with Sinemet    DM HISS
87M PMH HTN, HLD, CAD s/p CABG, chronic headaches, parkinsons p/w intermittent R occipital headaches radiating to R trapezius for last few months, lasting 2-3 hrs at a time, no clear provocative/palliative factors. Was prescribed gabapentin and naproxen by a neurologist who is now retired. Daughter states pt intermittently has SOB at home; takes symbicort for emphysema. Pt currently only complaining of headache. No vision changes, f/c, chest pain, neck stiffness, abd pain, leg swelling, falls, A/C use      Dyspnea- CTA neg for PE   Tele, Echo, Cards eval        Headache chronic Pain/ uncontrolled Hypertension - meds prn  CT head neg   MRI Brain   Neuro eval   # HTN Continue with Nifedepine, add Hydralazine Lisnopril     Parkinsons Continue with Sinemet    DM HISS
87M PMH HTN, HLD, CAD s/p CABG, chronic headaches, parkinsons p/w intermittent R occipital headaches radiating to R trapezius for last few months, lasting 2-3 hrs at a time, no clear provocative/palliative factors. Was prescribed gabapentin and naproxen by a neurologist who is now retired. Daughter states pt intermittently has SOB at home; takes symbicort for emphysema. Pt currently only complaining of headache. No vision changes, f/c, chest pain, neck stiffness, abd pain, leg swelling, falls, A/C use      Dyspnea- CTA neg for PE    Echo, Cards eval  appreciated       Headache chronic Pain/ uncontrolled Hypertension - meds prn  CT head neg   MRI Brain no acute findings       # HTN Continue with Nifedepine, add Hydralazine Lisnopril   Controlled     Parkinsons Continue with Sinemet    DM HISS
87M PMH HTN, HLD, CAD s/p CABG, chronic headaches, parkinsons p/w intermittent R occipital headaches radiating to R trapezius for last few months, lasting 2-3 hrs at a time, no clear provocative/palliative factors. Was prescribed gabapentin and naproxen by a neurologist who is now retired. Daughter states pt intermittently has SOB at home; takes symbicort for emphysema. Pt currently only complaining of headache. No vision changes, f/c, chest pain, neck stiffness, abd pain, leg swelling, falls, A/C use      Dyspnea- CTA neg for PE    Echo, Cards eval  appreciated       Headache chronic Pain/ uncontrolled Hypertension - meds prn  CT head neg   MRI Brain no acute findings   Neuro eval     # HTN Continue with Nifedepine, add Hydralazine Lisnopril   Controlled     Parkinsons Continue with Sinemet    DM HISS

## 2022-03-15 NOTE — DISCHARGE NOTE NURSING/CASE MANAGEMENT/SOCIAL WORK - PATIENT PORTAL LINK FT
You can access the FollowMyHealth Patient Portal offered by [facility] by registering at the following website: [email or web address]. By joining [email or web address] [email or web address] portal, you will also be able to view your health information using other applications (apps) compatible with our system.

## 2022-03-15 NOTE — PROGRESS NOTE ADULT - SUBJECTIVE AND OBJECTIVE BOX
Patient is a 87y old  Male who presents with a chief complaint of SOB x 1 week (11 Mar 2022 11:11)      SUBJECTIVE / OVERNIGHT EVENTS:     MEDICATIONS  (STANDING):  atorvastatin 80 milliGRAM(s) Oral at bedtime  budesonide 160 MICROgram(s)/formoterol 4.5 MICROgram(s) Inhaler 2 Puff(s) Inhalation two times a day  BUpivacaine 0.25% Injectable 5 milliLiter(s) Local Injection Once  carbidopa/levodopa  25/100 1 Tablet(s) Oral three times a day  dextrose 40% Gel 15 Gram(s) Oral once  dextrose 5%. 1000 milliLiter(s) (50 mL/Hr) IV Continuous <Continuous>  dextrose 5%. 1000 milliLiter(s) (100 mL/Hr) IV Continuous <Continuous>  dextrose 50% Injectable 25 Gram(s) IV Push once  dextrose 50% Injectable 12.5 Gram(s) IV Push once  dextrose 50% Injectable 25 Gram(s) IV Push once  finasteride 5 milliGRAM(s) Oral daily  glucagon  Injectable 1 milliGRAM(s) IntraMuscular once  influenza  Vaccine (HIGH DOSE) 0.7 milliLiter(s) IntraMuscular once  insulin lispro (ADMELOG) corrective regimen sliding scale   SubCutaneous three times a day before meals  lisinopril 10 milliGRAM(s) Oral daily  pantoprazole    Tablet 40 milliGRAM(s) Oral before breakfast  sertraline 25 milliGRAM(s) Oral daily  tamsulosin 0.4 milliGRAM(s) Oral at bedtime    MEDICATIONS  (PRN):      CAPILLARY BLOOD GLUCOSE      POCT Blood Glucose.: 100 mg/dL (12 Mar 2022 21:28)  POCT Blood Glucose.: 222 mg/dL (12 Mar 2022 16:29)  POCT Blood Glucose.: 94 mg/dL (12 Mar 2022 11:37)  POCT Blood Glucose.: 103 mg/dL (12 Mar 2022 07:58)    I&O's Summary    12 Mar 2022 06:01  -  12 Mar 2022 23:11  --------------------------------------------------------  IN: 590 mL / OUT: 0 mL / NET: 590 mL      T(C): 36.9 (03-12-22 @ 21:34), Max: 36.9 (03-12-22 @ 21:34)  HR: 64 (03-12-22 @ 22:20) (60 - 74)  BP: 136/63 (03-12-22 @ 22:20) (124/56 - 175/70)  RR: 18 (03-12-22 @ 21:34) (18 - 18)  SpO2: 96% (03-12-22 @ 21:34) (95% - 96%)    PHYSICAL EXAM:  GENERAL: NAD, well-developed  HEAD:  Atraumatic, Normocephalic  EYES: EOMI, PERRLA, conjunctiva and sclera clear  NECK: Supple, No JVD  CHEST/LUNG: Clear to auscultation bilaterally; No wheeze  HEART: Regular rate and rhythm; No murmurs, rubs, or gallops  ABDOMEN: Soft, Nontender, Nondistended; Bowel sounds present  EXTREMITIES:  2+ Peripheral Pulses, No clubbing, cyanosis, or edema  PSYCH: AAOx3  NEUROLOGY: non-focal  SKIN: No rashes or lesions    LABS:                        10.8   7.96  )-----------( 218      ( 12 Mar 2022 16:01 )             33.6     03-12    138  |  106  |  31<H>  ----------------------------<  203<H>  4.4   |  23  |  1.05    Ca    9.0      12 Mar 2022 16:03  Mg     1.9     03-12    TPro  6.4  /  Alb  3.9  /  TBili  0.3  /  DBili  x   /  AST  14  /  ALT  <5<L>  /  AlkPhos  64  03-11              RADIOLOGY & ADDITIONAL TESTS:    Imaging Personally Reviewed:    Consultant(s) Notes Reviewed:      Care Discussed with Consultants/Other Providers:  
[facility] -- Kumar Winston, Lyla Waddell Pannella, Patel, Savella  Office # [phone]      Follow Up:  CAD SOB    Subjective/Observations: Patient seen and examined. Events noted. Resting comfortably in bed. No complaints of chest pain,  or palpitations reported. No signs of orthopnea or PND. states that WORKMAN has improved overall. still with neck pain and ha      REVIEW OF SYSTEMS: All other review of systems is negative unless indicated above    PAST MEDICAL & SURGICAL HISTORY:  Diabetes mellitus    Dyslipidemia    CAD (coronary artery disease)    Hypertension    Osteoporosis    Urinary frequency    Syncope    Benign prostatic hyperplasia    Parkinson disease    HTN (hypertension)    HLD (hyperlipidemia)    DM (diabetes mellitus)    CAD (coronary artery disease)    Hypospadias    S/P coronary artery stent placement in 2003    History of total left knee replacement  2014    S/P CABG (coronary artery bypass graft)  2015    History of knee replacement procedure of left knee  2017        MEDICATIONS  (STANDING):  atorvastatin 80 milliGRAM(s) Oral at bedtime  budesonide 160 MICROgram(s)/formoterol 4.5 MICROgram(s) Inhaler 2 Puff(s) Inhalation two times a day  BUpivacaine 0.25% Injectable 5 milliLiter(s) Local Injection Once  carbidopa/levodopa  25/100 1 Tablet(s) Oral three times a day  dextrose 40% Gel 15 Gram(s) Oral once  dextrose 5%. 1000 milliLiter(s) (100 mL/Hr) IV Continuous <Continuous>  dextrose 5%. 1000 milliLiter(s) (50 mL/Hr) IV Continuous <Continuous>  dextrose 50% Injectable 25 Gram(s) IV Push once  dextrose 50% Injectable 12.5 Gram(s) IV Push once  dextrose 50% Injectable 25 Gram(s) IV Push once  finasteride 5 milliGRAM(s) Oral daily  glucagon  Injectable 1 milliGRAM(s) IntraMuscular once  influenza  Vaccine (HIGH DOSE) 0.7 milliLiter(s) IntraMuscular once  insulin lispro (ADMELOG) corrective regimen sliding scale   SubCutaneous three times a day before meals  lisinopril 10 milliGRAM(s) Oral daily  pantoprazole    Tablet 40 milliGRAM(s) Oral before breakfast  sertraline 25 milliGRAM(s) Oral daily  tamsulosin 0.4 milliGRAM(s) Oral at bedtime    MEDICATIONS  (PRN):      Allergies    No Known Allergies    Intolerances            Vital Signs Last 24 Hrs  T(C): 36.3 (14 Mar 2022 03:51), Max: 36.6 (13 Mar 2022 20:11)  T(F): 97.4 (14 Mar 2022 03:51), Max: 97.9 (13 Mar 2022 20:11)  HR: 65 (14 Mar 2022 05:10) (65 - 71)  BP: 152/66 (14 Mar 2022 05:10) (139/70 - 171/62)  BP(mean): --  RR: 18 (14 Mar 2022 03:51) (18 - 18)  SpO2: 96% (14 Mar 2022 03:51) (96% - 97%)    I&O's Summary    13 Mar 2022 07:01  -  14 Mar 2022 07:00  --------------------------------------------------------  IN: 1170 mL / OUT: 0 mL / NET: 1170 mL          PHYSICAL EXAM:  TELE: SR  Constitutional: NAD, awake and alert, well-developed  HEENT: Moist Mucous Membranes, Anicteric  Pulmonary: Decreased breath sounds b/l. No rales, crackles or wheeze appreciated.   Cardiovascular: Regular, S1 and S2, 2/6 SM   Gastrointestinal: Bowel Sounds present, soft, nontender.   Lymph: No peripheral edema. No lymphadenopathy.  Skin: No visible rashes or ulcers.  Psych:  Mood & affect appropriate for situation    LABS: All Labs Reviewed:                        11.2   6.62  )-----------( 224      ( 14 Mar 2022 06:41 )             35.1                         10.8   7.96  )-----------( 218      ( 12 Mar 2022 16:01 )             33.6                         11.0   6.36  )-----------( 211      ( 11 Mar 2022 13:10 )             35.2     13 Mar 2022 05:57    140    |  107    |  27     ----------------------------<  98     4.1     |  24     |  1.00   12 Mar 2022 16:03    138    |  106    |  31     ----------------------------<  203    4.4     |  23     |  1.05   11 Mar 2022 13:10    140    |  106    |  27     ----------------------------<  152    4.3     |  22     |  0.78     Ca    9.3        13 Mar 2022 05:57  Ca    9.0        12 Mar 2022 16:03  Ca    9.5        11 Mar 2022 13:10  Phos  2.5       13 Mar 2022 05:57  Mg     1.9       12 Mar 2022 16:03    TPro  6.4    /  Alb  3.9    /  TBili  0.3    /  DBili  x      /  AST  14     /  ALT  <5     /  AlkPhos  64     11 Mar 2022 13:10                     
Patient is a 87y old  Male who presents with a chief complaint of SOB x 1 week (11 Mar 2022 11:11)      SUBJECTIVE / OVERNIGHT EVENTS: no events over night       T(C): 36.8 (03-14-22 @ 20:40), Max: 36.8 (03-14-22 @ 20:40)  HR: 62 (03-14-22 @ 20:40) (62 - 62)  BP: 176/66 (03-14-22 @ 20:40) (176/66 - 176/66)  RR: 18 (03-14-22 @ 20:40) (18 - 18)  SpO2: 96% (03-14-22 @ 20:40) (96% - 96%)      MEDICATIONS  (STANDING):  atorvastatin 80 milliGRAM(s) Oral at bedtime  budesonide 160 MICROgram(s)/formoterol 4.5 MICROgram(s) Inhaler 2 Puff(s) Inhalation two times a day  BUpivacaine 0.25% Injectable 5 milliLiter(s) Local Injection Once  carbidopa/levodopa  25/100 1 Tablet(s) Oral three times a day  dextrose 40% Gel 15 Gram(s) Oral once  dextrose 5%. 1000 milliLiter(s) (50 mL/Hr) IV Continuous <Continuous>  dextrose 5%. 1000 milliLiter(s) (100 mL/Hr) IV Continuous <Continuous>  dextrose 50% Injectable 25 Gram(s) IV Push once  dextrose 50% Injectable 12.5 Gram(s) IV Push once  dextrose 50% Injectable 25 Gram(s) IV Push once  finasteride 5 milliGRAM(s) Oral daily  glucagon  Injectable 1 milliGRAM(s) IntraMuscular once  influenza  Vaccine (HIGH DOSE) 0.7 milliLiter(s) IntraMuscular once  insulin lispro (ADMELOG) corrective regimen sliding scale   SubCutaneous three times a day before meals  lisinopril 10 milliGRAM(s) Oral daily  pantoprazole    Tablet 40 milliGRAM(s) Oral before breakfast  sertraline 25 milliGRAM(s) Oral daily  tamsulosin 0.4 milliGRAM(s) Oral at bedtime    MEDICATIONS  (PRN):            PHYSICAL EXAM:  GENERAL: NAD, well-developed  HEAD:  Atraumatic, Normocephalic  EYES: EOMI, PERRLA, conjunctiva and sclera clear  NECK: Supple, No JVD  CHEST/LUNG: Clear to auscultation bilaterally; No wheeze  HEART: Regular rate and rhythm; No murmurs, rubs, or gallops  ABDOMEN: Soft, Nontender, Nondistended; Bowel sounds present  EXTREMITIES:  2+ Peripheral Pulses, No clubbing, cyanosis, or edema  PSYCH: AAOx3  NEUROLOGY: non-focal  SKIN: No rashes or lesions                                   11.2   6.62  )-----------( 224      ( 14 Mar 2022 06:41 )             35.1                               RADIOLOGY & ADDITIONAL TESTS:    Imaging Personally Reviewed:    Consultant(s) Notes Reviewed:      Care Discussed with Consultants/Other Providers:  
Patient is a 87y old  Male who presents with a chief complaint of SOB x 1 week (11 Mar 2022 11:11)      SUBJECTIVE / OVERNIGHT EVENTS: no events over night     T(C): 36.4 (03-13-22 @ 10:47), Max: 36.4 (03-13-22 @ 10:47)  HR: 65 (03-13-22 @ 11:19) (65 - 65)  BP: 139/70 (03-13-22 @ 11:19) (139/70 - 139/70)  RR: 18 (03-13-22 @ 10:47) (18 - 18)  SpO2: 97% (03-13-22 @ 10:47) (97% - 97%)    MEDICATIONS  (STANDING):  atorvastatin 80 milliGRAM(s) Oral at bedtime  budesonide 160 MICROgram(s)/formoterol 4.5 MICROgram(s) Inhaler 2 Puff(s) Inhalation two times a day  BUpivacaine 0.25% Injectable 5 milliLiter(s) Local Injection Once  carbidopa/levodopa  25/100 1 Tablet(s) Oral three times a day  dextrose 40% Gel 15 Gram(s) Oral once  dextrose 5%. 1000 milliLiter(s) (50 mL/Hr) IV Continuous <Continuous>  dextrose 5%. 1000 milliLiter(s) (100 mL/Hr) IV Continuous <Continuous>  dextrose 50% Injectable 25 Gram(s) IV Push once  dextrose 50% Injectable 12.5 Gram(s) IV Push once  dextrose 50% Injectable 25 Gram(s) IV Push once  finasteride 5 milliGRAM(s) Oral daily  glucagon  Injectable 1 milliGRAM(s) IntraMuscular once  influenza  Vaccine (HIGH DOSE) 0.7 milliLiter(s) IntraMuscular once  insulin lispro (ADMELOG) corrective regimen sliding scale   SubCutaneous three times a day before meals  lisinopril 10 milliGRAM(s) Oral daily  pantoprazole    Tablet 40 milliGRAM(s) Oral before breakfast  sertraline 25 milliGRAM(s) Oral daily  tamsulosin 0.4 milliGRAM(s) Oral at bedtime    MEDICATIONS  (PRN):            PHYSICAL EXAM:  GENERAL: NAD, well-developed  HEAD:  Atraumatic, Normocephalic  EYES: EOMI, PERRLA, conjunctiva and sclera clear  NECK: Supple, No JVD  CHEST/LUNG: Clear to auscultation bilaterally; No wheeze  HEART: Regular rate and rhythm; No murmurs, rubs, or gallops  ABDOMEN: Soft, Nontender, Nondistended; Bowel sounds present  EXTREMITIES:  2+ Peripheral Pulses, No clubbing, cyanosis, or edema  PSYCH: AAOx3  NEUROLOGY: non-focal  SKIN: No rashes or lesions                          10.8   7.96  )-----------( 218      ( 12 Mar 2022 16:01 )             33.6               140|107|27<98  4.1|24|1.00  9.3,--,2.5  03-13 @ 05:57                RADIOLOGY & ADDITIONAL TESTS:    Imaging Personally Reviewed:    Consultant(s) Notes Reviewed:      Care Discussed with Consultants/Other Providers:  
[facility]    Kumar Winston, Nadira, Lyla, Bishop, Scotty, Marichuy      [phone]    CHIEF COMPLAINT: Patient is a 87y old  Male who presents with a chief complaint of SOB x 1 week (14 Mar 2022 16:26)      Follow Up: as    Interim history: The patient reports no new symptoms.  Denies chest discomfort and shortness of breath.  No abdominal pain.  No new neurologic symptoms.      MEDICATIONS  (STANDING):  atorvastatin 80 milliGRAM(s) Oral at bedtime  budesonide 160 MICROgram(s)/formoterol 4.5 MICROgram(s) Inhaler 2 Puff(s) Inhalation two times a day  BUpivacaine 0.25% Injectable 5 milliLiter(s) Local Injection Once  carbidopa/levodopa  25/100 1 Tablet(s) Oral three times a day  dextrose 40% Gel 15 Gram(s) Oral once  dextrose 5%. 1000 milliLiter(s) (50 mL/Hr) IV Continuous <Continuous>  dextrose 5%. 1000 milliLiter(s) (100 mL/Hr) IV Continuous <Continuous>  dextrose 50% Injectable 12.5 Gram(s) IV Push once  dextrose 50% Injectable 25 Gram(s) IV Push once  dextrose 50% Injectable 25 Gram(s) IV Push once  finasteride 5 milliGRAM(s) Oral daily  glucagon  Injectable 1 milliGRAM(s) IntraMuscular once  influenza  Vaccine (HIGH DOSE) 0.7 milliLiter(s) IntraMuscular once  insulin lispro (ADMELOG) corrective regimen sliding scale   SubCutaneous three times a day before meals  lisinopril 10 milliGRAM(s) Oral daily  pantoprazole    Tablet 40 milliGRAM(s) Oral before breakfast  sertraline 25 milliGRAM(s) Oral daily  tamsulosin 0.4 milliGRAM(s) Oral at bedtime    MEDICATIONS  (PRN):      REVIEW OF SYSTEMS:  eye, ent, GI, , allergic, dermatologic, musculoskeletal and neurologic are negative except as described above    Vital Signs Last 24 Hrs  T(C): 36.5 (15 Mar 2022 03:48), Max: 36.8 (14 Mar 2022 20:40)  T(F): 97.7 (15 Mar 2022 03:48), Max: 98.3 (14 Mar 2022 20:40)  HR: 69 (15 Mar 2022 03:48) (62 - 69)  BP: 125/74 (15 Mar 2022 03:48) (125/74 - 176/66)  BP(mean): --  RR: 18 (15 Mar 2022 03:48) (18 - 18)  SpO2: 97% (15 Mar 2022 03:48) (96% - 97%)    I&O's Summary    14 Mar 2022 07:01  -  15 Mar 2022 07:00  --------------------------------------------------------  IN: 520 mL / OUT: 0 mL / NET: 520 mL        Telemetry past 24h:    PHYSICAL EXAM:    Constitutional: well-nourished, well-developed, NAD   HEENT:  MMM, sclerae anicteric, conjunctivae clear, no oral cyanosis.  Pulmonary: Non-labored, breath sounds are clear bilaterally, No wheezing, rales or rhonchi  Cardiovascular: Regular, S1 and S2.  2/6 sys murmur.  No rubs, gallops or clicks  Gastrointestinal: Bowel Sounds present, soft, nontender.   Lymph: No peripheral edema.   Neurological: Alert, no focal deficits  Skin: No rashes.  Psych:  Mood & affect appropriate    LABS: All Labs Reviewed:                        11.0   6.26  )-----------( 210      ( 15 Mar 2022 06:16 )             35.1                         11.2   6.62  )-----------( 224      ( 14 Mar 2022 06:41 )             35.1                         10.8   7.96  )-----------( 218      ( 12 Mar 2022 16:01 )             33.6     15 Mar 2022 06:16    138    |  105    |  26     ----------------------------<  114    4.6     |  24     |  1.03   13 Mar 2022 05:57    140    |  107    |  27     ----------------------------<  98     4.1     |  24     |  1.00   12 Mar 2022 16:03    138    |  106    |  31     ----------------------------<  203    4.4     |  23     |  1.05     Ca    9.5        15 Mar 2022 06:16  Ca    9.3        13 Mar 2022 05:57  Ca    9.0        12 Mar 2022 16:03  Phos  2.5       13 Mar 2022 05:57  Mg     1.9       12 Mar 2022 16:03            Blood Culture:         RADIOLOGY:    EKG:    Echo:    < from: Transthoracic Echocardiogram (03.14.22 @ 09:06) >    Patient name: ISIS ZAMORA  YOB: 1934   Age: 87 (M)   MR#: [id number]  Study Date: 3/14/2022  Location: [address] [place], [place] [id number]: [facility]  Study quality: Technically fair  Referring Physician: Shelly Sanchez MD  Blood Pressure: 132/66 mmHg  Height: 168 cm  Weight: 59 kg  BSA: 1.7 m2  ------------------------------------------------------------------------  PROCEDURE: Transthoracic echocardiogram with 2-D, M-Mode  and complete spectral and color flow Doppler.  INDICATION: Dyspnea, unspecified (R06.00)  ------------------------------------------------------------------------  Dimensions:    Normal Values:  LA:     3.6    2.0 - 4.0 cm  Ao:     3.3    2.0 - 3.8 cm  SEPTUM: 0.9    0.6 - 1.2 cm  PWT:    0.8    0.6 - 1.1 cm  LVIDd:  3.5    3.0 - 5.6 cm  LVIDs:  2.0    1.8 - 4.0 cm  Derived variables:  LVMI: 49 g/m2  RWT: 0.45  Fractional short: 43 %  EF (Teicholtz): 75 %  Doppler Peak Velocity (m/sec): MV=1.2 AoV=2.7  ------------------------------------------------------------------------  Observations:  Mitral Valve: Mitral annular calcification and calcified  mitral leaflets with decreased diastolic opening. Mild  mitral regurgitation.  Peak mitral valve gradient equals 6  mm Hg, mean transmitral valve gradient equals 2 mm Hg,  consistent with mild to moderate mitral stenosis.  Aortic Valve/Aorta: Calcified trileaflet aortic valve with  decreased opening. Peak transaortic valve gradient equals  29 mm Hg, mean transaortic valve gradient equals 16 mm Hg,  estimated aortic valve area equals 1.1 sqcm (by continuity  equation), aortic valve velocity time integral equals 49  cm, consistent with moderate aortic stenosis. Mild-moderate  aortic regurgitation. Peak left ventricular outflow tract  gradient equals 7 mm Hg, mean gradient is equal to 4 mm Hg,  LVOT velocity time integral equals 28 cm.  Aortic Root: 3.3 cm.  LVOT diameter: 1.6 cm.  Left Atrium: Moderately dilated left atrium.  LA volume  index = 43 cc/m2.  Left Ventricle: Hyperdynamic left ventricular systolic  function. Normal left ventricular internal dimensions and  wall thicknesses. Moderate diastolic dysfunction (Stage  II).  Right Heart: Normal right atrium. Normal right ventricular  size and function. Mild tricuspid regurgitation. Mild  pulmonic regurgitation.  Pericardium/Pleura: Normal pericardium with no pericardial  effusion.  Hemodynamic: Estimated right atrial pressure is 8 mm Hg.  Estimated right ventricular systolic pressure equals 35 mm  Hg, assuming right atrial pressure equals 8 mm Hg,  consistent with borderline pulmonary hypertension.  ------------------------------------------------------------------------  Conclusions:  1. Mitral annular calcification and calcified mitral  leaflets with decreased diastolic opening. Mild mitral  regurgitation.  Peak mitral valve gradient equals 6 mm Hg,  mean transmitral valve gradient equals 2 mm Hg, consistent  with mild to moderate mitral stenosis.  2. Calcified trileaflet aortic valve with decreased  opening. Peaktransaortic valve gradient equals 29 mm Hg,  mean transaortic valve gradient equals 16 mm Hg, estimated  aortic valve area equals 1.1 sqcm (by continuity equation),  aortic valve velocity time integral equals 49 cm,  consistent with moderate aortic stenosis. Mild-moderate  aortic regurgitation.  3. Moderately dilated left atrium.  LA volume index = 43  cc/m2.  4. Normal left ventricular internal dimensions and wall  thicknesses.  5. Hyperdynamic left ventricular systolic function.  6. Moderate diastolic dysfunction (Stage II).  7. Normal right ventricular size and function.  8. Mild tricuspid regurgitation.  9. Mild pulmonic regurgitation. Estimated pulmonary artery  systolic pressure equals 35  mm Hg, assuming right atrial  pressure equals 8mm Hg, consistent with borderline  pulmonary pressures.  *** Compared with echocardiogram of 9/8/2021, no  significant changes noted.  ------------------------------------------------------------------------  Confirmed on  3/14/2022 - 14:02:18 by Med Crowe M.D.  ------------------------------------------------------------------------    < end of copied text >  
Patient is a 87y old  Male who presents with a chief complaint of SOB x 1 week (11 Mar 2022 11:11)      SUBJECTIVE / OVERNIGHT EVENTS: no events over night     T(C): 36.8 (03-14-22 @ 20:40), Max: 36.8 (03-14-22 @ 20:40)  HR: 62 (03-14-22 @ 20:40) (62 - 62)  BP: 176/66 (03-14-22 @ 20:40) (176/66 - 176/66)  RR: 18 (03-14-22 @ 20:40) (18 - 18)  SpO2: 96% (03-14-22 @ 20:40) (96% - 96%)      MEDICATIONS  (STANDING):  atorvastatin 80 milliGRAM(s) Oral at bedtime  budesonide 160 MICROgram(s)/formoterol 4.5 MICROgram(s) Inhaler 2 Puff(s) Inhalation two times a day  BUpivacaine 0.25% Injectable 5 milliLiter(s) Local Injection Once  carbidopa/levodopa  25/100 1 Tablet(s) Oral three times a day  dextrose 40% Gel 15 Gram(s) Oral once  dextrose 5%. 1000 milliLiter(s) (50 mL/Hr) IV Continuous <Continuous>  dextrose 5%. 1000 milliLiter(s) (100 mL/Hr) IV Continuous <Continuous>  dextrose 50% Injectable 25 Gram(s) IV Push once  dextrose 50% Injectable 12.5 Gram(s) IV Push once  dextrose 50% Injectable 25 Gram(s) IV Push once  finasteride 5 milliGRAM(s) Oral daily  glucagon  Injectable 1 milliGRAM(s) IntraMuscular once  influenza  Vaccine (HIGH DOSE) 0.7 milliLiter(s) IntraMuscular once  insulin lispro (ADMELOG) corrective regimen sliding scale   SubCutaneous three times a day before meals  lisinopril 10 milliGRAM(s) Oral daily  pantoprazole    Tablet 40 milliGRAM(s) Oral before breakfast  sertraline 25 milliGRAM(s) Oral daily  tamsulosin 0.4 milliGRAM(s) Oral at bedtime    MEDICATIONS  (PRN):      PHYSICAL EXAM:  GENERAL: NAD, well-developed  HEAD:  Atraumatic, Normocephalic  EYES: EOMI, PERRLA, conjunctiva and sclera clear  NECK: Supple, No JVD  CHEST/LUNG: Clear to auscultation bilaterally; No wheeze  HEART: Regular rate and rhythm; No murmurs, rubs, or gallops  ABDOMEN: Soft, Nontender, Nondistended; Bowel sounds present  EXTREMITIES:  2+ Peripheral Pulses, No clubbing, cyanosis, or edema  PSYCH: AAOx3  NEUROLOGY: non-focal  SKIN: No rashes or lesions                                             11.2   6.62  )-----------( 224      ( 14 Mar 2022 06:41 )             35.1                             RADIOLOGY & ADDITIONAL TESTS:    Imaging Personally Reviewed:    Consultant(s) Notes Reviewed:      Care Discussed with Consultants/Other Providers:

## 2022-03-15 NOTE — DISCHARGE NOTE PROVIDER - NSDCCPCAREPLAN_GEN_ALL_CORE_FT
PRINCIPAL DISCHARGE DIAGNOSIS  Diagnosis: Intractable headache  Assessment and Plan of Treatment: CT head negative    MRI Brain - no acute finding   Tylenol 325 mg po prn      SECONDARY DISCHARGE DIAGNOSES  Diagnosis: Shortness of breath  Assessment and Plan of Treatment: CTA neg for PE         Continue Symbicort         Echo,EF 75%  Hyperdynamic left ventricular systolic                function. Normal left ventricular internal dimensions and                wall thicknesses. Moderate diastolic dysfunction (Stage       Cards eval  appreciated          Nuclear Stress Test outpatient   Follow up with Cardiologist outpatient    Diagnosis: HTN (hypertension)  Assessment and Plan of Treatment: Uncontrolled     Continue with Nifedepine, and Lisnopril   hydralazine po x 1        BP better Controlled    Diagnosis: Diabetes type 2, controlled  Assessment and Plan of Treatment: continue Januvia 100 mg po daily    Diagnosis: Parkinsons disease  Assessment and Plan of Treatment: continue Sinemet  outpatint follow up with neurologist

## 2022-05-17 ENCOUNTER — EMERGENCY (EMERGENCY)
Facility: HOSPITAL | Age: 87
LOS: 1 days | Discharge: HOME | End: 2022-05-17
Attending: EMERGENCY MEDICINE
Payer: MEDICARE

## 2022-05-17 VITALS
SYSTOLIC BLOOD PRESSURE: 118 MMHG | WEIGHT: 130.07 LBS | RESPIRATION RATE: 20 BRPM | HEIGHT: 66 IN | DIASTOLIC BLOOD PRESSURE: 70 MMHG | TEMPERATURE: 98 F | HEART RATE: 67 BPM | OXYGEN SATURATION: 95 %

## 2022-05-17 DIAGNOSIS — Z96.652 PRESENCE OF LEFT ARTIFICIAL KNEE JOINT: Chronic | ICD-10-CM

## 2022-05-17 DIAGNOSIS — Z95.1 PRESENCE OF AORTOCORONARY BYPASS GRAFT: Chronic | ICD-10-CM

## 2022-05-17 LAB
ALBUMIN SERPL ELPH-MCNC: 4.3 G/DL — SIGNIFICANT CHANGE UP (ref 3.3–5)
ALP SERPL-CCNC: 59 U/L — SIGNIFICANT CHANGE UP (ref 40–120)
ALT FLD-CCNC: 15 U/L — SIGNIFICANT CHANGE UP (ref 10–45)
ANION GAP SERPL CALC-SCNC: 11 MMOL/L — SIGNIFICANT CHANGE UP (ref 5–17)
AST SERPL-CCNC: 22 U/L — SIGNIFICANT CHANGE UP (ref 10–40)
BILIRUB SERPL-MCNC: 0.2 MG/DL — SIGNIFICANT CHANGE UP (ref 0.2–1.2)
BUN SERPL-MCNC: 30 MG/DL — HIGH (ref 7–23)
CALCIUM SERPL-MCNC: 9.6 MG/DL — SIGNIFICANT CHANGE UP (ref 8.4–10.5)
CHLORIDE SERPL-SCNC: 107 MMOL/L — SIGNIFICANT CHANGE UP (ref 96–108)
CO2 SERPL-SCNC: 23 MMOL/L — SIGNIFICANT CHANGE UP (ref 22–31)
CREAT SERPL-MCNC: 1.17 MG/DL — SIGNIFICANT CHANGE UP (ref 0.5–1.3)
CRP SERPL-MCNC: 11 MG/L — HIGH (ref 0–4)
D DIMER BLD IA.RAPID-MCNC: 291 NG/ML DDU — HIGH
EGFR: 60 ML/MIN/1.73M2 — SIGNIFICANT CHANGE UP
GLUCOSE SERPL-MCNC: 117 MG/DL — HIGH (ref 70–99)
HCT VFR BLD CALC: 34.3 % — LOW (ref 39–50)
HGB BLD-MCNC: 10.8 G/DL — LOW (ref 13–17)
MAGNESIUM SERPL-MCNC: 1.9 MG/DL — SIGNIFICANT CHANGE UP (ref 1.6–2.6)
MCHC RBC-ENTMCNC: 30.9 PG — SIGNIFICANT CHANGE UP (ref 27–34)
MCHC RBC-ENTMCNC: 31.5 GM/DL — LOW (ref 32–36)
MCV RBC AUTO: 98.3 FL — SIGNIFICANT CHANGE UP (ref 80–100)
NRBC # BLD: 0 /100 WBCS — SIGNIFICANT CHANGE UP (ref 0–0)
PHOSPHATE SERPL-MCNC: 2.9 MG/DL — SIGNIFICANT CHANGE UP (ref 2.5–4.5)
PLATELET # BLD AUTO: 170 K/UL — SIGNIFICANT CHANGE UP (ref 150–400)
POTASSIUM SERPL-MCNC: 4.2 MMOL/L — SIGNIFICANT CHANGE UP (ref 3.5–5.3)
POTASSIUM SERPL-SCNC: 4.2 MMOL/L — SIGNIFICANT CHANGE UP (ref 3.5–5.3)
PROT SERPL-MCNC: 6.6 G/DL — SIGNIFICANT CHANGE UP (ref 6–8.3)
RBC # BLD: 3.49 M/UL — LOW (ref 4.2–5.8)
RBC # FLD: 13.9 % — SIGNIFICANT CHANGE UP (ref 10.3–14.5)
SODIUM SERPL-SCNC: 141 MMOL/L — SIGNIFICANT CHANGE UP (ref 135–145)
WBC # BLD: 3.76 K/UL — LOW (ref 3.8–10.5)
WBC # FLD AUTO: 3.76 K/UL — LOW (ref 3.8–10.5)

## 2022-05-17 PROCEDURE — 71275 CT ANGIOGRAPHY CHEST: CPT | Mod: 26,ME

## 2022-05-17 PROCEDURE — 99284 EMERGENCY DEPT VISIT MOD MDM: CPT | Mod: CS,GC

## 2022-05-17 PROCEDURE — 71045 X-RAY EXAM CHEST 1 VIEW: CPT

## 2022-05-17 PROCEDURE — U0003: CPT

## 2022-05-17 PROCEDURE — 71045 X-RAY EXAM CHEST 1 VIEW: CPT | Mod: 26

## 2022-05-17 PROCEDURE — 83735 ASSAY OF MAGNESIUM: CPT

## 2022-05-17 PROCEDURE — 99285 EMERGENCY DEPT VISIT HI MDM: CPT | Mod: 25

## 2022-05-17 PROCEDURE — G1004: CPT

## 2022-05-17 PROCEDURE — 36415 COLL VENOUS BLD VENIPUNCTURE: CPT

## 2022-05-17 PROCEDURE — 80053 COMPREHEN METABOLIC PANEL: CPT

## 2022-05-17 PROCEDURE — U0005: CPT

## 2022-05-17 PROCEDURE — 93005 ELECTROCARDIOGRAM TRACING: CPT

## 2022-05-17 PROCEDURE — 85027 COMPLETE CBC AUTOMATED: CPT

## 2022-05-17 PROCEDURE — 82728 ASSAY OF FERRITIN: CPT

## 2022-05-17 PROCEDURE — 86140 C-REACTIVE PROTEIN: CPT

## 2022-05-17 PROCEDURE — 94640 AIRWAY INHALATION TREATMENT: CPT

## 2022-05-17 PROCEDURE — 84100 ASSAY OF PHOSPHORUS: CPT

## 2022-05-17 PROCEDURE — 85379 FIBRIN DEGRADATION QUANT: CPT

## 2022-05-17 PROCEDURE — 71275 CT ANGIOGRAPHY CHEST: CPT | Mod: ME

## 2022-05-17 RX ORDER — SODIUM CHLORIDE 9 MG/ML
500 INJECTION, SOLUTION INTRAVENOUS
Refills: 0 | Status: ACTIVE | OUTPATIENT
Start: 2022-05-17 | End: 2022-05-17

## 2022-05-17 RX ORDER — IPRATROPIUM/ALBUTEROL SULFATE 18-103MCG
3 AEROSOL WITH ADAPTER (GRAM) INHALATION ONCE
Refills: 0 | Status: COMPLETED | OUTPATIENT
Start: 2022-05-17 | End: 2022-05-17

## 2022-05-17 RX ADMIN — SODIUM CHLORIDE 1000 MILLILITER(S): 9 INJECTION, SOLUTION INTRAVENOUS at 20:04

## 2022-05-17 RX ADMIN — Medication 3 MILLILITER(S): at 20:04

## 2022-05-17 NOTE — ED PROVIDER NOTE - NS ED ROS FT
ROS:  CONSTITUTIONAL: +fever, chills, generalized weakness No fatigue.  EYES: No eye pain, visual disturbances, or discharge.  ENMT:  No difficulty hearing, tinnitus, vertigo; No sinus but +throat pain.  RESPIRATORY: +cough, No wheezing, chills or hemoptysis; No shortness of breath.  CARDIOVASCULAR: No chest pain, palpitations, dizziness, or leg swelling.  GASTROINTESTINAL: No abdominal or epigastric pain. No nausea, vomiting, or hematemesis; +diarrhea. No constipation. No melena or hematochezia.  GENITOURINARY: No dysuria, frequency, hematuria, or incontinence.  NEUROLOGICAL: No headaches, memory loss, loss of strength, numbness, or tremors.  ENDOCRINE: No heat or cold intolerance; No hair loss.  MUSCULOSKELETAL: No joint pain or swelling; No muscle, back, or extremity pain.  HEME/LYMPH: No easy bruising, or bleeding gums.  SKIN: No rash or itchiness.

## 2022-05-17 NOTE — ED PROVIDER NOTE - NSFOLLOWUPINSTRUCTIONS_ED_ALL_ED_FT
You were evaluated in the Emergency Department for COVID+  Patient is a 88y old  Male who presents with a chief complaint of COVID + and SpO2 <93%    You were evaluated and examined by a physician, and the available results of any tests are included in your discharge packet.    Based on your evaluation, your preliminary diagnosis is: COVID19     There are no signs of emergency conditions requiring admission to the hospital on today's workup.  Based on your evaluation, a preliminary diagnosis was made, however, further evaluation may be required by your primary care physician or a specialist for a more definitive diagnosis.  Please follow-up as directed and/or return to the Emergency Department if your symptoms change or worsen.    Follow up care:   1. See your primary care physician within the next 72 hours for follow up.  Bring a copy of your discharge paperwork (including any test results) to your doctor.    Medications/other discharge instructions:  1. You were prescribed medications: Prescriptions:  atorvastatin 80 mg oral tablet: 1 tab(s) orally once a day (at bedtime) (09 Sep 2021 12:48)  benzonatate 100 mg oral capsule: 1 cap(s) orally every 8 hours (09 Sep 2021 13:06)  fluticasone 50 mcg/inh nasal spray: 1 spray(s) nasal 2 times a day (09 Sep 2021 12:48)  lisinopril 10 mg oral tablet: 1 tab(s) orally once a day (15 Mar 2022 13:50)  pantoprazole 40 mg oral delayed release tablet: 1 tab(s) orally once a day (before a meal) (09 Sep 2021 12:48)  rOPINIRole 0.5 mg oral tablet: 1 tab(s) orally 3 times a day (09 Sep 2021 13:06)  sertraline 25 mg oral tablet: 1 tab(s) orally once a day (09 Sep 2021 12:48)    Please check your oxygen with a pulse oximeter (can be purchased at any pharmacy). If your resting oxygen levels fall below 92% please call your PCP or come into the hospital.    Return precautions:    *** Return immediately if you have chest pain, difficulty breathing, fever, a headinjury, wounds that won't stop bleeding, difficulty moving an arm or leg, unexplained sensory changes, or any other new/concerning symptoms. ***

## 2022-05-17 NOTE — ED PROVIDER NOTE - PHYSICAL EXAMINATION
CONST:     NAD, well-appearing; well-developed  EYES:         Conjunctiva clear; PERRL; no conjunctival pallor; no lid lag  ENMT:       MMM, no pharyngeal injection or exudates; normal dentition  NECK:        Supple, no palpable masses; no thyromegaly; no LAD  RESP :        Normal respiratory effort; faint bibasilar crackles, no wheeze  CHEST:       No TTP, no lines/ports; symmetric chest expansion  CARDIO:     RRR, normal S1 and S2, no M/R/G;  VASC:         No JVD/AJR, subtle 1+ peripheral edema in R leg, pulses 2+ B/L, Cap refill <2s  ABD:           Soft, mildly tender, ND, norm bowel sounds; no R/G; No HSM  MSK:          No clubbing or cyanosis of digits; no joint swelling or TTP  EXT:           WWP, no reduction in body hair, scar to L knee  PSYCH        A&O to person, place, and time; affect appropriate  NEURO:     Non-focal; no gross sensory deficits; moving all extremities; no tremor on outstretched arms or at rest  SKIN:          No rashes; no palpable lesions

## 2022-05-17 NOTE — ED PROVIDER NOTE - CLINICAL SUMMARY MEDICAL DECISION MAKING FREE TEXT BOX
88M PD, CAD s/p CABG, DM2 presents from UC for COVID+. High risk patient although minimally symptomatic. Reportedly hypoxemic to 92% at UC but 95% on RA here. R leg with subtle edema > L.  -monitor vitals  -check cbc, chem, ddimer, inflamm markers  -check cxr 88M PD, CAD s/p CABG, DM2 presents from UC for COVID+. High risk patient although minimally symptomatic. Reportedly hypoxemic to 92% at UC but 95% on RA here. R leg with subtle edema > L.  -monitor vitals  -check cbc, chem, ddimer, inflamm markers  -check cxr  Reassess ZR

## 2022-05-17 NOTE — ED PROVIDER NOTE - OBJECTIVE STATEMENT
88M PMH Parkinson disease, CAD s/p CABG, HTN, HLD presents from  for covid+. Patient has had cough and sore throat x3 days and is now starting to experience some myalgias. Only know sick contact is grandchild. Patient is vaccinated x2 last >1 year ago. He presented to  where he tested positive and reportedly had SpO2 of 92% so was sent into the ED. on ROS + for fever, chills, malaise, diarrhea x1 nonbloody. ROS negative for CP, SOB, abd pain, N/V/C, dysuria, bleeding. Reports PD is well controlled on medications.    Meds: reports no change from recent discharge.

## 2022-05-17 NOTE — ED ADULT NURSE NOTE - OBJECTIVE STATEMENT
88M PMH Parkinson disease, CAD s/p CABG, HTN, HLD presents from  for covid+. Patient has had cough and sore throat x3 days and is now starting to experience some myalgias. Only know sick contact is grandchild. Patient is vaccinated x2 last >1 year ago. He presented to  where he tested positive and reportedly had SpO2 of 92% so was sent into the ED. on ROS + for fever, chills, malaise, diarrhea x1 nonbloody. ROS negative for CP, SOB, abd pain, N/V/C, dysuria, bleeding. Reports PD is well controlled on medications.

## 2022-05-17 NOTE — ED PROVIDER NOTE - PATIENT PORTAL LINK FT
You can access the FollowMyHealth Patient Portal offered by [facility] by registering at the following website: [email or web address]. By joining [facility]’s FollowMyHealth portal, you will also be able to view your health information using other applications (apps) compatible with our system.

## 2022-05-17 NOTE — ED PROVIDER NOTE - PROGRESS NOTE DETAILS
Pavel PGY2 IM: labs and CXR ok. DDimer elevated and asymmetric LE swelling, CTPA negative for PE. Maintaining normal oxygen saturation. Can d/c home.

## 2022-05-18 VITALS
SYSTOLIC BLOOD PRESSURE: 180 MMHG | HEART RATE: 73 BPM | DIASTOLIC BLOOD PRESSURE: 66 MMHG | RESPIRATION RATE: 16 BRPM | OXYGEN SATURATION: 97 %

## 2022-05-18 LAB
FERRITIN SERPL-MCNC: 46 NG/ML — SIGNIFICANT CHANGE UP (ref 30–400)
SARS-COV-2 RNA SPEC QL NAA+PROBE: DETECTED

## 2022-05-18 NOTE — ED ADULT NURSE REASSESSMENT NOTE - NS ED NURSE REASSESS COMMENT FT1
ED Resident Pavel made aware pt noncompliant with instruction to ambulate to check oxygenation. Pt mumbling and states "I am unable to walk".

## 2022-05-23 NOTE — PHYSICAL THERAPY INITIAL EVALUATION ADULT - BALANCE DISTURBANCE, IDENTIFIED IMPAIRMENT CONTRIBUTE, REHAB EVAL
May 23, 2022  EMPLOYEE INFORMATION: EMPLOYER INFORMATION:   NAME: Breanna [employer]   : 1974 [phone]    DATE OF INJURY/EVENT: 2022           Location: [facility]   Treating Provider: Yolanda Prince PA-C  Time In:  12:55 PM Time Out:  1:46 PM      DIAGNOSIS:   1. Strain of lumbar region, subsequent encounter    2. Sacroiliac joint dysfunction of left side    3. Acute left-sided low back pain with left-sided sciatica      STATUS: This injury is determined to be WORK RELATED.    RETURN TO WORK:Employee may return to work with restrictions.   Return Date: 2022            RESTRICTIONS: Restrictions are to be followed at work and at home.  Restrictions are in effect until next follow-up visit.  Administrative tasks only.   Be allowed to sit to stand per comfort level.   No lifting, pushing, or pulling more than 5 lbs at waist level.  No repetitive pushing, pulling, lifting, twisting, bending, climbing, squatting, or kneeling.   Continue with scheduled physical therapy and chiropractic care.        TREATMENT PLAN:   Medications for this injury/condition: Continue with over the counter medications.   Continue with prescribed muscle relaxer as needed, advised take prior to bedtime.   Referral/Consult: None  Diagnostic Testing: None       Instructions: Gentle stretching.   Ice or heat as needed.     NEXT RETURN VISIT:   @ 11:45 am   Thank you for the privilege of providing medical care for this injury/condition.  If there are any questions, please call the occupational health clinic at Dept: [phone].      Electronically signed on 2022 at 1:46 PM by:   Yolanda Prince PA-C   [facility]    
decreased strength

## 2022-07-01 NOTE — ED ADULT NURSE REASSESSMENT NOTE - SPO2 (%)
Health Maintenance Due   Topic Date Due   • Shingles Vaccine (1 of 2) 05/05/2013   • DM/CKD Microalbumin  07/02/2019   • DTaP/Tdap/Td Vaccine (3 - Td) 07/21/2020   • Medicare Wellness Visit  07/20/2021       Patient is due for topics as listed above but is not proceeding with them at this time.           
99
Xray Ankle Complete 3 Views, Right

## 2023-02-06 NOTE — ED ADULT NURSE REASSESSMENT NOTE - NS ED NURSE REASSESS COMMENT FT1
Patient sleeping. No changes.   
Pt became tachypneic, connected to monitor spo2 99% on RA, duoneb started. Pt is AOx3. Will continue to monitor.
Received patient alert and oriented with c/o pain to back of head with noise states he was given medication earlier which only helped for a short period, will notify MD and continue to monitor.

## 2023-05-02 ENCOUNTER — EMERGENCY (EMERGENCY)
Facility: HOSPITAL | Age: 88
LOS: 1 days | Discharge: HOME | End: 2023-05-02
Attending: EMERGENCY MEDICINE
Payer: MEDICARE

## 2023-05-02 VITALS
WEIGHT: 115.08 LBS | OXYGEN SATURATION: 94 % | TEMPERATURE: 98 F | HEIGHT: 61 IN | HEART RATE: 68 BPM | RESPIRATION RATE: 18 BRPM | SYSTOLIC BLOOD PRESSURE: 146 MMHG | DIASTOLIC BLOOD PRESSURE: 56 MMHG

## 2023-05-02 DIAGNOSIS — Z96.652 PRESENCE OF LEFT ARTIFICIAL KNEE JOINT: Chronic | ICD-10-CM

## 2023-05-02 DIAGNOSIS — Z95.1 PRESENCE OF AORTOCORONARY BYPASS GRAFT: Chronic | ICD-10-CM

## 2023-05-02 LAB
ALBUMIN SERPL ELPH-MCNC: 4.3 G/DL — SIGNIFICANT CHANGE UP (ref 3.3–5)
ALP SERPL-CCNC: 65 U/L — SIGNIFICANT CHANGE UP (ref 40–120)
ALT FLD-CCNC: <5 U/L — LOW (ref 10–45)
ANION GAP SERPL CALC-SCNC: 11 MMOL/L — SIGNIFICANT CHANGE UP (ref 5–17)
AST SERPL-CCNC: 11 U/L — SIGNIFICANT CHANGE UP (ref 10–40)
BASE EXCESS BLDV CALC-SCNC: 2.3 MMOL/L — SIGNIFICANT CHANGE UP (ref -2–3)
BASOPHILS # BLD AUTO: 0.05 K/UL — SIGNIFICANT CHANGE UP (ref 0–0.2)
BASOPHILS NFR BLD AUTO: 0.8 % — SIGNIFICANT CHANGE UP (ref 0–2)
BILIRUB SERPL-MCNC: 0.5 MG/DL — SIGNIFICANT CHANGE UP (ref 0.2–1.2)
BUN SERPL-MCNC: 30 MG/DL — HIGH (ref 7–23)
CA-I SERPL-SCNC: 1.33 MMOL/L — SIGNIFICANT CHANGE UP (ref 1.15–1.33)
CALCIUM SERPL-MCNC: 9.8 MG/DL — SIGNIFICANT CHANGE UP (ref 8.4–10.5)
CHLORIDE BLDV-SCNC: 105 MMOL/L — SIGNIFICANT CHANGE UP (ref 96–108)
CHLORIDE SERPL-SCNC: 108 MMOL/L — SIGNIFICANT CHANGE UP (ref 96–108)
CO2 BLDV-SCNC: 30 MMOL/L — HIGH (ref 22–26)
CO2 SERPL-SCNC: 24 MMOL/L — SIGNIFICANT CHANGE UP (ref 22–31)
CREAT SERPL-MCNC: 0.83 MG/DL — SIGNIFICANT CHANGE UP (ref 0.5–1.3)
EGFR: 84 ML/MIN/1.73M2 — SIGNIFICANT CHANGE UP
EOSINOPHIL # BLD AUTO: 0.16 K/UL — SIGNIFICANT CHANGE UP (ref 0–0.5)
EOSINOPHIL NFR BLD AUTO: 2.6 % — SIGNIFICANT CHANGE UP (ref 0–6)
GAS PNL BLDV: 138 MMOL/L — SIGNIFICANT CHANGE UP (ref 136–145)
GAS PNL BLDV: SIGNIFICANT CHANGE UP
GLUCOSE BLDV-MCNC: 170 MG/DL — HIGH (ref 70–99)
GLUCOSE SERPL-MCNC: 178 MG/DL — HIGH (ref 70–99)
HCO3 BLDV-SCNC: 29 MMOL/L — SIGNIFICANT CHANGE UP (ref 22–29)
HCT VFR BLD CALC: 34.2 % — LOW (ref 39–50)
HCT VFR BLDA CALC: 33 % — LOW (ref 39–51)
HGB BLD CALC-MCNC: 11.1 G/DL — LOW (ref 12.6–17.4)
HGB BLD-MCNC: 10.8 G/DL — LOW (ref 13–17)
IMM GRANULOCYTES NFR BLD AUTO: 0.2 % — SIGNIFICANT CHANGE UP (ref 0–0.9)
LACTATE BLDV-MCNC: 1.5 MMOL/L — SIGNIFICANT CHANGE UP (ref 0.5–2)
LYMPHOCYTES # BLD AUTO: 0.95 K/UL — LOW (ref 1–3.3)
LYMPHOCYTES # BLD AUTO: 15.3 % — SIGNIFICANT CHANGE UP (ref 13–44)
MCHC RBC-ENTMCNC: 30.9 PG — SIGNIFICANT CHANGE UP (ref 27–34)
MCHC RBC-ENTMCNC: 31.6 GM/DL — LOW (ref 32–36)
MCV RBC AUTO: 97.7 FL — SIGNIFICANT CHANGE UP (ref 80–100)
MONOCYTES # BLD AUTO: 0.66 K/UL — SIGNIFICANT CHANGE UP (ref 0–0.9)
MONOCYTES NFR BLD AUTO: 10.7 % — SIGNIFICANT CHANGE UP (ref 2–14)
NEUTROPHILS # BLD AUTO: 4.36 K/UL — SIGNIFICANT CHANGE UP (ref 1.8–7.4)
NEUTROPHILS NFR BLD AUTO: 70.4 % — SIGNIFICANT CHANGE UP (ref 43–77)
NRBC # BLD: 0 /100 WBCS — SIGNIFICANT CHANGE UP (ref 0–0)
PCO2 BLDV: 52 MMHG — SIGNIFICANT CHANGE UP (ref 42–55)
PH BLDV: 7.35 — SIGNIFICANT CHANGE UP (ref 7.32–7.43)
PLATELET # BLD AUTO: 210 K/UL — SIGNIFICANT CHANGE UP (ref 150–400)
PO2 BLDV: 36 MMHG — SIGNIFICANT CHANGE UP (ref 25–45)
POTASSIUM BLDV-SCNC: 4.3 MMOL/L — SIGNIFICANT CHANGE UP (ref 3.5–5.1)
POTASSIUM SERPL-MCNC: 4.4 MMOL/L — SIGNIFICANT CHANGE UP (ref 3.5–5.3)
POTASSIUM SERPL-SCNC: 4.4 MMOL/L — SIGNIFICANT CHANGE UP (ref 3.5–5.3)
PROT SERPL-MCNC: 6.6 G/DL — SIGNIFICANT CHANGE UP (ref 6–8.3)
RAPID RVP RESULT: SIGNIFICANT CHANGE UP
RBC # BLD: 3.5 M/UL — LOW (ref 4.2–5.8)
RBC # FLD: 14.6 % — HIGH (ref 10.3–14.5)
SAO2 % BLDV: 61.3 % — LOW (ref 67–88)
SARS-COV-2 RNA SPEC QL NAA+PROBE: SIGNIFICANT CHANGE UP
SODIUM SERPL-SCNC: 143 MMOL/L — SIGNIFICANT CHANGE UP (ref 135–145)
TROPONIN T, HIGH SENSITIVITY RESULT: 12 NG/L — SIGNIFICANT CHANGE UP (ref 0–51)
WBC # BLD: 6.19 K/UL — SIGNIFICANT CHANGE UP (ref 3.8–10.5)
WBC # FLD AUTO: 6.19 K/UL — SIGNIFICANT CHANGE UP (ref 3.8–10.5)

## 2023-05-02 PROCEDURE — 85025 COMPLETE CBC W/AUTO DIFF WBC: CPT

## 2023-05-02 PROCEDURE — 82947 ASSAY GLUCOSE BLOOD QUANT: CPT

## 2023-05-02 PROCEDURE — 85018 HEMOGLOBIN: CPT

## 2023-05-02 PROCEDURE — 0225U NFCT DS DNA&RNA 21 SARSCOV2: CPT

## 2023-05-02 PROCEDURE — 99284 EMERGENCY DEPT VISIT MOD MDM: CPT | Mod: GC

## 2023-05-02 PROCEDURE — 83605 ASSAY OF LACTIC ACID: CPT

## 2023-05-02 PROCEDURE — 82330 ASSAY OF CALCIUM: CPT

## 2023-05-02 PROCEDURE — 93005 ELECTROCARDIOGRAM TRACING: CPT

## 2023-05-02 PROCEDURE — 85014 HEMATOCRIT: CPT

## 2023-05-02 PROCEDURE — 84484 ASSAY OF TROPONIN QUANT: CPT

## 2023-05-02 PROCEDURE — 80053 COMPREHEN METABOLIC PANEL: CPT

## 2023-05-02 PROCEDURE — 72125 CT NECK SPINE W/O DYE: CPT | Mod: 26,MA

## 2023-05-02 PROCEDURE — 84295 ASSAY OF SERUM SODIUM: CPT

## 2023-05-02 PROCEDURE — 71045 X-RAY EXAM CHEST 1 VIEW: CPT | Mod: 26

## 2023-05-02 PROCEDURE — 99285 EMERGENCY DEPT VISIT HI MDM: CPT | Mod: 25

## 2023-05-02 PROCEDURE — 82435 ASSAY OF BLOOD CHLORIDE: CPT

## 2023-05-02 PROCEDURE — 82803 BLOOD GASES ANY COMBINATION: CPT

## 2023-05-02 PROCEDURE — 72125 CT NECK SPINE W/O DYE: CPT | Mod: MA

## 2023-05-02 PROCEDURE — 70450 CT HEAD/BRAIN W/O DYE: CPT | Mod: 26,MA

## 2023-05-02 PROCEDURE — 84132 ASSAY OF SERUM POTASSIUM: CPT

## 2023-05-02 PROCEDURE — 70450 CT HEAD/BRAIN W/O DYE: CPT | Mod: MA

## 2023-05-02 PROCEDURE — 71045 X-RAY EXAM CHEST 1 VIEW: CPT

## 2023-05-02 RX ORDER — ACETAMINOPHEN 500 MG
650 TABLET ORAL ONCE
Refills: 0 | Status: COMPLETED | OUTPATIENT
Start: 2023-05-02 | End: 2023-05-02

## 2023-05-02 RX ORDER — CARBAMIDE PEROXIDE 81.86 MG/ML
5 SOLUTION/ DROPS AURICULAR (OTIC) ONCE
Refills: 0 | Status: COMPLETED | OUTPATIENT
Start: 2023-05-02 | End: 2023-05-02

## 2023-05-02 RX ADMIN — Medication 650 MILLIGRAM(S): at 20:08

## 2023-05-02 RX ADMIN — CARBAMIDE PEROXIDE 5 DROP(S): 81.86 SOLUTION/ DROPS AURICULAR (OTIC) at 21:46

## 2023-05-02 NOTE — ED PROVIDER NOTE - PATIENT PORTAL LINK FT
You can access the FollowMyHealth Patient Portal offered by [facility] by registering at the following website: [email or web address]. By joining [facility]’s FollowMyHealth portal, you will also be able to view your health information using other applications (apps) compatible with our system.

## 2023-05-02 NOTE — ED PROVIDER NOTE - PROGRESS NOTE DETAILS
MIRIAM Verde (PGY-3) -patient's work-up here otherwise unremarkable.  He does not appear to have significant anemia that would account for his generalized weakness.  He also does not have elevated leukocytosis and since being here does not have any fever or unstable vital signs do not suggest an underlying infection.  He has been mentating at baseline per daughter's head CT and CT of his cervical spine are otherwise unremarkable as well.  Patient refusing urine cath.  I do not believe that the diagnostic utility of a urine would outweigh the possible harm of having to sedate the patient for compliance.  Discussed this with the daughter who will take him to see his primary care doctor with copies of her results.  Additionally will make ENT referral for his severe cerumen impaction.

## 2023-05-02 NOTE — ED PROVIDER NOTE - RAPID ASSESSMENT
89y M w/ pmhx of PD, CAD s/p CABG, HTN, HLD, DM, chronic, HA presents to the ED c/o R sided HA and generalized weakness xmonths which has been worsening over the past 3days. Pt has been unable to ambulate independently. As per daughter pt has declined greatly in the past 3days. Denies fever, dysuria. Took Tylenol w/o relief. Of note pt fell two weeks ago on L side. Pt is well appearing in triage.   Fabian Guerrero ([facility]) have documented this rapid assessment note under the dictation of Curt Jaimes) which has been reviewed and affirmed to be accurate. Patient was seen as a QDOC patient. The patient will be seen and further worked up in the main emergency department and their care will be completed by the main emergency department team along with a thorough physical exam. Receiving team will follow up on labs, analgesia, any clinical imaging, reassess and disposition as clinically indicated, all decisions regarding the progression of care will be made at their discretion. PT/Daughter declined translation services.  89y M w/ pmhx of PD, CAD s/p CABG, HTN, HLD, DM, chronic, HA presents to the ED c/o R sided HA and generalized weakness xmonths which has been worsening over the past 3days. Pt has been unable to ambulate independently. As per daughter pt has declined greatly in the past 3days. Denies fever, dysuria. Took Tylenol w/o relief. Of note pt fell two weeks ago on L side. PE Eldelry male awake alert looking mildly uncomfortabe.  Dr. Fernando Tapia I, Fabian Andre (Scribe) have documented this rapid assessment note under the dictation of Curt Jaimes (MD) which has been reviewed and affirmed to be accurate. Patient was seen as a QDOC patient. The patient will be seen and further worked up in the main emergency department and their care will be completed by the main emergency department team along with a thorough physical exam. Receiving team will follow up on labs, analgesia, any clinical imaging, reassess and disposition as clinically indicated, all decisions regarding the progression of care will be made at their discretion.    PT seen as Triag/Qdoc with scribe.  Full evaluation to be performed once patient is transferred to main ED  Curt Jaimes MD, Facep

## 2023-05-02 NOTE — ED ADULT TRIAGE NOTE - CHIEF COMPLAINT QUOTE
Headache and generalized weakness x months  Worsening over the past few days, unable to ambulate independently

## 2023-05-02 NOTE — ED PROVIDER NOTE - ATTENDING CONTRIBUTION TO CARE
Patient presents with ear pain, headache right sided for 1 year and no f/c.   Curt Hutton MD, FACEP: In this physician's medical judgement based on clinical history and physical exam the patient's signs and symptoms lead to differential diagnoses which includes but is not limited to: ear pain, headache    Historical features, symptoms, and clinical exam not consistent with: ich, meningitis, mastoiditis     Labs were ordered and independently reviewed by me.  EKG was ordered and independently reviewed by me.  Imaging was ordered and reviewed by me.      Appropriate medications for the patient's presenting complaints were ordered, and effects were reassessed.     History assisted by family at bedside  Escalation to admission/observation was considered.    Will follow up on labs, therapeutics, imaging, reassess and disposition as clinically indicated.  *The above represents an initial assessment/impression. Please refer to my progress notes below for potential changes in patient clinical course*

## 2023-05-02 NOTE — ED PROVIDER NOTE - NSFOLLOWUPINSTRUCTIONS_ED_ALL_ED_FT
You were seen in the Emergency Department for headache. You were found to have severe amount of ear wax blocking your right ear. Follow up with ENT as discussed. Your blood work, ekg, xrays, and CT scan did not show any emergent medical problems that require hospitalization or inpatient treatment. Follow up with your primary care doctor as discussed.    1) Continue all previously prescribed medications as directed.    2) Follow up with your primary care physician - take copies of your results.    3) Return to the Emergency Department for worsening or persistent symptoms, and/or ANY NEW OR CONCERNING SYMPTOMS.

## 2023-05-02 NOTE — ED ADULT NURSE NOTE - OBJECTIVE STATEMENT
88 y/o M with PMHx of Parkinson's disease, BPH, DM, HLD presents to the ED with complaints of R sided headache and ear pain x 1 year. Daughter, Nitesh at bedside chooses to translate Patrice for father. States patient with intermittent headache x >1year, however worsening over the last 3 days. Notes patient has been unable to get up from bed due to associated generalized weakness. Notes pain is on R side of head, ear and at times radiates toward the shoulder. Patient denies chest pain, SOB, abdominal pain. AOx2, disoriented to time. Breathing is unlabored, spontaneous, and symmetrical. Abdomen is soft, nondistended, and nontender. Bladder is nondistended and nontender. No peripheral edema noted. <2s capillary refill. Ambulatory at baseline with cane. EOMI. Muscle strength intact in upper and lower extremities, bilaterally. Patient without headache at present, notes mild dizziness.

## 2023-05-02 NOTE — ED ADULT NURSE REASSESSMENT NOTE - NS ED NURSE REASSESS COMMENT FT1
Used [facility]  [id number] to educate patient on urine collection/straight catheterization. Patient declines at this time. Does not wish to give urine. Educated on risk for possible UTI and understands.

## 2023-05-02 NOTE — ED PROVIDER NOTE - CLINICAL SUMMARY MEDICAL DECISION MAKING FREE TEXT BOX
Patient with right-sided headaches and 3 days of generalized weakness.  I do believe that these are 2 separate etiologies given the chronicity of his right-sided headaches.  Additionally given examination and history I do believe his right-sided headaches are secondary to his severe cerumen impaction.  On attempt to evacuate cerumen from his right ear he states that it reproduced the headaches that he is feeling.  Patient was otherwise unable to comply with full cleanout.  I do not believe that a clean out at this time would benefit him to warrant the risk of sedating him for compliance.  His weakness is generalized and nonfocal, this unlikely represents a new intracranial pathology including CVA.  More likely metabolic versus infectious will obtain labs urine x-ray.  Given age will obtain CT head to assess for mass occupying lesions that may be responsible for his daily headaches.  Anticipate discharge with outpatient ENT follow-up if all else negative.

## 2023-05-02 NOTE — ED PROVIDER NOTE - PHYSICAL EXAMINATION
On examination patient is an elderly male, appearance is of stated age, regular rate rhythm, clear lungs, abdomen is soft and nontender.  On otoscopic examination he has severe cerumen impaction in the right ear and moderate cerumen impaction in the left ear.  Decreased hearing from right side but otherwise cranial nerves intact.  He follows commands and is oriented x3.  He has equal strength of upper and lower extremities bilaterally and sensory grossly intact.

## 2023-05-02 NOTE — ED PROVIDER NOTE - OBJECTIVE STATEMENT
MIRIAM Verde (PGY-3) -as per above, additionally past medical history Parkinson's disease, CAD with CABG, HTN, DM here for now chronic right-sided headache and generalized weakness.  He has had over a year of daily right-sided headaches associated with right ear pain and decreased hearing.  Last 3 days also having some generalized weakness per daughter more difficulty getting out of bed and has been unable to do so on his own.  He is able to stand with some assistance.  He has not had any fevers or recent illnesses.  He has no weakness in 1 arm versus 1 leg just global.  Otherwise she states that he is in his usual state of health and mentating as per his usual.  No abdominal pain nausea vomiting chest pain shortness of breath or urinary symptoms.

## 2023-05-02 NOTE — ED PROVIDER NOTE - DISCHARGE DATE
[facility] Hospitalist Discharge Summary    Admission Dt/Tm     3/11/2023  8:37 PM  Discharge DT/TM  3/13/23     Discharge Diagnosis  Acute deep vein thrombosis (DVT) of femoral vein of right lower extremity (CMD)     Hospital Course  Nathan Anderson is a 58 year old male admitted for recurrent DVT     Recurrent DVT:  -Ultrasound lower extremity demonstrated occlusive thrombus in the right femoral vein  -INR 1.7 again today   -Warfarin 10 mg with Lovenox ordered last night   -Hematologist consulted - await further recommendations ? Need for IVC filter/ another PO anticoagulation like xarelto vs eliquis?   - on  lovenox 1mg/kg BID and pharmacy to dose coumadin   - INR 2.1 today      Chronic anemia: Unspecified  -Hemoglobin 10.2 on admission  -No signs of overt bleeding  - Continue to monitor  - stable and at baseline      Hyperlipidemia:  -Patient reports that his PCP took him off of Crestor     Obesity:  -BMI 38  -Encouraged weight loss strategies, healthier diet and exercise       Chief Complaint   Patient presents with   • Leg Swelling   .  Lab Results  Vitals with min/max:    Vital Last Value 24 Hour Range   Temperature 98.6 °F (37 °C) (03/13/23 0736) Temp  Min: 98.6 °F (37 °C)  Max: 98.8 °F (37.1 °C)   Pulse (!) 53 (03/13/23 0736) Pulse  Min: 53  Max: 73   Respiratory 18 (03/12/23 1949) Resp  Min: 18  Max: 20   Non-Invasive  Blood Pressure 125/79 (03/13/23 0736) BP  Min: 115/68  Max: 130/80   Pulse Oximetry 100 % (03/13/23 0736) SpO2  Min: 99 %  Max: 100 %   Arterial   Blood Pressure   No data recorded     Physical Exam     General: patient not in acute distress.  HEENT: Normocephalic. Normal conjunctiva.   Respiratory: chest expansion wnl. Lung clear to auscultation bilaterally.  Cardiovascular: Regular rate and rhythm.  No peripheral edema.  Gastrointestinal: Abdomen soft, non tender, non distended. Bowel sound present in all 4 quadrants.  Genitourinary: No suprapubic tenderness. No costovertebra angle  tenderness.  Musculoskeletal: Moves all 4 extremities  Neurology: alert and oriented to place, time, person.  No focal defect  Skin: Warm. No concerning rash.  Psychiatry: Cooperative    Labs  Admission on 03/11/2023   Component Date Value Ref Range Status   • Sodium 03/11/2023 136  135 - 145 mmol/L Final   • Potassium 03/11/2023 4.6  3.4 - 5.1 mmol/L Final   • Chloride 03/11/2023 107  97 - 110 mmol/L Final   • Carbon Dioxide 03/11/2023 27  21 - 32 mmol/L Final   • Anion Gap 03/11/2023 7  7 - 19 mmol/L Final   • Glucose 03/11/2023 120 (A)  70 - 99 mg/dL Final   • BUN 03/11/2023 12  6 - 20 mg/dL Final   • Creatinine 03/11/2023 1.03  0.67 - 1.17 mg/dL Final   • Glomerular Filtration Rate 03/11/2023 84  >=60 Final    eGFR results = or >60 mL/min/1.73m2 = Normal kidney function. Estimated GFR calculated using the CKD-EPI-R (2021) equation that does not include race in the creatinine calculation.   • BUN/Cr 03/11/2023 12  7 - 25 Final   • Calcium 03/11/2023 8.7  8.4 - 10.2 mg/dL Final   • Bilirubin, Total 03/11/2023 0.4  0.2 - 1.0 mg/dL Final   • GOT/AST 03/11/2023 15  <=37 Units/L Final   • GPT/ALT 03/11/2023 17  <64 Units/L Final   • Alkaline Phosphatase 03/11/2023 68  45 - 117 Units/L Final   • Albumin 03/11/2023 2.8 (A)  3.6 - 5.1 g/dL Final   • Protein, Total 03/11/2023 7.0  6.4 - 8.2 g/dL Final   • Globulin 03/11/2023 4.2 (A)  2.0 - 4.0 g/dL Final   • A/G Ratio 03/11/2023 0.7 (A)  1.0 - 2.4 Final   • Prothrombin Time 03/11/2023 16.7 (A)  9.7 - 11.8 sec Final   • INR 03/11/2023 1.7    Final    INR Therapeutic Range: 2.0 to 3.0 (2.5 to 3.5 recommended for recurrent thrombotic episodes and mechanical prosthetic heart valves.)   • WBC 03/11/2023 7.5  4.2 - 11.0 K/mcL Final   • RBC 03/11/2023 3.88 (A)  4.50 - 5.90 mil/mcL Final   • HGB 03/11/2023 10.2 (A)  13.0 - 17.0 g/dL Final   • HCT 03/11/2023 32.5 (A)  39.0 - 51.0 % Final   • MCV 03/11/2023 83.8  78.0 - 100.0 fl Final   • MCH 03/11/2023 26.3  26.0 - 34.0 pg Final    • MCHC 03/11/2023 31.4 (A)  32.0 - 36.5 g/dL Final   • RDW-CV 03/11/2023 14.7  11.0 - 15.0 % Final   • RDW-SD 03/11/2023 44.9  39.0 - 50.0 fL Final   • PLT 03/11/2023 292  140 - 450 K/mcL Final   • NRBC 03/11/2023 0  <=0 /100 WBC Final   • Neutrophil, Percent 03/11/2023 57  % Final   • Lymphocytes, Percent 03/11/2023 28  % Final   • Mono, Percent 03/11/2023 12  % Final   • Eosinophils, Percent 03/11/2023 2  % Final   • Basophils, Percent 03/11/2023 1  % Final   • Immature Granulocytes 03/11/2023 0  % Final   • Absolute Neutrophils 03/11/2023 4.3  1.8 - 7.7 K/mcL Final   • Absolute Lymphocytes 03/11/2023 2.1  1.0 - 4.0 K/mcL Final   • Absolute Monocytes 03/11/2023 0.9  0.3 - 0.9 K/mcL Final   • Absolute Eosinophils  03/11/2023 0.1  0.0 - 0.5 K/mcL Final   • Absolute Basophils 03/11/2023 0.0  0.0 - 0.3 K/mcL Final   • Absolute Immature Granulocytes 03/11/2023 0.0  0.0 - 0.2 K/mcL Final   • Sodium 03/12/2023 136  135 - 145 mmol/L Final   • Potassium 03/12/2023 4.5  3.4 - 5.1 mmol/L Final   • Chloride 03/12/2023 108  97 - 110 mmol/L Final   • Carbon Dioxide 03/12/2023 25  21 - 32 mmol/L Final   • Anion Gap 03/12/2023 8  7 - 19 mmol/L Final   • Glucose 03/12/2023 169 (A)  70 - 99 mg/dL Final   • BUN 03/12/2023 12  6 - 20 mg/dL Final   • Creatinine 03/12/2023 0.97  0.67 - 1.17 mg/dL Final   • Glomerular Filtration Rate 03/12/2023 90  >=60 Final    eGFR results = or >60 mL/min/1.73m2 = Normal kidney function. Estimated GFR calculated using the CKD-EPI-R (2021) equation that does not include race in the creatinine calculation.   • BUN/Cr 03/12/2023 12  7 - 25 Final   • Calcium 03/12/2023 8.9  8.4 - 10.2 mg/dL Final   • Bilirubin, Total 03/12/2023 0.5  0.2 - 1.0 mg/dL Final   • GOT/AST 03/12/2023 17  <=37 Units/L Final   • GPT/ALT 03/12/2023 17  <64 Units/L Final   • Alkaline Phosphatase 03/12/2023 69  45 - 117 Units/L Final   • Albumin 03/12/2023 2.9 (A)  3.6 - 5.1 g/dL Final   • Protein, Total 03/12/2023 7.1  6.4 - 8.2  g/dL Final   • Globulin 03/12/2023 4.2 (A)  2.0 - 4.0 g/dL Final   • A/G Ratio 03/12/2023 0.7 (A)  1.0 - 2.4 Final   • Prothrombin Time 03/12/2023 16.7 (A)  9.7 - 11.8 sec Final   • INR 03/12/2023 1.7    Final    INR Therapeutic Range: 2.0 to 3.0 (2.5 to 3.5 recommended for recurrent thrombotic episodes and mechanical prosthetic heart valves.)   • WBC 03/12/2023 6.6  4.2 - 11.0 K/mcL Final   • RBC 03/12/2023 4.02 (A)  4.50 - 5.90 mil/mcL Final   • HGB 03/12/2023 10.5 (A)  13.0 - 17.0 g/dL Final   • HCT 03/12/2023 34.8 (A)  39.0 - 51.0 % Final   • MCV 03/12/2023 86.6  78.0 - 100.0 fl Final   • MCH 03/12/2023 26.1  26.0 - 34.0 pg Final   • MCHC 03/12/2023 30.2 (A)  32.0 - 36.5 g/dL Final   • RDW-CV 03/12/2023 14.7  11.0 - 15.0 % Final   • RDW-SD 03/12/2023 46.8  39.0 - 50.0 fL Final   • PLT 03/12/2023 309  140 - 450 K/mcL Final   • NRBC 03/12/2023 0  <=0 /100 WBC Final   • Neutrophil, Percent 03/12/2023 60  % Final   • Lymphocytes, Percent 03/12/2023 30  % Final   • Mono, Percent 03/12/2023 7  % Final   • Eosinophils, Percent 03/12/2023 2  % Final   • Basophils, Percent 03/12/2023 1  % Final   • Immature Granulocytes 03/12/2023 0  % Final   • Absolute Neutrophils 03/12/2023 4.0  1.8 - 7.7 K/mcL Final   • Absolute Lymphocytes 03/12/2023 2.0  1.0 - 4.0 K/mcL Final   • Absolute Monocytes 03/12/2023 0.5  0.3 - 0.9 K/mcL Final   • Absolute Eosinophils  03/12/2023 0.1  0.0 - 0.5 K/mcL Final   • Absolute Basophils 03/12/2023 0.0  0.0 - 0.3 K/mcL Final   • Absolute Immature Granulocytes 03/12/2023 0.0  0.0 - 0.2 K/mcL Final   • Prothrombin Time 03/13/2023 20.6 (A)  9.7 - 11.8 sec Final   • INR 03/13/2023 2.1    Final    INR Therapeutic Range: 2.0 to 3.0 (2.5 to 3.5 recommended for recurrent thrombotic episodes and mechanical prosthetic heart valves.)   • WBC 03/13/2023 7.1  4.2 - 11.0 K/mcL Final   • RBC 03/13/2023 3.81 (A)  4.50 - 5.90 mil/mcL Final   • HGB 03/13/2023 10.0 (A)  13.0 - 17.0 g/dL Final   • HCT 03/13/2023 32.7  (A)  39.0 - 51.0 % Final   • MCV 03/13/2023 85.8  78.0 - 100.0 fl Final   • MCH 03/13/2023 26.2  26.0 - 34.0 pg Final   • MCHC 03/13/2023 30.6 (A)  32.0 - 36.5 g/dL Final   • PLT 03/13/2023 289  140 - 450 K/mcL Final   • RDW-CV 03/13/2023 14.7  11.0 - 15.0 % Final   • RDW-SD 03/13/2023 46.7  39.0 - 50.0 fL Final   • NRBC 03/13/2023 0  <=0 /100 WBC Final       Imaging  LAST ECHO/ECHO STRESS:  No valid procedures specified.    LAST MRI:  No results found for this or any previous visit.    LAST CT:  No results found for this or any previous visit.    LAST EKG:  No results found for this or any previous visit (from the past 4464 hour(s)).    LAST X-RAY:  === 09/01/16 ===    - Narrative -  Accession #    [id number]    EXAM: XR CHEST PA, LATERAL 2V    CLINICAL INDICATION: Dyspnea on exertion and difficulty breathing    COMPARISON: 12/12/2006    FINDINGS: The cardiomediastinal silhouette and pulmonary vasculature are within normal limits.  There is no pleural effusion, focal infiltrate, or pneumothorax. The visualized osseous  structures are grossly stable with mild scoliosis.    - Impression -  No acute radiographic abnormality or significant change from prior exam.    *  F I N A L  *    Transcribed By: YESENIA  09/01/16 6:30 pm    Dictated By:            MIKA LOVE MD    Electronically Reviewed and Approved By:           MIKA LOVE MD  09/01/16 6:31 pm    LAST U/S:  No results found for this or any previous visit.       VAS EXTREMITY LOWER VENOUS DUPLEX   Final Result by Shayy Rivera MD (03/11 2217)      1.   Occlusive thrombus in right femoral vein.      Electronically Signed by: SHAYY RIVERA MD    Signed on: 3/11/2023 10:17 PM    Workstation ID: [id number]           Pathology  * Cannot find OR log *    Test Results Pending At Discharge    Follow up with PCP     Discharge Instructions    Discharge Medications     Summary of your Discharge Medications      Take these Medications      Details    methaDONE 40 MG disintegrating tablet  Commonly known as: METHADOSE   Take 80 mg by mouth daily.     warfarin 5 MG tablet  Commonly known as: COUMADIN   Take as directed. If you are unsure how to take this medication, talk to your nurse or doctor.  Original instructions: Take 2 tablets by mouth daily. as DIRECTED BY [facility] ([phone]).              Primary Care Physician  Informed  Kory Vyas DO  of pt discharge  by [employer] today     Final diagnosis, treatment plan, and follow-up recommendations were discussed and explained to the patient. The patient was given an opportunity to ask questions concerning the diagnosis and treatment plan. The patient acknowledged understanding of the diagnosis, treatment, and follow-up recommendations. The patient was advised to seek urgent care upon discharge if worsening symptoms develop prior to scheduled follow-up. I spent 32 minutes completing this patient's discharge.     Sana Mohsin, DO  3/13/2023  c   03-May-2023

## 2023-05-03 VITALS
TEMPERATURE: 98 F | DIASTOLIC BLOOD PRESSURE: 71 MMHG | HEART RATE: 67 BPM | RESPIRATION RATE: 20 BRPM | SYSTOLIC BLOOD PRESSURE: 190 MMHG | OXYGEN SATURATION: 96 %

## 2023-05-30 NOTE — ED PROVIDER NOTE - OBJECTIVE STATEMENT
86 yo M hx CAD s/p PCI & CABG, HTN, HLD, DM2, BPH presenting with dsyuria, increased frequency of urination, abdominal pain and constipation x 1 week. Passing gas. No history abdominal surgeries. No associated nausea/vomiting, fevers/chills. Patient also endorses increased WORKMAN and orthopnea, has not taken his "water pill" for several months because it makes him urinate too frequently. Thalidomide Counseling: I discussed with the patient the risks of thalidomide including but not limited to birth defects, anxiety, weakness, chest pain, dizziness, cough and severe allergy.

## 2023-06-08 ENCOUNTER — INPATIENT (INPATIENT)
Facility: HOSPITAL | Age: 88
LOS: 5 days | Discharge: NURSING, REHAB OR LONG-TERM CARE | DRG: 56 | End: 2023-06-14
Attending: INTERNAL MEDICINE | Admitting: INTERNAL MEDICINE
Payer: MEDICARE

## 2023-06-08 VITALS
DIASTOLIC BLOOD PRESSURE: 73 MMHG | SYSTOLIC BLOOD PRESSURE: 174 MMHG | WEIGHT: 115.08 LBS | TEMPERATURE: 98 F | HEART RATE: 72 BPM | OXYGEN SATURATION: 97 % | RESPIRATION RATE: 16 BRPM | HEIGHT: 61 IN

## 2023-06-08 DIAGNOSIS — R62.7 ADULT FAILURE TO THRIVE: ICD-10-CM

## 2023-06-08 DIAGNOSIS — Z95.1 PRESENCE OF AORTOCORONARY BYPASS GRAFT: Chronic | ICD-10-CM

## 2023-06-08 DIAGNOSIS — Z96.652 PRESENCE OF LEFT ARTIFICIAL KNEE JOINT: Chronic | ICD-10-CM

## 2023-06-08 LAB
ALBUMIN SERPL ELPH-MCNC: 4.4 G/DL — SIGNIFICANT CHANGE UP (ref 3.3–5)
ALP SERPL-CCNC: 66 U/L — SIGNIFICANT CHANGE UP (ref 40–120)
ALT FLD-CCNC: <5 U/L — LOW (ref 10–45)
ANION GAP SERPL CALC-SCNC: 12 MMOL/L — SIGNIFICANT CHANGE UP (ref 5–17)
APPEARANCE UR: CLEAR — SIGNIFICANT CHANGE UP
AST SERPL-CCNC: 12 U/L — SIGNIFICANT CHANGE UP (ref 10–40)
BACTERIA # UR AUTO: NEGATIVE — SIGNIFICANT CHANGE UP
BASE EXCESS BLDV CALC-SCNC: 1.6 MMOL/L — SIGNIFICANT CHANGE UP (ref -2–3)
BASOPHILS # BLD AUTO: 0.03 K/UL — SIGNIFICANT CHANGE UP (ref 0–0.2)
BASOPHILS NFR BLD AUTO: 0.5 % — SIGNIFICANT CHANGE UP (ref 0–2)
BILIRUB SERPL-MCNC: 0.6 MG/DL — SIGNIFICANT CHANGE UP (ref 0.2–1.2)
BILIRUB UR-MCNC: NEGATIVE — SIGNIFICANT CHANGE UP
BUN SERPL-MCNC: 32 MG/DL — HIGH (ref 7–23)
CA-I SERPL-SCNC: 1.32 MMOL/L — SIGNIFICANT CHANGE UP (ref 1.15–1.33)
CALCIUM SERPL-MCNC: 10.1 MG/DL — SIGNIFICANT CHANGE UP (ref 8.4–10.5)
CHLORIDE BLDV-SCNC: 104 MMOL/L — SIGNIFICANT CHANGE UP (ref 96–108)
CHLORIDE SERPL-SCNC: 106 MMOL/L — SIGNIFICANT CHANGE UP (ref 96–108)
CO2 BLDV-SCNC: 29 MMOL/L — HIGH (ref 22–26)
CO2 SERPL-SCNC: 23 MMOL/L — SIGNIFICANT CHANGE UP (ref 22–31)
COLOR SPEC: YELLOW — SIGNIFICANT CHANGE UP
CREAT SERPL-MCNC: 0.89 MG/DL — SIGNIFICANT CHANGE UP (ref 0.5–1.3)
DIFF PNL FLD: ABNORMAL
EGFR: 82 ML/MIN/1.73M2 — SIGNIFICANT CHANGE UP
EOSINOPHIL # BLD AUTO: 0.1 K/UL — SIGNIFICANT CHANGE UP (ref 0–0.5)
EOSINOPHIL NFR BLD AUTO: 1.5 % — SIGNIFICANT CHANGE UP (ref 0–6)
EPI CELLS # UR: 17 /HPF — HIGH
GAS PNL BLDV: 137 MMOL/L — SIGNIFICANT CHANGE UP (ref 136–145)
GAS PNL BLDV: SIGNIFICANT CHANGE UP
GAS PNL BLDV: SIGNIFICANT CHANGE UP
GLUCOSE BLDC GLUCOMTR-MCNC: 95 MG/DL — SIGNIFICANT CHANGE UP (ref 70–99)
GLUCOSE BLDV-MCNC: 115 MG/DL — HIGH (ref 70–99)
GLUCOSE SERPL-MCNC: 121 MG/DL — HIGH (ref 70–99)
GLUCOSE UR QL: NEGATIVE — SIGNIFICANT CHANGE UP
HCO3 BLDV-SCNC: 27 MMOL/L — SIGNIFICANT CHANGE UP (ref 22–29)
HCT VFR BLD CALC: 37.1 % — LOW (ref 39–50)
HCT VFR BLDA CALC: 37 % — LOW (ref 39–51)
HGB BLD CALC-MCNC: 12.4 G/DL — LOW (ref 12.6–17.4)
HGB BLD-MCNC: 12.1 G/DL — LOW (ref 13–17)
HYALINE CASTS # UR AUTO: 3 /LPF — HIGH (ref 0–2)
IMM GRANULOCYTES NFR BLD AUTO: 0.3 % — SIGNIFICANT CHANGE UP (ref 0–0.9)
KETONES UR-MCNC: NEGATIVE — SIGNIFICANT CHANGE UP
LACTATE BLDV-MCNC: 1.3 MMOL/L — SIGNIFICANT CHANGE UP (ref 0.5–2)
LEUKOCYTE ESTERASE UR-ACNC: ABNORMAL
LYMPHOCYTES # BLD AUTO: 0.84 K/UL — LOW (ref 1–3.3)
LYMPHOCYTES # BLD AUTO: 13 % — SIGNIFICANT CHANGE UP (ref 13–44)
MCHC RBC-ENTMCNC: 31.3 PG — SIGNIFICANT CHANGE UP (ref 27–34)
MCHC RBC-ENTMCNC: 32.6 GM/DL — SIGNIFICANT CHANGE UP (ref 32–36)
MCV RBC AUTO: 95.9 FL — SIGNIFICANT CHANGE UP (ref 80–100)
MONOCYTES # BLD AUTO: 0.6 K/UL — SIGNIFICANT CHANGE UP (ref 0–0.9)
MONOCYTES NFR BLD AUTO: 9.3 % — SIGNIFICANT CHANGE UP (ref 2–14)
NEUTROPHILS # BLD AUTO: 4.87 K/UL — SIGNIFICANT CHANGE UP (ref 1.8–7.4)
NEUTROPHILS NFR BLD AUTO: 75.4 % — SIGNIFICANT CHANGE UP (ref 43–77)
NITRITE UR-MCNC: NEGATIVE — SIGNIFICANT CHANGE UP
NRBC # BLD: 0 /100 WBCS — SIGNIFICANT CHANGE UP (ref 0–0)
PCO2 BLDV: 46 MMHG — SIGNIFICANT CHANGE UP (ref 42–55)
PH BLDV: 7.38 — SIGNIFICANT CHANGE UP (ref 7.32–7.43)
PH UR: 6 — SIGNIFICANT CHANGE UP (ref 5–8)
PLATELET # BLD AUTO: 220 K/UL — SIGNIFICANT CHANGE UP (ref 150–400)
PO2 BLDV: 68 MMHG — HIGH (ref 25–45)
POTASSIUM BLDV-SCNC: 4.7 MMOL/L — SIGNIFICANT CHANGE UP (ref 3.5–5.1)
POTASSIUM SERPL-MCNC: 4.7 MMOL/L — SIGNIFICANT CHANGE UP (ref 3.5–5.3)
POTASSIUM SERPL-SCNC: 4.7 MMOL/L — SIGNIFICANT CHANGE UP (ref 3.5–5.3)
PROT SERPL-MCNC: 7 G/DL — SIGNIFICANT CHANGE UP (ref 6–8.3)
PROT UR-MCNC: ABNORMAL
RBC # BLD: 3.87 M/UL — LOW (ref 4.2–5.8)
RBC # FLD: 13.4 % — SIGNIFICANT CHANGE UP (ref 10.3–14.5)
RBC CASTS # UR COMP ASSIST: 20 /HPF — HIGH (ref 0–4)
SAO2 % BLDV: 94.8 % — HIGH (ref 67–88)
SODIUM SERPL-SCNC: 141 MMOL/L — SIGNIFICANT CHANGE UP (ref 135–145)
SP GR SPEC: 1.03 — HIGH (ref 1.01–1.02)
TROPONIN T, HIGH SENSITIVITY RESULT: 13 NG/L — SIGNIFICANT CHANGE UP (ref 0–51)
TSH SERPL-MCNC: 1.15 UIU/ML — SIGNIFICANT CHANGE UP (ref 0.27–4.2)
UROBILINOGEN FLD QL: NEGATIVE — SIGNIFICANT CHANGE UP
WBC # BLD: 6.46 K/UL — SIGNIFICANT CHANGE UP (ref 3.8–10.5)
WBC # FLD AUTO: 6.46 K/UL — SIGNIFICANT CHANGE UP (ref 3.8–10.5)
WBC UR QL: 9 /HPF — HIGH (ref 0–5)

## 2023-06-08 PROCEDURE — 71045 X-RAY EXAM CHEST 1 VIEW: CPT | Mod: 26

## 2023-06-08 PROCEDURE — 99285 EMERGENCY DEPT VISIT HI MDM: CPT

## 2023-06-08 PROCEDURE — 70450 CT HEAD/BRAIN W/O DYE: CPT | Mod: 26,MA

## 2023-06-08 RX ORDER — PANTOPRAZOLE SODIUM 20 MG/1
40 TABLET, DELAYED RELEASE ORAL
Refills: 0 | Status: DISCONTINUED | OUTPATIENT
Start: 2023-06-08 | End: 2023-06-14

## 2023-06-08 RX ORDER — FINASTERIDE 5 MG/1
5 TABLET, FILM COATED ORAL DAILY
Refills: 0 | Status: DISCONTINUED | OUTPATIENT
Start: 2023-06-08 | End: 2023-06-14

## 2023-06-08 RX ORDER — DEXTROSE 50 % IN WATER 50 %
25 SYRINGE (ML) INTRAVENOUS ONCE
Refills: 0 | Status: DISCONTINUED | OUTPATIENT
Start: 2023-06-08 | End: 2023-06-14

## 2023-06-08 RX ORDER — CEFTRIAXONE 500 MG/1
1000 INJECTION, POWDER, FOR SOLUTION INTRAMUSCULAR; INTRAVENOUS ONCE
Refills: 0 | Status: COMPLETED | OUTPATIENT
Start: 2023-06-08 | End: 2023-06-08

## 2023-06-08 RX ORDER — INSULIN LISPRO 100/ML
VIAL (ML) SUBCUTANEOUS
Refills: 0 | Status: DISCONTINUED | OUTPATIENT
Start: 2023-06-08 | End: 2023-06-14

## 2023-06-08 RX ORDER — ATORVASTATIN CALCIUM 80 MG/1
80 TABLET, FILM COATED ORAL AT BEDTIME
Refills: 0 | Status: DISCONTINUED | OUTPATIENT
Start: 2023-06-08 | End: 2023-06-14

## 2023-06-08 RX ORDER — LISINOPRIL 2.5 MG/1
10 TABLET ORAL DAILY
Refills: 0 | Status: DISCONTINUED | OUTPATIENT
Start: 2023-06-08 | End: 2023-06-14

## 2023-06-08 RX ORDER — SODIUM CHLORIDE 9 MG/ML
1000 INJECTION, SOLUTION INTRAVENOUS
Refills: 0 | Status: DISCONTINUED | OUTPATIENT
Start: 2023-06-08 | End: 2023-06-14

## 2023-06-08 RX ORDER — SERTRALINE 25 MG/1
25 TABLET, FILM COATED ORAL DAILY
Refills: 0 | Status: DISCONTINUED | OUTPATIENT
Start: 2023-06-08 | End: 2023-06-14

## 2023-06-08 RX ORDER — GLUCAGON INJECTION, SOLUTION 0.5 MG/.1ML
1 INJECTION, SOLUTION SUBCUTANEOUS ONCE
Refills: 0 | Status: DISCONTINUED | OUTPATIENT
Start: 2023-06-08 | End: 2023-06-14

## 2023-06-08 RX ORDER — SODIUM CHLORIDE 9 MG/ML
500 INJECTION INTRAMUSCULAR; INTRAVENOUS; SUBCUTANEOUS ONCE
Refills: 0 | Status: COMPLETED | OUTPATIENT
Start: 2023-06-08 | End: 2023-06-08

## 2023-06-08 RX ORDER — CARBIDOPA AND LEVODOPA 25; 100 MG/1; MG/1
1 TABLET ORAL THREE TIMES A DAY
Refills: 0 | Status: DISCONTINUED | OUTPATIENT
Start: 2023-06-08 | End: 2023-06-14

## 2023-06-08 RX ORDER — ROPINIROLE 8 MG/1
0.5 TABLET, FILM COATED, EXTENDED RELEASE ORAL THREE TIMES A DAY
Refills: 0 | Status: DISCONTINUED | OUTPATIENT
Start: 2023-06-08 | End: 2023-06-14

## 2023-06-08 RX ORDER — TAMSULOSIN HYDROCHLORIDE 0.4 MG/1
0.4 CAPSULE ORAL AT BEDTIME
Refills: 0 | Status: DISCONTINUED | OUTPATIENT
Start: 2023-06-08 | End: 2023-06-14

## 2023-06-08 RX ORDER — DEXTROSE 50 % IN WATER 50 %
15 SYRINGE (ML) INTRAVENOUS ONCE
Refills: 0 | Status: DISCONTINUED | OUTPATIENT
Start: 2023-06-08 | End: 2023-06-14

## 2023-06-08 RX ORDER — DEXTROSE 50 % IN WATER 50 %
12.5 SYRINGE (ML) INTRAVENOUS ONCE
Refills: 0 | Status: DISCONTINUED | OUTPATIENT
Start: 2023-06-08 | End: 2023-06-14

## 2023-06-08 RX ORDER — BUDESONIDE AND FORMOTEROL FUMARATE DIHYDRATE 160; 4.5 UG/1; UG/1
2 AEROSOL RESPIRATORY (INHALATION)
Refills: 0 | Status: DISCONTINUED | OUTPATIENT
Start: 2023-06-08 | End: 2023-06-14

## 2023-06-08 RX ADMIN — SODIUM CHLORIDE 500 MILLILITER(S): 9 INJECTION INTRAMUSCULAR; INTRAVENOUS; SUBCUTANEOUS at 15:42

## 2023-06-08 RX ADMIN — SODIUM CHLORIDE 500 MILLILITER(S): 9 INJECTION INTRAMUSCULAR; INTRAVENOUS; SUBCUTANEOUS at 14:55

## 2023-06-08 RX ADMIN — CEFTRIAXONE 100 MILLIGRAM(S): 500 INJECTION, POWDER, FOR SOLUTION INTRAMUSCULAR; INTRAVENOUS at 17:20

## 2023-06-08 NOTE — ED ADULT NURSE REASSESSMENT NOTE - NS ED NURSE REASSESS COMMENT FT1
Straight cath performed at bedside per MD order. 2 RNs present to maintain sterile technique, approx 75 mL urine present on output.

## 2023-06-08 NOTE — H&P ADULT - ASSESSMENT
89-year-old M with pmhx CAD, diabetes hyperlipidemia hypertension Parkinson's disease p/w FTT       # FTT Encourage po intake   Nutrition consult   Speech swallow eval   PT eval     #CAD Continue with Aspirin     #Parkinsons Disease Continue with Sinemet     #DM HISS   Follow up A1C     #BPH Continue with Flomax and Finasteride

## 2023-06-08 NOTE — ED ADULT NURSE NOTE - NSFALLHARMRISKINTERV_ED_ALL_ED
Assistance OOB with selected safe patient handling equipment if applicable/Assistance with ambulation/Communicate risk of Fall with Harm to all staff, patient, and family/Monitor gait and stability/Provide visual cue: red socks, yellow wristband, yellow gown, etc/Reinforce activity limits and safety measures with patient and family/Bed in lowest position, wheels locked, appropriate side rails in place/Call bell, personal items and telephone in reach/Instruct patient to call for assistance before getting out of bed/chair/stretcher/Non-slip footwear applied when patient is off stretcher/[place] to call system/Physically safe environment - no spills, clutter or unnecessary equipment/Purposeful Proactive Rounding/Room/bathroom lighting operational, light cord in reach

## 2023-06-08 NOTE — ED PROVIDER NOTE - PROGRESS NOTE DETAILS
Sariah PGY 2 Spoke with family over the phone family wishes to keep patient admitted for placement in facility as they cannot take care of him.

## 2023-06-08 NOTE — ED ADULT NURSE NOTE - OBJECTIVE STATEMENT
88 y/o M PMH Parkinsons, HTN, CAD, DM presented to ED via EMS from home for generalized weakness. Per EMS, pt lives at home with family caregivers, family was concerned the pt has decreased PO intake, increased weakness, were concerned for failure to thrive. On arrival to ED, pt A&Ox1-2 to self, states he knows "he doesn't feel good." Pt breathing spontaneously and unlabored, is able to follow commands, decreased strength in lower extremities, no tenderness on palpation of abdomen. Pt is able to answer minimal questions due to mental status. Pt is on CM. Appropriate safety measures in place, bed to lowest position, side rails raised, call bell within reach.

## 2023-06-08 NOTE — H&P ADULT - HISTORY OF PRESENT ILLNESS
89-year-old M with pmhx CAD, diabetes hyperlipidemia hypertension Parkinson's disease p/w poor oral inatake and Appetite x few weeks.

## 2023-06-08 NOTE — ED PROVIDER NOTE - ATTENDING CONTRIBUTION TO CARE
Hx: gradual onset over days-weeks of decreased PO intake, generalized decline and inability to care for self, less ambulatory and requires assistance at home.  FAmily unable to care for patient, requesting admission.    PE: well appearing, nontoxic, dry mm, flat neck veins, generalized weakness without focality, no meningeal signs, no respiratory distress.    MDM: failure to thrive, dehydration, ambulatory dysfunction likely from deconditioning, r/o ICH, r/o metabolic and infectious process.  check cbc r/o anemia or leukocytosis, check bmp to r/o metabolic derangement and lyte imbalance, ct head, ua, will require inpatient treatment and likely rehab placement.

## 2023-06-08 NOTE — ED PROVIDER NOTE - OBJECTIVE STATEMENT
89-year-old male chief complaint failure to thrive at home.  Spoke to family member over the phone patient has been having decline in function at home has not been able to walk to the bathroom by himself as needed assistance and has been also having poor oral intake.  Otherwise patient has history of coronary artery disease diabetes hyperlipidemia hypertension Parkinson's disease.  Patient is able to answer minimal questions

## 2023-06-08 NOTE — ED PROVIDER NOTE - CLINICAL SUMMARY MEDICAL DECISION MAKING FREE TEXT BOX
89-year-old male chief complaint 30 thrive.  With multiple medical comorbidities.  Given history and physical rule out infectious etiology with x-ray urinalysis CT head to be admitted for failure to thrive.

## 2023-06-08 NOTE — ED PROVIDER NOTE - PHYSICAL EXAMINATION
GENERAL: Awake, alert, NAD  LUNGS: CTAB, no wheezes or crackles   CARDIAC: RRR, no m/r/g  ABDOMEN: Soft, non tender, non distended, no rebound, no guarding  EXT: No edema, no calf tenderness,  no deformities.  NEURO: A&Ox1. Moving all extremities.  SKIN: Warm and dry. No rash.  PSYCH: Normal affect.

## 2023-06-08 NOTE — H&P ADULT - EYES
PERRL/EOMI/non icteric sclera Eucrisa Counseling: Patient may experience a mild burning sensation during topical application. Eucrisa is not approved in children less than 2 years of age.

## 2023-06-09 LAB
A1C WITH ESTIMATED AVERAGE GLUCOSE RESULT: 6 % — HIGH (ref 4–5.6)
ESTIMATED AVERAGE GLUCOSE: 126 MG/DL — HIGH (ref 68–114)
GLUCOSE BLDC GLUCOMTR-MCNC: 111 MG/DL — HIGH (ref 70–99)
GLUCOSE BLDC GLUCOMTR-MCNC: 115 MG/DL — HIGH (ref 70–99)
GLUCOSE BLDC GLUCOMTR-MCNC: 122 MG/DL — HIGH (ref 70–99)
GLUCOSE BLDC GLUCOMTR-MCNC: 86 MG/DL — SIGNIFICANT CHANGE UP (ref 70–99)
GLUCOSE BLDC GLUCOMTR-MCNC: 88 MG/DL — SIGNIFICANT CHANGE UP (ref 70–99)

## 2023-06-09 PROCEDURE — 99231 SBSQ HOSP IP/OBS SF/LOW 25: CPT

## 2023-06-09 PROCEDURE — 99222 1ST HOSP IP/OBS MODERATE 55: CPT | Mod: FS

## 2023-06-09 RX ORDER — HEPARIN SODIUM 5000 [USP'U]/ML
5000 INJECTION INTRAVENOUS; SUBCUTANEOUS EVERY 12 HOURS
Refills: 0 | Status: DISCONTINUED | OUTPATIENT
Start: 2023-06-09 | End: 2023-06-14

## 2023-06-09 RX ORDER — LISINOPRIL 2.5 MG/1
10 TABLET ORAL ONCE
Refills: 0 | Status: COMPLETED | OUTPATIENT
Start: 2023-06-09 | End: 2023-06-09

## 2023-06-09 RX ORDER — CEFTRIAXONE 500 MG/1
1000 INJECTION, POWDER, FOR SOLUTION INTRAMUSCULAR; INTRAVENOUS EVERY 24 HOURS
Refills: 0 | Status: COMPLETED | OUTPATIENT
Start: 2023-06-09 | End: 2023-06-10

## 2023-06-09 RX ORDER — ACETAMINOPHEN 500 MG
650 TABLET ORAL EVERY 6 HOURS
Refills: 0 | Status: DISCONTINUED | OUTPATIENT
Start: 2023-06-09 | End: 2023-06-14

## 2023-06-09 RX ADMIN — ROPINIROLE 0.5 MILLIGRAM(S): 8 TABLET, FILM COATED, EXTENDED RELEASE ORAL at 22:59

## 2023-06-09 RX ADMIN — CEFTRIAXONE 100 MILLIGRAM(S): 500 INJECTION, POWDER, FOR SOLUTION INTRAMUSCULAR; INTRAVENOUS at 18:02

## 2023-06-09 RX ADMIN — BUDESONIDE AND FORMOTEROL FUMARATE DIHYDRATE 2 PUFF(S): 160; 4.5 AEROSOL RESPIRATORY (INHALATION) at 18:03

## 2023-06-09 RX ADMIN — PANTOPRAZOLE SODIUM 40 MILLIGRAM(S): 20 TABLET, DELAYED RELEASE ORAL at 05:26

## 2023-06-09 RX ADMIN — SERTRALINE 25 MILLIGRAM(S): 25 TABLET, FILM COATED ORAL at 12:25

## 2023-06-09 RX ADMIN — CARBIDOPA AND LEVODOPA 1 TABLET(S): 25; 100 TABLET ORAL at 13:58

## 2023-06-09 RX ADMIN — ROPINIROLE 0.5 MILLIGRAM(S): 8 TABLET, FILM COATED, EXTENDED RELEASE ORAL at 05:20

## 2023-06-09 RX ADMIN — HEPARIN SODIUM 5000 UNIT(S): 5000 INJECTION INTRAVENOUS; SUBCUTANEOUS at 18:03

## 2023-06-09 RX ADMIN — LISINOPRIL 10 MILLIGRAM(S): 2.5 TABLET ORAL at 00:43

## 2023-06-09 RX ADMIN — CARBIDOPA AND LEVODOPA 1 TABLET(S): 25; 100 TABLET ORAL at 22:59

## 2023-06-09 RX ADMIN — LISINOPRIL 10 MILLIGRAM(S): 2.5 TABLET ORAL at 05:20

## 2023-06-09 RX ADMIN — BUDESONIDE AND FORMOTEROL FUMARATE DIHYDRATE 2 PUFF(S): 160; 4.5 AEROSOL RESPIRATORY (INHALATION) at 05:21

## 2023-06-09 RX ADMIN — CARBIDOPA AND LEVODOPA 1 TABLET(S): 25; 100 TABLET ORAL at 05:20

## 2023-06-09 RX ADMIN — FINASTERIDE 5 MILLIGRAM(S): 5 TABLET, FILM COATED ORAL at 12:24

## 2023-06-09 RX ADMIN — ROPINIROLE 0.5 MILLIGRAM(S): 8 TABLET, FILM COATED, EXTENDED RELEASE ORAL at 13:58

## 2023-06-09 RX ADMIN — ATORVASTATIN CALCIUM 80 MILLIGRAM(S): 80 TABLET, FILM COATED ORAL at 23:00

## 2023-06-09 RX ADMIN — TAMSULOSIN HYDROCHLORIDE 0.4 MILLIGRAM(S): 0.4 CAPSULE ORAL at 22:59

## 2023-06-09 NOTE — PHYSICAL THERAPY INITIAL EVALUATION ADULT - NSPTDISCHREC_GEN_A_CORE
If pt d/c home would require home PT, assist with all mobility/ADLs, & DME: hospital bed, mechanical pt lift device (ramakrishna), 3:1 commode, & transport w/c with: anti-tippers, seat belt, swing away leg rests, & removable arm rests./[facility]-acute Rehab

## 2023-06-09 NOTE — ADVANCED PRACTICE NURSE CONSULT - REASON FOR CONSULT
Consult to assess patient skin initiated by RN pressure injury sacrum bilateral heels . present  on admission   History of Present Illness:  Reason for Admission: Poor oral intake x few weeks  History of Present Illness:   89-year-old M with pmhx CAD, diabetes hyperlipidemia hypertension Parkinson's disease p/w poor oral inatake and Appetite x few weeks.   Past Medical, Past Surgical, and Family History:  PAST MEDICAL HISTORY:  Benign prostatic hyperplasia   CAD (coronary artery disease)  CAD (coronary artery disease)   Diabetes mellitus   DM (diabetes mellitus)   Dyslipidemia   HLD (hyperlipidemia)   HTN (hypertension)   Hypertension   Hypospadias   Osteoporosis   Parkinson disease   Syncope  Urinary frequency.     PAST SURGICAL HISTORY:  History of knee replacement procedure of left knee 2017  History of total left knee replacement 2014  S/P CABG (coronary artery bypass graft) 2015  S/P coronary artery stent placement in 2003.

## 2023-06-09 NOTE — SWALLOW BEDSIDE ASSESSMENT ADULT - H & P REVIEW
89-year-old M with pmhx CAD, diabetes hyperlipidemia hypertension Parkinson's disease p/w poor oral inatake and Appetite x few weeks. Admitted with failure to thrive./yes

## 2023-06-09 NOTE — DIETITIAN NUTRITION RISK NOTIFICATION - TREATMENT: THE FOLLOWING DIET HAS BEEN RECOMMENDED
Diet, DASH/TLC:   Sodium & Cholesterol Restricted  Consistent Carbohydrate {Evening Snack} (CSTCHOSN) (06-08-23 @ 21:05) [Active]

## 2023-06-09 NOTE — CONSULT NOTE ADULT - ASSESSMENT
Impression:    Sacral/bilateral Buttocks deep tissue injury present on admission  Incontinence of bowel and bladder  Incontinence Dermatitis  Penile wound    Recommend:  1.) topical therapy: sacral/buttock injury - cleanse with incontinence cleanser, pat dry, apply John ointment BID and PRN for incontinent episodes  2.) Incontinence Management - incontinence cleanser, pads, pericare BID  3.) Maintain on an alternating air with low air loss surface  4.) Turn and reposition Q 2 hours  5.) Nutrition optimization - please add Juan Luis  6.) Offload heels/feet with complete cair air fluidized boots; ensure that the soles of the feet are not resting on the foot board of the bed.  7.) Chair cushion for chair sitting  8.) Consider Urology consult for penile wound    Care as per medicine. Will not actively follow but will remain available. Please recall for new issues or deterioration.  Upon [facility] [address] [place] [address] [phone]  Thank you for this consult  Seen with Dr. Mata and discussed with primary team  Dorina Pat, RIO-C, CWOCN via Teams Impression:    Sacral/bilateral Buttocks deep tissue injury present on admission  Incontinence of bowel and bladder  Incontinence Dermatitis  Penile wound    Recommend:  1.) topical therapy: sacral/buttock injury - cleanse with incontinence cleanser, pat dry, apply John ointment BID and PRN for incontinent episode  2.) Incontinence Management - incontinence cleanser, pads, pericare BID  3.) Maintain on an alternating air with low air loss surface  4.) Turn and reposition Q 2 hours  5.) Nutrition optimization - please add Juan Luis  6.) Offload heels/feet with complete cair air fluidized boots; ensure that the soles of the feet are not resting on the foot board of the bed.  7.) Chair cushion for chair sitting  8.) Consider Urology consult for penile wound    Care as per medicine. Will not actively follow but will remain available. Please recall for new issues or deterioration.  Upon discharge f/[facility] at [facility] [address] [place] [address] [phone]  Thank you for this consult  Seen with Dr. Mata and discussed with primary team  Dorina Pat, RIO-C, CWOCN via Teams

## 2023-06-09 NOTE — SWALLOW BEDSIDE ASSESSMENT ADULT - ASR SWALLOW REFERRAL
May consider dietary consult for poor p.o. intake in order to maximize nutrition/hydration and sufficient caloric intake/Registered Dietitian

## 2023-06-09 NOTE — SWALLOW BEDSIDE ASSESSMENT ADULT - PHARYNGEAL PHASE
no overt s/sx of aspiration/penetration +hyolaryngeal elevation and excursion upon digital palpation/Delayed pharyngeal swallow

## 2023-06-09 NOTE — ADVANCED PRACTICE NURSE CONSULT - ASSESSMENT
arrived on unit, patient was found lying in a low air loss pressure redistribution support surface style bed.  patient  is unable to turn independently and staff assistance x 1was provided. Once turned,  urinary incontinence was apparent and pericare was provided.  On bilateral  buttocks/sacral skin there is red discoloration with hyperpigmentation  noted to measure approximately 8cm x 6cm x 0 cm.  which   is consistent with deep tissue injury  . present on admission  right  heel  skin is boggy and there is red discoloration  noted to measure approximately 4cm x 3cm x 0cm.  which   is consistent with deep tissue injuries. present on admission .  left heel  skin is boggy and there is red discoloration  noted to measure approximately 6cm x 3cm x 0cm.  which  is consistent with deep tissue injuries. present on admission.  plan of care reviewed with CAMPBELL Carroll

## 2023-06-09 NOTE — PHYSICAL THERAPY INITIAL EVALUATION ADULT - BALANCE TRAINING, PT EVAL
GOAL: Patient will demonstrate improved static/dynamic balance to fair, in order to improve stability, decrease fall risk and increase independence with ADLs within 4 weeks.

## 2023-06-09 NOTE — PHYSICAL THERAPY INITIAL EVALUATION ADULT - PERTINENT HX OF CURRENT PROBLEM, REHAB EVAL
Pt is a 89-year-old male chief complaint failure to thrive at home.  Spoke to family member over the phone patient has been having decline in function at home has not been able to walk to the bathroom by himself as needed assistance and has been also having poor oral intake.  Otherwise patient has history of coronary artery disease diabetes hyperlipidemia hypertension Parkinson's disease. 6/8 CT Head (-). X-Ray -Chest (-). Pt is a 89-year-old male chief complaint failure to thrive at home. Spoke to family member over the phone patient has been having decline in function at home has not been able to walk to the bathroom by himself as needed assistance and has been also having poor oral intake. Otherwise patient has history of coronary artery disease diabetes hyperlipidemia hypertension Parkinson's disease. 6/8 CT Head (-). 6/8 CXR (-).

## 2023-06-09 NOTE — PROGRESS NOTE ADULT - SUBJECTIVE AND OBJECTIVE BOX
Subjective  called by staff due to concern for penile wound. Pt with sacral decubs, fecal and urinary incontinence. Daughters  at bedside cannot comment on pts urination what his penis regularly looks like.   RN staff have been using condom cath without much success; no hx of chronic caths    Objective    Vital signs  T(F): , Max: 99 (06-08-23 @ 14:36)  HR: 63 (06-09-23 @ 12:09)  BP: 166/71 (06-09-23 @ 12:09)  SpO2: 99% (06-09-23 @ 12:09)  Wt(kg): --    Output     06-09 @ 07:01  -  06-09 @ 12:41  --------------------------------------------------------  IN: 120 mL / OUT: 0 mL / NET: 120 mL        Gen awake alert nad axox3  Abd flat soft ntnd   Back no cvat bl    nonpalp bladder, circ phallus with mid penile shaft hypospadias. No erosions or discharge      Labs                          12.1   6.46  )-----------( 220      ( 08 Jun 2023 15:26 )             37.1   06-08    141  |  106  |  32<H>  ----------------------------<  121<H>  4.7   |  23  |  0.89    Ca    10.1      08 Jun 2023 15:26    TPro  7.0  /  Alb  4.4  /  TBili  0.6  /  DBili  x   /  AST  12  /  ALT  <5<L>  /  AlkPhos  66  06-08

## 2023-06-09 NOTE — PHYSICAL THERAPY INITIAL EVALUATION ADULT - ADDITIONAL COMMENTS
Pt's son in law who visited during the PT session states the pt lives in a house with his daughter. Pt's son in law states pt received assistance from his daughter and when daughter is not present a [facility] provides assistance.

## 2023-06-09 NOTE — DIETITIAN INITIAL EVALUATION ADULT - REASON INDICATOR FOR ASSESSMENT
consulted for assessment, MST score 2 or >, pressure injury stage 2 or >. information obtained from electronic medical record, and daughters at bedside.

## 2023-06-09 NOTE — DIETITIAN INITIAL EVALUATION ADULT - PERTINENT LABORATORY DATA
06-08    141  |  106  |  32<H>  ----------------------------<  121<H>  4.7   |  23  |  0.89    Ca    10.1      08 Jun 2023 15:26    TPro  7.0  /  Alb  4.4  /  TBili  0.6  /  DBili  x   /  AST  12  /  ALT  <5<L>  /  AlkPhos  66  06-08  POCT Blood Glucose.: 115 mg/dL (06-09-23 @ 12:41)  A1C with Estimated Average Glucose Result: 6.0 % (06-09-23 @ 07:07) - noted history of DM2. level indicates pre-DM.

## 2023-06-09 NOTE — SWALLOW BEDSIDE ASSESSMENT ADULT - SWALLOW EVAL: DIAGNOSIS
Pt is a 89-year-old M with pmhx CAD, diabetes, HLD, HTN, Parkinson's disease p/w poor oral inatake and appetite x few weeks, and admitted with failure to thrive. Pt p/w mild oropharyngeal dysphagia, likely baseline given Parkinson's disease. Pt also noted with reduced cognition as pt frequently forgetful and repeating same questions over and over. Oral phase of swallow characterized by mildly prolonged mastication with minimal oral cavity residue, suspected 2/2 incomplete dentition, however, fully cleared with thin liquid wash. Pharyngeal phase of swallow characterized by mildly delayed initiation of swallow, however, no overt s/sx of aspiration/penetration with puree, regular texture solids, mildly thick liquids, or thin liquids. Diet modification not indicated. Suspect poor p.o. intake related to behavioral component of reduced cognition/Parkinson's related dementia.

## 2023-06-09 NOTE — DIETITIAN INITIAL EVALUATION ADULT - ADD RECOMMEND
1) Will continue to monitor PO intake, weight, labs, skin, GI status and diet 2) nutrition risk placed in chart 3) consider addition of ensure plus high protein BID to aid in meeting nutrient needs  1) Will continue to monitor PO intake, weight, labs, skin, GI status and diet 2) nutrition risk placed in chart 3) consider addition of ensure plus high protein BID to aid in meeting nutrient needs   spoke to provider with recommendations

## 2023-06-09 NOTE — SWALLOW BEDSIDE ASSESSMENT ADULT - ASPIRATION PRECAUTIONS
Monitor for s/s aspiration/laryngeal penetration. If noted:  D/C p.o. intake, provide non-oral nutrition/hydration/meds, and contact this service @ [phone]

## 2023-06-09 NOTE — DIETITIAN INITIAL EVALUATION ADULT - PERTINENT MEDS FT
MEDICATIONS  (STANDING):  atorvastatin 80 milliGRAM(s) Oral at bedtime  budesonide 160 MICROgram(s)/formoterol 4.5 MICROgram(s) Inhaler 2 Puff(s) Inhalation two times a day  carbidopa/levodopa  25/100 1 Tablet(s) Oral three times a day  dextrose 5%. 1000 milliLiter(s) (100 mL/Hr) IV Continuous <Continuous>  dextrose 5%. 1000 milliLiter(s) (50 mL/Hr) IV Continuous <Continuous>  dextrose 50% Injectable 12.5 Gram(s) IV Push once  dextrose 50% Injectable 25 Gram(s) IV Push once  dextrose 50% Injectable 25 Gram(s) IV Push once  finasteride 5 milliGRAM(s) Oral daily  glucagon  Injectable 1 milliGRAM(s) IntraMuscular once  insulin lispro (ADMELOG) corrective regimen sliding scale   SubCutaneous three times a day before meals  lisinopril 10 milliGRAM(s) Oral daily  pantoprazole    Tablet 40 milliGRAM(s) Oral before breakfast  rOPINIRole 0.5 milliGRAM(s) Oral three times a day  sertraline 25 milliGRAM(s) Oral daily  tamsulosin 0.4 milliGRAM(s) Oral at bedtime    MEDICATIONS  (PRN):  acetaminophen     Tablet .. 650 milliGRAM(s) Oral every 6 hours PRN Mild Pain (1 - 3)  dextrose Oral Gel 15 Gram(s) Oral once PRN Blood Glucose LESS THAN 70 milliGRAM(s)/deciliter

## 2023-06-09 NOTE — DIETITIAN INITIAL EVALUATION ADULT - CONTINUE CURRENT NUTRITION CARE PLAN
liberalize diet restrictions to optimize calories provided. add kosher.  defer diet texture/liquid consistency to team/SLP recommendations.

## 2023-06-09 NOTE — PATIENT PROFILE ADULT - FALL HARM RISK - HARM RISK INTERVENTIONS

## 2023-06-09 NOTE — DIETITIAN INITIAL EVALUATION ADULT - ORAL INTAKE PTA/DIET HISTORY
visited pt at bedside this afternoon, daughters present. reports poor PO intake PTA x about 1 week associated with coughing. daughters requesting kosher meals. seen by SLP today with recommendations: Regular texture solids/thin liquids. family confirms NKFA.

## 2023-06-09 NOTE — PHYSICAL THERAPY INITIAL EVALUATION ADULT - STRENGTHENING, PT EVAL
GOAL: Patient will improve bilateral UE/LE strength to 5/5, for increased limb stability, to improve gait in 4 weeks.

## 2023-06-09 NOTE — SWALLOW BEDSIDE ASSESSMENT ADULT - SWALLOW EVAL: RECOMMENDED FEEDING/EATING TECHNIQUES
allow for swallow between intakes/check mouth frequently for oral residue/pocketing/hard swallow w/ each bite or sip/maintain upright posture during/after eating for 30 mins/oral hygiene/position upright (90 degrees)/small sips/bites

## 2023-06-09 NOTE — SWALLOW BEDSIDE ASSESSMENT ADULT - SLP GENERAL OBSERVATIONS
Pt received upright at bedside, on room air, soft spoken. Aox2 (person, place), able to follow simple directives, verbally expresses all wants/needs. pt suspected to have some component of reduced cognition/Parkinson's related dementia? given pt repeating same questions over and over throughout exam, however, pt re-directable and pleasant.

## 2023-06-09 NOTE — ADVANCED PRACTICE NURSE CONSULT - RECOMMEDATIONS
impression    bilateral sacrum/ buttocks deep tissue injury present on admission.    right  heel deep tissue injury present on admission.     left heel deep tissue injury present on admission .        Recommendations   1. Bilateral  , sacral / buttocks  deep tissue injury  with evolutionTopical therapy- sacral/bilateral buttocks- cleanse w/incontinent cleanser, pat dry & apply dano moisture barrier cream   twice daily & prn soiling .  monitor    for changes .  2. Right and left heel  deep tissue injury( A).  cleans  with normal saline pat dry and apply no sting barrier film ( Cavilon ) daily and monitor for changes .  (B)Elevate heels; apply Complete Cair air fluidized boots; ensure that the soles of the feet are not resting on the foot board of the bed.  3  Incontinent management - incontinent cleanser, pads,  david care  BID  4. Maintain on an alternating air with low air loss surface   5. Turn & reposition every 2 hr; Use positioning pillow to turn and reposition, soft pillow between bony prominences; continue measures to decrease friction/shear/pressure.  6. Nutrition optimization.  7.  waffle cushion when out of bed to chair .   plan of care reviewed with CAMPBELL Carroll

## 2023-06-09 NOTE — CONSULT NOTE ADULT - SUBJECTIVE AND OBJECTIVE BOX
Wound Surgery Consult Note:    HPI:  89-year-old M with pmhx CAD, diabetes hyperlipidemia hypertension Parkinson's disease p/w poor oral inatake and Appetite x few weeks.  (2023 21:02)    Request for wound care consult for sacral/bilateral buttocks skin breakdown received from nursing. Mr. Colon was encountered on an alternating air with low air loss surface. He is grossly incontinent of mushy/liquid stool and urine and has a urethral catheter for urine management.   His extreme immobility, inactivity, gross incontinence of stool and urine as well as poor nutritional status all contribute to his high risk for pressure injury development and hinder healing. Identification of the initial signs of deep tissue damage at the level of the skin within 72 hours of admission make this an injury that occurred prior to admission.    Patient's penis evaluated with Dr. Mata who recommends Urology consult and discussed this with the primary team NP    PAST MEDICAL & SURGICAL HISTORY:  Diabetes mellitus  Dyslipidemia  CAD (coronary artery disease)  Hypertension  Osteoporosis  Urinary frequency  Syncope  Benign prostatic hyperplasia  Parkinson disease  HTN (hypertension)  HLD (hyperlipidemia)  DM (diabetes mellitus)  CAD (coronary artery disease)  Hypospadias  S/P coronary artery stent placement in   History of total left knee replacement,   S/P CABG (coronary artery bypass graft),   History of knee replacement procedure of left knee, 2017    REVIEW OF SYSTEMS  unable to obtain due to patient's mental status    MEDICATIONS  (STANDING):  atorvastatin 80 milliGRAM(s) Oral at bedtime  budesonide 160 MICROgram(s)/formoterol 4.5 MICROgram(s) Inhaler 2 Puff(s) Inhalation two times a day  carbidopa/levodopa  25/100 1 Tablet(s) Oral three times a day  dextrose 5%. 1000 milliLiter(s) (100 mL/Hr) IV Continuous <Continuous>  dextrose 5%. 1000 milliLiter(s) (50 mL/Hr) IV Continuous <Continuous>  dextrose 50% Injectable 12.5 Gram(s) IV Push once  dextrose 50% Injectable 25 Gram(s) IV Push once  dextrose 50% Injectable 25 Gram(s) IV Push once  finasteride 5 milliGRAM(s) Oral daily  glucagon  Injectable 1 milliGRAM(s) IntraMuscular once  insulin lispro (ADMELOG) corrective regimen sliding scale   SubCutaneous three times a day before meals  lisinopril 10 milliGRAM(s) Oral daily  pantoprazole    Tablet 40 milliGRAM(s) Oral before breakfast  rOPINIRole 0.5 milliGRAM(s) Oral three times a day  sertraline 25 milliGRAM(s) Oral daily  tamsulosin 0.4 milliGRAM(s) Oral at bedtime    MEDICATIONS  (PRN):  dextrose Oral Gel 15 Gram(s) Oral once PRN Blood Glucose LESS THAN 70 milliGRAM(s)/deciliter    Allergies    No Known Allergies    Intolerances    SOCIAL HISTORY:  unable to obtain due to patient's mental status    FAMILY HISTORY:  Family history of coronary artery disease    No pertinent family history in first degree relatives  colon cancer    Vital Signs Last 24 Hrs  T(C): 36.9 (2023 04:56), Max: 37.2 (2023 14:36)  T(F): 98.5 (2023 04:56), Max: 99 (2023 14:36)  HR: 71 (2023 10:19) (57 - 72)  BP: 173/70 (2023 10:19) (142/60 - 195/70)  BP(mean): 100 (2023 21:02) (100 - 100)  RR: 18 (2023 04:56) (16 - 18)  SpO2: 98% (2023 10:19) (93% - 98%)    Parameters below as of 2023 10:19  Patient On (Oxygen Delivery Method): room air    Physical Exam:  General: Alert, confused, WN  Ophthamology: sclera clear  ENMT: moist mucous membranes, trachea midline  Respiratory: equal chest rise with respirations,  Gastrointestinal: soft NT/ND  Neurology: verbal, following commands  Psych: calm, confused  Musculoskeletal: no contractures  Vascular: BLE edema equal  Skin:  Sacral/bilateral buttocks with deep maroon discolored intact skin in and around the gluteal cleft L 5cm X W 3cm x D none, no drainage  Penile wound - ulceration in linear pattern with tunneling to 2.5cm toward the body/away from the tip, + TTP, L 1cm x W 0.3cm, scant serous drainage, no edema  No odor, erythema, increased warmth, tenderness, induration, fluctuance    LABS:      141  |  106  |  32<H>  ----------------------------<  121<H>  4.7   |  23  |  0.89    Ca    10.1      2023 15:26    TPro  7.0  /  Alb  4.4  /  TBili  0.6  /  DBili  x   /  AST  12  /  ALT  <5<L>  /  AlkPhos  66                            12.1   6.46  )-----------( 220      ( 2023 15:26 )             37.1       Urinalysis Basic - ( 2023 15:38 )    Color: Yellow / Appearance: Clear / S.029 / pH: x  Gluc: x / Ketone: Negative  / Bili: Negative / Urobili: Negative   Blood: x / Protein: 30 mg/dL / Nitrite: Negative   Leuk Esterase: Small / RBC: 20 /hpf / WBC 9 /HPF   Sq Epi: x / Non Sq Epi: x / Bacteria: Negative           Wound Surgery Consult Note:    HPI:  89-year-old M with pmhx CAD, diabetes hyperlipidemia hypertension Parkinson's disease p/w poor oral inatake and Appetite x few weeks.  (2023 21:02)    Request for wound care consult for sacral/bilateral buttocks skin breakdown received from nursing. Mr. Colon was encountered on an alternating air with low air loss surface. He is grossly incontinent of mushy/liquid stool and urine and has a urethral catheter for urine management.   His extreme immobility, inactivity, gross incontinence of stool and urine as well as poor nutritional status all contribute to his high risk for pressure injury development and hinder healing. Identification of the initial signs of deep tissue damage at the level of the skin within 72 hours of admission make this an injury that occurred prior to admission.    Patient's penis evaluated with Dr. Mata who recommends Urology consult and discussed this with the primary team NP    PAST MEDICAL & SURGICAL HISTORY:  Diabetes mellitus  Dyslipidemia  CAD (coronary artery disease)  Hypertension  Osteoporosis  Urinary frequency  Syncope  Benign prostatic hyperplasia  Parkinson disease  HTN (hypertension)  HLD (hyperlipidemia)  DM (diabetes mellitus)  CAD (coronary artery disease)  Hypospadias  S/P coronary artery stent placement in   History of total left knee replacement,   S/P CABG (coronary artery bypass graft),   History of knee replacement procedure of left knee, 2017    REVIEW OF SYSTEMS  unable to obtain due to patient's mental status    MEDICATIONS  (STANDING):  atorvastatin 80 milliGRAM(s) Oral at bedtime  budesonide 160 MICROgram(s)/formoterol 4.5 MICROgram(s) Inhaler 2 Puff(s) Inhalation two times a day  carbidopa/levodopa  25/100 1 Tablet(s) Oral three times a day  dextrose 5%. 1000 milliLiter(s) (100 mL/Hr) IV Continuous <Continuous>  dextrose 5%. 1000 milliLiter(s) (50 mL/Hr) IV Continuous <Continuous>  dextrose 50% Injectable 12.5 Gram(s) IV Push once  dextrose 50% Injectable 25 Gram(s) IV Push once  dextrose 50% Injectable 25 Gram(s) IV Push once  finasteride 5 milliGRAM(s) Oral daily  glucagon  Injectable 1 milliGRAM(s) IntraMuscular once  insulin lispro (ADMELOG) corrective regimen sliding scale   SubCutaneous three times a day before meals  lisinopril 10 milliGRAM(s) Oral daily  pantoprazole    Tablet 40 milliGRAM(s) Oral before breakfast  rOPINIRole 0.5 milliGRAM(s) Oral three times a day  sertraline 25 milliGRAM(s) Oral daily  tamsulosin 0.4 milliGRAM(s) Oral at bedtime    MEDICATIONS  (PRN):  dextrose Oral Gel 15 Gram(s) Oral once PRN Blood Glucose LESS THAN 70 milliGRAM(s)/deciliter    Allergies    No Known Allergies    Intolerances    SOCIAL HISTORY:  unable to obtain due to patient's mental status    FAMILY HISTORY:  Family history of coronary artery disease    No pertinent family history in first degree relatives  colon cancer    Vital Signs Last 24 Hrs  T(C): 36.9 (2023 04:56), Max: 37.2 (2023 14:36)  T(F): 98.5 (2023 04:56), Max: 99 (2023 14:36)  HR: 71 (2023 10:19) (57 - 72)  BP: 173/70 (2023 10:19) (142/60 - 195/70)  BP(mean): 100 (2023 21:02) (100 - 100)  RR: 18 (2023 04:56) (16 - 18)  SpO2: 98% (2023 10:19) (93% - 98%)    Parameters below as of 2023 10:19  Patient On (Oxygen Delivery Method): room air    Physical Exam:  General: Alert, confused, WN  Ophthamology: sclera clear  ENMT: moist mucous membranes, trachea midline  Respiratory: equal chest rise with respirations,  Gastrointestinal: soft NT/ND  Neurology: verbal, following commands  Psych: calm, confused  Musculoskeletal: no contractures  Vascular: BLE edema equal  Skin:  Penile wound - ulceration in linear pattern with tunneling to 2.5cm toward the body/away from the tip, + TTP, L 1cm x W 0.3cm, scant serous drainage, no edema  No odor, erythema, increased warmth, tenderness, induration, fluctuance    LABS:      141  |  106  |  32<H>  ----------------------------<  121<H>  4.7   |  23  |  0.89    Ca    10.1      2023 15:26    TPro  7.0  /  Alb  4.4  /  TBili  0.6  /  DBili  x   /  AST  12  /  ALT  <5<L>  /  AlkPhos  66  -                          12.1   6.46  )-----------( 220      ( 2023 15:26 )             37.1       Urinalysis Basic - ( 2023 15:38 )    Color: Yellow / Appearance: Clear / S.029 / pH: x  Gluc: x / Ketone: Negative  / Bili: Negative / Urobili: Negative   Blood: x / Protein: 30 mg/dL / Nitrite: Negative   Leuk Esterase: Small / RBC: 20 /hpf / WBC 9 /HPF   Sq Epi: x / Non Sq Epi: x / Bacteria: Negative

## 2023-06-09 NOTE — SWALLOW BEDSIDE ASSESSMENT ADULT - ORAL PHASE
Within functional limits minimum oral residue in b/l lateral sulci, which was fully cleared with thin liquid wash

## 2023-06-09 NOTE — DIETITIAN INITIAL EVALUATION ADULT - NSFNSPHYEXAMSKINFT_GEN_A_CORE
Pressure Injury 1: sacrum, Suspected deep tissue injury  Pressure Injury 2: L heel , Suspected deep tissue injury  Pressure Injury 3: R heel, Stage I  Pressure Injury 4: none, none  Pressure Injury 5: none, none  Pressure Injury 6: none, none  Pressure Injury 7: none, none  Pressure Injury 8: none, none  Pressure Injury 9: none, none  Pressure Injury 10: none, none  Pressure Injury 11: none, none

## 2023-06-09 NOTE — DIETITIAN INITIAL EVALUATION ADULT - NS FNS WEIGHT CHANGE REASON
daughters suspect weight loss, unsure exact amounts and hasn't weighed himself at home. RD will continue to monitor trends as they become available./unintentional

## 2023-06-09 NOTE — PROGRESS NOTE ADULT - SUBJECTIVE AND OBJECTIVE BOX
Patient is a 89y old  Male who presents with a chief complaint of Failure to thrive in adult     (2023 13:49)      SUBJECTIVE / OVERNIGHT EVENTS: no events     MEDICATIONS  (STANDING):  atorvastatin 80 milliGRAM(s) Oral at bedtime  budesonide 160 MICROgram(s)/formoterol 4.5 MICROgram(s) Inhaler 2 Puff(s) Inhalation two times a day  carbidopa/levodopa  25/100 1 Tablet(s) Oral three times a day  cefTRIAXone   IVPB 1000 milliGRAM(s) IV Intermittent every 24 hours  dextrose 5%. 1000 milliLiter(s) (100 mL/Hr) IV Continuous <Continuous>  dextrose 5%. 1000 milliLiter(s) (50 mL/Hr) IV Continuous <Continuous>  dextrose 50% Injectable 12.5 Gram(s) IV Push once  dextrose 50% Injectable 25 Gram(s) IV Push once  dextrose 50% Injectable 25 Gram(s) IV Push once  finasteride 5 milliGRAM(s) Oral daily  glucagon  Injectable 1 milliGRAM(s) IntraMuscular once  heparin   Injectable 5000 Unit(s) SubCutaneous every 12 hours  insulin lispro (ADMELOG) corrective regimen sliding scale   SubCutaneous three times a day before meals  lisinopril 10 milliGRAM(s) Oral daily  pantoprazole    Tablet 40 milliGRAM(s) Oral before breakfast  rOPINIRole 0.5 milliGRAM(s) Oral three times a day  sertraline 25 milliGRAM(s) Oral daily  tamsulosin 0.4 milliGRAM(s) Oral at bedtime    MEDICATIONS  (PRN):  acetaminophen     Tablet .. 650 milliGRAM(s) Oral every 6 hours PRN Mild Pain (1 - 3)  dextrose Oral Gel 15 Gram(s) Oral once PRN Blood Glucose LESS THAN 70 milliGRAM(s)/deciliter      CAPILLARY BLOOD GLUCOSE      POCT Blood Glucose.: 122 mg/dL (2023 22:30)  POCT Blood Glucose.: 88 mg/dL (2023 17:24)  POCT Blood Glucose.: 115 mg/dL (2023 12:41)  POCT Blood Glucose.: 86 mg/dL (2023 08:44)  POCT Blood Glucose.: 95 mg/dL (2023 23:49)    I&O's Summary    2023 07:01  -  2023 23:26  --------------------------------------------------------  IN: 240 mL / OUT: 0 mL / NET: 240 mL      T(C): 36.9 (23 @ 22:10), Max: 36.9 (23 @ 12:09)  HR: 65 (23 @ 22:10) (63 - 65)  BP: 165/72 (23 @ 22:10) (165/72 - 166/71)  RR: 18 (23 @ 22:10) (18 - 18)  SpO2: 95% (23 @ 22:10) (95% - 99%)    PHYSICAL EXAM:  GENERAL: NAD, well-developed  HEAD:  Atraumatic, Normocephalic  EYES: EOMI, PERRLA, conjunctiva and sclera clear  NECK: Supple, No JVD  CHEST/LUNG: Clear to auscultation bilaterally; No wheeze  HEART: Regular rate and rhythm; No murmurs, rubs, or gallops  ABDOMEN: Soft, Nontender, Nondistended; Bowel sounds present  EXTREMITIES:  2+ Peripheral Pulses, No clubbing, cyanosis, or edema  PSYCH: AAOx3  NEUROLOGY: non-focal  SKIN: No rashes or lesions    LABS:                        12.1   6.46  )-----------( 220      ( 2023 15:26 )             37.1     06-    141  |  106  |  32<H>  ----------------------------<  121<H>  4.7   |  23  |  0.89    Ca    10.1      2023 15:26    TPro  7.0  /  Alb  4.4  /  TBili  0.6  /  DBili  x   /  AST  12  /  ALT  <5<L>  /  AlkPhos  66  06-08          Urinalysis Basic - ( 2023 15:38 )    Color: Yellow / Appearance: Clear / S.029 / pH: x  Gluc: x / Ketone: Negative  / Bili: Negative / Urobili: Negative   Blood: x / Protein: 30 mg/dL / Nitrite: Negative   Leuk Esterase: Small / RBC: 20 /hpf / WBC 9 /HPF   Sq Epi: x / Non Sq Epi: x / Bacteria: Negative        RADIOLOGY & ADDITIONAL TESTS:    Imaging Personally Reviewed:    Consultant(s) Notes Reviewed:      Care Discussed with Consultants/Other Providers:

## 2023-06-09 NOTE — SWALLOW BEDSIDE ASSESSMENT ADULT - COMMENTS
hx con't  CTH 6/8/23 IMPRESSION: Age-appropriate involutional and ischemic gliotic changes. No   hemorrhage. No change since 5/2/2023.  CXR 6/8/23 IMPRESSION:  1. Clear lungs with no infiltrates, pleural effusions or pulmonary edema.  2. Previous cardiothoracic surgery with midline sternotomy.  3. No significant changes seen compared to the prior exam.    Pt with hx of 2x prior bedside swallow exams (9/8/21 and 3/13/22), both recommending regular texture solids and thin liquids. No prior MBS in PACS.

## 2023-06-09 NOTE — DIETITIAN INITIAL EVALUATION ADULT - NS FNS DIET ORDER
Diet, DASH/TLC:   Sodium & Cholesterol Restricted  Consistent Carbohydrate {Evening Snack} (CSTCHOSN) (06-08-23 @ 21:05)

## 2023-06-10 LAB
ALBUMIN SERPL ELPH-MCNC: 3.9 G/DL — SIGNIFICANT CHANGE UP (ref 3.3–5)
ALP SERPL-CCNC: 65 U/L — SIGNIFICANT CHANGE UP (ref 40–120)
ALT FLD-CCNC: <5 U/L — LOW (ref 10–45)
ANION GAP SERPL CALC-SCNC: 12 MMOL/L — SIGNIFICANT CHANGE UP (ref 5–17)
AST SERPL-CCNC: 21 U/L — SIGNIFICANT CHANGE UP (ref 10–40)
BASOPHILS # BLD AUTO: 0.04 K/UL — SIGNIFICANT CHANGE UP (ref 0–0.2)
BASOPHILS NFR BLD AUTO: 0.4 % — SIGNIFICANT CHANGE UP (ref 0–2)
BILIRUB SERPL-MCNC: 0.9 MG/DL — SIGNIFICANT CHANGE UP (ref 0.2–1.2)
BUN SERPL-MCNC: 24 MG/DL — HIGH (ref 7–23)
CALCIUM SERPL-MCNC: 9.4 MG/DL — SIGNIFICANT CHANGE UP (ref 8.4–10.5)
CHLORIDE SERPL-SCNC: 102 MMOL/L — SIGNIFICANT CHANGE UP (ref 96–108)
CK MB CFR SERPL CALC: 1.5 NG/ML — SIGNIFICANT CHANGE UP (ref 0–6.7)
CK SERPL-CCNC: 87 U/L — SIGNIFICANT CHANGE UP (ref 30–200)
CO2 SERPL-SCNC: 22 MMOL/L — SIGNIFICANT CHANGE UP (ref 22–31)
CREAT SERPL-MCNC: 0.9 MG/DL — SIGNIFICANT CHANGE UP (ref 0.5–1.3)
CULTURE RESULTS: SIGNIFICANT CHANGE UP
EGFR: 82 ML/MIN/1.73M2 — SIGNIFICANT CHANGE UP
EOSINOPHIL # BLD AUTO: 0.11 K/UL — SIGNIFICANT CHANGE UP (ref 0–0.5)
EOSINOPHIL NFR BLD AUTO: 1.2 % — SIGNIFICANT CHANGE UP (ref 0–6)
GLUCOSE BLDC GLUCOMTR-MCNC: 111 MG/DL — HIGH (ref 70–99)
GLUCOSE BLDC GLUCOMTR-MCNC: 124 MG/DL — HIGH (ref 70–99)
GLUCOSE BLDC GLUCOMTR-MCNC: 127 MG/DL — HIGH (ref 70–99)
GLUCOSE BLDC GLUCOMTR-MCNC: 147 MG/DL — HIGH (ref 70–99)
GLUCOSE BLDC GLUCOMTR-MCNC: 161 MG/DL — HIGH (ref 70–99)
GLUCOSE SERPL-MCNC: 137 MG/DL — HIGH (ref 70–99)
HCT VFR BLD CALC: 38 % — LOW (ref 39–50)
HGB BLD-MCNC: 12.2 G/DL — LOW (ref 13–17)
IMM GRANULOCYTES NFR BLD AUTO: 0.3 % — SIGNIFICANT CHANGE UP (ref 0–0.9)
LYMPHOCYTES # BLD AUTO: 1.11 K/UL — SIGNIFICANT CHANGE UP (ref 1–3.3)
LYMPHOCYTES # BLD AUTO: 12.2 % — LOW (ref 13–44)
MAGNESIUM SERPL-MCNC: 2 MG/DL — SIGNIFICANT CHANGE UP (ref 1.6–2.6)
MCHC RBC-ENTMCNC: 30.7 PG — SIGNIFICANT CHANGE UP (ref 27–34)
MCHC RBC-ENTMCNC: 32.1 GM/DL — SIGNIFICANT CHANGE UP (ref 32–36)
MCV RBC AUTO: 95.5 FL — SIGNIFICANT CHANGE UP (ref 80–100)
MONOCYTES # BLD AUTO: 0.75 K/UL — SIGNIFICANT CHANGE UP (ref 0–0.9)
MONOCYTES NFR BLD AUTO: 8.3 % — SIGNIFICANT CHANGE UP (ref 2–14)
NEUTROPHILS # BLD AUTO: 7.05 K/UL — SIGNIFICANT CHANGE UP (ref 1.8–7.4)
NEUTROPHILS NFR BLD AUTO: 77.6 % — HIGH (ref 43–77)
NRBC # BLD: 0 /100 WBCS — SIGNIFICANT CHANGE UP (ref 0–0)
PHOSPHATE SERPL-MCNC: 2.9 MG/DL — SIGNIFICANT CHANGE UP (ref 2.5–4.5)
PLATELET # BLD AUTO: 196 K/UL — SIGNIFICANT CHANGE UP (ref 150–400)
POTASSIUM SERPL-MCNC: 5.3 MMOL/L — SIGNIFICANT CHANGE UP (ref 3.5–5.3)
POTASSIUM SERPL-SCNC: 5.3 MMOL/L — SIGNIFICANT CHANGE UP (ref 3.5–5.3)
PROT SERPL-MCNC: 6.7 G/DL — SIGNIFICANT CHANGE UP (ref 6–8.3)
RBC # BLD: 3.98 M/UL — LOW (ref 4.2–5.8)
RBC # FLD: 13.2 % — SIGNIFICANT CHANGE UP (ref 10.3–14.5)
SODIUM SERPL-SCNC: 136 MMOL/L — SIGNIFICANT CHANGE UP (ref 135–145)
SPECIMEN SOURCE: SIGNIFICANT CHANGE UP
TROPONIN T, HIGH SENSITIVITY RESULT: 14 NG/L — SIGNIFICANT CHANGE UP (ref 0–51)
WBC # BLD: 9.09 K/UL — SIGNIFICANT CHANGE UP (ref 3.8–10.5)
WBC # FLD AUTO: 9.09 K/UL — SIGNIFICANT CHANGE UP (ref 3.8–10.5)

## 2023-06-10 PROCEDURE — 93010 ELECTROCARDIOGRAM REPORT: CPT

## 2023-06-10 RX ORDER — SODIUM CHLORIDE 9 MG/ML
500 INJECTION INTRAMUSCULAR; INTRAVENOUS; SUBCUTANEOUS ONCE
Refills: 0 | Status: COMPLETED | OUTPATIENT
Start: 2023-06-10 | End: 2023-06-10

## 2023-06-10 RX ORDER — METOPROLOL TARTRATE 50 MG
2.5 TABLET ORAL
Refills: 0 | Status: DISCONTINUED | OUTPATIENT
Start: 2023-06-10 | End: 2023-06-10

## 2023-06-10 RX ORDER — METOPROLOL TARTRATE 50 MG
12.5 TABLET ORAL EVERY 12 HOURS
Refills: 0 | Status: DISCONTINUED | OUTPATIENT
Start: 2023-06-10 | End: 2023-06-12

## 2023-06-10 RX ORDER — MAGNESIUM SULFATE 500 MG/ML
2 VIAL (ML) INJECTION ONCE
Refills: 0 | Status: COMPLETED | OUTPATIENT
Start: 2023-06-10 | End: 2023-06-10

## 2023-06-10 RX ADMIN — FINASTERIDE 5 MILLIGRAM(S): 5 TABLET, FILM COATED ORAL at 12:33

## 2023-06-10 RX ADMIN — ATORVASTATIN CALCIUM 80 MILLIGRAM(S): 80 TABLET, FILM COATED ORAL at 21:40

## 2023-06-10 RX ADMIN — BUDESONIDE AND FORMOTEROL FUMARATE DIHYDRATE 2 PUFF(S): 160; 4.5 AEROSOL RESPIRATORY (INHALATION) at 17:08

## 2023-06-10 RX ADMIN — BUDESONIDE AND FORMOTEROL FUMARATE DIHYDRATE 2 PUFF(S): 160; 4.5 AEROSOL RESPIRATORY (INHALATION) at 06:02

## 2023-06-10 RX ADMIN — CARBIDOPA AND LEVODOPA 1 TABLET(S): 25; 100 TABLET ORAL at 06:02

## 2023-06-10 RX ADMIN — HEPARIN SODIUM 5000 UNIT(S): 5000 INJECTION INTRAVENOUS; SUBCUTANEOUS at 17:09

## 2023-06-10 RX ADMIN — TAMSULOSIN HYDROCHLORIDE 0.4 MILLIGRAM(S): 0.4 CAPSULE ORAL at 21:40

## 2023-06-10 RX ADMIN — CEFTRIAXONE 100 MILLIGRAM(S): 500 INJECTION, POWDER, FOR SOLUTION INTRAMUSCULAR; INTRAVENOUS at 17:08

## 2023-06-10 RX ADMIN — ROPINIROLE 0.5 MILLIGRAM(S): 8 TABLET, FILM COATED, EXTENDED RELEASE ORAL at 21:40

## 2023-06-10 RX ADMIN — PANTOPRAZOLE SODIUM 40 MILLIGRAM(S): 20 TABLET, DELAYED RELEASE ORAL at 06:02

## 2023-06-10 RX ADMIN — CARBIDOPA AND LEVODOPA 1 TABLET(S): 25; 100 TABLET ORAL at 21:40

## 2023-06-10 RX ADMIN — SERTRALINE 25 MILLIGRAM(S): 25 TABLET, FILM COATED ORAL at 12:33

## 2023-06-10 RX ADMIN — Medication 25 GRAM(S): at 06:05

## 2023-06-10 RX ADMIN — Medication 12.5 MILLIGRAM(S): at 18:48

## 2023-06-10 RX ADMIN — CARBIDOPA AND LEVODOPA 1 TABLET(S): 25; 100 TABLET ORAL at 12:50

## 2023-06-10 RX ADMIN — Medication 2: at 12:45

## 2023-06-10 RX ADMIN — ROPINIROLE 0.5 MILLIGRAM(S): 8 TABLET, FILM COATED, EXTENDED RELEASE ORAL at 06:02

## 2023-06-10 RX ADMIN — HEPARIN SODIUM 5000 UNIT(S): 5000 INJECTION INTRAVENOUS; SUBCUTANEOUS at 06:05

## 2023-06-10 RX ADMIN — ROPINIROLE 0.5 MILLIGRAM(S): 8 TABLET, FILM COATED, EXTENDED RELEASE ORAL at 16:19

## 2023-06-10 NOTE — RAPID RESPONSE TEAM SUMMARY - NSSITUATIONBACKGROUNDRRT_GEN_ALL_CORE
89-year-old M with pmhx CAD, diabetes hyperlipidemia hypertension Parkinson's disease p/w FTT. RR called for new onset A fib with RVR. Initially, primary team was called b/c HR was 150-165 and normotensive. However, pt became hypotensive to SBP in 50s-60s. Stat IVF NS bolus started, EKG confirmed A Fib with RVR , RRT called, 150 mg Amiodarone IVP given with remaining of IVF bolus. Patient awake and alert to x1 during episode. He responded to Amiodarone bolus with HR improvement to 120s, and BP improvement to 94/62, Spo2 100% on RA, Temp 98F. Stat labs obtained, CBC, BMP, electrolytes and cardiac enzymes. Attending on record contacted, cardiology consult obtained with dr De La Paz. Repeated vitals after full 1 L NS bolus improved, 119/53, HR 79, SPO2 99 on RA. EKG to be repeated. Patient placed on Teli and awaiting transfer to teli monitor unit. 89-year-old M with pmhx CAD, diabetes hyperlipidemia hypertension Parkinson's disease p/w FTT. RR called for new onset A fib with RVR. Initially, primary team was called b/c HR was 150-165 and normotensive. However, pt became hypotensive to SBP in 50s-60s. Stat IVF NS bolus started, EKG confirmed A Fib with RVR , RRT called.

## 2023-06-10 NOTE — RAPID RESPONSE TEAM SUMMARY - [id number]_GEN_ALL_CORE
VS: BP 62/40, , Temp 98, . Patient was mentating at baseline, in no acute distress, appeared very dry, likely 2/2 FTT. He was given 150 mg Amiodarone IVP and 1L IVF bolus was bagged. HR improvement to 120s, and BP improvement to 94/62, Spo2 100% on RA. Stat labs obtained, CBC, BMP, electrolytes and cardiac enzymes. Pt was at baseline mentation, RRT ended and team was going to complete remaining IVF bolus. Given this was new afib with RVR, recommended primary team perform a full workup.

## 2023-06-10 NOTE — PROGRESS NOTE ADULT - SUBJECTIVE AND OBJECTIVE BOX
Patient is a 89y old  Male who presents with a chief complaint of Failure to thrive in adult     (09 Jun 2023 13:49)      SUBJECTIVE / OVERNIGHT EVENTS: s/p RRT Afib wih rvr   s/p Amiodarone           MEDICATIONS  (STANDING):  atorvastatin 80 milliGRAM(s) Oral at bedtime  budesonide 160 MICROgram(s)/formoterol 4.5 MICROgram(s) Inhaler 2 Puff(s) Inhalation two times a day  carbidopa/levodopa  25/100 1 Tablet(s) Oral three times a day  cefTRIAXone   IVPB 1000 milliGRAM(s) IV Intermittent every 24 hours  dextrose 5%. 1000 milliLiter(s) (100 mL/Hr) IV Continuous <Continuous>  dextrose 5%. 1000 milliLiter(s) (50 mL/Hr) IV Continuous <Continuous>  dextrose 50% Injectable 12.5 Gram(s) IV Push once  dextrose 50% Injectable 25 Gram(s) IV Push once  dextrose 50% Injectable 25 Gram(s) IV Push once  finasteride 5 milliGRAM(s) Oral daily  glucagon  Injectable 1 milliGRAM(s) IntraMuscular once  heparin   Injectable 5000 Unit(s) SubCutaneous every 12 hours  insulin lispro (ADMELOG) corrective regimen sliding scale   SubCutaneous three times a day before meals  lisinopril 10 milliGRAM(s) Oral daily  pantoprazole    Tablet 40 milliGRAM(s) Oral before breakfast  rOPINIRole 0.5 milliGRAM(s) Oral three times a day  sertraline 25 milliGRAM(s) Oral daily  tamsulosin 0.4 milliGRAM(s) Oral at bedtime    MEDICATIONS  (PRN):  acetaminophen     Tablet .. 650 milliGRAM(s) Oral every 6 hours PRN Mild Pain (1 - 3)  dextrose Oral Gel 15 Gram(s) Oral once PRN Blood Glucose LESS THAN 70 milliGRAM(s)/deciliter      T(C): 36.8 (06-10-23 @ 20:33), Max: 36.8 (06-10-23 @ 20:33)  HR: 62 (06-10-23 @ 20:33) (62 - 67)  BP: 139/60 (06-10-23 @ 20:33) (128/57 - 139/60)  RR: 18 (06-10-23 @ 20:33) (18 - 18)  SpO2: 97% (06-10-23 @ 20:33) (97% - 97%)      MEDICATIONS  (STANDING):  atorvastatin 80 milliGRAM(s) Oral at bedtime  budesonide 160 MICROgram(s)/formoterol 4.5 MICROgram(s) Inhaler 2 Puff(s) Inhalation two times a day  carbidopa/levodopa  25/100 1 Tablet(s) Oral three times a day  dextrose 5%. 1000 milliLiter(s) (50 mL/Hr) IV Continuous <Continuous>  dextrose 5%. 1000 milliLiter(s) (100 mL/Hr) IV Continuous <Continuous>  dextrose 50% Injectable 25 Gram(s) IV Push once  dextrose 50% Injectable 25 Gram(s) IV Push once  dextrose 50% Injectable 12.5 Gram(s) IV Push once  finasteride 5 milliGRAM(s) Oral daily  glucagon  Injectable 1 milliGRAM(s) IntraMuscular once  heparin   Injectable 5000 Unit(s) SubCutaneous every 12 hours  insulin lispro (ADMELOG) corrective regimen sliding scale   SubCutaneous three times a day before meals  lisinopril 10 milliGRAM(s) Oral daily  metoprolol tartrate 12.5 milliGRAM(s) Oral every 12 hours  pantoprazole    Tablet 40 milliGRAM(s) Oral before breakfast  rOPINIRole 0.5 milliGRAM(s) Oral three times a day  sertraline 25 milliGRAM(s) Oral daily  tamsulosin 0.4 milliGRAM(s) Oral at bedtime    MEDICATIONS  (PRN):  acetaminophen     Tablet .. 650 milliGRAM(s) Oral every 6 hours PRN Mild Pain (1 - 3)  dextrose Oral Gel 15 Gram(s) Oral once PRN Blood Glucose LESS THAN 70 milliGRAM(s)/deciliter    PHYSICAL EXAM:  GENERAL: NAD, well-developed  HEAD:  Atraumatic, Normocephalic  EYES: EOMI, PERRLA, conjunctiva and sclera clear  NECK: Supple, No JVD  CHEST/LUNG: Clear to auscultation bilaterally; No wheeze  HEART: Regular rate and rhythm; No murmurs, rubs, or gallops  ABDOMEN: Soft, Nontender, Nondistended; Bowel sounds present  EXTREMITIES:  2+ Peripheral Pulses, No clubbing, cyanosis, or edema  PSYCH: AAOx3  NEUROLOGY: non-focal  SKIN: No rashes or lesions    LABS:                                            12.2   9.09  )-----------( 196      ( 10 Torres 2023 05:03 )             38.0       CARDIAC MARKERS ( 10 Torres 2023 05:03 )  x     / x     / 87 U/L / x     / 1.5 ng/mL      LIVER FUNCTIONS - ( 10 Torres 2023 05:03 )  Alb: 3.9 g/dL / Pro: 6.7 g/dL / ALK PHOS: 65 U/L / ALT: <5 U/L / AST: 21 U/L / GGT: x             136|102|24<137  5.3|22|0.90  9.4,2.0,2.9  06-10 @ 05:03      RADIOLOGY & ADDITIONAL TESTS:    Imaging Personally Reviewed:    Consultant(s) Notes Reviewed:      Care Discussed with Consultants/Other Providers:

## 2023-06-10 NOTE — PROGRESS NOTE ADULT - SUBJECTIVE AND OBJECTIVE BOX
Patient developed arrythmia, A fib with RVR to 150-165 hr, initially with normotension but developed hypotension to SBP in 50s-60s. Stat IVF NS bolus started, EKG confirms A Fib with RVR , RRT called, 150 mg Amiodarone IVP given with remaining of IVF bolus. Patient awake and alert to x1 during episode. He responded to Amiodarone bolus with HR improvement to 120s, and BP improvement to 94/62, Spo2 100% on RA, Temp 98F. Stat labs obtained, CBC, BMP, electrolytes and cardiac enzymes. Attending on record contacted, cardiology consult obtained with dr De La Paz. Repeated vitals after full 1 L NS bolus improved, 119/53, HR 79, SPO2 99 on RA. EKG to be repeated. Patient placed on Teli and awaiting transfer to teli monitor unit.

## 2023-06-10 NOTE — CONSULT NOTE ADULT - SUBJECTIVE AND OBJECTIVE BOX
Cardiovascular Disease Initial Evaluation  Date of service: 06-10-23 @ 07:34    CHIEF COMPLAINT:  Failure to thrive    HISTORY OF PRESENT ILLNESS:  89-year-old M with pmhx CAD, diabetes hyperlipidemia hypertension Parkinson's disease p/w poor oral inatake and Appetite x few weeks. During hospitalization, pt developed new onset Afib. RRT called on 6/10 2/2 afib RVR and hypotension. Pt was given amiodarone with improvement in HR. Pt is currently in no distress. AA0x1.         Allergies    No Known Allergies    Intolerances    	    MEDICATIONS:  heparin   Injectable 5000 Unit(s) SubCutaneous every 12 hours  lisinopril 10 milliGRAM(s) Oral daily    cefTRIAXone   IVPB 1000 milliGRAM(s) IV Intermittent every 24 hours    budesonide 160 MICROgram(s)/formoterol 4.5 MICROgram(s) Inhaler 2 Puff(s) Inhalation two times a day    acetaminophen     Tablet .. 650 milliGRAM(s) Oral every 6 hours PRN  carbidopa/levodopa  25/100 1 Tablet(s) Oral three times a day  rOPINIRole 0.5 milliGRAM(s) Oral three times a day  sertraline 25 milliGRAM(s) Oral daily    pantoprazole    Tablet 40 milliGRAM(s) Oral before breakfast    atorvastatin 80 milliGRAM(s) Oral at bedtime  dextrose 50% Injectable 12.5 Gram(s) IV Push once  dextrose 50% Injectable 25 Gram(s) IV Push once  dextrose 50% Injectable 25 Gram(s) IV Push once  dextrose Oral Gel 15 Gram(s) Oral once PRN  finasteride 5 milliGRAM(s) Oral daily  glucagon  Injectable 1 milliGRAM(s) IntraMuscular once  insulin lispro (ADMELOG) corrective regimen sliding scale   SubCutaneous three times a day before meals    dextrose 5%. 1000 milliLiter(s) IV Continuous <Continuous>  dextrose 5%. 1000 milliLiter(s) IV Continuous <Continuous>  sodium chloride 0.9% Bolus 500 milliLiter(s) IV Bolus once  sodium chloride 0.9% Bolus 500 milliLiter(s) IV Bolus once  tamsulosin 0.4 milliGRAM(s) Oral at bedtime      PAST MEDICAL & SURGICAL HISTORY:  Diabetes mellitus      Dyslipidemia      CAD (coronary artery disease)      Hypertension      Osteoporosis      Urinary frequency      Syncope      Benign prostatic hyperplasia      Parkinson disease      HTN (hypertension)      HLD (hyperlipidemia)      DM (diabetes mellitus)      CAD (coronary artery disease)      Hypospadias      S/P coronary artery stent placement in 2003      History of total left knee replacement  2014      S/P CABG (coronary artery bypass graft)  2015      History of knee replacement procedure of left knee  2017          FAMILY HISTORY:  Family history of coronary artery disease    No pertinent family history in first degree relatives  colon cancer        SOCIAL HISTORY:    The patient is a nonsmoker       REVIEW OF SYSTEMS:  See HPI, otherwise complete 14 point review of systems negative    [ ] All others negative	  [x ] Unable to obtain    PHYSICAL EXAM:  T(C): 36.4 (06-10-23 @ 06:32), Max: 36.9 (06-09-23 @ 12:09)  HR: 74 (06-10-23 @ 06:32) (63 - 74)  BP: 101/55 (06-10-23 @ 06:32) (101/55 - 173/70)  RR: 18 (06-10-23 @ 06:32) (18 - 18)  SpO2: 93% (06-10-23 @ 06:32) (93% - 99%)  Wt(kg): --  I&O's Summary    09 Jun 2023 07:01  -  10 Torres 2023 07:00  --------------------------------------------------------  IN: 240 mL / OUT: 0 mL / NET: 240 mL        Appearance: No Acute Distress; resting comfortably  HEENT:  Normal oral mucosa, PERRL, EOMI	  Cardiovascular: Normal S1 S2, No JVD, No murmurs/rubs/gallops  Respiratory: Normal respiratory effort; Lungs clear to auscultation bilaterally  Gastrointestinal:  Soft, Non-tender, + BS	  Skin: No rashes, No ecchymoses, No cyanosis	  Neurologic: Non-focal; no weakness  Extremities: No clubbing, cyanosis or edema  Vascular: Peripheral pulses palpable 2+ bilaterally  Psychiatry: A & O x 3, Mood & affect appropriate    Laboratory Data:	 	    CBC Full  -  ( 10 Torres 2023 05:03 )  WBC Count : 9.09 K/uL  Hemoglobin : 12.2 g/dL  Hematocrit : 38.0 %  Platelet Count - Automated : 196 K/uL  Mean Cell Volume : 95.5 fl  Mean Cell Hemoglobin : 30.7 pg  Mean Cell Hemoglobin Concentration : 32.1 gm/dL  Auto Neutrophil # : 7.05 K/uL  Auto Lymphocyte # : 1.11 K/uL  Auto Monocyte # : 0.75 K/uL  Auto Eosinophil # : 0.11 K/uL  Auto Basophil # : 0.04 K/uL  Auto Neutrophil % : 77.6 %  Auto Lymphocyte % : 12.2 %  Auto Monocyte % : 8.3 %  Auto Eosinophil % : 1.2 %  Auto Basophil % : 0.4 %    06-10    136  |  102  |  24<H>  ----------------------------<  137<H>  5.3   |  22  |  0.90  06-08    141  |  106  |  32<H>  ----------------------------<  121<H>  4.7   |  23  |  0.89    Ca    9.4      10 Torres 2023 05:03  Ca    10.1      08 Jun 2023 15:26  Phos  2.9     06-10  Mg     2.0     06-10    TPro  6.7  /  Alb  3.9  /  TBili  0.9  /  DBili  x   /  AST  21  /  ALT  <5<L>  /  AlkPhos  65  06-10  TPro  7.0  /  Alb  4.4  /  TBili  0.6  /  DBili  x   /  AST  12  /  ALT  <5<L>  /  AlkPhos  66  06-08      proBNP:   Lipid Profile:   HgA1c:   TSH:       CARDIAC MARKERS:            Interpretation of Telemetry: 	    ECG:  	  RADIOLOGY:  OTHER: 	    PREVIOUS DIAGNOSTIC TESTING:    [x ] Echocardiogram: 3/2022  1. Mitral annular calcification and calcified mitral  leaflets with decreased diastolic opening. Mild mitral  regurgitation.  Peak mitral valve gradient equals 6 mm Hg,  mean transmitral valve gradient equals 2 mm Hg, consistent  with mild to moderate mitral stenosis.  2. Calcified trileaflet aortic valve with decreased  opening. Peak transaortic valve gradient equals 29 mm Hg,  mean transaortic valve gradient equals 16 mm Hg, estimated  aortic valve area equals 1.1 sqcm (by continuity equation),  aortic valve velocity time integral equals 49 cm,  consistent with moderate aortic stenosis. Mild-moderate  aortic regurgitation.  3. Moderately dilated left atrium.  LA volume index = 43  cc/m2.  4. Normal left ventricular internal dimensions and wall  thicknesses.  5. Hyperdynamic left ventricular systolic function.  6. Moderate diastolic dysfunction (Stage II).  7. Normal right ventricular size and function.  8. Mild tricuspid regurgitation.  9. Mild pulmonic regurgitation. Estimated pulmonary artery  systolic pressure equals 35  mm Hg, assuming right atrial  pressure equals 8 mm Hg, consistent with borderline  pulmonary pressures.  *** Compared with echocardiogram of 9/8/2021, no  significant changes noted.  [ ] Catheterization:  [ ] Stress Test:  	    Assessment:    89-year-old M with pmhx CAD, diabetes hyperlipidemia hypertension Parkinson's disease p/w poor oral inatake and Appetite x few weeks and new onset arrhythmia    Plan of Care:    #Afib RVR  - s/p amiodarone push  - HR now controlled      #DM  ISS as per primary team    #HLD  - Statin as ordered          72 minutes spent on total encounter; more than 50% of the visit was spent counseling and/or coordinating care by the attending physician.   	  Jayden De La Paz DO [facility]  Cardiovascular Diseases  [phone]     Cardiovascular Disease Initial Evaluation  Date of service: 06-10-23 @ 07:34    CHIEF COMPLAINT:  Failure to thrive    HISTORY OF PRESENT ILLNESS:  89-year-old M with pmhx CAD, diabetes hyperlipidemia hypertension Parkinson's disease p/w poor oral inatake and Appetite x few weeks. During hospitalization, pt developed new onset Afib. RRT called on 6/10 2/2 afib RVR and hypotension. Pt was given amiodarone with improvement in HR. Pt is currently in no distress. AA0x1.         Allergies    No Known Allergies    Intolerances    	    MEDICATIONS:  heparin   Injectable 5000 Unit(s) SubCutaneous every 12 hours  lisinopril 10 milliGRAM(s) Oral daily    cefTRIAXone   IVPB 1000 milliGRAM(s) IV Intermittent every 24 hours    budesonide 160 MICROgram(s)/formoterol 4.5 MICROgram(s) Inhaler 2 Puff(s) Inhalation two times a day    acetaminophen     Tablet .. 650 milliGRAM(s) Oral every 6 hours PRN  carbidopa/levodopa  25/100 1 Tablet(s) Oral three times a day  rOPINIRole 0.5 milliGRAM(s) Oral three times a day  sertraline 25 milliGRAM(s) Oral daily    pantoprazole    Tablet 40 milliGRAM(s) Oral before breakfast    atorvastatin 80 milliGRAM(s) Oral at bedtime  dextrose 50% Injectable 12.5 Gram(s) IV Push once  dextrose 50% Injectable 25 Gram(s) IV Push once  dextrose 50% Injectable 25 Gram(s) IV Push once  dextrose Oral Gel 15 Gram(s) Oral once PRN  finasteride 5 milliGRAM(s) Oral daily  glucagon  Injectable 1 milliGRAM(s) IntraMuscular once  insulin lispro (ADMELOG) corrective regimen sliding scale   SubCutaneous three times a day before meals    dextrose 5%. 1000 milliLiter(s) IV Continuous <Continuous>  dextrose 5%. 1000 milliLiter(s) IV Continuous <Continuous>  sodium chloride 0.9% Bolus 500 milliLiter(s) IV Bolus once  sodium chloride 0.9% Bolus 500 milliLiter(s) IV Bolus once  tamsulosin 0.4 milliGRAM(s) Oral at bedtime      PAST MEDICAL & SURGICAL HISTORY:  Diabetes mellitus      Dyslipidemia      CAD (coronary artery disease)      Hypertension      Osteoporosis      Urinary frequency      Syncope      Benign prostatic hyperplasia      Parkinson disease      HTN (hypertension)      HLD (hyperlipidemia)      DM (diabetes mellitus)      CAD (coronary artery disease)      Hypospadias      S/P coronary artery stent placement in 2003      History of total left knee replacement  2014      S/P CABG (coronary artery bypass graft)  2015      History of knee replacement procedure of left knee  2017          FAMILY HISTORY:  Family history of coronary artery disease    No pertinent family history in first degree relatives  colon cancer        SOCIAL HISTORY:    The patient is a nonsmoker       REVIEW OF SYSTEMS:  See HPI, otherwise complete 14 point review of systems negative    [ ] All others negative	  [x ] Unable to obtain    PHYSICAL EXAM:  T(C): 36.4 (06-10-23 @ 06:32), Max: 36.9 (06-09-23 @ 12:09)  HR: 74 (06-10-23 @ 06:32) (63 - 74)  BP: 101/55 (06-10-23 @ 06:32) (101/55 - 173/70)  RR: 18 (06-10-23 @ 06:32) (18 - 18)  SpO2: 93% (06-10-23 @ 06:32) (93% - 99%)  Wt(kg): --  I&O's Summary    09 Jun 2023 07:01  -  10 Torres 2023 07:00  --------------------------------------------------------  IN: 240 mL / OUT: 0 mL / NET: 240 mL        Appearance: No Acute Distress; resting comfortably  HEENT:  Normal oral mucosa, PERRL, EOMI	  Cardiovascular: Normal S1 S2, No JVD, diastolic murmur  Respiratory: Normal respiratory effort; Lungs clear to auscultation bilaterally  Gastrointestinal:  Soft, Non-tender, + BS	  Skin: No rashes, No ecchymoses, No cyanosis	  Neurologic: Non-focal; no weakness  Extremities: No clubbing, cyanosis or edema  Vascular: Peripheral pulses palpable 2+ bilaterally  Psychiatry: A & O x 1, Mood & affect appropriate    Laboratory Data:	 	    CBC Full  -  ( 10 Torres 2023 05:03 )  WBC Count : 9.09 K/uL  Hemoglobin : 12.2 g/dL  Hematocrit : 38.0 %  Platelet Count - Automated : 196 K/uL  Mean Cell Volume : 95.5 fl  Mean Cell Hemoglobin : 30.7 pg  Mean Cell Hemoglobin Concentration : 32.1 gm/dL  Auto Neutrophil # : 7.05 K/uL  Auto Lymphocyte # : 1.11 K/uL  Auto Monocyte # : 0.75 K/uL  Auto Eosinophil # : 0.11 K/uL  Auto Basophil # : 0.04 K/uL  Auto Neutrophil % : 77.6 %  Auto Lymphocyte % : 12.2 %  Auto Monocyte % : 8.3 %  Auto Eosinophil % : 1.2 %  Auto Basophil % : 0.4 %    06-10    136  |  102  |  24<H>  ----------------------------<  137<H>  5.3   |  22  |  0.90  06-08    141  |  106  |  32<H>  ----------------------------<  121<H>  4.7   |  23  |  0.89    Ca    9.4      10 Torres 2023 05:03  Ca    10.1      08 Jun 2023 15:26  Phos  2.9     06-10  Mg     2.0     06-10    TPro  6.7  /  Alb  3.9  /  TBili  0.9  /  DBili  x   /  AST  21  /  ALT  <5<L>  /  AlkPhos  65  06-10  TPro  7.0  /  Alb  4.4  /  TBili  0.6  /  DBili  x   /  AST  12  /  ALT  <5<L>  /  AlkPhos  66  06-08      proBNP:   Lipid Profile:   HgA1c:   TSH:       CARDIAC MARKERS:            Interpretation of Telemetry: Sinus 	    ECG: Sinus rhythm on admission, 2nd EKG during RRT shows narrow complex tachycardia   RADIOLOGY:  OTHER: 	    PREVIOUS DIAGNOSTIC TESTING:    [x ] Echocardiogram: 3/2022  1. Mitral annular calcification and calcified mitral  leaflets with decreased diastolic opening. Mild mitral  regurgitation.  Peak mitral valve gradient equals 6 mm Hg,  mean transmitral valve gradient equals 2 mm Hg, consistent  with mild to moderate mitral stenosis.  2. Calcified trileaflet aortic valve with decreased  opening. Peak transaortic valve gradient equals 29 mm Hg,  mean transaortic valve gradient equals 16 mm Hg, estimated  aortic valve area equals 1.1 sqcm (by continuity equation),  aortic valve velocity time integral equals 49 cm,  consistent with moderate aortic stenosis. Mild-moderate  aortic regurgitation.  3. Moderately dilated left atrium.  LA volume index = 43  cc/m2.  4. Normal left ventricular internal dimensions and wall  thicknesses.  5. Hyperdynamic left ventricular systolic function.  6. Moderate diastolic dysfunction (Stage II).  7. Normal right ventricular size and function.  8. Mild tricuspid regurgitation.  9. Mild pulmonic regurgitation. Estimated pulmonary artery  systolic pressure equals 35  mm Hg, assuming right atrial  pressure equals 8 mm Hg, consistent with borderline  pulmonary pressures.  *** Compared with echocardiogram of 9/8/2021, no  significant changes noted.  [ ] Catheterization:  [ ] Stress Test:  	    Assessment:    89-year-old M with pmhx CAD s/p CABG, diabetes, hyperlipidemia hypertension Parkinson's disease p/w poor oral intake and Appetite x few weeks and new onset arrhythmia    Plan of Care:    #Afib RVR  - Upon further review of EKG , pt appears to have had an episode of which broke with amiodarone  - Pt currently in sinus rhythm   - BP has improved as well   - At this time, will continue to monitor on Telemetry  - PT is not an ideal candidate for long term AC given history of Parkinsonism (pt is a high fall risk)  - Recommend starting Metoprolol tartrate 12.5mg BID with holding parameters for rate control  - If patient has another episode of afib RVR with hypotension, recommend Digoxin loading with 500mcg IV followed with 250mcg IV x2 q6h to complete 1mg load.   - TTE from 2022 shows a hyperdynamic LV with moderate AS and AR and mild to moderate MS. Unchanged from 2021. No need to repeat at this time as it will not .       #DM  ISS as per primary team    #HLD  - Statin as ordered    #HTN  - BP acceptable on current regimen    72 minutes spent on total encounter; more than 50% of the visit was spent counseling and/or coordinating care by the attending physician.   	  Jayden De La Paz,  [facility]  Cardiovascular Diseases  [phone]

## 2023-06-11 ENCOUNTER — TRANSCRIPTION ENCOUNTER (OUTPATIENT)
Age: 88
End: 2023-06-11

## 2023-06-11 LAB
GLUCOSE BLDC GLUCOMTR-MCNC: 102 MG/DL — HIGH (ref 70–99)
GLUCOSE BLDC GLUCOMTR-MCNC: 115 MG/DL — HIGH (ref 70–99)
GLUCOSE BLDC GLUCOMTR-MCNC: 126 MG/DL — HIGH (ref 70–99)
GLUCOSE BLDC GLUCOMTR-MCNC: 96 MG/DL — SIGNIFICANT CHANGE UP (ref 70–99)

## 2023-06-11 RX ADMIN — FINASTERIDE 5 MILLIGRAM(S): 5 TABLET, FILM COATED ORAL at 12:44

## 2023-06-11 RX ADMIN — ROPINIROLE 0.5 MILLIGRAM(S): 8 TABLET, FILM COATED, EXTENDED RELEASE ORAL at 05:13

## 2023-06-11 RX ADMIN — ROPINIROLE 0.5 MILLIGRAM(S): 8 TABLET, FILM COATED, EXTENDED RELEASE ORAL at 21:38

## 2023-06-11 RX ADMIN — TAMSULOSIN HYDROCHLORIDE 0.4 MILLIGRAM(S): 0.4 CAPSULE ORAL at 21:38

## 2023-06-11 RX ADMIN — HEPARIN SODIUM 5000 UNIT(S): 5000 INJECTION INTRAVENOUS; SUBCUTANEOUS at 05:13

## 2023-06-11 RX ADMIN — HEPARIN SODIUM 5000 UNIT(S): 5000 INJECTION INTRAVENOUS; SUBCUTANEOUS at 17:53

## 2023-06-11 RX ADMIN — CARBIDOPA AND LEVODOPA 1 TABLET(S): 25; 100 TABLET ORAL at 05:13

## 2023-06-11 RX ADMIN — CARBIDOPA AND LEVODOPA 1 TABLET(S): 25; 100 TABLET ORAL at 15:07

## 2023-06-11 RX ADMIN — LISINOPRIL 10 MILLIGRAM(S): 2.5 TABLET ORAL at 05:13

## 2023-06-11 RX ADMIN — SERTRALINE 25 MILLIGRAM(S): 25 TABLET, FILM COATED ORAL at 12:44

## 2023-06-11 RX ADMIN — ROPINIROLE 0.5 MILLIGRAM(S): 8 TABLET, FILM COATED, EXTENDED RELEASE ORAL at 15:07

## 2023-06-11 RX ADMIN — ATORVASTATIN CALCIUM 80 MILLIGRAM(S): 80 TABLET, FILM COATED ORAL at 21:38

## 2023-06-11 RX ADMIN — Medication 12.5 MILLIGRAM(S): at 05:13

## 2023-06-11 RX ADMIN — Medication 12.5 MILLIGRAM(S): at 17:53

## 2023-06-11 RX ADMIN — CARBIDOPA AND LEVODOPA 1 TABLET(S): 25; 100 TABLET ORAL at 21:38

## 2023-06-11 NOTE — PROGRESS NOTE ADULT - SUBJECTIVE AND OBJECTIVE BOX
Cardiovascular Disease Progress Note  Date of service: 06-11-23 @ 09:15    Overnight events: No acute events overnight.  Pt denies chest pain and is in no distress.   Otherwise review of systems negative    Objective Findings:  T(C): 36.5 (06-11-23 @ 04:03), Max: 36.8 (06-10-23 @ 20:33)  HR: 68 (06-11-23 @ 04:03) (62 - 68)  BP: 150/65 (06-11-23 @ 04:03) (128/57 - 150/65)  RR: 18 (06-11-23 @ 04:03) (18 - 18)  SpO2: 94% (06-11-23 @ 04:03) (94% - 97%)  Wt(kg): --  Daily     Daily       Physical Exam:  Gen: NAD; Patient resting comfortably  HEENT: EOMI, Normocephalic/ atraumatic  CV: RRR, normal S1 + S2, no m/r/g  Lungs:  Normal respiratory effort; clear to auscultation bilaterally  Abd: soft, non-tender; bowel sounds present  Ext: No edema; warm and well perfused    Telemetry: Sinus     Laboratory Data:                        12.2   9.09  )-----------( 196      ( 10 Torres 2023 05:03 )             38.0     06-10    136  |  102  |  24<H>  ----------------------------<  137<H>  5.3   |  22  |  0.90    Ca    9.4      10 Torres 2023 05:03  Phos  2.9     06-10  Mg     2.0     06-10    TPro  6.7  /  Alb  3.9  /  TBili  0.9  /  DBili  x   /  AST  21  /  ALT  <5<L>  /  AlkPhos  65  06-10      CARDIAC MARKERS ( 10 Torres 2023 05:03 )  x     / x     / 87 U/L / x     / 1.5 ng/mL          Inpatient Medications:  MEDICATIONS  (STANDING):  atorvastatin 80 milliGRAM(s) Oral at bedtime  budesonide 160 MICROgram(s)/formoterol 4.5 MICROgram(s) Inhaler 2 Puff(s) Inhalation two times a day  carbidopa/levodopa  25/100 1 Tablet(s) Oral three times a day  dextrose 5%. 1000 milliLiter(s) (50 mL/Hr) IV Continuous <Continuous>  dextrose 5%. 1000 milliLiter(s) (100 mL/Hr) IV Continuous <Continuous>  dextrose 50% Injectable 12.5 Gram(s) IV Push once  dextrose 50% Injectable 25 Gram(s) IV Push once  dextrose 50% Injectable 25 Gram(s) IV Push once  finasteride 5 milliGRAM(s) Oral daily  glucagon  Injectable 1 milliGRAM(s) IntraMuscular once  heparin   Injectable 5000 Unit(s) SubCutaneous every 12 hours  insulin lispro (ADMELOG) corrective regimen sliding scale   SubCutaneous three times a day before meals  lisinopril 10 milliGRAM(s) Oral daily  metoprolol tartrate 12.5 milliGRAM(s) Oral every 12 hours  pantoprazole    Tablet 40 milliGRAM(s) Oral before breakfast  rOPINIRole 0.5 milliGRAM(s) Oral three times a day  sertraline 25 milliGRAM(s) Oral daily  tamsulosin 0.4 milliGRAM(s) Oral at bedtime      Assessment:  89-year-old M with pmhx CAD s/p CABG, diabetes, hyperlipidemia hypertension Parkinson's disease p/w poor oral intake and Appetite x few weeks and new onset arrhythmia    Plan of Care:    #Afib RVR  - Upon further review of EKG , pt appears to have had an episode which broke with amiodarone  - Pt currently in sinus rhythm   - BP has improved as well   - At this time, will continue to monitor on Telemetry  - PT is not an ideal candidate for long term AC given history of Parkinsonism (pt is a high fall risk)  - Continue BB for rate control.   - If patient has another episode of afib RVR with hypotension, recommend Digoxin loading with 500mcg IV followed with 250mcg IV x2 q6h to complete 1mg load.   - TTE from 2022 shows a hyperdynamic LV with moderate AS and AR and mild to moderate MS. Unchanged from 2021. No need to repeat at this time as it will not .       #DM  ISS as per primary team    #HLD  - Statin as ordered    #HTN  - BP acceptable on current regimen    #ACP (advance care planning)-  Advanced care planning was discussed.  Risks, benefits and alternatives of medical treatment and procedures were addressed. 30 additional minutes spent addressing advance care plans.          Over 25 minutes spent on total encounter; more than 50% of the visit was spent counseling and/or coordinating care by the attending physician.      Jayden De La Paz,  [facility]  Cardiovascular Disease  [phone]

## 2023-06-12 LAB
ANION GAP SERPL CALC-SCNC: 11 MMOL/L — SIGNIFICANT CHANGE UP (ref 5–17)
BUN SERPL-MCNC: 26 MG/DL — HIGH (ref 7–23)
CALCIUM SERPL-MCNC: 9.3 MG/DL — SIGNIFICANT CHANGE UP (ref 8.4–10.5)
CHLORIDE SERPL-SCNC: 102 MMOL/L — SIGNIFICANT CHANGE UP (ref 96–108)
CO2 SERPL-SCNC: 25 MMOL/L — SIGNIFICANT CHANGE UP (ref 22–31)
CREAT SERPL-MCNC: 0.83 MG/DL — SIGNIFICANT CHANGE UP (ref 0.5–1.3)
EGFR: 84 ML/MIN/1.73M2 — SIGNIFICANT CHANGE UP
GLUCOSE BLDC GLUCOMTR-MCNC: 102 MG/DL — HIGH (ref 70–99)
GLUCOSE BLDC GLUCOMTR-MCNC: 103 MG/DL — HIGH (ref 70–99)
GLUCOSE BLDC GLUCOMTR-MCNC: 117 MG/DL — HIGH (ref 70–99)
GLUCOSE BLDC GLUCOMTR-MCNC: 96 MG/DL — SIGNIFICANT CHANGE UP (ref 70–99)
GLUCOSE SERPL-MCNC: 89 MG/DL — SIGNIFICANT CHANGE UP (ref 70–99)
HCT VFR BLD CALC: 37.2 % — LOW (ref 39–50)
HGB BLD-MCNC: 11.6 G/DL — LOW (ref 13–17)
MCHC RBC-ENTMCNC: 30.5 PG — SIGNIFICANT CHANGE UP (ref 27–34)
MCHC RBC-ENTMCNC: 31.2 GM/DL — LOW (ref 32–36)
MCV RBC AUTO: 97.9 FL — SIGNIFICANT CHANGE UP (ref 80–100)
NRBC # BLD: 0 /100 WBCS — SIGNIFICANT CHANGE UP (ref 0–0)
PLATELET # BLD AUTO: 205 K/UL — SIGNIFICANT CHANGE UP (ref 150–400)
POTASSIUM SERPL-MCNC: 4.2 MMOL/L — SIGNIFICANT CHANGE UP (ref 3.5–5.3)
POTASSIUM SERPL-SCNC: 4.2 MMOL/L — SIGNIFICANT CHANGE UP (ref 3.5–5.3)
RBC # BLD: 3.8 M/UL — LOW (ref 4.2–5.8)
RBC # FLD: 13.1 % — SIGNIFICANT CHANGE UP (ref 10.3–14.5)
SODIUM SERPL-SCNC: 138 MMOL/L — SIGNIFICANT CHANGE UP (ref 135–145)
WBC # BLD: 5.72 K/UL — SIGNIFICANT CHANGE UP (ref 3.8–10.5)
WBC # FLD AUTO: 5.72 K/UL — SIGNIFICANT CHANGE UP (ref 3.8–10.5)

## 2023-06-12 RX ORDER — HYDRALAZINE HCL 50 MG
5 TABLET ORAL ONCE
Refills: 0 | Status: COMPLETED | OUTPATIENT
Start: 2023-06-12 | End: 2023-06-12

## 2023-06-12 RX ADMIN — CARBIDOPA AND LEVODOPA 1 TABLET(S): 25; 100 TABLET ORAL at 21:11

## 2023-06-12 RX ADMIN — ROPINIROLE 0.5 MILLIGRAM(S): 8 TABLET, FILM COATED, EXTENDED RELEASE ORAL at 21:11

## 2023-06-12 RX ADMIN — FINASTERIDE 5 MILLIGRAM(S): 5 TABLET, FILM COATED ORAL at 21:12

## 2023-06-12 RX ADMIN — ROPINIROLE 0.5 MILLIGRAM(S): 8 TABLET, FILM COATED, EXTENDED RELEASE ORAL at 05:04

## 2023-06-12 RX ADMIN — HEPARIN SODIUM 5000 UNIT(S): 5000 INJECTION INTRAVENOUS; SUBCUTANEOUS at 05:05

## 2023-06-12 RX ADMIN — LISINOPRIL 10 MILLIGRAM(S): 2.5 TABLET ORAL at 05:05

## 2023-06-12 RX ADMIN — SERTRALINE 25 MILLIGRAM(S): 25 TABLET, FILM COATED ORAL at 13:15

## 2023-06-12 RX ADMIN — Medication 650 MILLIGRAM(S): at 02:53

## 2023-06-12 RX ADMIN — Medication 650 MILLIGRAM(S): at 03:40

## 2023-06-12 RX ADMIN — CARBIDOPA AND LEVODOPA 1 TABLET(S): 25; 100 TABLET ORAL at 13:15

## 2023-06-12 RX ADMIN — ATORVASTATIN CALCIUM 80 MILLIGRAM(S): 80 TABLET, FILM COATED ORAL at 21:12

## 2023-06-12 RX ADMIN — BUDESONIDE AND FORMOTEROL FUMARATE DIHYDRATE 2 PUFF(S): 160; 4.5 AEROSOL RESPIRATORY (INHALATION) at 17:08

## 2023-06-12 RX ADMIN — Medication 12.5 MILLIGRAM(S): at 05:04

## 2023-06-12 RX ADMIN — HEPARIN SODIUM 5000 UNIT(S): 5000 INJECTION INTRAVENOUS; SUBCUTANEOUS at 17:08

## 2023-06-12 RX ADMIN — TAMSULOSIN HYDROCHLORIDE 0.4 MILLIGRAM(S): 0.4 CAPSULE ORAL at 21:12

## 2023-06-12 RX ADMIN — Medication 5 MILLIGRAM(S): at 21:30

## 2023-06-12 RX ADMIN — ROPINIROLE 0.5 MILLIGRAM(S): 8 TABLET, FILM COATED, EXTENDED RELEASE ORAL at 13:14

## 2023-06-12 RX ADMIN — CARBIDOPA AND LEVODOPA 1 TABLET(S): 25; 100 TABLET ORAL at 05:05

## 2023-06-12 RX ADMIN — PANTOPRAZOLE SODIUM 40 MILLIGRAM(S): 20 TABLET, DELAYED RELEASE ORAL at 13:15

## 2023-06-12 NOTE — DISCHARGE NOTE PROVIDER - NSDCCPCAREPLAN_GEN_ALL_CORE_FT
PRINCIPAL DISCHARGE DIAGNOSIS  Diagnosis: Adult failure to thrive  Assessment and Plan of Treatment: provide a well-balanced diet with increase in caloric and protein intake. Enhanced or fortified foods and nutritional supplements. If weigh lost continues seek medical attention for possible appetite stimulants or even enteral feeding, if appropriat.. If psychosocial factors are involved, treatment should include improving the family dynamics and living conditions.      SECONDARY DISCHARGE DIAGNOSES  Diagnosis: Paroxysmal atrial fibrillation  Assessment and Plan of Treatment: Atrial fibrillation is the most common heart rhythm problem & has the risk of stroke & heart attack  if applicalbe, if you control your blood pressure, not drink more than 1-2 alcohol drinks per day, cut down on caffeine, getting treatment for over active thyroid gland, & getting exercise  Call your doctor if you feel your heart racing or beating unusually, chest tightness or pain, lightheaded, faint, shortness of breath especially with exercise  It is important to take your heart medication as prescribed  You may be on anticoagulation which is very important to take as directed - you may need blood work to monitor drug levels

## 2023-06-12 NOTE — PROVIDER CONTACT NOTE (OTHER) - ACTION/TREATMENT ORDERED:
SHUN Crawley aware. IV Push Hydralazine ordered. Will continue to monitor BP and maintain pt safety.

## 2023-06-12 NOTE — DISCHARGE NOTE PROVIDER - HOSPITAL COURSE
89-year-old M AxOx1 with pmhx CAD, DM type 2, hyperlipidemia, hypertension, Parkinson's disease presents with poor oral intake and Appetite x few weeks.   He was admitted for Failure to thrive with poor p.o. intake and found with UTI now s/p Rocephin for antibiotic coverage. Nutrition was consulted and speech an swallow eval recommend regular diet thin liquids and Ensure Plus for severe malnutrition and protein loss.  During hospitalization, pt developed new onset Afib on 6/10/23. RRT called for afib RVR with hypotension. Pt was given amiodarone with improvement in HR. Cardiology was consulted and informs pt appears to have had an episode of which Afib broke with amiodarone and currently in sinus rhythm. Cards also informs he is not an ideal candidate for long term AC given history of Parkinsonism (pt is a high fall risk) and started on Metoprolol tartrate 12.5mg BID for rate control. Daughter, HCP agreeing with [facility] placement for discharge. Requested by Dr. Sanchez to facilitate pt to d/c to rehab. Vital signs, labs, diagnostics reviewed, pt stable to d/c now. Dr. Sanchez medically cleared with discharging planning.

## 2023-06-12 NOTE — PROGRESS NOTE ADULT - SUBJECTIVE AND OBJECTIVE BOX
Patient is a 89y old  Male who presents with a chief complaint of Failure to thrive in adult     (09 Jun 2023 13:49)      SUBJECTIVE / OVERNIGHT EVENTS: s/p RRT Afib wih rvr     T(C): 36.4 (06-12-23 @ 11:20), Max: 36.4 (06-12-23 @ 11:20)  HR: 56 (06-12-23 @ 11:20) (56 - 56)  BP: 128/60 (06-12-23 @ 11:20) (128/60 - 128/60)  RR: 18 (06-12-23 @ 11:20) (18 - 18)  SpO2: 97% (06-12-23 @ 11:20) (97% - 97%)      MEDICATIONS  (STANDING):  atorvastatin 80 milliGRAM(s) Oral at bedtime  budesonide 160 MICROgram(s)/formoterol 4.5 MICROgram(s) Inhaler 2 Puff(s) Inhalation two times a day  carbidopa/levodopa  25/100 1 Tablet(s) Oral three times a day  dextrose 5%. 1000 milliLiter(s) (50 mL/Hr) IV Continuous <Continuous>  dextrose 5%. 1000 milliLiter(s) (100 mL/Hr) IV Continuous <Continuous>  dextrose 50% Injectable 12.5 Gram(s) IV Push once  dextrose 50% Injectable 25 Gram(s) IV Push once  dextrose 50% Injectable 25 Gram(s) IV Push once  finasteride 5 milliGRAM(s) Oral daily  glucagon  Injectable 1 milliGRAM(s) IntraMuscular once  heparin   Injectable 5000 Unit(s) SubCutaneous every 12 hours  insulin lispro (ADMELOG) corrective regimen sliding scale   SubCutaneous three times a day before meals  lisinopril 10 milliGRAM(s) Oral daily  pantoprazole    Tablet 40 milliGRAM(s) Oral before breakfast  rOPINIRole 0.5 milliGRAM(s) Oral three times a day  sertraline 25 milliGRAM(s) Oral daily  tamsulosin 0.4 milliGRAM(s) Oral at bedtime    PHYSICAL EXAM:  GENERAL: NAD, well-developed  HEAD:  Atraumatic, Normocephalic  EYES: EOMI, PERRLA, conjunctiva and sclera clear  NECK: Supple, No JVD  CHEST/LUNG: Clear to auscultation bilaterally; No wheeze  HEART: Regular rate and rhythm; No murmurs, rubs, or gallops  ABDOMEN: Soft, Nontender, Nondistended; Bowel sounds present  EXTREMITIES:  2+ Peripheral Pulses, No clubbing, cyanosis, or edema  PSYCH: AAOx3  NEUROLOGY: non-focal  SKIN: No rashes or lesions                            11.6   5.72  )-----------( 205      ( 12 Jun 2023 06:49 )             37.2               138|102|26<89  4.2|25|0.83  9.3,--,--  06-12 @ 06:50      RADIOLOGY & ADDITIONAL TESTS:    Imaging Personally Reviewed:    Consultant(s) Notes Reviewed:      Care Discussed with Consultants/Other Providers:

## 2023-06-12 NOTE — DISCHARGE NOTE PROVIDER - NSDCMRMEDTOKEN_GEN_ALL_CORE_FT
atorvastatin 80 mg oral tablet: 1 tab(s) orally once a day (at bedtime)  benzonatate 100 mg oral capsule: 1 cap(s) orally every 8 hours  carbidopa-levodopa 25 mg-100 mg oral tablet: 1 tab(s) orally 3 times a day  docusate sodium 100 mg oral capsule: 1 cap(s) orally 2 times a day  finasteride 5 mg oral tablet: 1 tab(s) orally once a day  fluticasone 50 mcg/inh nasal spray: 1 spray(s) nasal 2 times a day  Januvia 100 mg oral tablet: 1 tab(s) orally once a day  Lasix 20 mg oral tablet: 1 tab(s) orally once a day, As Needed  lisinopril 10 mg oral tablet: 1 tab(s) orally once a day  multivitamin: 1 tab(s) orally once a day  pantoprazole 40 mg oral delayed release tablet: 1 tab(s) orally once a day (before a meal)  Potassium Chloride (Eqv-Klor-Con 10) 10 mEq oral tablet, extended release: 1 tab(s) orally once a day  rOPINIRole 0.5 mg oral tablet: 1 tab(s) orally 3 times a day  Senna 8.6 mg oral tablet: 1 tab(s) orally once a day (at bedtime), As Needed  sertraline 25 mg oral tablet: 1 tab(s) orally once a day  Symbicort 160 mcg-4.5 mcg/inh inhalation aerosol: 2 puff(s) inhaled 2 times a day  tamsulosin 0.4 mg oral capsule: 1 cap(s) orally once a day (at bedtime)  Tylenol 325 mg oral tablet:    ascorbic acid 500 mg oral tablet: 1 tab(s) orally once a day  atorvastatin 80 mg oral tablet: 1 tab(s) orally once a day (at bedtime)  carbidopa-levodopa 25 mg-100 mg oral tablet: 1 tab(s) orally 3 times a day  docusate sodium 100 mg oral capsule: 1 cap(s) orally 2 times a day  finasteride 5 mg oral tablet: 1 tab(s) orally once a day  fluticasone 50 mcg/inh nasal spray: 1 spray(s) nasal 2 times a day  Januvia 100 mg oral tablet: 1 tab(s) orally once a day  Lasix 20 mg oral tablet: 1 tab(s) orally once a day, As Needed  lisinopril 10 mg oral tablet: 1 tab(s) orally once a day  multivitamin: 1 tab(s) orally once a day  pantoprazole 40 mg oral delayed release tablet: 1 tab(s) orally once a day (before a meal)  Potassium Chloride (Eqv-Klor-Con 10) 10 mEq oral tablet, extended release: 1 tab(s) orally once a day  rOPINIRole 0.5 mg oral tablet: 1 tab(s) orally 3 times a day  Senna 8.6 mg oral tablet: 1 tab(s) orally once a day (at bedtime), As Needed  sertraline 25 mg oral tablet: 1 tab(s) orally once a day  Symbicort 160 mcg-4.5 mcg/inh inhalation aerosol: 2 puff(s) inhaled 2 times a day  tamsulosin 0.4 mg oral capsule: 1 cap(s) orally once a day (at bedtime)  Tylenol 325 mg oral tablet: 1 tab(s) orally every 6 hours as needed for  mild pain

## 2023-06-12 NOTE — PROGRESS NOTE ADULT - SUBJECTIVE AND OBJECTIVE BOX
Cardiovascular Disease Progress Note  Date of service: 06-12-23 @ 07:35    Overnight events: No acute events overnight.  Pt is in no distress.   Otherwise review of systems negative    Objective Findings:  T(C): 36.6 (06-12-23 @ 04:37), Max: 36.7 (06-11-23 @ 20:39)  HR: 55 (06-12-23 @ 04:37) (53 - 66)  BP: 133/51 (06-12-23 @ 04:37) (130/55 - 159/70)  RR: 18 (06-12-23 @ 04:37) (18 - 18)  SpO2: 95% (06-12-23 @ 04:37) (95% - 98%)  Wt(kg): --  Daily     Daily       Physical Exam:  Gen: NAD; Patient resting comfortably  HEENT: EOMI, Normocephalic/ atraumatic  CV: RRR, normal S1 + S2, no m/r/g  Lungs:  Normal respiratory effort; clear to auscultation bilaterally  Abd: soft, non-tender; bowel sounds present  Ext: No edema; warm and well perfused    Telemetry: Sinus     Laboratory Data:                        11.6   5.72  )-----------( 205      ( 12 Jun 2023 06:49 )             37.2     06-12    138  |  102  |  26<H>  ----------------------------<  89  4.2   |  25  |  0.83    Ca    9.3      12 Jun 2023 06:50                Inpatient Medications:  MEDICATIONS  (STANDING):  atorvastatin 80 milliGRAM(s) Oral at bedtime  budesonide 160 MICROgram(s)/formoterol 4.5 MICROgram(s) Inhaler 2 Puff(s) Inhalation two times a day  carbidopa/levodopa  25/100 1 Tablet(s) Oral three times a day  dextrose 5%. 1000 milliLiter(s) (100 mL/Hr) IV Continuous <Continuous>  dextrose 5%. 1000 milliLiter(s) (50 mL/Hr) IV Continuous <Continuous>  dextrose 50% Injectable 12.5 Gram(s) IV Push once  dextrose 50% Injectable 25 Gram(s) IV Push once  dextrose 50% Injectable 25 Gram(s) IV Push once  finasteride 5 milliGRAM(s) Oral daily  glucagon  Injectable 1 milliGRAM(s) IntraMuscular once  heparin   Injectable 5000 Unit(s) SubCutaneous every 12 hours  insulin lispro (ADMELOG) corrective regimen sliding scale   SubCutaneous three times a day before meals  lisinopril 10 milliGRAM(s) Oral daily  metoprolol tartrate 12.5 milliGRAM(s) Oral every 12 hours  pantoprazole    Tablet 40 milliGRAM(s) Oral before breakfast  rOPINIRole 0.5 milliGRAM(s) Oral three times a day  sertraline 25 milliGRAM(s) Oral daily  tamsulosin 0.4 milliGRAM(s) Oral at bedtime      Assessment:  89-year-old M with pmhx CAD s/p CABG, diabetes, hyperlipidemia hypertension Parkinson's disease p/w poor oral intake and Appetite x few weeks and new onset arrhythmia    Plan of Care:    #Afib RVR  - Upon further review of EKG , pt appears to have had an episode which broke with amiodarone  - No further episodes. Likely PAT  - Pt currently in sinus rhythm   - BP has improved as well   - At this time, will continue to monitor on Telemetry  - PT is not an ideal candidate for long term AC given history of Parkinsonism (pt is a high fall risk)  - Continue BB for rate control.   - If patient has another episode of afib RVR with hypotension, recommend Digoxin loading with 500mcg IV followed with 250mcg IV x2 q6h to complete 1mg load.   - TTE from 2022 shows a hyperdynamic LV with moderate AS and AR and mild to moderate MS. Unchanged from 2021. No need to repeat at this time as it will not .       #DM  ISS as per primary team    #HLD  - Statin as ordered    #HTN  - BP acceptable on current regimen          Over 25 minutes spent on total encounter; more than 50% of the visit was spent counseling and/or coordinating care by the attending physician.      Jayden De La Paz,  [facility]  Cardiovascular Disease  [phone] Cardiovascular Disease Progress Note  Date of service: 06-12-23 @ 07:35    Overnight events: No acute events overnight.  Pt is in no distress.   Otherwise review of systems negative    Objective Findings:  T(C): 36.6 (06-12-23 @ 04:37), Max: 36.7 (06-11-23 @ 20:39)  HR: 55 (06-12-23 @ 04:37) (53 - 66)  BP: 133/51 (06-12-23 @ 04:37) (130/55 - 159/70)  RR: 18 (06-12-23 @ 04:37) (18 - 18)  SpO2: 95% (06-12-23 @ 04:37) (95% - 98%)  Wt(kg): --  Daily     Daily       Physical Exam:  Gen: NAD; Patient resting comfortably  HEENT: EOMI, Normocephalic/ atraumatic  CV: RRR, normal S1 + S2, no m/r/g  Lungs:  Normal respiratory effort; clear to auscultation bilaterally  Abd: soft, non-tender; bowel sounds present  Ext: No edema; warm and well perfused    Telemetry: Sinus     Laboratory Data:                        11.6   5.72  )-----------( 205      ( 12 Jun 2023 06:49 )             37.2     06-12    138  |  102  |  26<H>  ----------------------------<  89  4.2   |  25  |  0.83    Ca    9.3      12 Jun 2023 06:50                Inpatient Medications:  MEDICATIONS  (STANDING):  atorvastatin 80 milliGRAM(s) Oral at bedtime  budesonide 160 MICROgram(s)/formoterol 4.5 MICROgram(s) Inhaler 2 Puff(s) Inhalation two times a day  carbidopa/levodopa  25/100 1 Tablet(s) Oral three times a day  dextrose 5%. 1000 milliLiter(s) (100 mL/Hr) IV Continuous <Continuous>  dextrose 5%. 1000 milliLiter(s) (50 mL/Hr) IV Continuous <Continuous>  dextrose 50% Injectable 12.5 Gram(s) IV Push once  dextrose 50% Injectable 25 Gram(s) IV Push once  dextrose 50% Injectable 25 Gram(s) IV Push once  finasteride 5 milliGRAM(s) Oral daily  glucagon  Injectable 1 milliGRAM(s) IntraMuscular once  heparin   Injectable 5000 Unit(s) SubCutaneous every 12 hours  insulin lispro (ADMELOG) corrective regimen sliding scale   SubCutaneous three times a day before meals  lisinopril 10 milliGRAM(s) Oral daily  metoprolol tartrate 12.5 milliGRAM(s) Oral every 12 hours  pantoprazole    Tablet 40 milliGRAM(s) Oral before breakfast  rOPINIRole 0.5 milliGRAM(s) Oral three times a day  sertraline 25 milliGRAM(s) Oral daily  tamsulosin 0.4 milliGRAM(s) Oral at bedtime      Assessment:  89-year-old M with pmhx CAD s/p CABG, diabetes, hyperlipidemia hypertension Parkinson's disease p/w poor oral intake and Appetite x few weeks and new onset arrhythmia    Plan of Care:    #Afib RVR  - Upon further review of EKG , pt appears to have had an episode which broke with amiodarone  - No further episodes. Likely PAT  - Pt currently in sinus rhythm, now with episodes of bradycardia  - BP has improved as well   - At this time, will continue to monitor on Telemetry  - PT is not an ideal candidate for long term AC given history of Parkinsonism (pt is a high fall risk)  - BB stopped 2/2 bradycardia.  - If patient has another episode of afib RVR with hypotension, recommend Digoxin loading with 500mcg IV followed with 250mcg IV x2 q6h to complete 1mg load.   - TTE from 2022 shows a hyperdynamic LV with moderate AS and AR and mild to moderate MS. Unchanged from 2021. No need to repeat at this time as it will not .       #DM  ISS as per primary team    #HLD  - Statin as ordered    #HTN  - BP acceptable on current regimen          Over 25 minutes spent on total encounter; more than 50% of the visit was spent counseling and/or coordinating care by the attending physician.      Jayden De La Paz DO [facility]  Cardiovascular Disease  [phone]

## 2023-06-12 NOTE — DISCHARGE NOTE PROVIDER - DETAILS OF MALNUTRITION DIAGNOSIS/DIAGNOSES
This patient has been assessed with a concern for Malnutrition and was treated during this hospitalization for the following Nutrition diagnosis/diagnoses:     -  06/09/2023: Severe protein-calorie malnutrition

## 2023-06-12 NOTE — DISCHARGE NOTE PROVIDER - CARE PROVIDER_API CALL
Shelly Sanchez)  Internal Medicine  [address] [place] [address]  Phone: [phone]  Fax: [phone]  Established Patient  Follow Up Time: 1 week

## 2023-06-13 LAB
GLUCOSE BLDC GLUCOMTR-MCNC: 102 MG/DL — HIGH (ref 70–99)
GLUCOSE BLDC GLUCOMTR-MCNC: 106 MG/DL — HIGH (ref 70–99)
GLUCOSE BLDC GLUCOMTR-MCNC: 116 MG/DL — HIGH (ref 70–99)
GLUCOSE BLDC GLUCOMTR-MCNC: 188 MG/DL — HIGH (ref 70–99)
SARS-COV-2 RNA SPEC QL NAA+PROBE: SIGNIFICANT CHANGE UP

## 2023-06-13 RX ORDER — ASCORBIC ACID 60 MG
500 TABLET,CHEWABLE ORAL DAILY
Refills: 0 | Status: DISCONTINUED | OUTPATIENT
Start: 2023-06-13 | End: 2023-06-14

## 2023-06-13 RX ADMIN — SERTRALINE 25 MILLIGRAM(S): 25 TABLET, FILM COATED ORAL at 12:52

## 2023-06-13 RX ADMIN — HEPARIN SODIUM 5000 UNIT(S): 5000 INJECTION INTRAVENOUS; SUBCUTANEOUS at 05:46

## 2023-06-13 RX ADMIN — TAMSULOSIN HYDROCHLORIDE 0.4 MILLIGRAM(S): 0.4 CAPSULE ORAL at 21:43

## 2023-06-13 RX ADMIN — BUDESONIDE AND FORMOTEROL FUMARATE DIHYDRATE 2 PUFF(S): 160; 4.5 AEROSOL RESPIRATORY (INHALATION) at 05:46

## 2023-06-13 RX ADMIN — LISINOPRIL 10 MILLIGRAM(S): 2.5 TABLET ORAL at 05:46

## 2023-06-13 RX ADMIN — ROPINIROLE 0.5 MILLIGRAM(S): 8 TABLET, FILM COATED, EXTENDED RELEASE ORAL at 12:51

## 2023-06-13 RX ADMIN — ROPINIROLE 0.5 MILLIGRAM(S): 8 TABLET, FILM COATED, EXTENDED RELEASE ORAL at 05:46

## 2023-06-13 RX ADMIN — HEPARIN SODIUM 5000 UNIT(S): 5000 INJECTION INTRAVENOUS; SUBCUTANEOUS at 17:41

## 2023-06-13 RX ADMIN — FINASTERIDE 5 MILLIGRAM(S): 5 TABLET, FILM COATED ORAL at 21:43

## 2023-06-13 RX ADMIN — CARBIDOPA AND LEVODOPA 1 TABLET(S): 25; 100 TABLET ORAL at 21:43

## 2023-06-13 RX ADMIN — CARBIDOPA AND LEVODOPA 1 TABLET(S): 25; 100 TABLET ORAL at 12:51

## 2023-06-13 RX ADMIN — CARBIDOPA AND LEVODOPA 1 TABLET(S): 25; 100 TABLET ORAL at 05:46

## 2023-06-13 RX ADMIN — ROPINIROLE 0.5 MILLIGRAM(S): 8 TABLET, FILM COATED, EXTENDED RELEASE ORAL at 21:43

## 2023-06-13 RX ADMIN — ATORVASTATIN CALCIUM 80 MILLIGRAM(S): 80 TABLET, FILM COATED ORAL at 21:43

## 2023-06-13 RX ADMIN — PANTOPRAZOLE SODIUM 40 MILLIGRAM(S): 20 TABLET, DELAYED RELEASE ORAL at 05:46

## 2023-06-13 RX ADMIN — BUDESONIDE AND FORMOTEROL FUMARATE DIHYDRATE 2 PUFF(S): 160; 4.5 AEROSOL RESPIRATORY (INHALATION) at 17:41

## 2023-06-13 NOTE — PROGRESS NOTE ADULT - SUBJECTIVE AND OBJECTIVE BOX
Patient is a 89y old  Male who presents with a chief complaint of Failure to thrive in adult     (09 Jun 2023 13:49)      SUBJECTIVE / OVERNIGHT EVENTS: no events        T(C): 36.7 (06-13-23 @ 20:16), Max: 36.7 (06-13-23 @ 20:16)  HR: 70 (06-13-23 @ 20:16) (70 - 71)  BP: 161/74 (06-13-23 @ 20:16) (101/56 - 161/74)  RR: 18 (06-13-23 @ 20:16) (18 - 18)  SpO2: 98% (06-13-23 @ 20:16) (98% - 98%)      MEDICATIONS  (STANDING):  ascorbic acid 500 milliGRAM(s) Oral daily  atorvastatin 80 milliGRAM(s) Oral at bedtime  budesonide 160 MICROgram(s)/formoterol 4.5 MICROgram(s) Inhaler 2 Puff(s) Inhalation two times a day  carbidopa/levodopa  25/100 1 Tablet(s) Oral three times a day  dextrose 5%. 1000 milliLiter(s) (100 mL/Hr) IV Continuous <Continuous>  dextrose 5%. 1000 milliLiter(s) (50 mL/Hr) IV Continuous <Continuous>  dextrose 50% Injectable 12.5 Gram(s) IV Push once  dextrose 50% Injectable 25 Gram(s) IV Push once  dextrose 50% Injectable 25 Gram(s) IV Push once  finasteride 5 milliGRAM(s) Oral daily  glucagon  Injectable 1 milliGRAM(s) IntraMuscular once  heparin   Injectable 5000 Unit(s) SubCutaneous every 12 hours  insulin lispro (ADMELOG) corrective regimen sliding scale   SubCutaneous three times a day before meals  lisinopril 10 milliGRAM(s) Oral daily  multivitamin 1 Tablet(s) Oral daily  pantoprazole    Tablet 40 milliGRAM(s) Oral before breakfast  rOPINIRole 0.5 milliGRAM(s) Oral three times a day  sertraline 25 milliGRAM(s) Oral daily  tamsulosin 0.4 milliGRAM(s) Oral at bedtime    MEDICATIONS  (PRN):  acetaminophen     Tablet .. 650 milliGRAM(s) Oral every 6 hours PRN Mild Pain (1 - 3)  dextrose Oral Gel 15 Gram(s) Oral once PRN Blood Glucose LESS THAN 70 milliGRAM(s)/deciliter    PHYSICAL EXAM:  GENERAL: NAD, well-developed  HEAD:  Atraumatic, Normocephalic  EYES: EOMI, PERRLA, conjunctiva and sclera clear  NECK: Supple, No JVD  CHEST/LUNG: Clear to auscultation bilaterally; No wheeze  HEART: Regular rate and rhythm; No murmurs, rubs, or gallops  ABDOMEN: Soft, Nontender, Nondistended; Bowel sounds present  EXTREMITIES:  2+ Peripheral Pulses, No clubbing, cyanosis, or edema  PSYCH: AAOx3  NEUROLOGY: non-focal  SKIN: No rashes or lesions                            11.6   5.72  )-----------( 205      ( 12 Jun 2023 06:49 )             37.2                 138|102|26<89  4.2|25|0.83  9.3,--,--  06-12 @ 06:50      RADIOLOGY & ADDITIONAL TESTS:    Imaging Personally Reviewed:    Consultant(s) Notes Reviewed:      Care Discussed with Consultants/Other Providers:

## 2023-06-13 NOTE — CHART NOTE - NSCHARTNOTEFT_GEN_A_CORE
Nutrition Follow Up Note  Patient seen for: malnutrition follow up on [facility]    Chart reviewed, events noted.    FTT, PD, UTI, RRT 6/9, AF RVR, p GOC    Patient remains acute, transferred from [facility] to [facility]  with AFIB, on telemetry monitoring, with P/T evaluation and recommendation for  MARCIN.     Source: [x] Patient       [x] EMR        [] RN        [x] Family at bedside       [] Other:    -If unable to interview patient: [] Trach/Vent/BiPAP  [] Disoriented/confused/inappropriate to interview    Diet Order:   Diet, Regular:   Kosher  Supplement Feeding Modality:  Oral  Ensure Plus High Protein Cans or Servings Per Day:  1       Frequency:  Two Times a day (06-09-23)    - Is current order appropriate/adequate? [] Yes  [x]  No: pt with history of diabetes and pt is concerned about Ensure being appropriate    - PO intake meals :   [] >75%  Adequate    [] 50-75%  Fair       [x] <50%  Poor  - PO intake of supplements if pt receiving: [x]>75% []50% []25%   as per   []flow sheet  [x]patient  [x]family/aide  []PCA  []Nurse  []RD observation    - Nutrition-related concerns: FTT, malnutrition    pt seen by SLP [x]Yes []No    GI:  Last BM _6/10__.   Bowel Regimen? [] Yes   [x] No        Nutritionally Pertinent MEDICATIONS  (STANDING):  atorvastatin  dextrose 5%.  dextrose 5%.  dextrose 50% Injectable  dextrose 50% Injectable  dextrose 50% Injectable  finasteride  glucagon  Injectable  insulin lispro (ADMELOG) corrective regimen sliding scale  lisinopril  pantoprazole    Tablet    Pertinent Labs:   A1C with Estimated Average Glucose Result: 6.0 % (06-09-23 @ 07:07)    Finger Sticks:  POCT Blood Glucose.: 106 mg/dL (06-13 @ 09:03)  POCT Blood Glucose.: 96 mg/dL (06-12 @ 21:31)  POCT Blood Glucose.: 117 mg/dL (06-12 @ 17:20)  POCT Blood Glucose.: 102 mg/dL (06-12 @ 12:46)      Pressure Injuries as per nursing documentation: suspected DTI on sacrum, suspected DTI on left heel, stage 1 on left heel  Edema: none    Estimated Needs:   [x] no change since previous assessment  [] recalculated:     Weight used for calculations	dosing weight on 6/9  Estimated Energy Needs Weight (lbs)	115 lb  Estimated Energy Needs Weight (kg)	52.2 kg  Estimated Energy Needs From (leila/kg)	30  Estimated Energy Needs To (leila/kg)	35  Estimated Energy Needs Calculated From (leila/kg)	1566  Estimated Energy Needs Calculated To (leila/kg)	1827  Weight used for calculations	dosing weight  Estimated Protein Needs Weight (lbs)	115 lb  Estimated Protein Needs Weight (kg)	52.1 kg  Estimated Protein Needs From (g/kg)	1.2  Estimated Protein Needs To (g/kg)	1.4  Estimated Protein Needs Calculated From (g/kg)	62.52  Estimated Protein Needs Calculated To (g/kg)	72.94      Previous Nutrition Diagnosis: severe malnutrition, increased nutrient needs  Nutrition Diagnosis is: [x] ongoing  [] resolved [] not applicable     New Nutrition Diagnosis: [x] Not applicable    Nutrition Care Plan:  [] In Progress  [x] Achieved  [] Not applicable    Nutrition Interventions:     Education Provided:       [x] Yes:  [] No: spoke with daughter   pt was educated on the importance of supplements to increase calorie and protein intake in light of RD's nutritional findings [x]Yes []N/A         Recommendations:         [x] Continue current diet order     [] Change diet to:      [] continue oral nutrition supplement:     [x] change oral nutrition supplement: discontinue Ensure Plus High Protein x 2 daily, recommend GLucerna x 2 daily, Juan Luis x 2 daily        [x] Add micronutrient supplementation: multivitamin and Vit C daily     [] Continue current micronutrient supplementation:        [x] Provider made aware of recommendations     [] Needed to escalate to provider     []Placed sticker (malnutrition/BMI/underweight)     []Recommend swallow evaluation     [] monitor need for diet ed reinforcement     [] Other:     Monitoring and Evaluation:   Continue to monitor nutritional intake, tolerance to diet prescription, weights, labs, skin integrity      RD remains available upon request and will follow up per protocol  Maria Isabel Raymundo MA, RD, CDN #[id number]/TEAMS Nutrition Follow Up Note  Patient seen for: malnutrition follow up on [facility]    Chart reviewed, events noted.    FTT, PD, UTI, RRT 6/9, AF RVR, p GOC    Patient remains acute, transferred from [facility] to [facility]  with AFIB, on telemetry monitoring, with P/T evaluation and recommendation for  MARCIN.     Source: [x] Patient       [x] EMR        [] RN        [x] Family at bedside       [] Other:    -If unable to interview patient: [] Trach/Vent/BiPAP  [] Disoriented/confused/inappropriate to interview    Diet Order:   Diet, Regular:   Kosher  Supplement Feeding Modality:  Oral  Ensure Plus High Protein Cans or Servings Per Day:  1       Frequency:  Two Times a day (06-09-23)    - Is current order appropriate/adequate? [] Yes  [x]  No: pt with history of diabetes and pt is concerned about Ensure being appropriate    - PO intake meals :   [] >75%  Adequate    [] 50-75%  Fair       [x] <50%  Poor  - PO intake of supplements if pt receiving: [x]>75% []50% []25%   as per   []flow sheet  [x]patient  [x]family/aide  []PCA  []Nurse  []RD observation    - Nutrition-related concerns: FTT, malnutrition    pt seen by SLP [x]Yes []No    GI:  Last BM _6/10__.   Bowel Regimen? [] Yes   [x] No        Nutritionally Pertinent MEDICATIONS  (STANDING):  atorvastatin  dextrose 5%.  dextrose 5%.  dextrose 50% Injectable  dextrose 50% Injectable  dextrose 50% Injectable  finasteride  glucagon  Injectable  insulin lispro (ADMELOG) corrective regimen sliding scale  lisinopril  pantoprazole    Tablet    Pertinent Labs:   A1C with Estimated Average Glucose Result: 6.0 % (06-09-23 @ 07:07)    Finger Sticks:  POCT Blood Glucose.: 106 mg/dL (06-13 @ 09:03)  POCT Blood Glucose.: 96 mg/dL (06-12 @ 21:31)  POCT Blood Glucose.: 117 mg/dL (06-12 @ 17:20)  POCT Blood Glucose.: 102 mg/dL (06-12 @ 12:46)      Pressure Injuries as per nursing documentation: suspected DTI on sacrum, suspected DTI on left heel, stage 1 on left heel  Edema: none    Estimated Needs:   [x] no change since previous assessment  [] recalculated:     Weight used for calculations	dosing weight on 6/9  Estimated Energy Needs Weight (lbs)	115 lb  Estimated Energy Needs Weight (kg)	52.2 kg  Estimated Energy Needs From (leila/kg)	30  Estimated Energy Needs To (leila/kg)	35  Estimated Energy Needs Calculated From (leila/kg)	1566  Estimated Energy Needs Calculated To (leila/kg)	1827  Weight used for calculations	dosing weight  Estimated Protein Needs Weight (lbs)	115 lb  Estimated Protein Needs Weight (kg)	52.1 kg  Estimated Protein Needs From (g/kg)	1.2  Estimated Protein Needs To (g/kg)	1.4  Estimated Protein Needs Calculated From (g/kg)	62.52  Estimated Protein Needs Calculated To (g/kg)	72.94      Previous Nutrition Diagnosis: severe malnutrition, increased nutrient needs  Nutrition Diagnosis is: [x] ongoing  [] resolved [] not applicable     New Nutrition Diagnosis: [x] Not applicable    Nutrition Care Plan:  [] In Progress  [x] Achieved  [] Not applicable    Nutrition Interventions:     Education Provided:       [x] Yes:  [] No: spoke with daughter   pt was educated on the importance of supplements to increase calorie and protein intake in light of RD's nutritional findings [x]Yes []N/A         Recommendations:         [x] Continue current diet order: keep diet liberalized from Consistent Carbohydrate due to poor intake     [] Change diet to:      [] continue oral nutrition supplement:     [x] change oral nutrition supplement: discontinue Ensure Plus High Protein x 2 daily, recommend Glucerna x 2 daily, Juan Luis x 2 daily        [x] Add micronutrient supplementation: multivitamin and Vit C daily     [] Continue current micronutrient supplementation:        [x] Provider made aware of recommendations     [] Needed to escalate to provider     []Placed sticker (malnutrition/BMI/underweight)     []Recommend swallow evaluation     [] monitor need for diet ed reinforcement     [] Other:     Monitoring and Evaluation:   Continue to monitor nutritional intake, tolerance to diet prescription, weights, labs, skin integrity      RD remains available upon request and will follow up per protocol  Maria Isabel Raymundo MA, RD, CDN #[phone]/TEAMS

## 2023-06-13 NOTE — PROGRESS NOTE ADULT - SUBJECTIVE AND OBJECTIVE BOX
Cardiovascular Disease Progress Note  Date of service: 06-13-23 @ 08:11    Overnight events: No acute events overnight.  Pt is in no distress.   Otherwise review of systems negative    Objective Findings:  T(C): 36.4 (06-13-23 @ 04:28), Max: 36.4 (06-12-23 @ 11:20)  HR: 62 (06-13-23 @ 04:28) (56 - 62)  BP: 130/65 (06-13-23 @ 04:28) (109/61 - 180/63)  RR: 18 (06-13-23 @ 04:28) (18 - 18)  SpO2: 99% (06-13-23 @ 04:28) (97% - 99%)  Wt(kg): --  Daily     Daily       Physical Exam:  Gen: NAD; Patient resting comfortably  HEENT: EOMI, Normocephalic/ atraumatic  CV: RRR, normal S1 + S2, no m/r/g  Lungs:  Normal respiratory effort; clear to auscultation bilaterally  Abd: soft, non-tender; bowel sounds present  Ext: No edema; warm and well perfused    Telemetry: Sinus     Laboratory Data:                        11.6   5.72  )-----------( 205      ( 12 Jun 2023 06:49 )             37.2     06-12    138  |  102  |  26<H>  ----------------------------<  89  4.2   |  25  |  0.83    Ca    9.3      12 Jun 2023 06:50                Inpatient Medications:  MEDICATIONS  (STANDING):  atorvastatin 80 milliGRAM(s) Oral at bedtime  budesonide 160 MICROgram(s)/formoterol 4.5 MICROgram(s) Inhaler 2 Puff(s) Inhalation two times a day  carbidopa/levodopa  25/100 1 Tablet(s) Oral three times a day  dextrose 5%. 1000 milliLiter(s) (50 mL/Hr) IV Continuous <Continuous>  dextrose 5%. 1000 milliLiter(s) (100 mL/Hr) IV Continuous <Continuous>  dextrose 50% Injectable 12.5 Gram(s) IV Push once  dextrose 50% Injectable 25 Gram(s) IV Push once  dextrose 50% Injectable 25 Gram(s) IV Push once  finasteride 5 milliGRAM(s) Oral daily  glucagon  Injectable 1 milliGRAM(s) IntraMuscular once  heparin   Injectable 5000 Unit(s) SubCutaneous every 12 hours  insulin lispro (ADMELOG) corrective regimen sliding scale   SubCutaneous three times a day before meals  lisinopril 10 milliGRAM(s) Oral daily  pantoprazole    Tablet 40 milliGRAM(s) Oral before breakfast  rOPINIRole 0.5 milliGRAM(s) Oral three times a day  sertraline 25 milliGRAM(s) Oral daily  tamsulosin 0.4 milliGRAM(s) Oral at bedtime      Assessment:   89-year-old M with pmhx CAD s/p CABG, diabetes, hyperlipidemia hypertension Parkinson's disease p/w poor oral intake and Appetite x few weeks and new onset arrhythmia    Plan of Care:    #Afib RVR  - No further episodes. Likely PAT  - Pt currently in sinus rhythm, now with episodes of bradycardia  - BP has improved as well   - PT is not an ideal candidate for long term AC given history of Parkinsonism (pt is a high fall risk)  - BB stopped 2/2 bradycardia.  - If patient has another episode of afib RVR with hypotension, recommend Digoxin loading with 500mcg IV followed with 250mcg IV x2 q6h to complete 1mg load.   - TTE from 2022 shows a hyperdynamic LV with moderate AS and AR and mild to moderate MS. Unchanged from 2021. No need to repeat at this time as it will not .       #DM  ISS as per primary team    #HLD  - Statin as ordered    #HTN  - Pt with labile htn.   - BP acceptable on current regimen          Over 25 minutes spent on total encounter; more than 50% of the visit was spent counseling and/or coordinating care by the attending physician.      Jayden De La Paz,  [facility]  Cardiovascular Disease  [phone]

## 2023-06-13 NOTE — PROGRESS NOTE ADULT - NUTRITIONAL ASSESSMENT
This patient has been assessed with a concern for Malnutrition and has been determined to have a diagnosis/diagnoses of Severe protein-calorie malnutrition.    This patient is being managed with:   Diet Regular-  Kosher  Supplement Feeding Modality:  Oral  Ensure Plus High Protein Cans or Servings Per Day:  1       Frequency:  Two Times a day  Entered: Jun 9 2023  2:54PM  
This patient has been assessed with a concern for Malnutrition and has been determined to have a diagnosis/diagnoses of Severe protein-calorie malnutrition.    This patient is being managed with:   Diet Regular-  Kosher  Supplement Feeding Modality:  Oral  Ensure Plus High Protein Cans or Servings Per Day:  1       Frequency:  Two Times a day  Entered: Jun 9 2023  2:54PM  
This patient has been assessed with a concern for Malnutrition and has been determined to have a diagnosis/diagnoses of Severe protein-calorie malnutrition.    This patient is being managed with:   Diet Regular-  Kosher  Juan Luis(7 Gm Arginine/7 Gm Glut/1.2 Gm HMB     Qty per Day:  2  Supplement Feeding Modality:  Oral  Glucerna Shake Cans or Servings Per Day:  2       Frequency:  Daily  Entered: Jun 13 2023  1:41PM  
This patient has been assessed with a concern for Malnutrition and has been determined to have a diagnosis/diagnoses of Severe protein-calorie malnutrition.    This patient is being managed with:   Diet Regular-  Kosher  Supplement Feeding Modality:  Oral  Ensure Plus High Protein Cans or Servings Per Day:  1       Frequency:  Two Times a day  Entered: Jun 9 2023  2:54PM  

## 2023-06-13 NOTE — PROGRESS NOTE ADULT - ASSESSMENT
89-year-old M with pmhx CAD, diabetes hyperlipidemia hypertension Parkinson's disease p/w FTT       #  Afib with RVR Metorpolol iv   Cards consulted   Echo     # FTT Encourage po intake   Nutrition consult   Speech swallow eval appreciated   Regular diet Thin liquids   PT eval     #CAD Continue with Aspirin     #Parkinsons Disease Continue with Sinemet     #DM HISS   Follow up A1C   Daughter agreeing for MARCIN placement       #BPH Continue with Flomax and Finasteride     Congenital Hypospadias Urology conuslted 
89-year-old M with pmhx CAD, diabetes hyperlipidemia hypertension Parkinson's disease p/w FTT       # FTT Encourage po intake   Nutrition consult   Speech swallow eval   PT eval     #CAD Continue with Aspirin     #Parkinsons Disease Continue with Sinemet     #DM HISS   Follow up A1C     #BPH Continue with Flomax and Finasteride     Congenital Hypospadias Urology conuslted 
89 yo male with congenital hypospadias.  Normal anatomical variant  Dw RN condom cath will not capture urine due to location of meatus. 
89-year-old M with pmhx CAD, diabetes hyperlipidemia hypertension Parkinson's disease p/w FTT       #  Afib with RVR s/p Amio   Cards consulted   Echo 2022 reviewed     # FTT Encourage po intake   Nutrition consult   Speech swallow eval appreciated   Regular diet Thin liquids   PT eval MARCIN     #CAD Continue with Aspirin     #Parkinsons Disease Continue with Sinemet     #DM HISS   Follow up A1C   Daughter agreeing for MARCIN placement       #BPH Continue with Flomax and Finasteride     Congenital Hypospadias Urology conuslted 
89-year-old M with pmhx CAD, diabetes hyperlipidemia hypertension Parkinson's disease p/w FTT       #  Afib with RVR Metorpolol iv   Cards consulted     # FTT Encourage po intake   Nutrition consult   Speech swallow eval appreciated   Regular diet Thin liquids   PT eval     #CAD Continue with Aspirin     #Parkinsons Disease Continue with Sinemet     #DM HISS   Follow up A1C     #BPH Continue with Flomax and Finasteride     Congenital Hypospadias Urology conuslted 
89-year-old M with pmhx CAD, diabetes hyperlipidemia hypertension Parkinson's disease p/w FTT       #  Afib with RVR s/p Amio   Cards consulted   Echo 2022 reviewed     # FTT Encourage po intake   Nutrition consult   Speech swallow eval appreciated   Regular diet Thin liquids   PT eval MARCIN     #CAD Continue with Aspirin     #Parkinsons Disease Continue with Sinemet     #DM HISS   Follow up A1C   Daughter agreeing for MARCIN placement       #BPH Continue with Flomax and Finasteride     Congenital Hypospadias Urology conuslted

## 2023-06-13 NOTE — CHART NOTE - NSCHARTNOTEFT_GEN_A_CORE
Spoke with primary team plan for MARCIN. Palliative care will sign off. please reconsult if necessary

## 2023-06-14 ENCOUNTER — TRANSCRIPTION ENCOUNTER (OUTPATIENT)
Age: 88
End: 2023-06-14

## 2023-06-14 VITALS
TEMPERATURE: 98 F | HEART RATE: 61 BPM | RESPIRATION RATE: 19 BRPM | OXYGEN SATURATION: 98 % | SYSTOLIC BLOOD PRESSURE: 121 MMHG | DIASTOLIC BLOOD PRESSURE: 61 MMHG

## 2023-06-14 LAB
GLUCOSE BLDC GLUCOMTR-MCNC: 117 MG/DL — HIGH (ref 70–99)
GLUCOSE BLDC GLUCOMTR-MCNC: 128 MG/DL — HIGH (ref 70–99)
GLUCOSE BLDC GLUCOMTR-MCNC: 172 MG/DL — HIGH (ref 70–99)

## 2023-06-14 PROCEDURE — 81001 URINALYSIS AUTO W/SCOPE: CPT

## 2023-06-14 PROCEDURE — 83735 ASSAY OF MAGNESIUM: CPT

## 2023-06-14 PROCEDURE — 82435 ASSAY OF BLOOD CHLORIDE: CPT

## 2023-06-14 PROCEDURE — 99285 EMERGENCY DEPT VISIT HI MDM: CPT | Mod: 25

## 2023-06-14 PROCEDURE — 71045 X-RAY EXAM CHEST 1 VIEW: CPT

## 2023-06-14 PROCEDURE — 70450 CT HEAD/BRAIN W/O DYE: CPT | Mod: MA

## 2023-06-14 PROCEDURE — 84295 ASSAY OF SERUM SODIUM: CPT

## 2023-06-14 PROCEDURE — 84443 ASSAY THYROID STIM HORMONE: CPT

## 2023-06-14 PROCEDURE — 96374 THER/PROPH/DIAG INJ IV PUSH: CPT

## 2023-06-14 PROCEDURE — 92610 EVALUATE SWALLOWING FUNCTION: CPT

## 2023-06-14 PROCEDURE — 82550 ASSAY OF CK (CPK): CPT

## 2023-06-14 PROCEDURE — 97162 PT EVAL MOD COMPLEX 30 MIN: CPT

## 2023-06-14 PROCEDURE — 84132 ASSAY OF SERUM POTASSIUM: CPT

## 2023-06-14 PROCEDURE — 82962 GLUCOSE BLOOD TEST: CPT

## 2023-06-14 PROCEDURE — 97530 THERAPEUTIC ACTIVITIES: CPT

## 2023-06-14 PROCEDURE — 87635 SARS-COV-2 COVID-19 AMP PRB: CPT

## 2023-06-14 PROCEDURE — 93005 ELECTROCARDIOGRAM TRACING: CPT

## 2023-06-14 PROCEDURE — 82947 ASSAY GLUCOSE BLOOD QUANT: CPT

## 2023-06-14 PROCEDURE — 96361 HYDRATE IV INFUSION ADD-ON: CPT

## 2023-06-14 PROCEDURE — 85027 COMPLETE CBC AUTOMATED: CPT

## 2023-06-14 PROCEDURE — 85018 HEMOGLOBIN: CPT

## 2023-06-14 PROCEDURE — 84484 ASSAY OF TROPONIN QUANT: CPT

## 2023-06-14 PROCEDURE — 83036 HEMOGLOBIN GLYCOSYLATED A1C: CPT

## 2023-06-14 PROCEDURE — 82553 CREATINE MB FRACTION: CPT

## 2023-06-14 PROCEDURE — 97116 GAIT TRAINING THERAPY: CPT

## 2023-06-14 PROCEDURE — 36415 COLL VENOUS BLD VENIPUNCTURE: CPT

## 2023-06-14 PROCEDURE — 83605 ASSAY OF LACTIC ACID: CPT

## 2023-06-14 PROCEDURE — 80053 COMPREHEN METABOLIC PANEL: CPT

## 2023-06-14 PROCEDURE — 87086 URINE CULTURE/COLONY COUNT: CPT

## 2023-06-14 PROCEDURE — 85025 COMPLETE CBC W/AUTO DIFF WBC: CPT

## 2023-06-14 PROCEDURE — 82803 BLOOD GASES ANY COMBINATION: CPT

## 2023-06-14 PROCEDURE — 85014 HEMATOCRIT: CPT

## 2023-06-14 PROCEDURE — 80048 BASIC METABOLIC PNL TOTAL CA: CPT

## 2023-06-14 PROCEDURE — 84100 ASSAY OF PHOSPHORUS: CPT

## 2023-06-14 PROCEDURE — 94640 AIRWAY INHALATION TREATMENT: CPT

## 2023-06-14 PROCEDURE — 82330 ASSAY OF CALCIUM: CPT

## 2023-06-14 RX ORDER — ACETAMINOPHEN 500 MG
1 TABLET ORAL
Qty: 0 | Refills: 0 | DISCHARGE

## 2023-06-14 RX ORDER — ASCORBIC ACID 60 MG
1 TABLET,CHEWABLE ORAL
Qty: 0 | Refills: 0 | DISCHARGE
Start: 2023-06-14

## 2023-06-14 RX ADMIN — LISINOPRIL 10 MILLIGRAM(S): 2.5 TABLET ORAL at 06:42

## 2023-06-14 RX ADMIN — PANTOPRAZOLE SODIUM 40 MILLIGRAM(S): 20 TABLET, DELAYED RELEASE ORAL at 06:42

## 2023-06-14 RX ADMIN — Medication 2: at 13:58

## 2023-06-14 RX ADMIN — BUDESONIDE AND FORMOTEROL FUMARATE DIHYDRATE 2 PUFF(S): 160; 4.5 AEROSOL RESPIRATORY (INHALATION) at 06:42

## 2023-06-14 RX ADMIN — CARBIDOPA AND LEVODOPA 1 TABLET(S): 25; 100 TABLET ORAL at 06:42

## 2023-06-14 RX ADMIN — Medication 500 MILLIGRAM(S): at 12:48

## 2023-06-14 RX ADMIN — ROPINIROLE 0.5 MILLIGRAM(S): 8 TABLET, FILM COATED, EXTENDED RELEASE ORAL at 06:42

## 2023-06-14 RX ADMIN — HEPARIN SODIUM 5000 UNIT(S): 5000 INJECTION INTRAVENOUS; SUBCUTANEOUS at 06:42

## 2023-06-14 RX ADMIN — Medication 1 TABLET(S): at 12:48

## 2023-06-14 NOTE — PROGRESS NOTE ADULT - REASON FOR ADMISSION
Poor oral intake x few weeks

## 2023-06-14 NOTE — DISCHARGE NOTE NURSING/CASE MANAGEMENT/SOCIAL WORK - NSDCPEFALRISK_GEN_ALL_CORE
For information on Fall & Injury Prevention, visit: [email or web address] OR  [email or web address] OR  https://www.cdc.gov/steadi/patient.html

## 2023-06-14 NOTE — DISCHARGE NOTE NURSING/CASE MANAGEMENT/SOCIAL WORK - PATIENT PORTAL LINK FT
You can access the FollowMyHealth Patient Portal offered by [facility] by registering at the following website: [email or web address]. By joining [email or web address]’s FollowMyHealth portal, you will also be able to view your health information using other applications (apps) compatible with our system.

## 2023-06-14 NOTE — PROGRESS NOTE ADULT - SUBJECTIVE AND OBJECTIVE BOX
Cardiovascular Disease Progress Note  Date of service: 23 @ 08:44    Overnight events: No acute events overnight.  Pt is in no distress.   Otherwise review of systems negative    Objective Findings:  T(C): 36.8 (23 @ 07:48), Max: 36.8 (23 @ 07:48)  HR: 63 (23 @ 07:48) (63 - 71)  BP: 116/61 (23 @ 07:48) (101/56 - 161/74)  RR: 19 (23 @ 07:48) (18 - 19)  SpO2: 97% (23 @ 07:48) (95% - 98%)  Wt(kg): --  Daily     Daily Weight in k.4 (2023 04:00)      Physical Exam:  Gen: NAD; Patient resting comfortably  HEENT: EOMI, Normocephalic/ atraumatic  CV: RRR, normal S1 + S2, no m/r/g  Lungs:  Normal respiratory effort; clear to auscultation bilaterally  Abd: soft, non-tender; bowel sounds present  Ext: No edema; warm and well perfused    Telemetry: sinus     Laboratory Data:                    Inpatient Medications:  MEDICATIONS  (STANDING):  ascorbic acid 500 milliGRAM(s) Oral daily  atorvastatin 80 milliGRAM(s) Oral at bedtime  budesonide 160 MICROgram(s)/formoterol 4.5 MICROgram(s) Inhaler 2 Puff(s) Inhalation two times a day  carbidopa/levodopa  25/100 1 Tablet(s) Oral three times a day  dextrose 5%. 1000 milliLiter(s) (50 mL/Hr) IV Continuous <Continuous>  dextrose 5%. 1000 milliLiter(s) (100 mL/Hr) IV Continuous <Continuous>  dextrose 50% Injectable 12.5 Gram(s) IV Push once  dextrose 50% Injectable 25 Gram(s) IV Push once  dextrose 50% Injectable 25 Gram(s) IV Push once  finasteride 5 milliGRAM(s) Oral daily  glucagon  Injectable 1 milliGRAM(s) IntraMuscular once  heparin   Injectable 5000 Unit(s) SubCutaneous every 12 hours  insulin lispro (ADMELOG) corrective regimen sliding scale   SubCutaneous three times a day before meals  lisinopril 10 milliGRAM(s) Oral daily  multivitamin 1 Tablet(s) Oral daily  pantoprazole    Tablet 40 milliGRAM(s) Oral before breakfast  rOPINIRole 0.5 milliGRAM(s) Oral three times a day  sertraline 25 milliGRAM(s) Oral daily  tamsulosin 0.4 milliGRAM(s) Oral at bedtime      Assessment: 89-year-old M with pmhx CAD s/p CABG, diabetes, hyperlipidemia hypertension Parkinson's disease p/w poor oral intake and Appetite x few weeks and new onset arrhythmia    Plan of Care:    #Afib RVR  - No further episodes. Likely PAT  - Pt currently in sinus rhythm, now with episodes of bradycardia  - BP has improved as well   - PT is not an ideal candidate for long term AC given history of Parkinsonism (pt is a high fall risk)  - BB stopped 2/2 bradycardia.  - If patient has another episode of afib RVR with hypotension, recommend Digoxin loading with 500mcg IV followed with 250mcg IV x2 q6h to complete 1mg load.   - TTE from  shows a hyperdynamic LV with moderate AS and AR and mild to moderate MS. Unchanged from . No need to repeat at this time as it will not .       #DM  ISS as per primary team    #HLD  - Statin as ordered    #HTN  - Pt with labile htn.   - BP acceptable on current regimen    Remaining management as per primary team    #ACP (advance care planning)-  Advanced care planning was discussed. Risks, benefits and alternatives of medical treatment and procedures were addressed. 30 additional minutes spent addressing advance care plans.        Over 25 minutes spent on total encounter; more than 50% of the visit was spent counseling and/or coordinating care by the attending physician.      Jayden De La Paz DO [facility]  Cardiovascular Disease  [phone]

## 2023-06-14 NOTE — PROGRESS NOTE ADULT - PROVIDER SPECIALTY LIST ADULT
Cardiology
Cardiology
Hospitalist
Cardiology
Cardiology
Hospitalist
Urology
Hospitalist
Hospitalist

## 2023-09-07 NOTE — ED ADULT NURSE NOTE - NSSEPSISNEWALTERMENTAL_ED_A_ED
September 8, 2023      Benny Carrillo  [address] [address]        Dear ,    We are writing to inform you of your test results.    Your complete blood count results were fine.  No evidence for anemia, etc.    Your kidney function tests (i.e. Basic Metabolic Panel) were fine as well.  Ensure adequate hydration.    Resulted Orders   CBC with platelets   Result Value Ref Range    WBC Count 8.2 4.0 - 11.0 10e3/uL    RBC Count 4.16 (L) 4.40 - 5.90 10e6/uL    Hemoglobin 13.6 13.3 - 17.7 g/dL    Hematocrit 43.0 40.0 - 53.0 %     (H) 78 - 100 fL    MCH 32.7 26.5 - 33.0 pg    MCHC 31.6 31.5 - 36.5 g/dL    RDW 12.9 10.0 - 15.0 %    Platelet Count 224 150 - 450 10e3/uL   Basic metabolic panel   Result Value Ref Range    Sodium 140 136 - 145 mmol/L    Potassium 4.4 3.4 - 5.3 mmol/L    Chloride 102 98 - 107 mmol/L    Carbon Dioxide (CO2) 29 22 - 29 mmol/L    Anion Gap 9 7 - 15 mmol/L    Urea Nitrogen 9.4 8.0 - 23.0 mg/dL    Creatinine 1.02 0.67 - 1.17 mg/dL    Calcium 8.6 8.2 - 9.6 mg/dL    Glucose 118 (H) 70 - 99 mg/dL    GFR Estimate 65 >60 mL/min/1.73m2       If you have any questions or concerns, please call the clinic at the number listed above.       Sincerely,      Jb Martínez MD             No

## 2023-09-18 NOTE — ED ADULT NURSE NOTE - NSIMPLEMENTINTERV_GEN_ALL_ED
johanne Implemented All Fall with Harm Risk Interventions:  Dania to call system. Call bell, personal items and telephone within reach. Instruct patient to call for assistance. Room bathroom lighting operational. Non-slip footwear when patient is off stretcher. Physically safe environment: no spills, clutter or unnecessary equipment. Stretcher in lowest position, wheels locked, appropriate side rails in place. Provide visual cue, wrist band, yellow gown, etc. Monitor gait and stability. Monitor for mental status changes and reorient to person, place, and time. Review medications for side effects contributing to fall risk. Reinforce activity limits and safety measures with patient and family. Provide visual clues: red socks.

## 2023-10-02 NOTE — DISCHARGE NOTE ADULT - APPOINTMENTS. PLEASE FOLLOW UP WITH YOUR DOCTOR WITHIN 3 DAYS OF DISCHARGE TO SCHEDULE YOUR NEXT BLOOD TEST.
Pt arrived for Coronary CT angiogram. Test, meds and side effects reviewed with pt. Resting  bpm, given total of 150  mg PO Metoprolol + 15 mg PO Ivabradine per verbal order. Administered 0.8 mg SL nitro and 20 mg IV metoprolol on CTA table per order. CTA completed. Patient tolerated procedure well and denies symptoms of allergic reaction. Post monitoring completed and VSS. D/C instructions reviewed with pt whom verbalized understanding of need to increase PO fluids today. D/C to gold waiting room accompanied by staff.   Statement Selected Complex Repair And V-Y Plasty Text: The defect edges were debeveled with a #15 scalpel blade.  The primary defect was closed partially with a complex linear closure.  Given the location of the remaining defect, shape of the defect and the proximity to free margins a V-Y plasty was deemed most appropriate for complete closure of the defect.  Using a sterile surgical marker, an appropriate advancement flap was drawn incorporating the defect and placing the expected incisions within the relaxed skin tension lines where possible.    The area thus outlined was incised deep to adipose tissue with a #15 scalpel blade.  The skin margins were undermined to an appropriate distance in all directions utilizing iris scissors.

## 2023-11-08 NOTE — PROGRESS NOTE ADULT - PROBLEM/PLAN-4
LAST OV WAS ON 9/7/22, ASSESSMENT/ PLAN:    Paroxysmal atrial fibrillation   CAC 0  CKD  Insulin resistance  Atrial level shunt with positive agitated saline study   Hypothyroidism  LDL 51 2/22        Continues to do well, no chest pain or shortness of breath is reported.  She is compliant with her medications.  She does report of daytime fatigue and she really correlates this to her thyroid medication dose adjustment, I do agree with the adjustments that have been done.  I did offer her sleep apnea screening however she does not think at this point that she will be benefiting from this.  I will follow-up with her in about 1 year.      INTERVAL PROGRESS SINCE LAST VISIT:  n/a      CARDIAC RELATED HOSPITAL/ ER VISIT SINCE LAST OV:   8/25/23 ED Admission Vertigo        REFERRALS TO OTHER SPECIALTIES:  Nephrology & Pulmonology      UPCOMING PROCEDURES: n/a        ANTICOAGULATION: Xarelto Tab - 20 MG   • Reason on/ not on: Afib      IMPLANTABLE CARDIAC DEVICES:    Implanted on n/a by  n/a for n/a .      LAST DEVICE INTERROGATION:   n/a        DATA REVIEWED:    3/30/22 CT Cardiac Calcium Scoring: Total coronary artery calcium score is 0.  A calcium score of 0 confers a low near term risk of adverse cardiac event related to    atherosclerosis but does not exclude noncalcified atherosclerosis. Mild mitral annular calcification. Mild aortic valve calcification.     5/19/20 Echo: BRANDON Summary: The LV EF is 65%. 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 There was definitive color doppler evidence. The bubble study was done, but there was no bubble crossing as shown in the TTE.There is no atrial appendage thrombus based on multiple views. The atrial velocity was within normal limit. There is no Aortic atherosclerosis. There is no significant regional wall motion abnormality. There is no significant valvular abnormality. We recommend clinical correlation if indicated.    5/13/20 Echo: The LVEF is 65-70%. There is no significant wall 
DISPLAY PLAN FREE TEXT
DISPLAY PLAN FREE TEXT

## 2023-12-05 NOTE — PHYSICAL THERAPY INITIAL EVALUATION ADULT - DISCHARGE PLANNER MADE AWARE
Recommend hold colestid, only take if diarrhea occurs  Do not use fleets enema or manual disimpaction  
yes

## 2023-12-08 NOTE — DIETITIAN INITIAL EVALUATION ADULT - FLUID ACCUMULATION
Health Maintenance Due   Topic Date Due    Chlamydia and Gonorrhea Screening (if sexually active)  Never done    Influenza Vaccine (1) 09/01/2023    COVID-19 Vaccine (3 - 2023-24 season) 09/01/2023    Depression Screening  05/09/2024       Patient is due for topics listed above, she wishes to proceed with Immunization(s) Influenza, but is not proceeding with Immunization(s) COVID-19 at this time. The following has occurred: declined   none noted per flow sheets

## 2024-01-11 NOTE — ED CLERICAL - NS ED CLERK UNITS
Called Renée Aguirre to discuss OT No-shows and inform her of future appointments which will be cancelled as a result. No answer. Her phone message states messages are not able to be left at this time.
APER

## 2024-02-07 NOTE — CONSULT NOTE ADULT - NSCONSULTADDITIONALINFOA_GEN_ALL_CORE
tablet  Commonly known as: MEDROL (YASIR)  Take by mouth.               Where to Get Your Medications        These medications were sent to [facility]/pharmacy #[id number] - [place], [place] - [address] - P [phone] - F [phone]  [address] [facility] [address]      Hours: 24-hours Phone: [phone]   lidocaine 5 %  methylPREDNISolone 4 MG tablet           DISCONTINUED MEDICATIONS:  Discharge Medication List as of 2/7/2024  6:16 PM          I have seen and evaluated the patient autonomously. My supervision physician was on site and available for consultation if needed.     I am the Primary Clinician of Record.   Manisha Wilcox PA-C (electronically signed)    (Please note that parts of this dictation were completed with voice recognition software. Quite often unanticipated grammatical, syntax, homophones, and other interpretive errors are inadvertently transcribed by the computer software. Please disregards these errors. Please excuse any errors that have escaped final proofreading.)     Manisha Wilcox PA-C  02/07/24 [id number]    
dw acp

## 2024-02-15 NOTE — SWALLOW BEDSIDE ASSESSMENT ADULT - SWALLOW EVAL: FEEDING ASSISTANCE
Pre-Surgery Instructions:   Medication Instructions    albuterol (Proventil HFA) 90 mcg/act inhaler Use day of surgery if needed and bring with you      escitalopram (LEXAPRO) 10 mg tablet Take this medication day of surgery if normally taken in the morning.      hydrOXYzine pamoate (VISTARIL) 50 mg capsule May use day of surgery if needed      nystatin (MYCOSTATIN) cream Hold day of surgery      triamcinolone (KENALOG) 0.1 % ointment Hold day of surgery      valACYclovir (VALTREX) 500 mg tablet Hold day of surgery      Medication instructions for day surgery reviewed. Please use only a sip of water to take your instructed medications. Avoid all over the counter vitamins, supplements and NSAIDS for one week prior to surgery per anesthesia guidelines. Tylenol is ok to take as needed.     You will receive a call one business day prior to surgery with an arrival time and hospital directions. If your surgery is scheduled on a Monday, the hospital will be calling you on the Friday prior to your surgery. If you have not heard from anyone by 8pm, please call the hospital supervisor through the hospital  at [phone]. ([place] [phone] or [place] [phone]).    Do not eat or drink anything after midnight the night before your surgery, including candy, mints, lifesavers, or chewing gum. Do not drink alcohol 24hrs before your surgery. Try not to smoke at least 24hrs before your surgery.       Follow the pre surgery showering instructions as listed in the “My Surgical Experience Booklet” or otherwise provided by your surgeon's office. Do not use a blade to shave the surgical area 1 week before surgery. It is okay to use a clean electric clippers up to 24 hours before surgery. Do not apply any lotions, creams, including makeup, cologne, deodorant, or perfumes after showering on the day of your surgery. Do not use dry shampoo, hair spray, hair gel, or any type of hair products.     No contact lenses, eye  make-up, or artificial eyelashes. Remove nail polish, including gel polish, and any artificial, gel, or acrylic nails if possible. Remove all jewelry including rings and body piercing jewelry.     Wear causal clothing that is easy to take on and off. Consider your type of surgery.    Keep any valuables, jewelry, piercings at home. Please bring any specially ordered equipment (sling, braces) if indicated.    Arrange for a responsible person to drive you to and from the hospital on the day of your surgery. Visitor Guidelines discussed.     Call the surgeon's office with any new illnesses, exposures, or additional questions prior to surgery.    Please reference your “My Surgical Experience Booklet” for additional information to prepare for your upcoming surgery.     set up only required

## 2024-03-08 NOTE — PHYSICAL THERAPY INITIAL EVALUATION ADULT - BED MOBILITY LIMITATIONS, REHAB EVAL
decreased ability to use arms for pushing/pulling/impaired ability to control trunk for mobility/decreased ability to use legs for bridging/pushing No indicators present

## 2024-05-01 NOTE — DIETITIAN INITIAL EVALUATION ADULT - NUTRITION CONSULT
Dr. Montilla administered the third dose of Euflexxa in your left knee.  Dr. Montilla has ordered an injection for your lumbar spine (low back). You should receive a phone call to help schedule this appointment. Otherwise, you can call [phone].  We scheduled your 2 week telephone follow up visit with Dr. Montilla after her neck injection (Injection scheduled for 5/6/2024).    yes

## 2024-05-10 NOTE — ED ADULT NURSE NOTE - CAS DISCH BELONGINGS RETURNED
-- DO NOT REPLY / DO NOT REPLY ALL --  -- Message is from Engagement Center Operations ([employer]) --    General Patient Message: Allie from [facility] called stating tst she sent an inbox message to Dr. Red this morning that pt was reporting frequent urination with pain at night- She is asking if a provider can approve UA with culture for patient? Allie is needing a call back to confirm- secure chat is an option as well     Caller Information         Type Contact Phone/Fax    05/10/2024 10:38 AM CDT Phone (Incoming) Dawson king rn [phone]          Alternative phone number: na    Can a detailed message be left? Yes    Message Turnaround:     Is it Working Hours? Yes - Working Hours                      Yes

## 2024-05-14 NOTE — PATIENT PROFILE ADULT. - NSTOBACCONEVERSMOKERY/N_GEN_A
Patient Education     Acute Pharyngitis: Presumed Strep (Child)  Pharyngitis is a sore throat. Sore throat is a common condition in children. It can be caused by an infection with the bacterium streptococcus. This is commonly known as strep throat.   Strep throat starts suddenly. Symptoms include a red, swollen throat and swollen lymph nodes, which make it painful to swallow. Red spots may appear on the roof of the mouth. Some children will be flushed and have a fever. Young children may not show that they feel pain. But they may refuse to eat or drink, or may drool a lot.   Strep throat is diagnosed with a rapid test or a throat culture. If the rapid test results are unclear, your child may need a throat culture. Results from the culture may take up to 2 days. This waiting period may be hard for you and your child. The doctor may prescribe medicines to treat fever and pain. Strep throat is very contagious. So your child must stay at home until your child's healthcare provider says they can go back to school or .   If a strep infection is confirmed, your child’s healthcare provider will prescribe antibiotic medicine. This may be given by injection or pills. Children with strep throat are contagious until they have been taking antibiotic medicine for 24 hours.     Home care  Medicines  Follow these guidelines when giving your child medicine at home:  If your child has pain or fever, you can give him or her medicine as advised by your child's healthcare provider.  Don't give your child any other medicine without first asking the provider.  Follow these tips when giving fever medicine to a normally healthy child:   Don’t give ibuprofen to children younger than 6 months old. Also don’t give ibuprofen to an older child who is vomiting constantly and is dehydrated.  Read the label before giving fever medicine. This is to make sure that you are giving the right dose. The dose should be right for your child’s age and  weight.  If your child is taking other medicine, check the list of ingredients. Look for acetaminophen or ibuprofen. If the medicine contains either of these, tell your child’s healthcare provider before giving your child the medicine. This is to prevent a possible overdose.  If your child is younger than 2 years, talk with your child’s healthcare provider before giving any medicines to find out the right medicine to use and how much to give.  Don’t give aspirin (or medicine that contains aspirin) to a child younger than age 19 unless directed by your child’s provider. Taking aspirin can put your child at risk for Reye syndrome. This is a rare but very serious disorder. It most often affects the brain and the liver. Note: Don't give your child any other medicine without first asking your child's healthcare provider, especially the first time.  General care  Keep your child home from school or day care until the provider tells you if your child has strep throat. Strep throat is very contagious.   If strep throat is confirmed  The healthcare provider will prescribe antibiotics. Follow all instructions for giving this medicine to your child. Make sure your child takes the medicine as directed until it's gone. You should not have any left over.  Limit your child's contact with others until he or she is no longer contagious. This is 24 hours after starting antibiotics or as advised by your child’s provider.   Tell people who may have had contact with your child about his or her illness. This may include school officials and  center workers.  Wash your hands with clean, running water and soap before and after caring for your child. This is to help prevent the spread of infection. Others should do the same.  Give your child plenty of time to rest.  Encourage your child to drink liquids.  Older children may prefer ice chips, cold drinks, frozen desserts, or ice pops.  Older children may also like warm chicken soup or  drinks with lemon and honey. Don’t give honey to a child younger than 1 year old.  Don’t force your child to eat. If your child feels like eating, don’t give him or her salty or spicy foods. These can irritate the throat.  Older children may gargle with warm salt water to ease throat pain. Have your child spit out the gargle afterward and not swallow it.     Follow-up care  Follow up with your child’s healthcare provider, or as directed.  When to seek medical advice  Call your child's healthcare provider right away if any of these occur:  Fever (see \"Fever and children,\" below)  Symptoms don’t get better after taking prescribed medicine or seem to be getting worse  New or worsening ear pain, sinus pain, or headache  Painful lumps in the back of neck  Lymph nodes are getting larger   Your child can’t swallow liquids, has lots of drooling, or can’t open his or her mouth wide because of throat pain  Signs of dehydration. These include very dark urine or no urine, sunken eyes, and dizziness.  Noisy breathing  Muffled voice  New rash  Call 911  Call 911 if your child has any of these:   Fever (see \"Fever and children\" below)  Trouble breathing  Confusion  Feeling drowsy or having trouble waking up  Unresponsive  Fainting or loss of consciousness  Fast (rapid) heart rate  Seizure  Stiff neck  Fever and children  Use a digital thermometer to check your child’s temperature. Don’t use a mercury thermometer. There are different kinds and uses of digital thermometers. They include:   Rectal. For children younger than 3 years, a rectal temperature is the most accurate.  Forehead (temporal). This works for children age 3 months and older. If a child under 3 months old has signs of illness, this can be used for a first pass. The provider may want to confirm with a rectal temperature.  Ear (tympanic). Ear temperatures are accurate after 6 months of age, but not before.  Armpit (axillary). This is the least reliable but may be used  for a first pass to check a child of any age with signs of illness. The provider may want to confirm with a rectal temperature.  Mouth (oral). Don’t use a thermometer in your child’s mouth until he or she is at least 4 years old.  Use the rectal thermometer with care. Follow the product maker’s directions for correct use. Insert it gently. Label it and make sure it’s not used in the mouth. It may pass on germs from the stool. If you don’t feel OK using a rectal thermometer, ask the healthcare provider what type to use instead. When you talk with any healthcare provider about your child’s fever, tell him or her which type you used.   Below are guidelines to know if your young child has a fever. Your child’s healthcare provider may give you different numbers for your child. Follow your provider’s specific instructions.   Fever readings for a baby under 3 months old:   First, ask your child’s healthcare provider how you should take the temperature.  Rectal or forehead: 100.4°F (38°C) or higher  Armpit: 99°F (37.2°C) or higher  Fever readings for a child age 3 months to 36 months (3 years):   Rectal, forehead, or ear: 102°F (38.9°C) or higher  Armpit: 101°F (38.3°C) or higher  Call the healthcare provider in these cases:   Repeated temperature of 104°F (40°C) or higher in a child of any age  Fever of 100.4° F (38° C) or higher in baby younger than 3 months  Fever that lasts more than 24 hours in a child under age 2  Fever that lasts for 3 days in a child age 2 or older  [employer] last reviewed this educational content on 3/1/2020  © 3722-4551 [employer]. All rights reserved. This information is not intended as a substitute for professional medical care. Always follow your healthcare professional's instructions.           No

## 2024-06-04 NOTE — PATIENT PROFILE ADULT - CAREGIVER NAME
Physical Therapy  Facility/Department: [address] MED SURG  Physical Therapy Initial Assessment and Discharge    Name: Clyde Salgado  : 1954  MRN: [id number]  Date of Service: 2024    Discharge Recommendations:  Home with assist PRN          Patient Diagnosis(es): The primary encounter diagnosis was Chest pain, unspecified type. Diagnoses of Pneumonia of right lung due to infectious organism, unspecified part of lung and Hypoxemia were also pertinent to this visit.  Past Medical History:  has a past medical history of Acquired hypothyroidism, DMII (diabetes mellitus, type 2) ([facility]), GERD (gastroesophageal reflux disease), Hyperlipidemia, Movement disorder, Pneumonia due to infectious agent, and Uncomplicated asthma.  Past Surgical History:  has a past surgical history that includes Rotator cuff repair.    Assessment   Assessment: Pt is a 69 y.o. male who presents to the ER on 6/3/24 for evaluation following an acute onset of chest pain and shortness of breath. Pt found to have pneumonia. Prior to admission, pt living with spouse in mobile home setting, independent with functional mobility, still working full time. Pt currently functioning slightly below baseline. Anticipate return home with PRN assist.  Therapy Prognosis: Good  Decision Making: Medium Complexity  History: see above  Exam: see below  Clinical Presentation: evolving  No Skilled PT: Safe to return home  Requires PT Follow-Up: No  Activity Tolerance  Activity Tolerance: Patient tolerated treatment well     Plan   Physical Therapy Plan  General Plan: Discharge  Safety Devices  Type of Devices: Call light within reach, Left in chair, Nurse notified     Restrictions  Restrictions/Precautions  Restrictions/Precautions: Up [place]  Position Activity Restriction  Other position/activity restrictions: 1L to RA     Subjective   General  Chart Reviewed: Yes  Patient assessed for rehabilitation services?: Yes  Additional Pertinent Hx: Pt is a 69 y.o.  Nitesh Colon

## 2024-10-04 NOTE — ED ADULT NURSE NOTE - NSFALLRSKINDICATORS_ED_ALL_ED
XR chest today in [address]  Please call central scheduling and get the ultrasound of your legs done  Please get your labs drawn    Call with any new or worsening symptoms   no

## 2025-03-12 NOTE — ED PROVIDER NOTE - NSICDXPASTMEDICALHX_GEN_ALL_CORE_FT
PAST MEDICAL HISTORY:  Benign prostatic hyperplasia     CAD (coronary artery disease)     CAD (coronary artery disease)     Diabetes mellitus     DM (diabetes mellitus)     Dyslipidemia     HLD (hyperlipidemia)     HTN (hypertension)     Hypertension     Hypospadias     Osteoporosis     Parkinson disease     Syncope     Urinary frequency      4 = No assist / stand by assistance

## 2025-06-19 NOTE — ED ADULT NURSE NOTE - EXTENSIONS OF SELF_ADULT
Cane show In setting of diarrhea and low solid PO intake. Will continue to replete to above with BID BMP. Plan for diarrhea as above In setting of diarrhea and low solid PO intake. Now resolved.